# Patient Record
Sex: FEMALE | Race: WHITE | Employment: UNEMPLOYED | URBAN - METROPOLITAN AREA
[De-identification: names, ages, dates, MRNs, and addresses within clinical notes are randomized per-mention and may not be internally consistent; named-entity substitution may affect disease eponyms.]

---

## 2017-01-11 ENCOUNTER — OFFICE VISIT (OUTPATIENT)
Dept: BEHAVIORAL/MENTAL HEALTH CLINIC | Age: 62
End: 2017-01-11

## 2017-01-11 VITALS
DIASTOLIC BLOOD PRESSURE: 73 MMHG | OXYGEN SATURATION: 98 % | WEIGHT: 196 LBS | HEIGHT: 65 IN | HEART RATE: 88 BPM | SYSTOLIC BLOOD PRESSURE: 142 MMHG | BODY MASS INDEX: 32.65 KG/M2

## 2017-01-11 DIAGNOSIS — F31.9 BIPOLAR 1 DISORDER (HCC): Primary | ICD-10-CM

## 2017-01-11 RX ORDER — LORAZEPAM 0.5 MG/1
TABLET ORAL
Qty: 60 TAB | Refills: 5 | Status: SHIPPED | OUTPATIENT
Start: 2017-01-11 | End: 2017-05-26

## 2017-01-11 RX ORDER — LITHIUM CARBONATE 300 MG/1
300 TABLET, FILM COATED, EXTENDED RELEASE ORAL
Qty: 30 TAB | Refills: 5 | Status: SHIPPED | OUTPATIENT
Start: 2017-01-11 | End: 2017-05-26

## 2017-01-11 RX ORDER — SERTRALINE HYDROCHLORIDE 100 MG/1
TABLET, FILM COATED ORAL
Qty: 30 TAB | Refills: 5 | Status: ON HOLD | OUTPATIENT
Start: 2017-01-11 | End: 2017-05-26

## 2017-01-11 RX ORDER — QUETIAPINE FUMARATE 100 MG/1
TABLET, FILM COATED ORAL
Qty: 15 TAB | Refills: 5 | Status: SHIPPED | OUTPATIENT
Start: 2017-01-11 | End: 2017-05-26
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
## 2017-01-13 DIAGNOSIS — I10 ESSENTIAL HYPERTENSION WITH GOAL BLOOD PRESSURE LESS THAN 130/80: ICD-10-CM

## 2017-01-13 RX ORDER — AMLODIPINE BESYLATE 10 MG/1
TABLET ORAL
Qty: 30 TAB | Refills: 0 | Status: SHIPPED | OUTPATIENT
Start: 2017-01-13 | End: 2017-01-18 | Stop reason: SDUPTHER

## 2017-01-18 ENCOUNTER — OFFICE VISIT (OUTPATIENT)
Dept: FAMILY MEDICINE CLINIC | Age: 62
End: 2017-01-18

## 2017-01-18 VITALS
RESPIRATION RATE: 20 BRPM | WEIGHT: 198 LBS | HEART RATE: 75 BPM | OXYGEN SATURATION: 93 % | TEMPERATURE: 97.5 F | BODY MASS INDEX: 32.99 KG/M2 | HEIGHT: 65 IN | DIASTOLIC BLOOD PRESSURE: 67 MMHG | SYSTOLIC BLOOD PRESSURE: 124 MMHG

## 2017-01-18 DIAGNOSIS — F17.210 CIGARETTE NICOTINE DEPENDENCE WITHOUT COMPLICATION: ICD-10-CM

## 2017-01-18 DIAGNOSIS — I10 ESSENTIAL HYPERTENSION WITH GOAL BLOOD PRESSURE LESS THAN 130/80: Primary | ICD-10-CM

## 2017-01-18 DIAGNOSIS — E78.2 HYPERCHOLESTEROLEMIA WITH HYPERTRIGLYCERIDEMIA: ICD-10-CM

## 2017-01-18 DIAGNOSIS — J20.9 ACUTE BRONCHITIS, UNSPECIFIED ORGANISM: ICD-10-CM

## 2017-01-18 DIAGNOSIS — E78.00 HYPERCHOLESTEREMIA: ICD-10-CM

## 2017-01-18 DIAGNOSIS — R73.03 PREDIABETES: ICD-10-CM

## 2017-01-18 RX ORDER — LISINOPRIL 40 MG/1
40 TABLET ORAL DAILY
Qty: 30 TAB | Refills: 1 | Status: SHIPPED | OUTPATIENT
Start: 2017-01-18 | End: 2017-03-15 | Stop reason: SDUPTHER

## 2017-01-18 RX ORDER — AZITHROMYCIN 250 MG/1
TABLET, FILM COATED ORAL
Qty: 6 TAB | Refills: 0 | Status: SHIPPED | OUTPATIENT
Start: 2017-01-18 | End: 2017-01-23

## 2017-01-18 RX ORDER — AMLODIPINE BESYLATE 10 MG/1
10 TABLET ORAL DAILY
Qty: 30 TAB | Refills: 0 | Status: SHIPPED | OUTPATIENT
Start: 2017-01-18 | End: 2017-02-11 | Stop reason: SDUPTHER

## 2017-01-18 RX ORDER — METOPROLOL SUCCINATE 25 MG/1
25 TABLET, EXTENDED RELEASE ORAL DAILY
Qty: 30 TAB | Refills: 1 | Status: SHIPPED | OUTPATIENT
Start: 2017-01-18 | End: 2017-03-15 | Stop reason: SDUPTHER

## 2017-01-18 NOTE — PATIENT INSTRUCTIONS
High Cholesterol: Care Instructions  Your Care Instructions  Cholesterol is a type of fat in your blood. It is needed for many body functions, such as making new cells. Cholesterol is made by your body. It also comes from food you eat. High cholesterol means that you have too much of the fat in your blood. This raises your risk of a heart attack and stroke. LDL and HDL are part of your total cholesterol. LDL is the \"bad\" cholesterol. High LDL can raise your risk for heart disease, heart attack, and stroke. HDL is the \"good\" cholesterol. It helps clear bad cholesterol from the body. High HDL is linked with a lower risk of heart disease, heart attack, and stroke. Your cholesterol levels help your doctor find out your risk for having a heart attack or stroke. You and your doctor can talk about whether you need to lower your risk and what treatment is best for you. A heart-healthy lifestyle along with medicines can help lower your cholesterol and your risk. The way you choose to lower your risk will depend on how high your risk is for heart attack and stroke. It will also depend on how you feel about taking medicines. Follow-up care is a key part of your treatment and safety. Be sure to make and go to all appointments, and call your doctor if you are having problems. It's also a good idea to know your test results and keep a list of the medicines you take. How can you care for yourself at home? · Eat a variety of foods every day. Good choices include fruits, vegetables, whole grains (like oatmeal), dried beans and peas, nuts and seeds, soy products (like tofu), and fat-free or low-fat dairy products. · Replace butter, margarine, and hydrogenated or partially hydrogenated oils with olive and canola oils. (Canola oil margarine without trans fat is fine.)  · Replace red meat with fish, poultry, and soy protein (like tofu). · Limit processed and packaged foods like chips, crackers, and cookies.   · Bake, broil, or steam foods. Don't west them. · Be physically active. Get at least 30 minutes of exercise on most days of the week. Walking is a good choice. You also may want to do other activities, such as running, swimming, cycling, or playing tennis or team sports. · Stay at a healthy weight or lose weight by making the changes in eating and physical activity listed above. Losing just a small amount of weight, even 5 to 10 pounds, can reduce your risk for having a heart attack or stroke. · Do not smoke. When should you call for help? Watch closely for changes in your health, and be sure to contact your doctor if:  · You need help making lifestyle changes. · You have questions about your medicine. Where can you learn more? Go to http://maria victoriaOCP Collectiveignacio.info/. Enter Q096 in the search box to learn more about \"High Cholesterol: Care Instructions. \"  Current as of: January 27, 2016  Content Version: 11.1  © 6531-2213 [employer]. Care instructions adapted under license by [employer] (which disclaims liability or warranty for this information). If you have questions about a medical condition or this instruction, always ask your healthcare professional. [address] any warranty or liability for your use of this information. DASH Diet: Care Instructions  Your Care Instructions  The DASH diet is an eating plan that can help lower your blood pressure. DASH stands for Dietary Approaches to Stop Hypertension. Hypertension is high blood pressure. The DASH diet focuses on eating foods that are high in calcium, potassium, and magnesium. These nutrients can lower blood pressure. The foods that are highest in these nutrients are fruits, vegetables, low-fat dairy products, nuts, seeds, and legumes. But taking calcium, potassium, and magnesium supplements instead of eating foods that are high in those nutrients does not have the same effect.  The DASH diet also includes whole grains, fish, and poultry. The DASH diet is one of several lifestyle changes your doctor may recommend to lower your high blood pressure. Your doctor may also want you to decrease the amount of sodium in your diet. Lowering sodium while following the DASH diet can lower blood pressure even further than just the DASH diet alone. Follow-up care is a key part of your treatment and safety. Be sure to make and go to all appointments, and call your doctor if you are having problems. It's also a good idea to know your test results and keep a list of the medicines you take. How can you care for yourself at home? Following the DASH diet  · Eat 4 to 5 servings of fruit each day. A serving is 1 medium-sized piece of fruit, ½ cup chopped or canned fruit, 1/4 cup dried fruit, or 4 ounces (½ cup) of fruit juice. Choose fruit more often than fruit juice. · Eat 4 to 5 servings of vegetables each day. A serving is 1 cup of lettuce or raw leafy vegetables, ½ cup of chopped or cooked vegetables, or 4 ounces (½ cup) of vegetable juice. Choose vegetables more often than vegetable juice. · Get 2 to 3 servings of low-fat and fat-free dairy each day. A serving is 8 ounces of milk, 1 cup of yogurt, or 1 ½ ounces of cheese. · Eat 6 to 8 servings of grains each day. A serving is 1 slice of bread, 1 ounce of dry cereal, or ½ cup of cooked rice, pasta, or cooked cereal. Try to choose whole-grain products as much as possible. · Limit lean meat, poultry, and fish to 2 servings each day. A serving is 3 ounces, about the size of a deck of cards. · Eat 4 to 5 servings of nuts, seeds, and legumes (cooked dried beans, lentils, and split peas) each week. A serving is 1/3 cup of nuts, 2 tablespoons of seeds, or ½ cup of cooked beans or peas. · Limit fats and oils to 2 to 3 servings each day. A serving is 1 teaspoon of vegetable oil or 2 tablespoons of salad dressing. · Limit sweets and added sugars to 5 servings or less a week. A serving is 1 tablespoon jelly or jam, ½ cup sorbet, or 1 cup of lemonade. · Eat less than 2,300 milligrams (mg) of sodium a day. If you limit your sodium to 1,500 mg a day, you can lower your blood pressure even more. Tips for success  · Start small. Do not try to make dramatic changes to your diet all at once. You might feel that you are missing out on your favorite foods and then be more likely to not follow the plan. Make small changes, and stick with them. Once those changes become habit, add a few more changes. · Try some of the following:  ¨ Make it a goal to eat a fruit or vegetable at every meal and at snacks. This will make it easy to get the recommended amount of fruits and vegetables each day. ¨ Try yogurt topped with fruit and nuts for a snack or healthy dessert. ¨ Add lettuce, tomato, cucumber, and onion to sandwiches. ¨ Combine a ready-made pizza crust with low-fat mozzarella cheese and lots of vegetable toppings. Try using tomatoes, squash, spinach, broccoli, carrots, cauliflower, and onions. ¨ Have a variety of cut-up vegetables with a low-fat dip as an appetizer instead of chips and dip. ¨ Sprinkle sunflower seeds or chopped almonds over salads. Or try adding chopped walnuts or almonds to cooked vegetables. ¨ Try some vegetarian meals using beans and peas. Add garbanzo or kidney beans to salads. Make burritos and tacos with mashed marques beans or black beans. Where can you learn more? Go to http://maria victoria-ignacio.info/. Enter Z098 in the search box to learn more about \"DASH Diet: Care Instructions. \"  Current as of: March 23, 2016  Content Version: 11.1  © 4952-2840 [employer]. Care instructions adapted under license by [employer] (which disclaims liability or warranty for this information).  If you have questions about a medical condition or this instruction, always ask your healthcare professional. Vignesh Ferrer disclaims any warranty or liability for your use of this information. Stopping Smoking: Care Instructions  Your Care Instructions  Cigarette smokers crave the nicotine in cigarettes. Giving it up is much harder than simply changing a habit. Your body has to stop craving the nicotine. It is hard to quit, but you can do it. There are many tools that people use to quit smoking. You may find that combining tools works best for you. There are several steps to quitting. First you get ready to quit. Then you get support to help you. After that, you learn new skills and behaviors to become a nonsmoker. For many people, a necessary step is getting and using medicine. Your doctor will help you set up the plan that best meets your needs. You may want to attend a smoking cessation program to help you quit smoking. When you choose a program, look for one that has proven success. Ask your doctor for ideas. You will greatly increase your chances of success if you take medicine as well as get counseling or join a cessation program.  Some of the changes you feel when you first quit tobacco are uncomfortable. Your body will miss the nicotine at first, and you may feel short-tempered and grumpy. You may have trouble sleeping or concentrating. Medicine can help you deal with these symptoms. You may struggle with changing your smoking habits and rituals. The last step is the tricky one: Be prepared for the smoking urge to continue for a time. This is a lot to deal with, but keep at it. You will feel better. Follow-up care is a key part of your treatment and safety. Be sure to make and go to all appointments, and call your doctor if you are having problems. Its also a good idea to know your test results and keep a list of the medicines you take. How can you care for yourself at home? · Ask your family, friends, and coworkers for support. You have a better chance of quitting if you have help and support.   · Join a support group, such as Nicotine Anonymous, for people who are trying to quit smoking. · Consider signing up for a smoking cessation program, such as the American Lung Association's Freedom from Smoking program.  · Set a quit date. Pick your date carefully so that it is not right in the middle of a big deadline or stressful time. Once you quit, do not even take a puff. Get rid of all ashtrays and lighters after your last cigarette. Clean your house and your clothes so that they do not smell of smoke. · Learn how to be a nonsmoker. Think about ways you can avoid those things that make you reach for a cigarette. ¨ Avoid situations that put you at greatest risk for smoking. For some people, it is hard to have a drink with friends without smoking. For others, they might skip a coffee break with coworkers who smoke. ¨ Change your daily routine. Take a different route to work or eat a meal in a different place. · Cut down on stress. Calm yourself or release tension by doing an activity you enjoy, such as reading a book, taking a hot bath, or gardening. · Talk to your doctor or pharmacist about nicotine replacement therapy, which replaces the nicotine in your body. You still get nicotine but you do not use tobacco. Nicotine replacement products help you slowly reduce the amount of nicotine you need. These products come in several forms, many of them available over-the-counter:  ¨ Nicotine patches  ¨ Nicotine gum and lozenges  ¨ Nicotine inhaler  · Ask your doctor about bupropion (Wellbutrin) or varenicline (Chantix), which are prescription medicines. They do not contain nicotine. They help you by reducing withdrawal symptoms, such as stress and anxiety. · Some people find hypnosis, acupuncture, and massage helpful for ending the smoking habit. · Eat a healthy diet and get regular exercise. Having healthy habits will help your body move past its craving for nicotine. · Be prepared to keep trying.  Most people are not successful the first few times they try to quit. Do not get mad at yourself if you smoke again. Make a list of things you learned and think about when you want to try again, such as next week, next month, or next year. Where can you learn more? Go to http://maria victoria-ignacio.info/. Enter Z719 in the search box to learn more about \"Stopping Smoking: Care Instructions. \"  Current as of: May 26, 2016  Content Version: 11.1  © 1366-9497 [employer]. Care instructions adapted under license by [employer] (which disclaims liability or warranty for this information). If you have questions about a medical condition or this instruction, always ask your healthcare professional. [address] any warranty or liability for your use of this information. Bronchitis: Care Instructions  Your Care Instructions    Bronchitis is inflammation of the bronchial tubes, which carry air to the lungs. The tubes swell and produce mucus, or phlegm. The mucus and inflamed bronchial tubes make you cough. You may have trouble breathing. Most cases of bronchitis are caused by viruses like those that cause colds. Antibiotics usually do not help and they may be harmful. Bronchitis usually develops rapidly and lasts about 2 to 3 weeks in otherwise healthy people. Follow-up care is a key part of your treatment and safety. Be sure to make and go to all appointments, and call your doctor if you are having problems. It's also a good idea to know your test results and keep a list of the medicines you take. How can you care for yourself at home? · Take all medicines exactly as prescribed. Call your doctor if you think you are having a problem with your medicine. · Get some extra rest.  · Take an over-the-counter pain medicine, such as acetaminophen (Tylenol), ibuprofen (Advil, Motrin), or naproxen (Aleve) to reduce fever and relieve body aches. Read and follow all instructions on the label.   · Do not take two or more pain medicines at the same time unless the doctor told you to. Many pain medicines have acetaminophen, which is Tylenol. Too much acetaminophen (Tylenol) can be harmful. · Take an over-the-counter cough medicine that contains dextromethorphan to help quiet a dry, hacking cough so that you can sleep. Avoid cough medicines that have more than one active ingredient. Read and follow all instructions on the label. · Breathe moist air from a humidifier, hot shower, or sink filled with hot water. The heat and moisture will thin mucus so you can cough it out. · Do not smoke. Smoking can make bronchitis worse. If you need help quitting, talk to your doctor about stop-smoking programs and medicines. These can increase your chances of quitting for good. When should you call for help? Call 911 anytime you think you may need emergency care. For example, call if:  · You have severe trouble breathing. Call your doctor now or seek immediate medical care if:  · You have new or worse trouble breathing. · You cough up dark brown or bloody mucus (sputum). · You have a new or higher fever. · You have a new rash. Watch closely for changes in your health, and be sure to contact your doctor if:  · You cough more deeply or more often, especially if you notice more mucus or a change in the color of your mucus. · You are not getting better as expected. Where can you learn more? Go to http://maria victoria-ignacio.info/. Enter H333 in the search box to learn more about \"Bronchitis: Care Instructions. \"  Current as of: May 23, 2016  Content Version: 11.1  © 9862-9027 [employer]. Care instructions adapted under license by [employer] (which disclaims liability or warranty for this information).  If you have questions about a medical condition or this instruction, always ask your healthcare professional. [place] [address] any warranty or liability for your use of this information.

## 2017-01-18 NOTE — PROGRESS NOTES
Chief Complaint   Patient presents with    Hypertension     1 month follow up      1. Have you been to the ER, urgent care clinic since your last visit? Hospitalized since your last visit? Yes psychiatry     2. Have you seen or consulted any other health care providers outside of the [address] [place] [address] since your last visit? Include any pap smears or colon screening.  No

## 2017-01-18 NOTE — MR AVS SNAPSHOT
Visit Information Date & Time Provider Department Dept. Phone Encounter #  
 1/18/2017 10:00 AM Robb Meza, RO[facility] [address] \[facility]\"" Primary Care [phone] [id number] Follow-up Instructions Return in about 3 months (around 4/18/2017) for f/u htn, lipids. Your Appointments 7/12/2017 11:00 AM  
ESTABLISHED PATIENT with Araseli Carcamo NP [facility] ([address] [facility]) Appt Note: 6 month follow-up [address] [facility] [address] [facility] [address] [address]  
  
   
 [address] [facility] [address] [facility] [address] [facility] [id number] Upcoming Health Maintenance Date Due Hepatitis C Screening 1955 Pneumococcal 19-64 Medium Risk (1 of 1 - PPSV23) 11/16/1975 DTaP/Tdap/Td series (1 - Tdap) 11/16/1977 PAP AKA CERVICAL CYTOLOGY 11/16/1977 BREAST CANCER SCRN MAMMOGRAM 11/16/2006 FOBT Q 1 YEAR AGE 50-75 11/16/2006 INFLUENZA AGE 9 TO ADULT 8/1/2016 ZOSTER VACCINE AGE 60> 11/16/2016 Allergies as of 1/18/2017  Review Complete On: 1/18/2017 By: Robb Meza NP No Known Allergies Current Immunizations  Never Reviewed No immunizations on file. Not reviewed this visit You Were Diagnosed With   
  
 Codes Comments Essential hypertension with goal blood pressure less than 130/80    -  Primary ICD-10-CM: I10 
ICD-9-CM: 401.9 Hypercholesterolemia with hypertriglyceridemia     ICD-10-CM: E78.2 ICD-9-CM: 272.2 Prediabetes     ICD-10-CM: R73.03 
ICD-9-CM: 790.29 Acute bronchitis, unspecified organism     ICD-10-CM: J20.9 ICD-9-CM: 466.0 Cigarette nicotine dependence without complication     ZDT-36-FD: F17.210 ICD-9-CM: 305.1 Vitals BP Pulse Temp Resp Height(growth percentile) Weight(growth percentile) 124/67 (BP 1 Location: Right arm, BP Patient Position: Sitting) 75 97.5 °F (36.4 °C) (Oral) 20 5' 5\" (1.651 m) 198 lb (89.8 kg) SpO2 BMI OB Status Smoking Status 93% 32.95 kg/m2 Postmenopausal Current Every Day Smoker Vitals History BMI and BSA Data Body Mass Index Body Surface Area 32.95 kg/m 2 2.03 m 2 Preferred Pharmacy Pharmacy Name Phone [facility] [place] [facility] [address], [address] [phone] Your Updated Medication List  
  
   
This list is accurate as of: 1/18/17 10:20 AM.  Always use your most recent med list. amLODIPine 10 mg tablet Commonly known as:  Elspeth Bakes Take 1 Tab by mouth daily. atorvastatin 20 mg tablet Commonly known as:  LIPITOR Take 1 Tab by mouth daily. azithromycin 250 mg tablet Commonly known as:  Ernestina Sheriff Take 2 tablets today, then take 1 tablet daily GAS-X 80 mg chewable tablet Generic drug:  simethicone Take 80 mg by mouth every six (6) hours as needed. guaiFENesin-dextromethorphan 600-30 mg per tablet Commonly known as:  Benoit & Benoit DM Take 1 Tab by mouth two (2) times a day for 5 days. lisinopril 40 mg tablet Commonly known as:  Celestina Primmer Take 1 Tab by mouth daily. lithium carbonate  mg CR tablet Commonly known as:  Carles Poot Take 1 Tab by mouth nightly. LORazepam 0.5 mg tablet Commonly known as:  ATIVAN  
TAKE ONE TABLET BY MOUTH TWICE A DAY FOR ANXIETY  
  
 metoprolol succinate 25 mg XL tablet Commonly known as:  TOPROL-XL Take 1 Tab by mouth daily. mupirocin 2 % ointment Commonly known as:  [facility] Apply  to affected area daily. QUEtiapine 100 mg tablet Commonly known as:  SEROquel TAKE ONE-HALF TABLET BY MOUTH EVERY NIGHT AT BEDTIME  
  
 sertraline 100 mg tablet Commonly known as:  ZOLOFT  
TAKE ONE TABLET BY MOUTH DAILY  
  
 triamcinolone acetonide 0.025 % topical cream  
Commonly known as:  KENALOG Apply  to affected area two (2) times a day. use thin layer VITAMIN D3 1,000 unit tablet Generic drug:  cholecalciferol Take 1,000 Units by mouth daily. Prescriptions Sent to Pharmacy Refills  
 amLODIPine (NORVASC) 10 mg tablet 0 Sig: Take 1 Tab by mouth daily. Class: Normal  
 Pharmacy: [address] Ph #: [phone] Route: Oral  
 metoprolol succinate (TOPROL-XL) 25 mg XL tablet 1 Sig: Take 1 Tab by mouth daily. Class: Normal  
 Pharmacy: [address] Ph #: [phone] Route: Oral  
 lisinopril (PRINIVIL, ZESTRIL) 40 mg tablet 1 Sig: Take 1 Tab by mouth daily. Class: Normal  
 Pharmacy: [address] Ph #: [phone] Route: Oral  
 azithromycin (ZITHROMAX) 250 mg tablet 0 Sig: Take 2 tablets today, then take 1 tablet daily Class: Normal  
 Pharmacy: [address] Ph #: [phone]  
 guaiFENesin-dextromethorphan (MUCINEX DM) 600-30 mg per tablet 0 Sig: Take 1 Tab by mouth two (2) times a day for 5 days. Class: Normal  
 Pharmacy: [address] Ph #: [phone] Route: Oral  
  
We Performed the Following LIPID PANEL [43606 CPT(R)] METABOLIC PANEL, COMPREHENSIVE [17033 CPT(R)] Follow-up Instructions Return in about 3 months (around 4/18/2017) for f/u htn, lipids. Patient Instructions High Cholesterol: Care Instructions Your Care Instructions Cholesterol is a type of fat in your blood. It is needed for many body functions, such as making new cells. Cholesterol is made by your body. It also comes from food you eat. High cholesterol means that you have too much of the fat in your blood. This raises your risk of a heart attack and stroke. LDL and HDL are part of your total cholesterol. LDL is the \"bad\" cholesterol.  High LDL can raise your risk for heart disease, heart attack, and stroke. HDL is the \"good\" cholesterol. It helps clear bad cholesterol from the body. High HDL is linked with a lower risk of heart disease, heart attack, and stroke. Your cholesterol levels help your doctor find out your risk for having a heart attack or stroke. You and your doctor can talk about whether you need to lower your risk and what treatment is best for you. A heart-healthy lifestyle along with medicines can help lower your cholesterol and your risk. The way you choose to lower your risk will depend on how high your risk is for heart attack and stroke. It will also depend on how you feel about taking medicines. Follow-up care is a key part of your treatment and safety. Be sure to make and go to all appointments, and call your doctor if you are having problems. It's also a good idea to know your test results and keep a list of the medicines you take. How can you care for yourself at home? · Eat a variety of foods every day. Good choices include fruits, vegetables, whole grains (like oatmeal), dried beans and peas, nuts and seeds, soy products (like tofu), and fat-free or low-fat dairy products. · Replace butter, margarine, and hydrogenated or partially hydrogenated oils with olive and canola oils. (Canola oil margarine without trans fat is fine.) · Replace red meat with fish, poultry, and soy protein (like tofu). · Limit processed and packaged foods like chips, crackers, and cookies. · Bake, broil, or steam foods. Don't west them. · Be physically active. Get at least 30 minutes of exercise on most days of the week. Walking is a good choice. You also may want to do other activities, such as running, swimming, cycling, or playing tennis or team sports. · Stay at a healthy weight or lose weight by making the changes in eating and physical activity listed above. Losing just a small amount of weight, even 5 to 10 pounds, can reduce your risk for having a heart attack or stroke. · Do not smoke. When should you call for help? Watch closely for changes in your health, and be sure to contact your doctor if: 
· You need help making lifestyle changes. · You have questions about your medicine. Where can you learn more? Go to http://maria victoria-ignacio.info/. Enter D477 in the search box to learn more about \"High Cholesterol: Care Instructions. \" Current as of: January 27, 2016 Content Version: 11.1 © 2006-2016 [employer]. Care instructions adapted under license by [employer] (which disclaims liability or warranty for this information). If you have questions about a medical condition or this instruction, always ask your healthcare professional. [place] [address] any warranty or liability for your use of this information. DASH Diet: Care Instructions Your Care Instructions The DASH diet is an eating plan that can help lower your blood pressure. DASH stands for Dietary Approaches to Stop Hypertension. Hypertension is high blood pressure. The DASH diet focuses on eating foods that are high in calcium, potassium, and magnesium. These nutrients can lower blood pressure. The foods that are highest in these nutrients are fruits, vegetables, low-fat dairy products, nuts, seeds, and legumes. But taking calcium, potassium, and magnesium supplements instead of eating foods that are high in those nutrients does not have the same effect. The DASH diet also includes whole grains, fish, and poultry. The DASH diet is one of several lifestyle changes your doctor may recommend to lower your high blood pressure. Your doctor may also want you to decrease the amount of sodium in your diet. Lowering sodium while following the DASH diet can lower blood pressure even further than just the DASH diet alone. Follow-up care is a key part of your treatment and safety.  Be sure to make and go to all appointments, and call your doctor if you are having problems. It's also a good idea to know your test results and keep a list of the medicines you take. How can you care for yourself at home? Following the DASH diet · Eat 4 to 5 servings of fruit each day. A serving is 1 medium-sized piece of fruit, ½ cup chopped or canned fruit, 1/4 cup dried fruit, or 4 ounces (½ cup) of fruit juice. Choose fruit more often than fruit juice. · Eat 4 to 5 servings of vegetables each day. A serving is 1 cup of lettuce or raw leafy vegetables, ½ cup of chopped or cooked vegetables, or 4 ounces (½ cup) of vegetable juice. Choose vegetables more often than vegetable juice. · Get 2 to 3 servings of low-fat and fat-free dairy each day. A serving is 8 ounces of milk, 1 cup of yogurt, or 1 ½ ounces of cheese. · Eat 6 to 8 servings of grains each day. A serving is 1 slice of bread, 1 ounce of dry cereal, or ½ cup of cooked rice, pasta, or cooked cereal. Try to choose whole-grain products as much as possible. · Limit lean meat, poultry, and fish to 2 servings each day. A serving is 3 ounces, about the size of a deck of cards. · Eat 4 to 5 servings of nuts, seeds, and legumes (cooked dried beans, lentils, and split peas) each week. A serving is 1/3 cup of nuts, 2 tablespoons of seeds, or ½ cup of cooked beans or peas. · Limit fats and oils to 2 to 3 servings each day. A serving is 1 teaspoon of vegetable oil or 2 tablespoons of salad dressing. · Limit sweets and added sugars to 5 servings or less a week. A serving is 1 tablespoon jelly or jam, ½ cup sorbet, or 1 cup of lemonade. · Eat less than 2,300 milligrams (mg) of sodium a day. If you limit your sodium to 1,500 mg a day, you can lower your blood pressure even more. Tips for success · Start small. Do not try to make dramatic changes to your diet all at once. You might feel that you are missing out on your favorite foods and then be more likely to not follow the plan.  Make small changes, and stick with them. Once those changes become habit, add a few more changes. · Try some of the following: ¨ Make it a goal to eat a fruit or vegetable at every meal and at snacks. This will make it easy to get the recommended amount of fruits and vegetables each day. ¨ Try yogurt topped with fruit and nuts for a snack or healthy dessert. ¨ Add lettuce, tomato, cucumber, and onion to sandwiches. ¨ Combine a ready-made pizza crust with low-fat mozzarella cheese and lots of vegetable toppings. Try using tomatoes, squash, spinach, broccoli, carrots, cauliflower, and onions. ¨ Have a variety of cut-up vegetables with a low-fat dip as an appetizer instead of chips and dip. ¨ Sprinkle sunflower seeds or chopped almonds over salads. Or try adding chopped walnuts or almonds to cooked vegetables. ¨ Try some vegetarian meals using beans and peas. Add garbanzo or kidney beans to salads. Make burritos and tacos with mashed marques beans or black beans. Where can you learn more? Go to http://maria [email or web address].info/. Enter T511 in the search box to learn more about \"DASH Diet: Care Instructions. \" Current as of: March 23, 2016 Content Version: 11.1 © 1596-3207 [employer]. Care instructions adapted under license by "[employer] (which disclaims liability or warranty for this information). If you have questions about a medical condition or this instruction, always ask your healthcare professional. [place] [address] any warranty or liability for your use of this information. Stopping Smoking: Care Instructions Your Care Instructions Cigarette smokers crave the nicotine in cigarettes. Giving it up is much harder than simply changing a habit. Your body has to stop craving the nicotine. It is hard to quit, but you can do it. There are many tools that people use to quit smoking. You may find that combining tools works best for you. There are several steps to quitting. First you get ready to quit. Then you get support to help you. After that, you learn new skills and behaviors to become a nonsmoker. For many people, a necessary step is getting and using medicine. Your doctor will help you set up the plan that best meets your needs. You may want to attend a smoking cessation program to help you quit smoking. When you choose a program, look for one that has proven success. Ask your doctor for ideas. You will greatly increase your chances of success if you take medicine as well as get counseling or join a cessation program. 
Some of the changes you feel when you first quit tobacco are uncomfortable. Your body will miss the nicotine at first, and you may feel short-tempered and grumpy. You may have trouble sleeping or concentrating. Medicine can help you deal with these symptoms. You may struggle with changing your smoking habits and rituals. The last step is the tricky one: Be prepared for the smoking urge to continue for a time. This is a lot to deal with, but keep at it. You will feel better. Follow-up care is a key part of your treatment and safety. Be sure to make and go to all appointments, and call your doctor if you are having problems. Its also a good idea to know your test results and keep a list of the medicines you take. How can you care for yourself at home? · Ask your family, friends, and coworkers for support. You have a better chance of quitting if you have help and support. · Join a support group, such as Nicotine Anonymous, for people who are trying to quit smoking. · Consider signing up for a smoking cessation program, such as the American Lung Association's Freedom from Smoking program. 
· Set a quit date. Pick your date carefully so that it is not right in the middle of a big deadline or stressful time. Once you quit, do not even take a puff.  Get rid of all ashtrays and lighters after your last cigarette. Clean your house and your clothes so that they do not smell of smoke. · Learn how to be a nonsmoker. Think about ways you can avoid those things that make you reach for a cigarette. ¨ Avoid situations that put you at greatest risk for smoking. For some people, it is hard to have a drink with friends without smoking. For others, they might skip a coffee break with coworkers who smoke. ¨ Change your daily routine. Take a different route to work or eat a meal in a different place. · Cut down on stress. Calm yourself or release tension by doing an activity you enjoy, such as reading a book, taking a hot bath, or gardening. · Talk to your doctor or pharmacist about nicotine replacement therapy, which replaces the nicotine in your body. You still get nicotine but you do not use tobacco. Nicotine replacement products help you slowly reduce the amount of nicotine you need. These products come in several forms, many of them available over-the-counter: ¨ Nicotine patches ¨ Nicotine gum and lozenges ¨ Nicotine inhaler · Ask your doctor about bupropion (Wellbutrin) or varenicline (Chantix), which are prescription medicines. They do not contain nicotine. They help you by reducing withdrawal symptoms, such as stress and anxiety. · Some people find hypnosis, acupuncture, and massage helpful for ending the smoking habit. · Eat a healthy diet and get regular exercise. Having healthy habits will help your body move past its craving for nicotine. · Be prepared to keep trying. Most people are not successful the first few times they try to quit. Do not get mad at yourself if you smoke again. Make a list of things you learned and think about when you want to try again, such as next week, next month, or next year. Where can you learn more? Go to http://maria victoria-ignacio.info/. Enter J536 in the search box to learn more about \"Stopping Smoking: Care Instructions. \" Current as of: May 26, 2016 Content Version: 11.1 © 2840-8036 [employer]. Care instructions adapted under license by [employer] (which disclaims liability or warranty for this information). If you have questions about a medical condition or this instruction, always ask your healthcare professional. [address] any warranty or liability for your use of this information. Bronchitis: Care Instructions Your Care Instructions Bronchitis is inflammation of the bronchial tubes, which carry air to the lungs. The tubes swell and produce mucus, or phlegm. The mucus and inflamed bronchial tubes make you cough. You may have trouble breathing. Most cases of bronchitis are caused by viruses like those that cause colds. Antibiotics usually do not help and they may be harmful. Bronchitis usually develops rapidly and lasts about 2 to 3 weeks in otherwise healthy people. Follow-up care is a key part of your treatment and safety. Be sure to make and go to all appointments, and call your doctor if you are having problems. It's also a good idea to know your test results and keep a list of the medicines you take. How can you care for yourself at home? · Take all medicines exactly as prescribed. Call your doctor if you think you are having a problem with your medicine. · Get some extra rest. 
· Take an over-the-counter pain medicine, such as acetaminophen (Tylenol), ibuprofen (Advil, Motrin), or naproxen (Aleve) to reduce fever and relieve body aches. Read and follow all instructions on the label. · Do not take two or more pain medicines at the same time unless the doctor told you to. Many pain medicines have acetaminophen, which is Tylenol. Too much acetaminophen (Tylenol) can be harmful. · Take an over-the-counter cough medicine that contains dextromethorphan to help quiet a dry, hacking cough so that you can sleep.  Avoid cough medicines that have more than one active ingredient. Read and follow all instructions on the label. · Breathe moist air from a humidifier, hot shower, or sink filled with hot water. The heat and moisture will thin mucus so you can cough it out. · Do not smoke. Smoking can make bronchitis worse. If you need help quitting, talk to your doctor about stop-smoking programs and medicines. These can increase your chances of quitting for good. When should you call for help? Call 911 anytime you think you may need emergency care. For example, call if: 
· You have severe trouble breathing. Call your doctor now or seek immediate medical care if: 
· You have new or worse trouble breathing. · You cough up dark brown or bloody mucus (sputum). · You have a new or higher fever. · You have a new rash. Watch closely for changes in your health, and be sure to contact your doctor if: 
· You cough more deeply or more often, especially if you notice more mucus or a change in the color of your mucus. · You are not getting better as expected. Where can you learn more? Go to http://maria victoria-ignacio.info/. Enter H333 in the search box to learn more about \"Bronchitis: Care Instructions. \" Current as of: May 23, 2016 Content Version: 11.1 © 4859-6060 Bookmycab, Inc[employer]. Care instructions adapted under license by gumi (which disclaims liability or warranty for this information). If you have questions about a medical condition or this instruction, always ask your healthcare professional. [place] [address] any warranty or liability for your use of this information. Introducing [facility]! [facility] [facility] introduces [employer] patient portal. Now you can access parts of your medical record, email your doctor's office, and request medication refills online. 1. In your internet browser, go to [email or web address] 2. Click on the First Time User? Click Here link in the Sign In box. You will see the New Member Sign Up page. 3. Enter your Ziegler Access Code exactly as it appears below. You will not need to use this code after youve completed the sign-up process. If you do not sign up before the expiration date, you must request a new code. · Ziegler Access Code: [email or web address] Expires: 4/18/2017 10:20 AM 
 
4. Enter the last four digits of your Social Security Number (xxxx) and Date of Birth (mm/dd/yyyy) as indicated and click Submit. You will be taken to the next sign-up page. 5. Create a Ziegler ID. This will be your Ziegler login ID and cannot be changed, so think of one that is secure and easy to remember. 6. Create a Ziegler password. You can change your password at any time. 7. Enter your Password Reset Question and Answer. This can be used at a later time if you forget your password. 8. Enter your e-mail address. You will receive e-mail notification when new information is available in [address] 9. Click Sign Up. You can now view and download portions of your medical record. 10. Click the Download Summary menu link to download a portable copy of your medical information. If you have questions, please visit the Frequently Asked Questions section of the Ziegler website. Remember, Ziegler is NOT to be used for urgent needs. For medical emergencies, dial 911. Now available from your iPhone and Android! Please provide this summary of care documentation to your next provider. Your primary care clinician is listed as Chidi Sifuentes. If you have any questions after today's visit, please call [phone].

## 2017-01-18 NOTE — PROGRESS NOTES
Subjective:     Sydnee Dick is a 62 y.o. female who presents for follow up of hypertension and hyperlipidemia. History is obtained from patient and . Diet and Lifestyle: not attempting to follow a low fat, low cholesterol diet, not attempting to follow a low sodium diet, sedentary, smoker 1/2 ppd  Home BP Monitoring: is not measured at home    Cardiovascular ROS: taking medications as instructed, no medication side effects noted, no TIA's, no chest pain on exertion, no dyspnea on exertion, no swelling of ankles. New concerns: c/o cough productive for green sputum, with headache and purulent nasal discharge, possibly some wheezing, x 2 weeks. No fever or chills.  states he was instructed to throw away all of her albuterol inhalers during her last hospital admission. Psychiatric symptoms stable, doing well on current medications. Denies psychosis. Has continued regular f/u visits with her mental health provider FATOUMATA Reid NP. Back on seroquel with improved mood and sleep. Patient Active Problem List   Diagnosis Code    Gallstones K80.20    Common bile duct calculus K80.50    Hydradenitis L73.2    Abscess of buttock L02.31    Bipolar 1 disorder (HCC) F31.9    Essential hypertension with goal blood pressure less than 130/80 I10    Prediabetes R73.03     No Known Allergies  Past Medical History   Diagnosis Date    Abscess of buttock 7/23/2012    Bronchitis     Common bile duct calculus 7/23/2012    Gallstones 7/12/2012    GERD (gastroesophageal reflux disease)     High cholesterol     Hypercholesterolemia     Hypertension     Ill-defined condition      missing upper front tooth    Ill-defined condition      lower leg bilateral reddened rash.  Insomnia     Other ill-defined conditions(799.89)      Large boil under right arm-pit.     Paranoia ([facility] [address]     Psychiatric disorder      bipolar     Past Surgical History   Procedure Laterality Date    Pr abdomen surgery proc unlisted       cholecystectomy 7/23/2012     Family History   Problem Relation Age of Onset    Cancer Father      lung    Diabetes Brother     Heart Disease Brother     Malignant Hyperthermia Neg Hx     Pseudocholinesterase Deficiency Neg Hx     Delayed Awakening Neg Hx     Post-op Nausea/Vomiting Neg Hx     Emergence Delirium Neg Hx     Post-op Cognitive Dysfunction Neg Hx     Other Neg Hx      Social History   Substance Use Topics    Smoking status: Current Every Day Smoker     Packs/day: 0.50    Smokeless tobacco: Never Used    Alcohol use No        Lab Results  Component Value Date/Time   Hemoglobin A1c 5.9 11/02/2016 10:29 AM   Hemoglobin A1c 6.0 05/31/2016 02:13 PM   Glucose 98 11/02/2016 10:29 AM   Glucose (POC) 115 10/31/2011 08:08 PM   LDL, calculated 281 11/02/2016 10:29 AM   Creatinine (POC) 0.6 10/31/2011 08:08 PM   Creatinine 0.90 11/02/2016 10:29 AM      Lab Results  Component Value Date/Time   Cholesterol, total 399 11/02/2016 10:29 AM   HDL Cholesterol 44 11/02/2016 10:29 AM   LDL, calculated 281 11/02/2016 10:29 AM   Triglyceride 368 11/02/2016 10:29 AM       Lab Results  Component Value Date/Time   ALT 16 11/02/2016 10:29 AM   AST 15 11/02/2016 10:29 AM   Alk. phosphatase 98 11/02/2016 10:29 AM   Bilirubin, direct 0.1 10/31/2011 08:05 PM   Bilirubin, total 0.2 11/02/2016 10:29 AM       Lab Results  Component Value Date/Time   GFR est AA 81 11/02/2016 10:29 AM   GFR est non-AA 70 11/02/2016 10:29 AM   Creatinine (POC) 0.6 10/31/2011 08:08 PM   Creatinine 0.90 11/02/2016 10:29 AM   BUN 16 11/02/2016 10:29 AM   BUN (POC) 12 10/31/2011 08:08 PM   Sodium (POC) 136 10/31/2011 08:08 PM   Sodium 137 11/02/2016 10:29 AM   Potassium 4.6 11/02/2016 10:29 AM   Potassium (POC) 3.6 10/31/2011 08:08 PM   Chloride (POC) 102 10/31/2011 08:08 PM   Chloride 99 11/02/2016 10:29 AM   CO2 23 11/02/2016 10:29 AM         Review of Systems, additional:  Pertinent items are noted in HPI.     Objective:     Visit Vitals    /67 (BP 1 Location: Right arm, BP Patient Position: Sitting)    Pulse 75    Temp 97.5 °F (36.4 °C) (Oral)    Resp 20    Ht 5' 5\" (1.651 m)    Wt 198 lb (89.8 kg)    SpO2 93%    BMI 32.95 kg/m2     Appearance: alert, well appearing, and in no distress, oriented to person, place, and time and overweight. General exam: CVS exam BP noted to be well controlled today in office, S1, S2 normal, no gallop, no murmur, chest clear, no JVD, no HSM, no edema, peripheral vascular exam both carotids normal upstroke without bruits, neurological exam alert, oriented, normal speech, no focal findings or movement disorder noted. Lab review: labs reviewed, I note that glycosylated hemoglobin elevated in prediabetes range, lipids LDL result does not yet meet goal, HDL normal, triglycerides high, renal functions normal, liver functions normal.     Assessment/Plan:     hypertension well controlled, hyperlipidemia poorly controlled. reviewed diet, exercise and weight control  very strongly urged to quit smoking to reduce cardiovascular risk  copy of written low fat low cholesterol diet provided and reviewed  cardiovascular risk and specific lipid/LDL goals reviewed  reviewed medications and side effects in detail. [place]into was seen today for hypertension and cough. Diagnoses and all orders for this visit:    Essential hypertension with goal blood pressure less than 130/80  At goal  Continue current regimen  Weight loss  Increase exercise  Smoking cessation  DASH diet  -     METABOLIC PANEL, COMPREHENSIVE  -     amLODIPine (NORVASC) 10 mg tablet; Take 1 Tab by mouth daily. -     metoprolol succinate (TOPROL-XL) 25 mg XL tablet; Take 1 Tab by mouth daily. -     lisinopril (PRINIVIL, ZESTRIL) 40 mg tablet; Take 1 Tab by mouth daily.     Hypercholesterolemia with hypertriglyceridemia  TLC Diet:  -- Saturated fat <7% of calories, cholesterol <200 mg/day  -- Consider increased viscous (soluble) fiber (10-25 g/day) and plant stanols/sterols  (2g/day) as therapeutic options to enhance LDL lowering  Weight management  Increased physical activity  Continue statin, adjust dose pending labs  -     METABOLIC PANEL, COMPREHENSIVE  -     LIPID PANEL    Prediabetes  Weight loss  Decrease sugars and simple carbs    Acute bronchitis, unspecified organism  Add Rx  -     azithromycin (ZITHROMAX) 250 mg tablet; Take 2 tablets today, then take 1 tablet daily  -     guaiFENesin-dextromethorphan (MUCINEX DM) 600-30 mg per tablet; Take 1 Tab by mouth two (2) times a day for 5 days. Cigarette nicotine dependence without complication  Continue wean to quit  Continue nicotine patches  We have set a goal to quit by next f/u visit    Follow-up Disposition:  Return in about 3 months (around 4/18/2017) for f/u htn, lipids. I have discussed the diagnosis with the patient and the intended plan as seen in the above orders. The patient has received an after-visit summary and questions were answered concerning future plans. Patient conveyed understanding of the plan at the time of the visit.     Ej Israel NP  01/18/17

## 2017-01-19 LAB
ALBUMIN SERPL-MCNC: 4.5 G/DL (ref 3.6–4.8)
ALBUMIN/GLOB SERPL: 1.4 {RATIO} (ref 1.1–2.5)
ALP SERPL-CCNC: 98 IU/L (ref 39–117)
ALT SERPL-CCNC: 17 IU/L (ref 0–32)
AST SERPL-CCNC: 15 IU/L (ref 0–40)
BILIRUB SERPL-MCNC: 0.3 MG/DL (ref 0–1.2)
BUN SERPL-MCNC: 12 MG/DL (ref 8–27)
BUN/CREAT SERPL: 14 (ref 11–26)
CALCIUM SERPL-MCNC: 9.6 MG/DL (ref 8.7–10.3)
CHLORIDE SERPL-SCNC: 100 MMOL/L (ref 96–106)
CHOLEST SERPL-MCNC: 244 MG/DL (ref 100–199)
CO2 SERPL-SCNC: 23 MMOL/L (ref 18–29)
CREAT SERPL-MCNC: 0.85 MG/DL (ref 0.57–1)
GLOBULIN SER CALC-MCNC: 3.3 G/DL (ref 1.5–4.5)
GLUCOSE SERPL-MCNC: 107 MG/DL (ref 65–99)
HDLC SERPL-MCNC: 47 MG/DL
INTERPRETATION, 910389: NORMAL
LDLC SERPL CALC-MCNC: ABNORMAL MG/DL (ref 0–99)
POTASSIUM SERPL-SCNC: 4.7 MMOL/L (ref 3.5–5.2)
PROT SERPL-MCNC: 7.8 G/DL (ref 6–8.5)
SODIUM SERPL-SCNC: 138 MMOL/L (ref 134–144)
TRIGL SERPL-MCNC: 420 MG/DL (ref 0–149)
VLDLC SERPL CALC-MCNC: ABNORMAL MG/DL (ref 5–40)

## 2017-01-19 RX ORDER — ATORVASTATIN CALCIUM 40 MG/1
40 TABLET, FILM COATED ORAL DAILY
Qty: 30 TAB | Refills: 1 | Status: SHIPPED | OUTPATIENT
Start: 2017-01-19 | End: 2017-03-15 | Stop reason: SDUPTHER

## 2017-01-19 NOTE — PROGRESS NOTES
Total cholesterol improved by triglycerides are worse. Need to decrease sugar intake (sugars, simple carbs) to address this. Will also increase your cholesterol medication to 40 mg. Recheck in 6-8 weeks fasting.

## 2017-02-11 DIAGNOSIS — I10 ESSENTIAL HYPERTENSION WITH GOAL BLOOD PRESSURE LESS THAN 130/80: ICD-10-CM

## 2017-02-11 RX ORDER — AMLODIPINE BESYLATE 10 MG/1
TABLET ORAL
Qty: 30 TAB | Refills: 0 | Status: SHIPPED | OUTPATIENT
Start: 2017-02-11 | End: 2017-04-13 | Stop reason: SDUPTHER

## 2017-03-15 ENCOUNTER — OFFICE VISIT (OUTPATIENT)
Dept: FAMILY MEDICINE CLINIC | Age: 62
End: 2017-03-15

## 2017-03-15 VITALS
RESPIRATION RATE: 18 BRPM | HEIGHT: 65 IN | SYSTOLIC BLOOD PRESSURE: 135 MMHG | OXYGEN SATURATION: 95 % | WEIGHT: 197 LBS | DIASTOLIC BLOOD PRESSURE: 74 MMHG | HEART RATE: 67 BPM | BODY MASS INDEX: 32.82 KG/M2 | TEMPERATURE: 97.7 F

## 2017-03-15 DIAGNOSIS — F31.9 BIPOLAR 1 DISORDER (HCC): ICD-10-CM

## 2017-03-15 DIAGNOSIS — E78.00 HYPERCHOLESTEREMIA: Primary | ICD-10-CM

## 2017-03-15 DIAGNOSIS — I10 ESSENTIAL HYPERTENSION WITH GOAL BLOOD PRESSURE LESS THAN 130/80: ICD-10-CM

## 2017-03-15 DIAGNOSIS — R73.03 PREDIABETES: ICD-10-CM

## 2017-03-15 RX ORDER — METOPROLOL SUCCINATE 25 MG/1
25 TABLET, EXTENDED RELEASE ORAL DAILY
Qty: 30 TAB | Refills: 5 | Status: SHIPPED | OUTPATIENT
Start: 2017-03-15 | End: 2017-10-30 | Stop reason: SDUPTHER

## 2017-03-15 RX ORDER — ATORVASTATIN CALCIUM 40 MG/1
40 TABLET, FILM COATED ORAL DAILY
Qty: 30 TAB | Refills: 5 | Status: SHIPPED | OUTPATIENT
Start: 2017-03-15 | End: 2017-05-11 | Stop reason: SDUPTHER

## 2017-03-15 RX ORDER — LISINOPRIL 40 MG/1
40 TABLET ORAL DAILY
Qty: 30 TAB | Refills: 5 | Status: SHIPPED | OUTPATIENT
Start: 2017-03-15 | End: 2017-05-26

## 2017-03-15 NOTE — MR AVS SNAPSHOT
Visit Information Date & Time Provider Department Dept. Phone Encounter #  
 3/15/2017 10:00 AM David Hall  [facility] [phone] [id number] Follow-up Instructions Return in about 3 months (around 6/15/2017) for f/u cholesterol, BP, prediabetes. Your Appointments 7/12/2017 11:00 AM  
ESTABLISHED PATIENT with Shanda Metcalf NP [facility] (Ammon Jaeger) Appt Note: 6 month follow-up [address] [facility] [place] [address] [facility] [address]  
  
   
 [address] [facility] [id number] Upcoming Health Maintenance Date Due Hepatitis C Screening 1955 Pneumococcal 19-64 Medium Risk (1 of 1 - PPSV23) 11/16/1975 DTaP/Tdap/Td series (1 - Tdap) 11/16/1977 PAP AKA CERVICAL CYTOLOGY 11/16/1977 BREAST CANCER SCRN MAMMOGRAM 11/16/2006 FOBT Q 1 YEAR AGE 50-75 11/16/2006 INFLUENZA AGE 9 TO ADULT 8/1/2016 ZOSTER VACCINE AGE 60> 11/16/2016 Allergies as of 3/15/2017  Review Complete On: 3/15/2017 By: David Hall NP No Known Allergies Current Immunizations  Never Reviewed No immunizations on file. Not reviewed this visit You Were Diagnosed With   
  
 Codes Comments Hypercholesteremia    -  Primary ICD-10-CM: E78.00 ICD-9-CM: 272.0 Essential hypertension with goal blood pressure less than 130/80     ICD-10-CM: I10 
ICD-9-CM: 401.9 Bipolar 1 disorder (HCC)     ICD-10-CM: F31.9 ICD-9-CM: 296.7 Prediabetes     ICD-10-CM: R73.03 
ICD-9-CM: 790.29 Vitals BP Pulse Temp Resp Height(growth percentile) Weight(growth percentile) 135/74 (BP 1 Location: Left arm, BP Patient Position: Sitting) 67 97.7 °F (36.5 °C) (Oral) 18 5' 5\" (1.651 m) 197 lb (89.4 kg) SpO2 BMI OB Status Smoking Status 95% 32.78 kg/m2 Postmenopausal Current Every Day Smoker Vitals History BMI and BSA Data Body Mass Index Body Surface Area 32.78 kg/m 2 2.02 m 2 Preferred Pharmacy Pharmacy Name Phone [facility] [address] [facility], [facility] [facility] [address] [phone] Your Updated Medication List  
  
   
This list is accurate as of: 3/15/17 10:32 AM.  Always use your most recent med list. amLODIPine 10 mg tablet Commonly known as:  Rudene Post TAKE ONE TABLET BY MOUTH DAILY  
  
 atorvastatin 40 mg tablet Commonly known as:  LIPITOR Take 1 Tab by mouth daily. GAS-X 80 mg chewable tablet Generic drug:  simethicone Take 80 mg by mouth every six (6) hours as needed. lisinopril 40 mg tablet Commonly known as:  Delsie Commander Take 1 Tab by mouth daily. lithium carbonate  mg CR tablet Commonly known as:  Neo Ormond Take 1 Tab by mouth nightly. LORazepam 0.5 mg tablet Commonly known as:  ATIVAN  
TAKE ONE TABLET BY MOUTH TWICE A DAY FOR ANXIETY  
  
 metoprolol succinate 25 mg XL tablet Commonly known as:  TOPROL-XL Take 1 Tab by mouth daily. mupirocin 2 % ointment Commonly known as:  [employer] Apply  to affected area daily. QUEtiapine 100 mg tablet Commonly known as:  SEROquel TAKE ONE-HALF TABLET BY MOUTH EVERY NIGHT AT BEDTIME  
  
 sertraline 100 mg tablet Commonly known as:  ZOLOFT  
TAKE ONE TABLET BY MOUTH DAILY  
  
 triamcinolone acetonide 0.025 % topical cream  
Commonly known as:  KENALOG Apply  to affected area two (2) times a day. use thin layer VITAMIN D3 1,000 unit tablet Generic drug:  cholecalciferol Take 1,000 Units by mouth daily. Prescriptions Sent to Pharmacy Refills  
 atorvastatin (LIPITOR) 40 mg tablet 5 Sig: Take 1 Tab by mouth daily. Class: Normal  
 Pharmacy: Darian Lal, [address] [place] [address] #: [phone]  Route: Oral  
 metoprolol succinate (TOPROL-XL) 25 mg XL tablet 5  
 Sig: Take 1 Tab by mouth daily. Class: Normal  
 [facility] [address] [facility] [employer] Ph #: [phone] Route: Oral  
 lisinopril (PRINIVIL, ZESTRIL) 40 mg tablet 5 Sig: Take 1 Tab by mouth daily. Class: Normal  
 [facility], [address] [facility] [employer] Ph #: [phone] Route: Oral  
  
We Performed the Following LIPID PANEL [56925 CPT(R)] METABOLIC PANEL, COMPREHENSIVE [13380 CPT(R)] Follow-up Instructions Return in about 3 months (around 6/15/2017) for f/u cholesterol, BP, prediabetes. Patient Instructions High Blood Pressure: Care Instructions Your Care Instructions If your blood pressure is usually above 140/90, you have high blood pressure, or hypertension. That means the top number is 140 or higher or the bottom number is 90 or higher, or both. Despite what a lot of people think, high blood pressure usually doesn't cause headaches or make you feel dizzy or lightheaded. It usually has no symptoms. But it does increase your risk for heart attack, stroke, and kidney or eye damage. The higher your blood pressure, the more your risk increases. Your doctor will give you a goal for your blood pressure. Your goal will be based on your health and your age. An example of a goal is to keep your blood pressure below 140/90. Lifestyle changes, such as eating healthy and being active, are always important to help lower blood pressure. You might also take medicine to reach your blood pressure goal. 
Follow-up care is a key part of your treatment and safety. Be sure to make and go to all appointments, and call your doctor if you are having problems. It's also a good idea to know your test results and keep a list of the medicines you take. How can you care for yourself at home? Medical treatment · If you stop taking your medicine, your blood pressure will go back up. You may take one or more types of medicine to lower your blood pressure. Be safe with medicines. Take your medicine exactly as prescribed. Call your doctor if you think you are having a problem with your medicine. · Talk to your doctor before you start taking aspirin every day. Aspirin can help certain people lower their risk of a heart attack or stroke. But taking aspirin isn't right for everyone, because it can cause serious bleeding. · See your doctor regularly. You may need to see the doctor more often at first or until your blood pressure comes down. · If you are taking blood pressure medicine, talk to your doctor before you take decongestants or anti-inflammatory medicine, such as ibuprofen. Some of these medicines can raise blood pressure. · Learn how to check your blood pressure at home. Lifestyle changes · Stay at a healthy weight. This is especially important if you put on weight around the waist. Losing even 10 pounds can help you lower your blood pressure. · If your doctor recommends it, get more exercise. Walking is a good choice. Bit by bit, increase the amount you walk every day. Try for at least 30 minutes on most days of the week. You also may want to swim, bike, or do other activities. · Avoid or limit alcohol. Talk to your doctor about whether you can drink any alcohol. · Try to limit how much sodium you eat to less than 2,300 milligrams (mg) a day. Your doctor may ask you to try to eat less than 1,500 mg a day. · Eat plenty of fruits (such as bananas and oranges), vegetables, legumes, whole grains, and low-fat dairy products. · Lower the amount of saturated fat in your diet. Saturated fat is found in animal products such as milk, cheese, and meat. Limiting these foods may help you lose weight and also lower your risk for heart disease. · Do not smoke. Smoking increases your risk for heart attack and stroke.  If you need help quitting, talk to your doctor about stop-smoking programs and medicines. These can increase your chances of quitting for good. When should you call for help? Call 911 anytime you think you may need emergency care. This may mean having symptoms that suggest that your blood pressure is causing a serious heart or blood vessel problem. Your blood pressure may be over 180/110. For example, call 911 if: 
· You have symptoms of a heart attack. These may include: ¨ Chest pain or pressure, or a strange feeling in the chest. 
¨ Sweating. ¨ Shortness of breath. ¨ Nausea or vomiting. ¨ Pain, pressure, or a strange feeling in the back, neck, jaw, or upper belly or in one or both shoulders or arms. ¨ Lightheadedness or sudden weakness. ¨ A fast or irregular heartbeat. · You have symptoms of a stroke. These may include: 
¨ Sudden numbness, tingling, weakness, or loss of movement in your face, arm, or leg, especially on only one side of your body. ¨ Sudden vision changes. ¨ Sudden trouble speaking. ¨ Sudden confusion or trouble understanding simple statements. ¨ Sudden problems with walking or balance. ¨ A sudden, severe headache that is different from past headaches. · You have severe back or belly pain. Do not wait until your blood pressure comes down on its own. Get help right away. Call your doctor now or seek immediate care if: 
· Your blood pressure is much higher than normal (such as 180/110 or higher), but you don't have symptoms. · You think high blood pressure is causing symptoms, such as: ¨ Severe headache. ¨ Blurry vision. Watch closely for changes in your health, and be sure to contact your doctor if: 
· Your blood pressure measures 140/90 or higher at least 2 times. That means the top number is 140 or higher or the bottom number is 90 or higher, or both. · You think you may be having side effects from your blood pressure medicine. · Your blood pressure is usually normal, but it goes above normal at least 2 times. Where can you learn more? Go to http://maria victoria-ignacio.info/. Enter Y545 in the search box to learn more about \"High Blood Pressure: Care Instructions. \" Current as of: August 8, 2016 Content Version: 11.1 © 8183-7608 [employer]. Care instructions adapted under license by [employer] (which disclaims liability or warranty for this information). If you have questions about a medical condition or this instruction, always ask your healthcare professional. [address] any warranty or liability for your use of this information. DASH Diet: Care Instructions Your Care Instructions The DASH diet is an eating plan that can help lower your blood pressure. DASH stands for Dietary Approaches to Stop Hypertension. Hypertension is high blood pressure. The DASH diet focuses on eating foods that are high in calcium, potassium, and magnesium. These nutrients can lower blood pressure. The foods that are highest in these nutrients are fruits, vegetables, low-fat dairy products, nuts, seeds, and legumes. But taking calcium, potassium, and magnesium supplements instead of eating foods that are high in those nutrients does not have the same effect. The DASH diet also includes whole grains, fish, and poultry. The DASH diet is one of several lifestyle changes your doctor may recommend to lower your high blood pressure. Your doctor may also want you to decrease the amount of sodium in your diet. Lowering sodium while following the DASH diet can lower blood pressure even further than just the DASH diet alone. Follow-up care is a key part of your treatment and safety. Be sure to make and go to all appointments, and call your doctor if you are having problems. It's also a good idea to know your test results and keep a list of the medicines you take. How can you care for yourself at home? Following the DASH diet · Eat 4 to 5 servings of fruit each day.  A serving is 1 medium-sized piece of fruit, ½ cup chopped or canned fruit, 1/4 cup dried fruit, or 4 ounces (½ cup) of fruit juice. Choose fruit more often than fruit juice. · Eat 4 to 5 servings of vegetables each day. A serving is 1 cup of lettuce or raw leafy vegetables, ½ cup of chopped or cooked vegetables, or 4 ounces (½ cup) of vegetable juice. Choose vegetables more often than vegetable juice. · Get 2 to 3 servings of low-fat and fat-free dairy each day. A serving is 8 ounces of milk, 1 cup of yogurt, or 1 ½ ounces of cheese. · Eat 6 to 8 servings of grains each day. A serving is 1 slice of bread, 1 ounce of dry cereal, or ½ cup of cooked rice, pasta, or cooked cereal. Try to choose whole-grain products as much as possible. · Limit lean meat, poultry, and fish to 2 servings each day. A serving is 3 ounces, about the size of a deck of cards. · Eat 4 to 5 servings of nuts, seeds, and legumes (cooked dried beans, lentils, and split peas) each week. A serving is 1/3 cup of nuts, 2 tablespoons of seeds, or ½ cup of cooked beans or peas. · Limit fats and oils to 2 to 3 servings each day. A serving is 1 teaspoon of vegetable oil or 2 tablespoons of salad dressing. · Limit sweets and added sugars to 5 servings or less a week. A serving is 1 tablespoon jelly or jam, ½ cup sorbet, or 1 cup of lemonade. · Eat less than 2,300 milligrams (mg) of sodium a day. If you limit your sodium to 1,500 mg a day, you can lower your blood pressure even more. Tips for success · Start small. Do not try to make dramatic changes to your diet all at once. You might feel that you are missing out on your favorite foods and then be more likely to not follow the plan. Make small changes, and stick with them. Once those changes become habit, add a few more changes. · Try some of the following: ¨ Make it a goal to eat a fruit or vegetable at every meal and at snacks. This will make it easy to get the recommended amount of fruits and vegetables each day. ¨ Try yogurt topped with fruit and nuts for a snack or healthy dessert. ¨ Add lettuce, tomato, cucumber, and onion to sandwiches. ¨ Combine a ready-made pizza crust with low-fat mozzarella cheese and lots of vegetable toppings. Try using tomatoes, squash, spinach, broccoli, carrots, cauliflower, and onions. ¨ Have a variety of cut-up vegetables with a low-fat dip as an appetizer instead of chips and dip. ¨ Sprinkle sunflower seeds or chopped almonds over salads. Or try adding chopped walnuts or almonds to cooked vegetables. ¨ Try some vegetarian meals using beans and peas. Add garbanzo or kidney beans to salads. Make burritos and tacos with mashed marques beans or black beans. Where can you learn more? Go to http://maria victoriaWeb International Englishignacio.info/. Enter F623 in the search box to learn more about \"DASH Diet: Care Instructions. \" Current as of: March 23, 2016 Content Version: 11.1 © 9107-0111 [employer]. Care instructions adapted under license by [employer]Share (which disclaims liability or warranty for this information). If you have questions about a medical condition or this instruction, always ask your healthcare professional. [address] any warranty or liability for your use of this information. Heart-Healthy Diet: Care Instructions Your Care Instructions A heart-healthy diet has lots of vegetables, fruits, nuts, beans, and whole grains, and is low in salt. It limits foods that are high in saturated fat, such as meats, cheeses, and fried foods. It may be hard to change your diet, but even small changes can lower your risk of heart attack and heart disease. Follow-up care is a key part of your treatment and safety. Be sure to make and go to all appointments, and call your doctor if you are having problems. It's also a good idea to know your test results and keep a list of the medicines you take. How can you care for yourself at home? Watch your portions · Learn what a serving is. A \"serving\" and a \"portion\" are not always the same thing. Make sure that you are not eating larger portions than are recommended. For example, a serving of pasta is ½ cup. A serving size of meat is 2 to 3 ounces. A 3-ounce serving is about the size of a deck of cards. Measure serving sizes until you are good at [place]" them. Keep in mind that restaurants often serve portions that are 2 or 3 times the size of one serving. · To keep your energy level up and keep you from feeling hungry, eat often but in smaller portions. · Eat only the number of calories you need to stay at a healthy weight. If you need to lose weight, eat fewer calories than your body burns (through exercise and other physical activity). Eat more fruits and vegetables · Eat a variety of fruit and vegetables every day. Dark green, deep orange, red, or yellow fruits and vegetables are especially good for you. Examples include spinach, carrots, peaches, and berries. · Keep carrots, celery, and other veggies handy for snacks. Buy fruit that is in season and store it where you can see it so that you will be tempted to eat it. · Cook dishes that have a lot of veggies in them, such as stir-fries and soups. Limit saturated and trans fat · Read food labels, and try to avoid saturated and trans fats. They increase your risk of heart disease. Trans fat is found in many processed foods such as cookies and crackers. · Use olive or canola oil when you cook. Try cholesterol-lowering spreads, such as Benecol or Take Control. · Bake, broil, grill, or steam foods instead of frying them. · Choose lean meats instead of high-fat meats such as hot dogs and sausages. Cut off all visible fat when you prepare meat. · Eat fish, skinless poultry, and meat alternatives such as soy products instead of high-fat meats. Soy products, such as tofu, may be especially good for your heart. · Choose low-fat or fat-free milk and dairy products. Eat fish · Eat at least two servings of fish a week. Certain fish, such as salmon and tuna, contain omega-3 fatty acids, which may help reduce your risk of heart attack. Eat foods high in fiber · Eat a variety of grain products every day. Include whole-grain foods that have lots of fiber and nutrients. Examples of whole-grain foods include oats, whole wheat bread, and brown rice. · Buy whole-grain breads and cereals, instead of white bread or pastries. Limit salt and sodium · Limit how much salt and sodium you eat to help lower your blood pressure. · Taste food before you salt it. Add only a little salt when you think you need it. With time, your taste buds will adjust to less salt. · Eat fewer snack items, fast foods, and other high-salt, processed foods. Check food labels for the amount of sodium in packaged foods. · Choose low-sodium versions of canned goods (such as soups, vegetables, and beans). Limit sugar · Limit drinks and foods with added sugar. These include candy, desserts, and soda pop. Limit alcohol · Limit alcohol to no more than 2 drinks a day for men and 1 drink a day for women. Too much alcohol can cause health problems. When should you call for help? Watch closely for changes in your health, and be sure to contact your doctor if: 
· You would like help planning heart-healthy meals. Where can you learn more? Go to http://maria victoria-ignacio.info/. Enter V137 in the search box to learn more about \"Heart-Healthy Diet: Care Instructions. \" Current as of: January 27, 2016 Content Version: 11.1 © 9500-4277 [employer]. Care instructions adapted under license by [employer]Doc (which disclaims liability or warranty for this information).  If you have questions about a medical condition or this instruction, always ask your healthcare professional. Kayla Pal[employer] disclaims any warranty or liability for your use of this information. Introducing [facility]! Adrien Quinn introduces ScheduleThing patient portal. Now you can access parts of your medical record, email your doctor's office, and request medication refills online. 1. In your internet browser, go to [email or web address] 2. Click on the First Time User? Click Here link in the Sign In box. You will see the New Member Sign Up page. 3. Enter your ScheduleThing Access Code exactly as it appears below. You will not need to use this code after youve completed the sign-up process. If you do not sign up before the expiration date, you must request a new code. · ScheduleThing Access Code: [email or web address] Expires: 4/18/2017 11:20 AM 
 
4. Enter the last four digits of your Social Security Number (xxxx) and Date of Birth (mm/dd/yyyy) as indicated and click Submit. You will be taken to the next sign-up page. 5. Create a ScheduleThing ID. This will be your ScheduleThing login ID and cannot be changed, so think of one that is secure and easy to remember. 6. Create a ScheduleThing password. You can change your password at any time. 7. Enter your Password Reset Question and Answer. This can be used at a later time if you forget your password. 8. Enter your e-mail address. You will receive e-mail notification when new information is available in [address] 9. Click Sign Up. You can now view and download portions of your medical record. 10. Click the Download Summary menu link to download a portable copy of your medical information. If you have questions, please visit the Frequently Asked Questions section of the ScheduleThing website. Remember, ScheduleThing is NOT to be used for urgent needs. For medical emergencies, dial 911. Now available from your iPhone and Android! Please provide this summary of care documentation to your next provider. Your primary care clinician is listed as Ginger Gaytan. If you have any questions after today's visit, please call [phone].

## 2017-03-15 NOTE — PROGRESS NOTES
Subjective:   Laura Keith is a 62 y.o. female who is seen for follow up of hypertension, hyperlipidemia and obesity. Cardiovascular risk analysis - 62 y.o. female LDL goal is under 100. Compliance with treatment thus far has been fair. ROS: taking medications as instructed, no medication side effects noted, no TIA's, no chest pain on exertion, no dyspnea on exertion, no swelling of ankles. New concerns: none. Does not follow recommended diet. Does not exercise  Smoking 5 cigarettes a day, using nicotine patches    Patient Active Problem List   Diagnosis Code    Gallstones K80.20    Common bile duct calculus K80.50    Hydradenitis L73.2    Abscess of buttock L02.31    Bipolar 1 disorder ([address] F31.9    Essential hypertension with goal blood pressure less than 130/80 I10    Prediabetes R73.03    Hypercholesteremia E78.00     No Known Allergies  Past Medical History:   Diagnosis Date    Abscess of buttock 7/23/2012    Bronchitis     Common bile duct calculus 7/23/2012    Gallstones 7/12/2012    GERD (gastroesophageal reflux disease)     High cholesterol     Hypercholesterolemia     Hypertension     Ill-defined condition     missing upper front tooth    Ill-defined condition     lower leg bilateral reddened rash.  Insomnia     Other ill-defined conditions(799.89)     Large boil under right arm-pit.     Paranoia [address] [address]     Psychiatric disorder     bipolar     Past Surgical History:   Procedure Laterality Date    ABDOMEN SURGERY PROC UNLISTED      cholecystectomy 7/23/2012     Family History   Problem Relation Age of Onset    Cancer Father      lung    Diabetes Brother     Heart Disease Brother     Malignant Hyperthermia Neg Hx     Pseudocholinesterase Deficiency Neg Hx     Delayed Awakening Neg Hx     Post-op Nausea/Vomiting Neg Hx     Emergence Delirium Neg Hx     Post-op Cognitive Dysfunction Neg Hx     Other Neg Hx      Social History   Substance Use Topics    Smoking status: Current Every Day Smoker     Packs/day: 0.50    Smokeless tobacco: Never Used    Alcohol use No      Lab Results  Component Value Date/Time   Hemoglobin A1c 5.9 11/02/2016 10:29 AM   Hemoglobin A1c 6.0 05/31/2016 02:13 PM   Glucose 107 01/18/2017 10:23 AM   Glucose (POC) 115 10/31/2011 08:08 PM   LDL, calculated Comment 01/18/2017 10:23 AM   Creatinine (POC) 0.6 10/31/2011 08:08 PM   Creatinine 0.85 01/18/2017 10:23 AM      Lab Results  Component Value Date/Time   Cholesterol, total 244 01/18/2017 10:23 AM   HDL Cholesterol 47 01/18/2017 10:23 AM   LDL, calculated Comment 01/18/2017 10:23 AM   Triglyceride 420 01/18/2017 10:23 AM       Lab Results  Component Value Date/Time   ALT (SGPT) 17 01/18/2017 10:23 AM   AST (SGOT) 15 01/18/2017 10:23 AM   Alk. phosphatase 98 01/18/2017 10:23 AM   Bilirubin, direct 0.1 10/31/2011 08:05 PM   Bilirubin, total 0.3 01/18/2017 10:23 AM       Lab Results  Component Value Date/Time   GFR est AA 86 01/18/2017 10:23 AM   GFR est non-AA 75 01/18/2017 10:23 AM   Creatinine (POC) 0.6 10/31/2011 08:08 PM   Creatinine 0.85 01/18/2017 10:23 AM   BUN 12 01/18/2017 10:23 AM   BUN (POC) 12 10/31/2011 08:08 PM   Sodium (POC) 136 10/31/2011 08:08 PM   Sodium 138 01/18/2017 10:23 AM   Potassium 4.7 01/18/2017 10:23 AM   Potassium (POC) 3.6 10/31/2011 08:08 PM   Chloride (POC) 102 10/31/2011 08:08 PM   Chloride 100 01/18/2017 10:23 AM   CO2 23 01/18/2017 10:23 AM         Objective:     Visit Vitals    /74 (BP 1 Location: Left arm, BP Patient Position: Sitting)    Pulse 67    Temp 97.7 °F (36.5 °C) (Oral)    Resp 18    Ht 5' 5\" (1.651 m)    Wt 197 lb (89.4 kg)    SpO2 95%    BMI 32.78 kg/m2      Appearance: alert, well appearing, and in no distress, oriented to person, place, and time, overweight and anxious. CVS exam BP noted to be well controlled today in office, S1, S2 normal, no gallop, no murmur, chest clear, no JVD, no HSM, no edema.   Lab review: labs reviewed, I note that glycosylated hemoglobin abnormal consistent with prediabetes, lipids LDL result does not yet meet goal, HDL normal, triglycerides high, renal functions normal, liver functions normal.     Assessment/Plan:   Hyperlipidemia poorly controlled. reviewed diet, exercise and weight control  very strongly urged to quit smoking to reduce cardiovascular risk  copy of written low fat low cholesterol diet provided and reviewed  reviewed medications and side effects in detail. Gerald Simmons was seen today for cholesterol problem. Diagnoses and all orders for this visit:    Hypercholesteremia  TLC Diet:  -- Saturated fat <7% of calories, cholesterol <200 mg/day  -- Consider increased viscous (soluble) fiber (10-25 g/day) and plant stanols/sterols  (2g/day) as therapeutic options to enhance LDL lowering  Weight management  Increased physical activity. Continue atorvastatin  Check fasting lipids today, liver function  -     atorvastatin (LIPITOR) 40 mg tablet; Take 1 Tab by mouth daily.  -     LIPID PANEL  -     METABOLIC PANEL, COMPREHENSIVE    Essential hypertension with goal blood pressure less than 130/80  At goal  Continue current meds  DASH diet  Weight loss  Walking 20 minutes daily  Smoking cessation  -     metoprolol succinate (TOPROL-XL) 25 mg XL tablet; Take 1 Tab by mouth daily. -     lisinopril (PRINIVIL, ZESTRIL) 40 mg tablet; Take 1 Tab by mouth daily. Bipolar 1 disorder ([address]  Continue f/u at regular intervals with mental health provider  Currently doing well on sertraline, quetiapine, and lithium    Prediabetes  Weight loss  Lower carb diet  Daily walking    Follow-up Disposition:  Return in about 3 months (around 6/15/2017) for f/u cholesterol, BP, prediabetes. I have discussed the diagnosis with the patient and the intended plan as seen in the above orders. The patient has received an after-visit summary and questions were answered concerning future plans.  Patient conveyed understanding of the plan at the time of the visit.     Ella Hudson NP  03/15/17

## 2017-03-15 NOTE — PATIENT INSTRUCTIONS
High Blood Pressure: Care Instructions  Your Care Instructions  If your blood pressure is usually above 140/90, you have high blood pressure, or hypertension. That means the top number is 140 or higher or the bottom number is 90 or higher, or both. Despite what a lot of people think, high blood pressure usually doesn't cause headaches or make you feel dizzy or lightheaded. It usually has no symptoms. But it does increase your risk for heart attack, stroke, and kidney or eye damage. The higher your blood pressure, the more your risk increases. Your doctor will give you a goal for your blood pressure. Your goal will be based on your health and your age. An example of a goal is to keep your blood pressure below 140/90. Lifestyle changes, such as eating healthy and being active, are always important to help lower blood pressure. You might also take medicine to reach your blood pressure goal.  Follow-up care is a key part of your treatment and safety. Be sure to make and go to all appointments, and call your doctor if you are having problems. It's also a good idea to know your test results and keep a list of the medicines you take. How can you care for yourself at home? Medical treatment  · If you stop taking your medicine, your blood pressure will go back up. You may take one or more types of medicine to lower your blood pressure. Be safe with medicines. Take your medicine exactly as prescribed. Call your doctor if you think you are having a problem with your medicine. · Talk to your doctor before you start taking aspirin every day. Aspirin can help certain people lower their risk of a heart attack or stroke. But taking aspirin isn't right for everyone, because it can cause serious bleeding. · See your doctor regularly. You may need to see the doctor more often at first or until your blood pressure comes down.   · If you are taking blood pressure medicine, talk to your doctor before you take decongestants or anti-inflammatory medicine, such as ibuprofen. Some of these medicines can raise blood pressure. · Learn how to check your blood pressure at home. Lifestyle changes  · Stay at a healthy weight. This is especially important if you put on weight around the waist. Losing even 10 pounds can help you lower your blood pressure. · If your doctor recommends it, get more exercise. Walking is a good choice. Bit by bit, increase the amount you walk every day. Try for at least 30 minutes on most days of the week. You also may want to swim, bike, or do other activities. · Avoid or limit alcohol. Talk to your doctor about whether you can drink any alcohol. · Try to limit how much sodium you eat to less than 2,300 milligrams (mg) a day. Your doctor may ask you to try to eat less than 1,500 mg a day. · Eat plenty of fruits (such as bananas and oranges), vegetables, legumes, whole grains, and low-fat dairy products. · Lower the amount of saturated fat in your diet. Saturated fat is found in animal products such as milk, cheese, and meat. Limiting these foods may help you lose weight and also lower your risk for heart disease. · Do not smoke. Smoking increases your risk for heart attack and stroke. If you need help quitting, talk to your doctor about stop-smoking programs and medicines. These can increase your chances of quitting for good. When should you call for help? Call 911 anytime you think you may need emergency care. This may mean having symptoms that suggest that your blood pressure is causing a serious heart or blood vessel problem. Your blood pressure may be over 180/110. For example, call 911 if:  · You have symptoms of a heart attack. These may include:  ¨ Chest pain or pressure, or a strange feeling in the chest.  ¨ Sweating. ¨ Shortness of breath. ¨ Nausea or vomiting. ¨ Pain, pressure, or a strange feeling in the back, neck, jaw, or upper belly or in one or both shoulders or arms.   ¨ Lightheadedness or sudden weakness. ¨ A fast or irregular heartbeat. · You have symptoms of a stroke. These may include:  ¨ Sudden numbness, tingling, weakness, or loss of movement in your face, arm, or leg, especially on only one side of your body. ¨ Sudden vision changes. ¨ Sudden trouble speaking. ¨ Sudden confusion or trouble understanding simple statements. ¨ Sudden problems with walking or balance. ¨ A sudden, severe headache that is different from past headaches. · You have severe back or belly pain. Do not wait until your blood pressure comes down on its own. Get help right away. Call your doctor now or seek immediate care if:  · Your blood pressure is much higher than normal (such as 180/110 or higher), but you don't have symptoms. · You think high blood pressure is causing symptoms, such as:  ¨ Severe headache. ¨ Blurry vision. Watch closely for changes in your health, and be sure to contact your doctor if:  · Your blood pressure measures 140/90 or higher at least 2 times. That means the top number is 140 or higher or the bottom number is 90 or higher, or both. · You think you may be having side effects from your blood pressure medicine. · Your blood pressure is usually normal, but it goes above normal at least 2 times. Where can you learn more? Go to http://maria victoria-ignacio.info/. Enter I535 in the search box to learn more about \"High Blood Pressure: Care Instructions. \"  Current as of: August 8, 2016  Content Version: 11.1  © 4422-2876 [employer]. Care instructions adapted under license by [email or web address] (which disclaims liability or warranty for this information). If you have questions about a medical condition or this instruction, always ask your healthcare professional. [place] [address] any warranty or liability for your use of this information.        DASH Diet: Care Instructions  Your Care Instructions  The DASH diet is an eating plan that can help lower your blood pressure. DASH stands for Dietary Approaches to Stop Hypertension. Hypertension is high blood pressure. The DASH diet focuses on eating foods that are high in calcium, potassium, and magnesium. These nutrients can lower blood pressure. The foods that are highest in these nutrients are fruits, vegetables, low-fat dairy products, nuts, seeds, and legumes. But taking calcium, potassium, and magnesium supplements instead of eating foods that are high in those nutrients does not have the same effect. The DASH diet also includes whole grains, fish, and poultry. The DASH diet is one of several lifestyle changes your doctor may recommend to lower your high blood pressure. Your doctor may also want you to decrease the amount of sodium in your diet. Lowering sodium while following the DASH diet can lower blood pressure even further than just the DASH diet alone. Follow-up care is a key part of your treatment and safety. Be sure to make and go to all appointments, and call your doctor if you are having problems. It's also a good idea to know your test results and keep a list of the medicines you take. How can you care for yourself at home? Following the DASH diet  · Eat 4 to 5 servings of fruit each day. A serving is 1 medium-sized piece of fruit, ½ cup chopped or canned fruit, 1/4 cup dried fruit, or 4 ounces (½ cup) of fruit juice. Choose fruit more often than fruit juice. · Eat 4 to 5 servings of vegetables each day. A serving is 1 cup of lettuce or raw leafy vegetables, ½ cup of chopped or cooked vegetables, or 4 ounces (½ cup) of vegetable juice. Choose vegetables more often than vegetable juice. · Get 2 to 3 servings of low-fat and fat-free dairy each day. A serving is 8 ounces of milk, 1 cup of yogurt, or 1 ½ ounces of cheese. · Eat 6 to 8 servings of grains each day.  A serving is 1 slice of bread, 1 ounce of dry cereal, or ½ cup of cooked rice, pasta, or cooked cereal. Try to choose whole-grain products as much as possible. · Limit lean meat, poultry, and fish to 2 servings each day. A serving is 3 ounces, about the size of a deck of cards. · Eat 4 to 5 servings of nuts, seeds, and legumes (cooked dried beans, lentils, and split peas) each week. A serving is 1/3 cup of nuts, 2 tablespoons of seeds, or ½ cup of cooked beans or peas. · Limit fats and oils to 2 to 3 servings each day. A serving is 1 teaspoon of vegetable oil or 2 tablespoons of salad dressing. · Limit sweets and added sugars to 5 servings or less a week. A serving is 1 tablespoon jelly or jam, ½ cup sorbet, or 1 cup of lemonade. · Eat less than 2,300 milligrams (mg) of sodium a day. If you limit your sodium to 1,500 mg a day, you can lower your blood pressure even more. Tips for success  · Start small. Do not try to make dramatic changes to your diet all at once. You might feel that you are missing out on your favorite foods and then be more likely to not follow the plan. Make small changes, and stick with them. Once those changes become habit, add a few more changes. · Try some of the following:  ¨ Make it a goal to eat a fruit or vegetable at every meal and at snacks. This will make it easy to get the recommended amount of fruits and vegetables each day. ¨ Try yogurt topped with fruit and nuts for a snack or healthy dessert. ¨ Add lettuce, tomato, cucumber, and onion to sandwiches. ¨ Combine a ready-made pizza crust with low-fat mozzarella cheese and lots of vegetable toppings. Try using tomatoes, squash, spinach, broccoli, carrots, cauliflower, and onions. ¨ Have a variety of cut-up vegetables with a low-fat dip as an appetizer instead of chips and dip. ¨ Sprinkle sunflower seeds or chopped almonds over salads. Or try adding chopped walnuts or almonds to cooked vegetables. ¨ Try some vegetarian meals using beans and peas. Add garbanzo or kidney beans to salads.  Make burritos and tacos with mashed marques beans or black beans.  Where can you learn more? Go to http://maria victoria-ignacio.info/. Enter A131 in the search box to learn more about \"DASH Diet: Care Instructions. \"  Current as of: March 23, 2016  Content Version: 11.1  © 3576-3183 [employer]. Care instructions adapted under license by [employer] (which disclaims liability or warranty for this information). If you have questions about a medical condition or this instruction, always ask your healthcare professional. [address] any warranty or liability for your use of this information. Heart-Healthy Diet: Care Instructions  Your Care Instructions    A heart-healthy diet has lots of vegetables, fruits, nuts, beans, and whole grains, and is low in salt. It limits foods that are high in saturated fat, such as meats, cheeses, and fried foods. It may be hard to change your diet, but even small changes can lower your risk of heart attack and heart disease. Follow-up care is a key part of your treatment and safety. Be sure to make and go to all appointments, and call your doctor if you are having problems. It's also a good idea to know your test results and keep a list of the medicines you take. How can you care for yourself at home? Watch your portions  · Learn what a serving is. A \"serving\" and a \"portion\" are not always the same thing. Make sure that you are not eating larger portions than are recommended. For example, a serving of pasta is ½ cup. A serving size of meat is 2 to 3 ounces. A 3-ounce serving is about the size of a deck of cards. Measure serving sizes until you are good at [place]" them. Keep in mind that restaurants often serve portions that are 2 or 3 times the size of one serving. · To keep your energy level up and keep you from feeling hungry, eat often but in smaller portions. · Eat only the number of calories you need to stay at a healthy weight.  If you need to lose weight, eat fewer calories than your body burns (through exercise and other physical activity). Eat more fruits and vegetables  · Eat a variety of fruit and vegetables every day. Dark green, deep orange, red, or yellow fruits and vegetables are especially good for you. Examples include spinach, carrots, peaches, and berries. · Keep carrots, celery, and other veggies handy for snacks. Buy fruit that is in season and store it where you can see it so that you will be tempted to eat it. · Cook dishes that have a lot of veggies in them, such as stir-fries and soups. Limit saturated and trans fat  · Read food labels, and try to avoid saturated and trans fats. They increase your risk of heart disease. Trans fat is found in many processed foods such as cookies and crackers. · Use olive or canola oil when you cook. Try cholesterol-lowering spreads, such as Benecol or Take Control. · Bake, broil, grill, or steam foods instead of frying them. · Choose lean meats instead of high-fat meats such as hot dogs and sausages. Cut off all visible fat when you prepare meat. · Eat fish, skinless poultry, and meat alternatives such as soy products instead of high-fat meats. Soy products, such as tofu, may be especially good for your heart. · Choose low-fat or fat-free milk and dairy products. Eat fish  · Eat at least two servings of fish a week. Certain fish, such as salmon and tuna, contain omega-3 fatty acids, which may help reduce your risk of heart attack. Eat foods high in fiber  · Eat a variety of grain products every day. Include whole-grain foods that have lots of fiber and nutrients. Examples of whole-grain foods include oats, whole wheat bread, and brown rice. · Buy whole-grain breads and cereals, instead of white bread or pastries. Limit salt and sodium  · Limit how much salt and sodium you eat to help lower your blood pressure. · Taste food before you salt it. Add only a little salt when you think you need it.  With time, your taste buds will adjust to less salt. · Eat fewer snack items, fast foods, and other high-salt, processed foods. Check food labels for the amount of sodium in packaged foods. · Choose low-sodium versions of canned goods (such as soups, vegetables, and beans). Limit sugar  · Limit drinks and foods with added sugar. These include candy, desserts, and soda pop. Limit alcohol  · Limit alcohol to no more than 2 drinks a day for men and 1 drink a day for women. Too much alcohol can cause health problems. When should you call for help? Watch closely for changes in your health, and be sure to contact your doctor if:  · You would like help planning heart-healthy meals. Where can you learn more? Go to http://maria victoria-ignacio.info/. Enter V137 in the search box to learn more about \"Heart-Healthy Diet: Care Instructions. \"  Current as of: January 27, 2016  Content Version: 11.1  © 1270-7272 [employer]. Care instructions adapted under license by [employer] (which disclaims liability or warranty for this information). If you have questions about a medical condition or this instruction, always ask your healthcare professional. [address] any warranty or liability for your use of this information.

## 2017-03-16 LAB
ALBUMIN SERPL-MCNC: 4 G/DL (ref 3.6–4.8)
ALBUMIN/GLOB SERPL: 1.2 {RATIO} (ref 1.2–2.2)
ALP SERPL-CCNC: 97 IU/L (ref 39–117)
ALT SERPL-CCNC: 16 IU/L (ref 0–32)
AST SERPL-CCNC: 13 IU/L (ref 0–40)
BILIRUB SERPL-MCNC: 0.2 MG/DL (ref 0–1.2)
BUN SERPL-MCNC: 16 MG/DL (ref 8–27)
BUN/CREAT SERPL: 18 (ref 11–26)
CALCIUM SERPL-MCNC: 9.7 MG/DL (ref 8.7–10.3)
CHLORIDE SERPL-SCNC: 102 MMOL/L (ref 96–106)
CHOLEST SERPL-MCNC: 198 MG/DL (ref 100–199)
CO2 SERPL-SCNC: 20 MMOL/L (ref 18–29)
CREAT SERPL-MCNC: 0.91 MG/DL (ref 0.57–1)
GLOBULIN SER CALC-MCNC: 3.4 G/DL (ref 1.5–4.5)
GLUCOSE SERPL-MCNC: 105 MG/DL (ref 65–99)
HDLC SERPL-MCNC: 41 MG/DL
INTERPRETATION, 910389: NORMAL
LDLC SERPL CALC-MCNC: 86 MG/DL (ref 0–99)
POTASSIUM SERPL-SCNC: 4.9 MMOL/L (ref 3.5–5.2)
PROT SERPL-MCNC: 7.4 G/DL (ref 6–8.5)
SODIUM SERPL-SCNC: 138 MMOL/L (ref 134–144)
TRIGL SERPL-MCNC: 357 MG/DL (ref 0–149)
VLDLC SERPL CALC-MCNC: 71 MG/DL (ref 5–40)

## 2017-03-17 NOTE — PROGRESS NOTES
Cholesterol has improved significantly on the medication. Please continue to take at current dose. Triglycerides remain high- need to decrease sugar and simple carbs in the diet to address. Recheck in 3 months.

## 2017-03-27 ENCOUNTER — TELEPHONE (OUTPATIENT)
Dept: FAMILY MEDICINE CLINIC | Age: 62
End: 2017-03-27

## 2017-04-13 DIAGNOSIS — I10 ESSENTIAL HYPERTENSION WITH GOAL BLOOD PRESSURE LESS THAN 130/80: ICD-10-CM

## 2017-04-13 RX ORDER — AMLODIPINE BESYLATE 10 MG/1
TABLET ORAL
Qty: 30 TAB | Refills: 0 | Status: SHIPPED | OUTPATIENT
Start: 2017-04-13 | End: 2017-05-11 | Stop reason: SDUPTHER

## 2017-05-11 DIAGNOSIS — E78.00 HYPERCHOLESTEREMIA: ICD-10-CM

## 2017-05-11 DIAGNOSIS — I10 ESSENTIAL HYPERTENSION WITH GOAL BLOOD PRESSURE LESS THAN 130/80: ICD-10-CM

## 2017-05-15 RX ORDER — ATORVASTATIN CALCIUM 40 MG/1
40 TABLET, FILM COATED ORAL DAILY
Qty: 90 TAB | Refills: 1 | Status: SHIPPED | OUTPATIENT
Start: 2017-05-15 | End: 2017-05-26

## 2017-05-15 RX ORDER — AMLODIPINE BESYLATE 10 MG/1
TABLET ORAL
Qty: 30 TAB | Refills: 0 | Status: SHIPPED | OUTPATIENT
Start: 2017-05-15 | End: 2017-05-26

## 2017-05-19 ENCOUNTER — TELEPHONE (OUTPATIENT)
Dept: FAMILY MEDICINE CLINIC | Age: 62
End: 2017-05-19

## 2017-05-19 NOTE — TELEPHONE ENCOUNTER
Received a call from patients  with several concerns. He indicated patient abruptly stopped taking all of her medication 10 days ago. When asked patient stated she felt shaky. When  became aware he restarted her on all of her medication 2 days with the exception of metoprolol because it states to consult physician is doses are interrupted. Also amlodipine in which they are out of. He states patient during that time developed diarrhea, and loss of appetite. Diarrhea continued for 4 days but has since stopped. Patient has c/o dry mouth, chapped bottom lip, and and bad taste in her mouth. States everything except water taste bad and has not had much food. Patient has also experienced some fogginess by husbands account.      Advised  that the schedule was full today, however I would get further advise & instructions from NP

## 2017-05-19 NOTE — TELEPHONE ENCOUNTER
Returned call as requested. LM recommended resuming all of her medications. Recommend daily oversight by  for medication administration. Anticipate symptoms described will improve with time back on meds. Recommend  Office visit or f/u by phone early next week.

## 2017-05-21 ENCOUNTER — HOSPITAL ENCOUNTER (INPATIENT)
Age: 62
LOS: 5 days | Discharge: HOME HEALTH | DRG: 092 | End: 2017-05-26
Attending: EMERGENCY MEDICINE | Admitting: INTERNAL MEDICINE
Payer: SUBSIDIZED

## 2017-05-21 ENCOUNTER — APPOINTMENT (OUTPATIENT)
Dept: GENERAL RADIOLOGY | Age: 62
DRG: 092 | End: 2017-05-21
Attending: PHYSICIAN ASSISTANT
Payer: SUBSIDIZED

## 2017-05-21 ENCOUNTER — APPOINTMENT (OUTPATIENT)
Dept: CT IMAGING | Age: 62
DRG: 092 | End: 2017-05-21
Attending: PHYSICIAN ASSISTANT
Payer: SUBSIDIZED

## 2017-05-21 DIAGNOSIS — N17.9 AKI (ACUTE KIDNEY INJURY) (HCC): ICD-10-CM

## 2017-05-21 DIAGNOSIS — G93.40 ACUTE ENCEPHALOPATHY: Primary | ICD-10-CM

## 2017-05-21 DIAGNOSIS — T56.891A LITHIUM TOXICITY, ACCIDENTAL OR UNINTENTIONAL, INITIAL ENCOUNTER: ICD-10-CM

## 2017-05-21 LAB
ALBUMIN SERPL BCP-MCNC: 3.7 G/DL (ref 3.5–5)
ALBUMIN/GLOB SERPL: 0.8 {RATIO} (ref 1.1–2.2)
ALP SERPL-CCNC: 239 U/L (ref 45–117)
ALT SERPL-CCNC: 17 U/L (ref 12–78)
AMPHET UR QL SCN: NEGATIVE
ANION GAP BLD CALC-SCNC: 10 MMOL/L (ref 5–15)
APPEARANCE UR: ABNORMAL
AST SERPL W P-5'-P-CCNC: 15 U/L (ref 15–37)
BARBITURATES UR QL SCN: NEGATIVE
BASOPHILS # BLD AUTO: 0 K/UL (ref 0–0.1)
BASOPHILS # BLD: 0 % (ref 0–1)
BENZODIAZ UR QL: NEGATIVE
BILIRUB SERPL-MCNC: 0.3 MG/DL (ref 0.2–1)
BILIRUB UR QL CFM: NEGATIVE
BNP SERPL-MCNC: 139 PG/ML (ref 0–125)
BUN SERPL-MCNC: 44 MG/DL (ref 6–20)
BUN/CREAT SERPL: 28 (ref 12–20)
CALCIUM SERPL-MCNC: 9.7 MG/DL (ref 8.5–10.1)
CANNABINOIDS UR QL SCN: NEGATIVE
CHLORIDE SERPL-SCNC: 100 MMOL/L (ref 97–108)
CO2 SERPL-SCNC: 24 MMOL/L (ref 21–32)
COCAINE UR QL SCN: NEGATIVE
COLOR UR: ABNORMAL
CREAT SERPL-MCNC: 1.59 MG/DL (ref 0.55–1.02)
DATE LAST DOSE: ABNORMAL
DATE LAST DOSE: ABNORMAL
DRUG SCRN COMMENT,DRGCM: NORMAL
EOSINOPHIL # BLD: 0.6 K/UL (ref 0–0.4)
EOSINOPHIL NFR BLD: 5 % (ref 0–7)
ERYTHROCYTE [DISTWIDTH] IN BLOOD BY AUTOMATED COUNT: 13.9 % (ref 11.5–14.5)
GLOBULIN SER CALC-MCNC: 4.4 G/DL (ref 2–4)
GLUCOSE SERPL-MCNC: 91 MG/DL (ref 65–100)
GLUCOSE UR STRIP.AUTO-MCNC: NEGATIVE MG/DL
HCT VFR BLD AUTO: 38.3 % (ref 35–47)
HGB BLD-MCNC: 13 G/DL (ref 11.5–16)
HGB UR QL STRIP: NEGATIVE
KETONES UR QL STRIP.AUTO: 15 MG/DL
LACTATE SERPL-SCNC: 1 MMOL/L (ref 0.4–2)
LEUKOCYTE ESTERASE UR QL STRIP.AUTO: NEGATIVE
LITHIUM SERPL-SCNC: 2.43 MMOL/L (ref 0.6–1.2)
LITHIUM SERPL-SCNC: 2.66 MMOL/L (ref 0.6–1.2)
LYMPHOCYTES # BLD AUTO: 22 % (ref 12–49)
LYMPHOCYTES # BLD: 2.5 K/UL (ref 0.8–3.5)
MAGNESIUM SERPL-MCNC: 2.3 MG/DL (ref 1.6–2.4)
MCH RBC QN AUTO: 28.8 PG (ref 26–34)
MCHC RBC AUTO-ENTMCNC: 33.9 G/DL (ref 30–36.5)
MCV RBC AUTO: 84.7 FL (ref 80–99)
METHADONE UR QL: NEGATIVE
MONOCYTES # BLD: 0.7 K/UL (ref 0–1)
MONOCYTES NFR BLD AUTO: 6 % (ref 5–13)
NEUTS SEG # BLD: 7.6 K/UL (ref 1.8–8)
NEUTS SEG NFR BLD AUTO: 67 % (ref 32–75)
NITRITE UR QL STRIP.AUTO: NEGATIVE
OPIATES UR QL: NEGATIVE
PCP UR QL: NEGATIVE
PH UR STRIP: 5 [PH] (ref 5–8)
PLATELET # BLD AUTO: 299 K/UL (ref 150–400)
POTASSIUM SERPL-SCNC: 3.7 MMOL/L (ref 3.5–5.1)
PROT SERPL-MCNC: 8.1 G/DL (ref 6.4–8.2)
PROT UR STRIP-MCNC: NEGATIVE MG/DL
RBC # BLD AUTO: 4.52 M/UL (ref 3.8–5.2)
REPORTED DOSE,DOSE: ABNORMAL UNITS
REPORTED DOSE,DOSE: ABNORMAL UNITS
REPORTED DOSE/TIME,TMG: ABNORMAL
REPORTED DOSE/TIME,TMG: ABNORMAL
SODIUM SERPL-SCNC: 134 MMOL/L (ref 136–145)
SP GR UR REFRACTOMETRY: 1.02 (ref 1–1.03)
TROPONIN I SERPL-MCNC: <0.04 NG/ML
TSH SERPL DL<=0.05 MIU/L-ACNC: 6.15 UIU/ML (ref 0.36–3.74)
UROBILINOGEN UR QL STRIP.AUTO: 0.2 EU/DL (ref 0.2–1)
WBC # BLD AUTO: 11.4 K/UL (ref 3.6–11)

## 2017-05-21 PROCEDURE — 51798 US URINE CAPACITY MEASURE: CPT

## 2017-05-21 PROCEDURE — 80178 ASSAY OF LITHIUM: CPT | Performed by: PHYSICIAN ASSISTANT

## 2017-05-21 PROCEDURE — 85025 COMPLETE CBC W/AUTO DIFF WBC: CPT | Performed by: PHYSICIAN ASSISTANT

## 2017-05-21 PROCEDURE — 93005 ELECTROCARDIOGRAM TRACING: CPT

## 2017-05-21 PROCEDURE — 81003 URINALYSIS AUTO W/O SCOPE: CPT | Performed by: PHYSICIAN ASSISTANT

## 2017-05-21 PROCEDURE — 80178 ASSAY OF LITHIUM: CPT | Performed by: INTERNAL MEDICINE

## 2017-05-21 PROCEDURE — 77030011943

## 2017-05-21 PROCEDURE — 83880 ASSAY OF NATRIURETIC PEPTIDE: CPT | Performed by: PHYSICIAN ASSISTANT

## 2017-05-21 PROCEDURE — 99285 EMERGENCY DEPT VISIT HI MDM: CPT

## 2017-05-21 PROCEDURE — 84443 ASSAY THYROID STIM HORMONE: CPT | Performed by: PHYSICIAN ASSISTANT

## 2017-05-21 PROCEDURE — 70450 CT HEAD/BRAIN W/O DYE: CPT

## 2017-05-21 PROCEDURE — 51702 INSERT TEMP BLADDER CATH: CPT

## 2017-05-21 PROCEDURE — 83735 ASSAY OF MAGNESIUM: CPT | Performed by: PHYSICIAN ASSISTANT

## 2017-05-21 PROCEDURE — 71010 XR CHEST PORT: CPT

## 2017-05-21 PROCEDURE — 94762 N-INVAS EAR/PLS OXIMTRY CONT: CPT

## 2017-05-21 PROCEDURE — 80053 COMPREHEN METABOLIC PANEL: CPT | Performed by: PHYSICIAN ASSISTANT

## 2017-05-21 PROCEDURE — 74011250636 HC RX REV CODE- 250/636: Performed by: INTERNAL MEDICINE

## 2017-05-21 PROCEDURE — 65660000000 HC RM CCU STEPDOWN

## 2017-05-21 PROCEDURE — 36415 COLL VENOUS BLD VENIPUNCTURE: CPT | Performed by: PHYSICIAN ASSISTANT

## 2017-05-21 PROCEDURE — 84484 ASSAY OF TROPONIN QUANT: CPT | Performed by: PHYSICIAN ASSISTANT

## 2017-05-21 PROCEDURE — 83605 ASSAY OF LACTIC ACID: CPT | Performed by: PHYSICIAN ASSISTANT

## 2017-05-21 PROCEDURE — 80307 DRUG TEST PRSMV CHEM ANLYZR: CPT | Performed by: INTERNAL MEDICINE

## 2017-05-21 RX ORDER — SODIUM CHLORIDE 0.9 % (FLUSH) 0.9 %
5-10 SYRINGE (ML) INJECTION EVERY 8 HOURS
Status: DISCONTINUED | OUTPATIENT
Start: 2017-05-21 | End: 2017-05-26 | Stop reason: HOSPADM

## 2017-05-21 RX ORDER — LORAZEPAM 0.5 MG/1
0.5 TABLET ORAL
Status: DISCONTINUED | OUTPATIENT
Start: 2017-05-21 | End: 2017-05-26 | Stop reason: HOSPADM

## 2017-05-21 RX ORDER — ATORVASTATIN CALCIUM 20 MG/1
40 TABLET, FILM COATED ORAL DAILY
Status: DISCONTINUED | OUTPATIENT
Start: 2017-05-22 | End: 2017-05-24

## 2017-05-21 RX ORDER — SODIUM CHLORIDE 0.9 % (FLUSH) 0.9 %
5-10 SYRINGE (ML) INJECTION AS NEEDED
Status: DISCONTINUED | OUTPATIENT
Start: 2017-05-21 | End: 2017-05-26 | Stop reason: HOSPADM

## 2017-05-21 RX ORDER — SODIUM CHLORIDE 9 MG/ML
50 INJECTION, SOLUTION INTRAVENOUS CONTINUOUS
Status: DISCONTINUED | OUTPATIENT
Start: 2017-05-21 | End: 2017-05-26 | Stop reason: HOSPADM

## 2017-05-21 RX ORDER — NALOXONE HYDROCHLORIDE 0.4 MG/ML
0.4 INJECTION, SOLUTION INTRAMUSCULAR; INTRAVENOUS; SUBCUTANEOUS AS NEEDED
Status: DISCONTINUED | OUTPATIENT
Start: 2017-05-21 | End: 2017-05-26 | Stop reason: HOSPADM

## 2017-05-21 RX ORDER — HEPARIN SODIUM 5000 [USP'U]/ML
5000 INJECTION, SOLUTION INTRAVENOUS; SUBCUTANEOUS EVERY 8 HOURS
Status: DISCONTINUED | OUTPATIENT
Start: 2017-05-21 | End: 2017-05-26 | Stop reason: HOSPADM

## 2017-05-21 RX ADMIN — HEPARIN SODIUM 5000 UNITS: 5000 INJECTION, SOLUTION INTRAVENOUS; SUBCUTANEOUS at 21:29

## 2017-05-21 RX ADMIN — SODIUM CHLORIDE 150 ML/HR: 900 INJECTION, SOLUTION INTRAVENOUS at 23:49

## 2017-05-21 RX ADMIN — SODIUM CHLORIDE 150 ML/HR: 900 INJECTION, SOLUTION INTRAVENOUS at 16:00

## 2017-05-21 RX ADMIN — Medication 10 ML: at 21:29

## 2017-05-21 NOTE — H&P
[facility]  [address] [place] [address] 19 [phone]    Hospitalist Admission Note      NAME:  Omar Emerson   :   [birth year]   MRN:  [id number]     PCP:  Monique Tang NP     Date/Time:  2017 5:50 PM          Subjective:     CHIEF COMPLAINT: AMS    HISTORY OF PRESENT ILLNESS:     Ms. Gilda Sweeney is a 62 y.o. female w/ hx of bipolar d/o on Li, HTN, HLD who presents with AMS. Her symptoms started ~5 days ago.  first noted confusion, lethargy, moderate, constant, worsening, associated with tremors, n/v/d. Pt could not take her medications herself so  continued to give them to her, including Shannan Senna. No HA, dizziness, CP, SOB, f/c.     ED workup showed Li 2.66 and ALEX. Ms. Gilda Sweeney is admitted for further evaluation and management of lithium toxicity. Past Medical History:   Diagnosis Date    Abscess of buttock 2012    Bronchitis     Common bile duct calculus 2012    Gallstones 2012    GERD (gastroesophageal reflux disease)     High cholesterol     Hypercholesterolemia     Hypertension     Ill-defined condition     missing upper front tooth    Ill-defined condition     lower leg bilateral reddened rash.  Insomnia     Other ill-defined conditions(799.89)     Large boil under right arm-pit.     Paranoia ([facility] [address]     Psychiatric disorder     bipolar        Past Surgical History:   Procedure Laterality Date    ABDOMEN SURGERY PROC UNLISTED      cholecystectomy 2012       Social History   Substance Use Topics    Smoking status: Current Every Day Smoker     Packs/day: 0.50    Smokeless tobacco: Never Used    Alcohol use No        Family History   Problem Relation Age of Onset    Cancer Father      lung    Diabetes Brother     Heart Disease Brother     Malignant Hyperthermia Neg Hx     Pseudocholinesterase Deficiency Neg Hx     Delayed Awakening Neg Hx     Post-op Nausea/Vomiting Neg Hx     Emergence Delirium Neg Hx     Post-op Cognitive Dysfunction Neg Hx     Other Neg Hx         No Known Allergies     Prior to Admission medications    Medication Sig Start Date End Date Taking? Authorizing Provider   atorvastatin (LIPITOR) 40 mg tablet Take 1 Tab by mouth daily. 5/15/17  Yes Lordirk RivalROQUE   amLODIPine (NORVASC) 10 mg tablet TAKE ONE TABLET BY MOUTH DAILY 5/15/17  Yes Lorel RivalROQUE   metoprolol succinate (TOPROL-XL) 25 mg XL tablet Take 1 Tab by mouth daily. 3/15/17  Yes Amaury Mccabe NP   lisinopril (PRINIVIL, ZESTRIL) 40 mg tablet Take 1 Tab by mouth daily. 3/15/17  Yes Lorel RivaROQUE le   sertraline (ZOLOFT) 100 mg tablet TAKE ONE TABLET BY MOUTH DAILY 1/11/17  Yes Kimberly Pierce NP   QUEtiapine (SEROQUEL) 100 mg tablet TAKE ONE-HALF TABLET BY MOUTH EVERY NIGHT AT BEDTIME 1/11/17  Yes Kimberly Pierce NP   LORazepam (ATIVAN) 0.5 mg tablet TAKE ONE TABLET BY MOUTH TWICE A DAY FOR ANXIETY 1/11/17  Yes Kimberly Pierce NP   lithium carbonate SR (LITHOBID) 300 mg CR tablet Take 1 Tab by mouth nightly. 1/11/17  Yes Kimberly Pierce NP       Review of Systems:  (bold if positive, if negative)    Gen:  Eyes:  ENT:  CVS:  Pulm:  GI:  Abdominal pain, nausea, emesis, diarrhea,  :    MS:  Skin:  Psych:  Endo:    Hem:  Renal:    Neuro:  tremors          Objective:      VITALS:    Vital signs reviewed; most recent are:    Visit Vitals    /51    Pulse 78    Temp 98.6 °F (37 °C)    Resp 16    Ht 5' 5\" (1.651 m)    Wt 89.4 kg (197 lb)    SpO2 98%    BMI 32.78 kg/m2     SpO2 Readings from Last 6 Encounters:   05/21/17 98%   03/15/17 95%   01/18/17 93%   01/11/17 98%   12/14/16 97%   11/30/16 98%        No intake or output data in the 24 hours ending 05/21/17 1750         Exam:     Physical Exam:    Gen:  Well-developed, well-nourished, in no acute distress. somnolent  HEENT:  No scleral icterus, PERRL, hearing intact to voice, moist mucous membranes  Neck:  Supple, without masses.    Resp:  No accessory muscle use. CTAB without wheezing, rales, rhonchi  Card: RRR. Normal S1 and S2 without murmurs, rubs, or gallops. No peripheral lower extremity edema. No JVD. Abd:  Normoactive bowel sounds. Soft, non-tender, non-distended. No rebound, no guarding. No appreciable hepatosplenomegaly   Musc:  No cyanosis or clubbing  Skin:  No rashes or ulcers; turgor intact   Neuro:  Cranial nerves are grossly intact, no focal motor weakness, follows commands appropriately slowly  Psych:  Limited insight, normal affect. Alert, oriented to self, hospital, year, president. Answers questions appropriately but very slowly       Labs:    Recent Labs      05/21/17   1025   WBC  11.4*   HGB  13.0   HCT  38.3   PLT  299     Recent Labs      05/21/17   1025   NA  134*   K  3.7   CL  100   CO2  24   GLU  91   BUN  44*   CREA  1.59*   CA  9.7   MG  2.3   ALB  3.7   SGOT  15   ALT  17     No components found for: GLPOC  No results for input(s): PH, PCO2, PO2, HCO3, FIO2 in the last 72 hours. No results for input(s): INR in the last 72 hours. No lab exists for component: INREXT  No results found for: SDES  No results found for: CULT  All other current labs reviewed in the computer. Imaging/Studies:    CXR: Low lung volumes with mild bibasilar atelectasis. Imaging personally reviewed    EKG: NSR, Q waves inf leads  EKG personally reviewed         Assessment / Plan:       62 y.o. female with of bipolar d/o p/w AMS, n/v/d, tremors, found to have Li toxicity      Lithium toxicity: Li 2.66, with acute toxic encephalopathy, tremors, N/V/D  -- aggressive IVF with NS  -- repeat Li  -- monitor UOP. Follow clinically  -- if deteriorates then RRT.  Discussed with nephrology      ALEX (acute kidney injury)  -- follow Cr and UOP closely on IVF      Bipolar 1 disorder: has been hospitalized prior, but years ago  -- holding Li and other psychotropics  -- psych to make recommendations       Essential hypertension with goal blood pressure less than 130/80: BP controlled  -- hold Norvasc and metoprolol      Prediabetes: BG WNR  -- follow FBG      Hypercholesteremia  -- cont statin      Code Status: FULL     Surrogate decision maker:       Previous notes and lab results reviewed including ED notes      Total time spent with patient: 79 Minutes     Risk of deterioration: High                 Care Plan discussed with: ED provider, Patient, Family, Nursing Staff and Consultant/Specialist    Discussed:  Care Plan    Prophylaxis:  Hep SQ    Disposition:  Home w/Family       ___________________________________________________    Attending Physician: Denisse Jones MD

## 2017-05-21 NOTE — CONSULTS
Susie Prasad  YOB: 1955     Assessment & Plan:   1. Lithium toxicity  · Level 2.6   · Borderline for HD need  · Repeal levels post volume  · If levels better, NO hd  · IF Levels worse: HD  2. ALEX  · Appears dry  · Hold ace  · ivf  · Us IF not better  3. Bipolar  4. HTN  · Norvasc,toprol  · Hold ACE  5. Dyslipidemia            Subjective:   CHIEF COMPLAIN:AMS  HPI: 62 YRS OLD WF is here with AMS. She was found to have elevated Lithum level 2.6. Past levels 0.3  She was found to have ALEX cr: 1.5 . She is ao2. Not a good historian. ? Baseline   She has HTN and on 3 meds. She has Bipolar and on lithium. She has dyslipidemia and on statin      Review of Systems  Review of systems not obtained due to patient factors. Past Medical History:   Diagnosis Date    Abscess of buttock 7/23/2012    Bronchitis     Common bile duct calculus 7/23/2012    Gallstones 7/12/2012    GERD (gastroesophageal reflux disease)     High cholesterol     Hypercholesterolemia     Hypertension     Ill-defined condition     missing upper front tooth    Ill-defined condition     lower leg bilateral reddened rash.  Insomnia     Other ill-defined conditions(799.89)     Large boil under right arm-pit.  Paranoia ([facility] [address]     Psychiatric disorder     bipolar      Past Surgical History:   Procedure Laterality Date    ABDOMEN SURGERY PROC UNLISTED      cholecystectomy 7/23/2012       Social History     Social History    Marital status:      Spouse name: N/A    Number of children: N/A    Years of education: N/A     Occupational History    Not on file.      Social History Main Topics    Smoking status: Current Every Day Smoker     Packs/day: 0.50    Smokeless tobacco: Never Used    Alcohol use No    Drug use: No    Sexual activity: Not on file     Other Topics Concern    Not on file     Social History Narrative      Family History   Problem Relation Age of Onset    Cancer Father      lung    Diabetes Brother     Heart Disease Brother     Malignant Hyperthermia Neg Hx     Pseudocholinesterase Deficiency Neg Hx     Delayed Awakening Neg Hx     Post-op Nausea/Vomiting Neg Hx     Emergence Delirium Neg Hx     Post-op Cognitive Dysfunction Neg Hx     Other Neg Hx       Prior to Admission medications    Medication Sig Start Date End Date Taking? Authorizing Provider   atorvastatin (LIPITOR) 40 mg tablet Take 1 Tab by mouth daily. 5/15/17  Yes Tonja Loyola NP   amLODIPine (NORVASC) 10 mg tablet TAKE ONE TABLET BY MOUTH DAILY 5/15/17  Yes Tonja Loyola NP   metoprolol succinate (TOPROL-XL) 25 mg XL tablet Take 1 Tab by mouth daily. 3/15/17  Yes Amaury Mccabe NP   lisinopril (PRINIVIL, ZESTRIL) 40 mg tablet Take 1 Tab by mouth daily. 3/15/17  Yes Tonja Loyola NP   sertraline (ZOLOFT) 100 mg tablet TAKE ONE TABLET BY MOUTH DAILY 17  Yes Nate Rogers NP   QUEtiapine (SEROQUEL) 100 mg tablet TAKE ONE-HALF TABLET BY MOUTH EVERY NIGHT AT BEDTIME 17  Yes Nate Rogers NP   LORazepam (ATIVAN) 0.5 mg tablet TAKE ONE TABLET BY MOUTH TWICE A DAY FOR ANXIETY 17  Yes Nate Rogers NP   lithium carbonate SR (LITHOBID) 300 mg CR tablet Take 1 Tab by mouth nightly. 17  Yes Nate Rogers NP     No Known Allergies    Objective:     Vitals:  Blood pressure 116/51, pulse 80, temperature 98.6 °F (37 °C), resp. rate 16, height 5' 5\" (1.651 m), weight 89.4 kg (197 lb), SpO2 98 %.   Temp (24hrs), Av.1 °F (36.7 °C), Min:97.5 °F (36.4 °C), Max:98.6 °F (37 °C)      Intake and Output:          Physical Exam:                Patient is intubated:  no    Physical Examination:   GENERAL ASSESSMENT: altered  SKIN: dry skin  HEAD: normocephalic  EYES: normal eyes  NECK: normal  CHEST: normal air exchange, no rales, no rhonchi, no wheezes, respiratory effort normal with no retractions  HEART: regular rate and rhythm, normal S1/S2  ABDOMEN: soft, non-distended, no masses  :Ricci: no  EXTREMITY: no joint swelling  NEURO: gross motor exam normal by observation but AO2      ECG/rhythm[de-identified] Rev:yes  Xray/CT/US/MRI REV:yes  Data Review   Recent Results (from the past 72 hour(s))   LITHIUM    Collection Time: 05/21/17 10:15 AM   Result Value Ref Range    Lithium 2.66 (HH) 0.60 - 1.20 MMOL/L    Reported dose date: UNKNOWN      Reported dose time: UNKNOWN      Reported dose: UNKNOWN UNITS   CBC WITH AUTOMATED DIFF    Collection Time: 05/21/17 10:25 AM   Result Value Ref Range    WBC 11.4 (H) 3.6 - 11.0 K/uL    RBC 4.52 3.80 - 5.20 M/uL    HGB 13.0 11.5 - 16.0 g/dL    HCT 38.3 35.0 - 47.0 %    MCV 84.7 80.0 - 99.0 FL    MCH 28.8 26.0 - 34.0 PG    MCHC 33.9 30.0 - 36.5 g/dL    RDW 13.9 11.5 - 14.5 %    PLATELET 643 786 - 256 K/uL    NEUTROPHILS 67 32 - 75 %    LYMPHOCYTES 22 12 - 49 %    MONOCYTES 6 5 - 13 %    EOSINOPHILS 5 0 - 7 %    BASOPHILS 0 0 - 1 %    ABS. NEUTROPHILS 7.6 1.8 - 8.0 K/UL    ABS. LYMPHOCYTES 2.5 0.8 - 3.5 K/UL    ABS. MONOCYTES 0.7 0.0 - 1.0 K/UL    ABS. EOSINOPHILS 0.6 (H) 0.0 - 0.4 K/UL    ABS. BASOPHILS 0.0 0.0 - 0.1 K/UL   METABOLIC PANEL, COMPREHENSIVE    Collection Time: 05/21/17 10:25 AM   Result Value Ref Range    Sodium 134 (L) 136 - 145 mmol/L    Potassium 3.7 3.5 - 5.1 mmol/L    Chloride 100 97 - 108 mmol/L    CO2 24 21 - 32 mmol/L    Anion gap 10 5 - 15 mmol/L    Glucose 91 65 - 100 mg/dL    BUN 44 (H) 6 - 20 MG/DL    Creatinine 1.59 (H) 0.55 - 1.02 MG/DL    BUN/Creatinine ratio 28 (H) 12 - 20      GFR est AA 40 (L) >60 ml/min/1.73m2    GFR est non-AA 33 (L) >60 ml/min/1.73m2    Calcium 9.7 8.5 - 10.1 MG/DL    Bilirubin, total 0.3 0.2 - 1.0 MG/DL    ALT (SGPT) 17 12 - 78 U/L    AST (SGOT) 15 15 - 37 U/L    Alk.  phosphatase 239 (H) 45 - 117 U/L    Protein, total 8.1 6.4 - 8.2 g/dL    Albumin 3.7 3.5 - 5.0 g/dL    Globulin 4.4 (H) 2.0 - 4.0 g/dL    A-G Ratio 0.8 (L) 1.1 - 2.2     MAGNESIUM    Collection Time: 05/21/17 10:25 AM   Result Value Ref Range    Magnesium 2.3 1.6 - 2.4 mg/dL   TROPONIN I    Collection Time: 05/21/17 10:25 AM   Result Value Ref Range    Troponin-I, Qt. <0.04 <0.05 ng/mL   LACTIC ACID, PLASMA    Collection Time: 05/21/17 10:25 AM   Result Value Ref Range    Lactic acid 1.0 0.4 - 2.0 MMOL/L   PRO-BNP    Collection Time: 05/21/17 10:30 AM   Result Value Ref Range    NT pro- (H) 0 - 125 PG/ML   TSH 3RD GENERATION    Collection Time: 05/21/17 10:30 AM   Result Value Ref Range    TSH 6.15 (H) 0.36 - 3.74 uIU/mL   EKG, 12 LEAD, INITIAL    Collection Time: 05/21/17 10:33 AM   Result Value Ref Range    Ventricular Rate 77 BPM    Atrial Rate 77 BPM    P-R Interval 204 ms    QRS Duration 84 ms    Q-T Interval 402 ms    QTC Calculation (Bezet) 454 ms    Calculated P Axis 76 degrees    Calculated R Axis -7 degrees    Calculated T Axis 81 degrees    Diagnosis       Normal sinus rhythm  Low voltage QRS  Inferior infarct , age undetermined  Cannot rule out Anterior infarct , age undetermined  Abnormal ECG  When compared with ECG of 18-JUL-2012 11:34,  Inferior infarct is now present     URINALYSIS W/ RFLX MICROSCOPIC    Collection Time: 05/21/17 11:50 AM   Result Value Ref Range    Color YELLOW/STRAW      Appearance CLOUDY (A) CLEAR      Specific gravity 1.019 1.003 - 1.030      pH (UA) 5.0 5.0 - 8.0      Protein NEGATIVE  NEG mg/dL    Glucose NEGATIVE  NEG mg/dL    Ketone 15 (A) NEG mg/dL    Blood NEGATIVE  NEG      Urobilinogen 0.2 0.2 - 1.0 EU/dL    Nitrites NEGATIVE  NEG      Leukocyte Esterase NEGATIVE  NEG     BILIRUBIN, CONFIRM    Collection Time: 05/21/17 11:50 AM   Result Value Ref Range    Bilirubin UA, confirm NEGATIVE  NEG         Discussed with:    Patient and Attending/Consulting  Thank you so much to allow us to participate in this patient's care. We will follow.  : Jennifer Coombs MD  5/21/2017      [facility]:  [email or web address]  Denny Bradley office:  [address] [place] [address], [address] [place] [address],[address]  [place], [address] [facility]  Phone: [phone]  Fax :     [phone]    [place] office:  [address] [facility], [address]   Phone - [phone]  Fax - [phone]

## 2017-05-21 NOTE — IP AVS SNAPSHOT
Ghislaine Meza 
 
 
 [address] [place] [address] 
[phone] Patient: Moira Roman MRN: [id number] WPI:81/80/2822 You are allergic to the following No active allergies Recent Documentation Height Weight BMI OB Status Smoking Status 1.651 m 84.9 kg 31.14 kg/m2 Postmenopausal Current Every Day Smoker Unresulted Labs Order Current Status ARSENIC, UR, FRACTIONATED In , UR In process Emergency Contacts Name Discharge Info Relation Home Work Mobile Venu Mejía DISCHARGE CAREGIVER [3] Spouse [3] About your hospitalization You were admitted on:  May 21, 2017 You last received care in the:  [facility] 4M POST SURG ORT 2 You were discharged on:  May 26, 2017 Unit phone number:  [phone] Why you were hospitalized 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 Your diagnoses also included:  Kevon (Acute Kidney Injury) (Hcc), Bipolar 1 Disorder (Hcc), Essential Hypertension With Goal Blood Pressure Less Than 130/80, Prediabetes, Hypercholesteremia, Encephalopathy, Anemia Providers Seen During Your Hospitalizations Provider Role Specialty Primary office phone Shaun Ferris MD Attending Provider Emergency Medicine [phone] Tery Lesch, MD Attending Provider Internal Medicine [phone] Kalpana Chou MD Attending Provider Internal Medicine [phone] Adonay Flynn MD Attending Provider Internal Medicine [phone] Your Primary Care Physician (PCP) Primary Care Physician Office Phone Office Fax Mercedes Foster [phone] Follow-up Information Follow up With Details Comments Contact [address] 
[address] Floor [place] [address] 
[phone]  Audrey Matias NP Schedule an appointment as soon as possible for a visit in 1 week  [place] [address] [address] [id number] [phone] Current Discharge Medication List  
  
START taking these medications Dose & Instructions Dispensing Information Comments Morning Noon Evening Bedtime  
 risperiDONE 0.5 mg tablet Commonly known as:  RisperDAL Your last dose was: Your next dose is:    
   
   
 Dose:  0.5 mg Take 1 Tab by mouth nightly for 20 days. Quantity:  20 Tab Refills:  0 CONTINUE these medications which have CHANGED Dose & Instructions Dispensing Information Comments Morning Noon Evening Bedtime  
 sertraline 50 mg tablet Commonly known as:  ZOLOFT What changed:   
- medication strength 
- how much to take 
- how to take this - when to take this Your last dose was: Your next dose is:    
   
   
 Dose:  50 mg Take 1 Tab by mouth daily for 30 days. TAKE ONE TABLET BY MOUTH DAILY  Indications: ANXIETY WITH DEPRESSION Quantity:  30 Tab Refills:  0 CONTINUE these medications which have NOT CHANGED Dose & Instructions Dispensing Information Comments Morning Noon Evening Bedtime  
 metoprolol succinate 25 mg XL tablet Commonly known as:  TOPROL-XL Your last dose was: Your next dose is:    
   
   
 Dose:  25 mg Take 1 Tab by mouth daily. Quantity:  30 Tab Refills:  5 STOP taking these medications   
 amLODIPine 10 mg tablet Commonly known as:  NORVASC  
   
  
 atorvastatin 40 mg tablet Commonly known as:  LIPITOR  
   
  
 lisinopril 40 mg tablet Commonly known as:  PRINIVIL, ZESTRIL  
   
  
 lithium carbonate  mg CR tablet Commonly known as:  Tacos Chum LORazepam 0.5 mg tablet Commonly known as:  ATIVAN  
   
  
 QUEtiapine 100 mg tablet Commonly known as:  SEROquel Where to Get Your Medications Information on where to get these meds will be given to you by the nurse or doctor. ! Ask your nurse or doctor about these medications  
  risperiDONE 0.5 mg tablet  
 sertraline 50 mg tablet Discharge Instructions Patient Discharge Instructions Leesa Rabago / [id number] : 1955 Admitted 2017 Discharged: 2017 Primary Diagnoses Problem List as of 2017  Date Reviewed: 3/15/2017 Codes Class Noted - Resolved Anemia ALEX (acute kidney injury) ([facility] * (Principal)Lithium toxicity Encephalopathy Hypercholesteremia Prediabetes Essential hypertension with goal blood pressure less than 130/80 Bipolar 1 disorder ([facility] [address] Take Home Medications · It is important that you take the medication exactly as they are prescribed. · Keep your medication in the bottles provided by the pharmacist and keep a list of the medication names, dosages, and times to be taken in your wallet. · Do not take other medications without consulting your doctor. What to do at [facility] Recommended diet: Cardiac Diet Recommended activity: Activity as tolerated and PT/OT per Home Health If you experience worse symptoms, please follow up with your PCP or psychiatry. Follow-up with your PCP in a few days Bipolar Disorder: Care Instructions Your Care Instructions Bipolar disorder is an illness that causes extreme mood changes, from times of very high energy (manic episodes) to times of depression. But many people with bipolar disorder show only the symptoms of depression. These moods may cause problems with your work, school, family life, friendships, and how well you function. This disease is also called manic-depression. There is no cure for bipolar disorder, but it can be helped with medicines. Counseling may also help. It is important to take your medicines exactly as prescribed, even when you feel well. You may need lifelong treatment. Follow-up care is a key part of your treatment and safety. Be sure to make and go to all appointments, and call your doctor if you are having problems. It's also a good idea to know your test results and keep a list of the medicines you take. How can you care for yourself at home? · Be safe with medicines. Take your medicines exactly as prescribed. Do not stop or change a medicine without talking to your doctor first. Gordon Barba and your doctor may need to try different combinations of medicines to find what works for you. · Take your medicines on schedule to keep your moods even. When you feel good, you may think that you do not need your medicines. But it is important to keep taking them. · Go to your counseling sessions. Call and talk with your counselor if you can't go to a session or if you don't think the sessions are helping. Do not just stop going. · Get at least 30 minutes of activity on most days of the week. Walking is a good choice. You also may want to do other things, such as running, swimming, or cycling. · Get enough sleep. Keep your room dark and quiet. Try to go to bed at the same time every night. · Eat a healthy diet. This includes whole grains, dairy, fruits, vegetables, and protein. Eat foods from each of these groups. · Try to lower your stress. Manage your time, build a strong support system, and lead a healthy lifestyle. To lower your stress, try physical activity, slow deep breathing, or getting a massage. · Do not use alcohol or illegal drugs. · Learn the early signs of your mood changes. You can then take steps to help yourself feel better. · Ask for help from friends and family when you need it. You may need help with daily chores when you are depressed. When you are manic, you may need support to control your high energy levels. What should you do if someone in your family has bipolar disorder? · Learn about the disease and the signs that it is getting worse. · Remind your family member that you love him or her. · Make a plan with all family members about how to take care of your loved one when his or her symptoms are bad. · Talk about your fears and concerns and those of other family members. Seek counseling if needed. · Do not focus attention only on the person who is in treatment. · Remind yourself that it will take time for changes to occur. · Do not blame yourself for the disease. · Know your legal rights and the legal rights of your family member. Support groups or counselors can help you with this information. · Take care of yourself. Keep up with your own interests, such as your career, hobbies, and friends. Use exercise, positive self-talk, deep breathing, and other relaxing exercises to help lower your stress. · Give yourself time to grieve. You may need to deal with emotions such as anger, fear, and frustration. After you work through your feelings, you will be better able to care for yourself and your family. · If you are having a hard time with your feelings or with your relationship with your family member, talk with a counselor. When should you call for help? Call 911 anytime you think you may need emergency care. For example, call if: 
· You feel like hurting yourself or someone else. · Someone who has bipolar disorder displays dangerous behavior, and you think the person might hurt himself or herself or someone else. Call your doctor now or seek immediate medical care if: 
· You hear voices. · Someone you know has bipolar disorder and talks about suicide. Keep the numbers for these national suicide hotlines: 1-596-586-TALK (0-953-463-356-185-6798) and 0-415-XNVNHPV (6-355.345.9190). If a suicide threat seems real, with a specific plan and a way to carry it out, stay with the person, or ask someone you trust to stay with the person, until you can get help. · Someone you know has bipolar disorder and: 
¨ Starts to give away possessions. ¨ Is using illegal drugs or drinking alcohol heavily. ¨ Talks or writes about death, including writing suicide notes or talking about guns, knives, or pills. ¨ Talks or writes about hurting someone else. ¨ Starts to spend a lot of time alone. ¨ Acts very aggressively or suddenly appears calm. ¨ Talks about beliefs that are not based in reality (delusions). Watch closely for changes in your health, and be sure to contact your doctor if: 
· You cannot go to your counseling sessions. Where can you learn more? Go to http://maria victoriaBirthday Gorillaignacio.info/. Enter K052 in the search box to learn more about \"Bipolar Disorder: Care Instructions. \" Current as of: July 26, 2016 Content Version: 11.2 © 2763-9447 [employer]. Care instructions adapted under license by [employer] (which disclaims liability or warranty for this information). If you have questions about a medical condition or this instruction, always ask your healthcare professional. [place] [address] any warranty or liability for your use of this information. Information obtained by : 
I understand that if any problems occur once I am at home I am to contact my physician. I understand and acknowledge receipt of the instructions indicated above. Physician's or R.N.'s Signature                                                                  Date/Time Patient or Representative Signature                                                          Date/Time Discharge Orders None [facility] Announcement  We are excited to announce that we are making your provider's discharge notes available to you in [employer]. You will see these notes when they are completed and signed by the physician that discharged you from your recent hospital stay. If you have any questions or concerns about any information you see in [employer], please call the Health Information Department where you were seen or reach out to your Primary Care Provider for more information about your plan of care. Introducing [facility]! [employer] introduces [employer] patient portal. Now you can access parts of your medical record, email your doctor's office, and request medication refills online. 1. In your internet browser, go to [email or web address] 2. Click on the First Time User? Click Here link in the Sign In box. You will see the New Member Sign Up page. 3. Enter your [employer] Access Code exactly as it appears below. You will not need to use this code after youve completed the sign-up process. If you do not sign up before the expiration date, you must request a new code. · [employer] Access Code: [email or web address] Expires: 8/20/2017  4:35 PM 
 
4. Enter the last four digits of your Social Security Number (xxxx) and Date of Birth (mm/dd/yyyy) as indicated and click Submit. You will be taken to the next sign-up page. 5. Create a [employer] ID. This will be your [facility] login ID and cannot be changed, so think of one that is secure and easy to remember. 6. Create a [employer] password. You can change your password at any time. 7. Enter your Password Reset Question and Answer. This can be used at a later time if you forget your password. 8. Enter your e-mail address. You will receive e-mail notification when new information is available in [address] 9. Click Sign Up. You can now view and download portions of your medical record. 10. Click the Download Summary menu link to download a portable copy of your medical information. If you have questions, please visit the Frequently Asked Questions section of the MyChart website. Remember, MyChart is NOT to be used for urgent needs. For medical emergencies, dial 911. Now available from your iPhone and Android! General Information Please provide this summary of care documentation to your next provider. Patient Signature:  ____________________________________________________________ Date:  ____________________________________________________________  
  
Anthony Yolette Provider Signature:  ____________________________________________________________ Date:  ____________________________________________________________

## 2017-05-21 NOTE — PROGRESS NOTES
1925  Bedside and Verbal shift change report given to [address] (oncoming nurse) by Ella Watson RN (offgoing nurse). Report included the following information SBAR, Kardex, Intake/Output, MAR, Recent Results and Cardiac Rhythm NSR. Pt's medications are in the room with the . Pt's  advised to take medications home. Pt's  verbalized understanding. 1010  Primary Nurse Georgi Flores RN and Ambika Kevin RN performed a dual skin assessment on this patient Impairment noted- see wound doc flow sheet  Cade score is 17    1508  TRANSFER - IN REPORT:    Verbal report received from Hany Garcia (name) on Severo Longest  being received from ED (unit) for routine progression of care      Report consisted of patients Situation, Background, Assessment and   Recommendations(SBAR). Information from the following report(s) SBAR, Kardex, Intake/Output, MAR, Recent Results and Cardiac Rhythm NSR was reviewed with the receiving nurse. Opportunity for questions and clarification was provided. Assessment completed upon patients arrival to unit and care assumed.

## 2017-05-21 NOTE — CONSULTS
Full note to follow. 63 yo WF w/ hx of bipolar d/o p/w AMS, n/v/d, tremors, found to have Li toxicity. Li 2.66, checked at 10:15am.     Aggressive IVF, repeat Li. Strict I/Os, watch renal function. Already spoke to nephrologist, may need to have RRT if does not improve.

## 2017-05-21 NOTE — IP AVS SNAPSHOT
Current Discharge Medication List  
  
START taking these medications Dose & Instructions Dispensing Information Comments Morning Noon Evening Bedtime  
 risperiDONE 0.5 mg tablet Commonly known as:  RisperDAL Your last dose was: Your next dose is:    
   
   
 Dose:  0.5 mg Take 1 Tab by mouth nightly for 20 days. Quantity:  20 Tab Refills:  0 CONTINUE these medications which have CHANGED Dose & Instructions Dispensing Information Comments Morning Noon Evening Bedtime  
 sertraline 50 mg tablet Commonly known as:  ZOLOFT What changed:   
- medication strength 
- how much to take 
- how to take this - when to take this Your last dose was: Your next dose is:    
   
   
 Dose:  50 mg Take 1 Tab by mouth daily for 30 days. TAKE ONE TABLET BY MOUTH DAILY  Indications: ANXIETY WITH DEPRESSION Quantity:  30 Tab Refills:  0 CONTINUE these medications which have NOT CHANGED Dose & Instructions Dispensing Information Comments Morning Noon Evening Bedtime  
 metoprolol succinate 25 mg XL tablet Commonly known as:  TOPROL-XL Your last dose was: Your next dose is:    
   
   
 Dose:  25 mg Take 1 Tab by mouth daily. Quantity:  30 Tab Refills:  5 STOP taking these medications   
 amLODIPine 10 mg tablet Commonly known as:  NORVASC  
   
  
 atorvastatin 40 mg tablet Commonly known as:  LIPITOR  
   
  
 lisinopril 40 mg tablet Commonly known as:  PRINIVIL, ZESTRIL  
   
  
 lithium carbonate  mg CR tablet Commonly known as:  Britta Actis LORazepam 0.5 mg tablet Commonly known as:  ATIVAN  
   
  
 QUEtiapine 100 mg tablet Commonly known as:  SEROquel Where to Get Your Medications Information on where to get these meds will be given to you by the nurse or doctor. ! Ask your nurse or doctor about these medications  
  risperiDONE 0.5 mg tablet  
 sertraline 50 mg tablet

## 2017-05-21 NOTE — ED NOTES
Per lab they have dumped the urine sample and unable to run urine drug screen, will need to recollect. Receiving nurse is aware.

## 2017-05-21 NOTE — PROGRESS NOTES
[facility]: MED RECONCILIATION    Comments/Recommendations:    Patient very lethargic at time of interview. Medication bottles were available for review however, and were consistent with current [facility] med list.   When asked, patient does not know when she last took her medications, so unable to confirm time of last doses.  Lithium - Lithium can cause a metallic taste, which is apparently one of the patient's symptoms. Recommend obtaining lithium level to check for compliance/toxicity. Information obtained from: patient's medication bottles from Sakshi cabrera    Significant PMH/Disease States:   Past Medical History:   Diagnosis Date    Abscess of buttock 7/23/2012    Bronchitis     Common bile duct calculus 7/23/2012    Gallstones 7/12/2012    GERD (gastroesophageal reflux disease)     High cholesterol     Hypercholesterolemia     Hypertension     Ill-defined condition     missing upper front tooth    Ill-defined condition     lower leg bilateral reddened rash.  Insomnia     Other ill-defined conditions(799.89)     Large boil under right arm-pit.  Paranoia ([address]     Psychiatric disorder     bipolar     Chief Complaint for this Admission:   Chief Complaint   Patient presents with    Lethargy    Decreased Appetite     Allergies: Review of patient's allergies indicates no known allergies. Prior to Admission Medications:     Medication Documentation Review Audit       Reviewed by Linda Jensen PHARMD (Pharmacist) on 05/21/17 at          Medication Sig Documenting Provider Last Dose Status Taking? amLODIPine (NORVASC) 10 mg tablet TAKE ONE TABLET BY MOUTH DAILY Audrey Matias NP  Active Yes    atorvastatin (LIPITOR) 40 mg tablet Take 1 Tab by mouth daily. Audrey Matias NP  Active Yes    lisinopril (PRINIVIL, ZESTRIL) 40 mg tablet Take 1 Tab by mouth daily. Audrey Matias NP  Active Yes    lithium carbonate SR (LITHOBID) 300 mg CR tablet Take 1 Tab by mouth nightly.  Coco Maradiaga Tiburcio Elizondo, NP  Active Yes    LORazepam (ATIVAN) 0.5 mg tablet TAKE ONE TABLET BY MOUTH TWICE A DAY FOR ANXIETY Hai Smallwood NP  Active Yes    metoprolol succinate (TOPROL-XL) 25 mg XL tablet Take 1 Tab by mouth daily. Christelle Stanford, NP  Active Yes    QUEtiapine (SEROQUEL) 100 mg tablet TAKE ONE-HALF TABLET BY MOUTH EVERY NIGHT AT BEDTIME Hai Smallwood, NP  Active Yes    sertraline (ZOLOFT) 100 mg tablet TAKE ONE TABLET BY MOUTH DAILY Hai Smallwood NP  Active Yes                  Thank you for the consult,  Arian MORRISON [facility]

## 2017-05-21 NOTE — ED TRIAGE NOTES
Patient brought in by spouse for decreased appetite x 1 wk, lethargy, states everything tastes like tin, and has not been taking medication regularly, and confusion.

## 2017-05-21 NOTE — ED PROVIDER NOTES
HPI Comments: Sugey Washburn is a 62 y.o. female who presents via [facility] with  to the ED with a c/o increasing confusion and fatigue x 2 weeks. Per , pt quit smoking, quit eating and barely drinks as \"everything tastes like tin\". She has been more confused. Pt's  notes pt has not been taking her meds for the last few days, but states he is unsure what or how much she was taking previously.  denies f/c. He states pt has been coughing. She normally smokes 1/2 ppd. He denies n/v/d. He notes she has not been walking right or moving right, noting that she had an episode of urinary incontinence. Pt is not able to provide history.  is unsure of pt's formal psychiatric diagnosis     PCP: Almaz Tuttle NP  PMHx significant for: Past Medical History:  7/23/2012: Abscess of buttock  No date: Bronchitis  7/23/2012: Common bile duct calculus  7/12/2012: Gallstones  No date: GERD (gastroesophageal reflux disease)  No date: High cholesterol  No date: Hypercholesterolemia  No date: Hypertension  No date: Ill-defined condition      Comment: missing upper front tooth  No date: Ill-defined condition      Comment: lower leg bilateral reddened rash. No date: Insomnia  No date: Other ill-defined conditions(799.89)      Comment: Large boil under right arm-pit. No date: Paranoia [facility])  No date: Psychiatric disorder      Comment: bipolar  PSHx significant for: Past Surgical History:  No date: ABDOMEN SURGERY PROC UNLISTED      Comment: cholecystectomy 7/23/2012  Social Hx: Tobacco: 1/2 ppd  EtOH: denies  Illicit drug use: denies    There are no further complaints or symptoms at this time. The history is provided by the patient and the spouse.         Past Medical History:   Diagnosis Date    Abscess of buttock 7/23/2012    Bronchitis     Common bile duct calculus 7/23/2012    Gallstones 7/12/2012    GERD (gastroesophageal reflux disease)     High cholesterol     Hypercholesterolemia     Hypertension     Ill-defined condition     missing upper front tooth    Ill-defined condition     lower leg bilateral reddened rash.  Insomnia     Other ill-defined conditions(799.89)     Large boil under right arm-pit.  Paranoia ([facility] [address]     Psychiatric disorder     bipolar       Past Surgical History:   Procedure Laterality Date    ABDOMEN SURGERY PROC UNLISTED      cholecystectomy 7/23/2012         Family History:   Problem Relation Age of Onset    Cancer Father      lung    Diabetes Brother     Heart Disease Brother     Malignant Hyperthermia Neg Hx     Pseudocholinesterase Deficiency Neg Hx     Delayed Awakening Neg Hx     Post-op Nausea/Vomiting Neg Hx     Emergence Delirium Neg Hx     Post-op Cognitive Dysfunction Neg Hx     Other Neg Hx        Social History     Social History    Marital status:      Spouse name: N/A    Number of children: N/A    Years of education: N/A     Occupational History    Not on file. Social History Main Topics    Smoking status: Current Every Day Smoker     Packs/day: 0.50    Smokeless tobacco: Never Used    Alcohol use No    Drug use: No    Sexual activity: Not on file     Other Topics Concern    Not on file     Social History Narrative         ALLERGIES: Review of patient's allergies indicates no known allergies.     Review of Systems   Unable to perform ROS: Mental status change       Patient Vitals for the past 12 hrs:   Temp Pulse Resp BP SpO2   05/21/17 1620 - 78 - - -   05/21/17 1608 98.6 °F (37 °C) 80 16 116/51 98 %   05/21/17 1500 - 75 20 139/51 99 %   05/21/17 1454 - 75 18 131/53 97 %   05/21/17 1430 - 84 18 (!) 123/102 96 %   05/21/17 1400 - 78 16 143/79 100 %   05/21/17 1357 - 76 19 140/65 97 %   05/21/17 1330 - 74 19 - 98 %   05/21/17 1300 - 80 15 - 100 %   05/21/17 1215 - - - (!) 110/97 -   05/21/17 1130 - 75 21 - 98 %   05/21/17 1115 - 77 21 - 97 %   05/21/17 1100 - 80 18 - 97 %   05/21/17 1016 - 73 20 (!) 137/97 100 %   05/21/17 0959 97.5 °F (36.4 °C) 88 15 129/65 96 %              Physical Exam   Constitutional: She appears well-developed and well-nourished. No distress. Disheveled. HENT:   Head: Normocephalic and atraumatic. Right Ear: External ear normal.   Left Ear: External ear normal.   Nose: Nose normal.   Mouth/Throat: Oropharynx is clear and moist.   Poor dentition   Neck: Neck supple. Cardiovascular: Normal rate, regular rhythm, normal heart sounds and intact distal pulses. Exam reveals no gallop and no friction rub. No murmur heard. Pulmonary/Chest: Effort normal and breath sounds normal. No stridor. No respiratory distress. She has no wheezes. She has no rales. She exhibits no tenderness. Abdominal: Soft. Bowel sounds are normal. She exhibits no distension and no mass. There is no tenderness. There is no rebound and no guarding. Musculoskeletal: Normal range of motion. She exhibits no edema, tenderness or deformity. Neurological: She is alert. No cranial nerve deficit. She exhibits normal muscle tone. Coordination abnormal.   Oriented to self. Skin: No rash noted. No erythema. No pallor. Psychiatric: She has a normal mood and affect. Her behavior is normal.   Nursing note and vitals reviewed. MDM  Number of Diagnoses or Management Options     Amount and/or Complexity of Data Reviewed  Clinical lab tests: ordered and reviewed  Tests in the radiology section of CPT®: ordered and reviewed  Tests in the medicine section of CPT®: reviewed and ordered  Obtain history from someone other than the patient: yes ()  Review and summarize past medical records: yes  Independent visualization of images, tracings, or specimens: yes    Patient Progress  Patient progress: stable    ED Course       Procedures  10:21 AM  Discussed pt, sx, hx and current findings with Dr Monet Christie. He is in agreement with plan and will see pt   Trudy Edouard.  KANNAN Bates      ED EKG interpretation: 10:33 AM  Rhythm: normal sinus rhythm; and regular . Rate (approx.): 77; Axis: normal; P wave: normal; QRS interval: low voltage; ST/T wave: non-specific changes, inferior and anterior leads; Other findings: abnormal ekg. This EKG was interpreted by Filemon Ceballos MD,ED Provider. 1:42 PM  Ernesto Barfield. KANNAN Bates spoke with Dr. Riddhi Nails, Consult for Hospitalist. Discussed available diagnostic tests and clinical findings. He is in agreement with care plans as outlined. He will admit pt. BAMBI Rojas    LABS COMPLETED AND REVIEWED:  Recent Results (from the past 12 hour(s))   LITHIUM    Collection Time: 05/21/17 10:15 AM   Result Value Ref Range    Lithium 2.66 (HH) 0.60 - 1.20 MMOL/L    Reported dose date: UNKNOWN      Reported dose time: UNKNOWN      Reported dose: UNKNOWN UNITS   CBC WITH AUTOMATED DIFF    Collection Time: 05/21/17 10:25 AM   Result Value Ref Range    WBC 11.4 (H) 3.6 - 11.0 K/uL    RBC 4.52 3.80 - 5.20 M/uL    HGB 13.0 11.5 - 16.0 g/dL    HCT 38.3 35.0 - 47.0 %    MCV 84.7 80.0 - 99.0 FL    MCH 28.8 26.0 - 34.0 PG    MCHC 33.9 30.0 - 36.5 g/dL    RDW 13.9 11.5 - 14.5 %    PLATELET 027 639 - 052 K/uL    NEUTROPHILS 67 32 - 75 %    LYMPHOCYTES 22 12 - 49 %    MONOCYTES 6 5 - 13 %    EOSINOPHILS 5 0 - 7 %    BASOPHILS 0 0 - 1 %    ABS. NEUTROPHILS 7.6 1.8 - 8.0 K/UL    ABS. LYMPHOCYTES 2.5 0.8 - 3.5 K/UL    ABS. MONOCYTES 0.7 0.0 - 1.0 K/UL    ABS. EOSINOPHILS 0.6 (H) 0.0 - 0.4 K/UL    ABS.  BASOPHILS 0.0 0.0 - 0.1 K/UL   METABOLIC PANEL, COMPREHENSIVE    Collection Time: 05/21/17 10:25 AM   Result Value Ref Range    Sodium 134 (L) 136 - 145 mmol/L    Potassium 3.7 3.5 - 5.1 mmol/L    Chloride 100 97 - 108 mmol/L    CO2 24 21 - 32 mmol/L    Anion gap 10 5 - 15 mmol/L    Glucose 91 65 - 100 mg/dL    BUN 44 (H) 6 - 20 MG/DL    Creatinine 1.59 (H) 0.55 - 1.02 MG/DL    BUN/Creatinine ratio 28 (H) 12 - 20      GFR est AA 40 (L) >60 ml/min/1.73m2    GFR est non-AA 33 (L) >60 ml/min/1.73m2    Calcium 9.7 8.5 - 10.1 MG/DL    Bilirubin, total 0.3 0.2 - 1.0 MG/DL    ALT (SGPT) 17 12 - 78 U/L    AST (SGOT) 15 15 - 37 U/L    Alk. phosphatase 239 (H) 45 - 117 U/L    Protein, total 8.1 6.4 - 8.2 g/dL    Albumin 3.7 3.5 - 5.0 g/dL    Globulin 4.4 (H) 2.0 - 4.0 g/dL    A-G Ratio 0.8 (L) 1.1 - 2.2     MAGNESIUM    Collection Time: 05/21/17 10:25 AM   Result Value Ref Range    Magnesium 2.3 1.6 - 2.4 mg/dL   TROPONIN I    Collection Time: 05/21/17 10:25 AM   Result Value Ref Range    Troponin-I, Qt. <0.04 <0.05 ng/mL   LACTIC ACID, PLASMA    Collection Time: 05/21/17 10:25 AM   Result Value Ref Range    Lactic acid 1.0 0.4 - 2.0 MMOL/L   EKG, 12 LEAD, INITIAL    Collection Time: 05/21/17 10:33 AM   Result Value Ref Range    Ventricular Rate 77 BPM    Atrial Rate 77 BPM    P-R Interval 204 ms    QRS Duration 84 ms    Q-T Interval 402 ms    QTC Calculation (Bezet) 454 ms    Calculated P Axis 76 degrees    Calculated R Axis -7 degrees    Calculated T Axis 81 degrees    Diagnosis       Normal sinus rhythm  Low voltage QRS  Inferior infarct , age undetermined  Cannot rule out Anterior infarct , age undetermined  Abnormal ECG  When compared with ECG of 18-JUL-2012 11:34,  Inferior infarct is now present     URINALYSIS W/ RFLX MICROSCOPIC    Collection Time: 05/21/17 11:50 AM   Result Value Ref Range    Color YELLOW/STRAW      Appearance CLOUDY (A) CLEAR      Specific gravity 1.019 1.003 - 1.030      pH (UA) 5.0 5.0 - 8.0      Protein NEGATIVE  NEG mg/dL    Glucose NEGATIVE  NEG mg/dL    Ketone 15 (A) NEG mg/dL    Blood NEGATIVE  NEG      Urobilinogen 0.2 0.2 - 1.0 EU/dL    Nitrites NEGATIVE  NEG      Leukocyte Esterase NEGATIVE  NEG     BILIRUBIN, CONFIRM    Collection Time: 05/21/17 11:50 AM   Result Value Ref Range    Bilirubin UA, confirm NEGATIVE  NEG         IMAGING COMPLETED AND REVIEWED:  The following have been ordered and reviewed:  Study Result      EXAM: XR CHEST PORT     INDICATION: Cough.  Altered mental status.     COMPARISON: 7/18/2012     TECHNIQUE: Portable AP upright chest view at 1053 hours     FINDINGS: Cardiac monitoring wires overlie the thorax. The cardiomediastinal  contours are stable. The pulmonary vasculature is within normal limits.      There are low lung volumes with mild bibasilar opacities. There is no pleural  effusion or pneumothorax. The bones and upper abdomen are stable.     IMPRESSION  IMPRESSION:     Low lung volumes with mild bibasilar atelectasis.        Study Result      EXAM: CT HEAD WO CONT     INDICATION: confusion/ ams     COMPARISON: None.     TECHNIQUE: Unenhanced CT of the head was performed using 5 mm images. Brain and  bone windows were generated. CT dose reduction was achieved through use of a  standardized protocol tailored for this examination and automatic exposure  control for dose modulation.      FINDINGS:  There Is no extra-axial fluid collection hemorrhage shift or masses.     IMPRESSION  impression: No acute changes. MEDICATIONS GIVEN:  Medications   sodium chloride 0.9 % bolus infusion 1,000 mL (not administered)         CLINICAL IMPRESSION:  1. Acute encephalopathy    2. ALEX (acute kidney injury) ([address]    3. Lithium toxicity, accidental or unintentional, initial encounter          Plan  1. Admission per Hosptalist      1:42 PM  The patient is being admitted to the hospital.  The results of their tests and reasons for their admission have been discussed with them and/or available family. The patient/family has conveyed agreement and understanding for the need to be admitted and for their admission diagnosis. Consultation has been made with the inpatient physician specialist for hospitalization.

## 2017-05-21 NOTE — ED NOTES
TRANSFER - OUT REPORT:    Verbal report given to Marie Hook (name) on Suad Venice  being transferred to [facility](unit) for routine progression of care       Report consisted of patients Situation, Background, Assessment and   Recommendations(SBAR). Information from the following report(s) SBAR, ED Summary and Recent Results was reviewed with the receiving nurse. Lines:   Peripheral IV 17 Left Antecubital (Active)   Site Assessment Clean, dry, & intact 2017 10:38 AM   Phlebitis Assessment 0 2017 10:38 AM   Infiltration Assessment 0 2017 10:38 AM   Dressing Status Clean, dry, & intact 2017 10:38 AM   Dressing Type Transparent 2017 10:38 AM   Hub Color/Line Status Pink;Patent 2017 10:38 AM   Alcohol Cap Used Yes 2017 10:38 AM        Opportunity for questions and clarification was provided.       Patient transported with:   Monitor  Tech

## 2017-05-22 LAB
ALBUMIN SERPL BCP-MCNC: 3.1 G/DL (ref 3.5–5)
ALBUMIN/GLOB SERPL: 0.9 {RATIO} (ref 1.1–2.2)
ALP SERPL-CCNC: 216 U/L (ref 45–117)
ALT SERPL-CCNC: 15 U/L (ref 12–78)
ANION GAP BLD CALC-SCNC: 11 MMOL/L (ref 5–15)
AST SERPL W P-5'-P-CCNC: 14 U/L (ref 15–37)
ATRIAL RATE: 77 BPM
BASOPHILS # BLD AUTO: 0 K/UL (ref 0–0.1)
BASOPHILS # BLD: 0 % (ref 0–1)
BILIRUB SERPL-MCNC: 0.3 MG/DL (ref 0.2–1)
BUN SERPL-MCNC: 29 MG/DL (ref 6–20)
BUN/CREAT SERPL: 25 (ref 12–20)
CALCIUM SERPL-MCNC: 8.8 MG/DL (ref 8.5–10.1)
CALCULATED P AXIS, ECG09: 76 DEGREES
CALCULATED R AXIS, ECG10: -7 DEGREES
CALCULATED T AXIS, ECG11: 81 DEGREES
CHLORIDE SERPL-SCNC: 108 MMOL/L (ref 97–108)
CO2 SERPL-SCNC: 19 MMOL/L (ref 21–32)
CREAT SERPL-MCNC: 1.14 MG/DL (ref 0.55–1.02)
DATE LAST DOSE: ABNORMAL
DIAGNOSIS, 93000: NORMAL
EOSINOPHIL # BLD: 0.4 K/UL (ref 0–0.4)
EOSINOPHIL NFR BLD: 5 % (ref 0–7)
ERYTHROCYTE [DISTWIDTH] IN BLOOD BY AUTOMATED COUNT: 13.9 % (ref 11.5–14.5)
GLOBULIN SER CALC-MCNC: 3.6 G/DL (ref 2–4)
GLUCOSE SERPL-MCNC: 71 MG/DL (ref 65–100)
HCT VFR BLD AUTO: 34.9 % (ref 35–47)
HGB BLD-MCNC: 11.4 G/DL (ref 11.5–16)
LITHIUM SERPL-SCNC: 2.02 MMOL/L (ref 0.6–1.2)
LYMPHOCYTES # BLD AUTO: 26 % (ref 12–49)
LYMPHOCYTES # BLD: 2.2 K/UL (ref 0.8–3.5)
MAGNESIUM SERPL-MCNC: 2.1 MG/DL (ref 1.6–2.4)
MCH RBC QN AUTO: 27.8 PG (ref 26–34)
MCHC RBC AUTO-ENTMCNC: 32.7 G/DL (ref 30–36.5)
MCV RBC AUTO: 85.1 FL (ref 80–99)
MONOCYTES # BLD: 0.6 K/UL (ref 0–1)
MONOCYTES NFR BLD AUTO: 7 % (ref 5–13)
NEUTS SEG # BLD: 5.4 K/UL (ref 1.8–8)
NEUTS SEG NFR BLD AUTO: 62 % (ref 32–75)
P-R INTERVAL, ECG05: 204 MS
PLATELET # BLD AUTO: 227 K/UL (ref 150–400)
POTASSIUM SERPL-SCNC: 3.5 MMOL/L (ref 3.5–5.1)
PROT SERPL-MCNC: 6.7 G/DL (ref 6.4–8.2)
Q-T INTERVAL, ECG07: 402 MS
QRS DURATION, ECG06: 84 MS
QTC CALCULATION (BEZET), ECG08: 454 MS
RBC # BLD AUTO: 4.1 M/UL (ref 3.8–5.2)
REPORTED DOSE,DOSE: ABNORMAL UNITS
REPORTED DOSE/TIME,TMG: ABNORMAL
SODIUM SERPL-SCNC: 138 MMOL/L (ref 136–145)
VENTRICULAR RATE, ECG03: 77 BPM
WBC # BLD AUTO: 8.6 K/UL (ref 3.6–11)

## 2017-05-22 PROCEDURE — 77030034850

## 2017-05-22 PROCEDURE — 65660000000 HC RM CCU STEPDOWN

## 2017-05-22 PROCEDURE — 85025 COMPLETE CBC W/AUTO DIFF WBC: CPT | Performed by: INTERNAL MEDICINE

## 2017-05-22 PROCEDURE — 80053 COMPREHEN METABOLIC PANEL: CPT | Performed by: INTERNAL MEDICINE

## 2017-05-22 PROCEDURE — 74011250637 HC RX REV CODE- 250/637: Performed by: INTERNAL MEDICINE

## 2017-05-22 PROCEDURE — 80178 ASSAY OF LITHIUM: CPT | Performed by: INTERNAL MEDICINE

## 2017-05-22 PROCEDURE — 74011250636 HC RX REV CODE- 250/636: Performed by: INTERNAL MEDICINE

## 2017-05-22 PROCEDURE — 83735 ASSAY OF MAGNESIUM: CPT | Performed by: INTERNAL MEDICINE

## 2017-05-22 PROCEDURE — 36415 COLL VENOUS BLD VENIPUNCTURE: CPT | Performed by: INTERNAL MEDICINE

## 2017-05-22 RX ORDER — POTASSIUM CHLORIDE 1.5 G/1.77G
20 POWDER, FOR SOLUTION ORAL
Status: COMPLETED | OUTPATIENT
Start: 2017-05-22 | End: 2017-05-22

## 2017-05-22 RX ORDER — METOPROLOL SUCCINATE 25 MG/1
25 TABLET, EXTENDED RELEASE ORAL DAILY
Status: DISCONTINUED | OUTPATIENT
Start: 2017-05-22 | End: 2017-05-26 | Stop reason: HOSPADM

## 2017-05-22 RX ADMIN — SODIUM CHLORIDE 150 ML/HR: 900 INJECTION, SOLUTION INTRAVENOUS at 12:39

## 2017-05-22 RX ADMIN — SODIUM CHLORIDE 150 ML/HR: 900 INJECTION, SOLUTION INTRAVENOUS at 19:41

## 2017-05-22 RX ADMIN — Medication 10 ML: at 06:45

## 2017-05-22 RX ADMIN — SODIUM CHLORIDE 150 ML/HR: 900 INJECTION, SOLUTION INTRAVENOUS at 06:45

## 2017-05-22 RX ADMIN — METOPROLOL SUCCINATE 25 MG: 25 TABLET, FILM COATED, EXTENDED RELEASE ORAL at 12:21

## 2017-05-22 RX ADMIN — Medication 10 ML: at 21:46

## 2017-05-22 RX ADMIN — ATORVASTATIN CALCIUM 40 MG: 20 TABLET, FILM COATED ORAL at 09:16

## 2017-05-22 RX ADMIN — HEPARIN SODIUM 5000 UNITS: 5000 INJECTION, SOLUTION INTRAVENOUS; SUBCUTANEOUS at 04:36

## 2017-05-22 RX ADMIN — Medication 10 ML: at 17:01

## 2017-05-22 RX ADMIN — POTASSIUM CHLORIDE 20 MEQ: 1.5 POWDER, FOR SOLUTION ORAL at 12:22

## 2017-05-22 RX ADMIN — HEPARIN SODIUM 5000 UNITS: 5000 INJECTION, SOLUTION INTRAVENOUS; SUBCUTANEOUS at 12:21

## 2017-05-22 RX ADMIN — HEPARIN SODIUM 5000 UNITS: 5000 INJECTION, SOLUTION INTRAVENOUS; SUBCUTANEOUS at 19:44

## 2017-05-22 NOTE — PROGRESS NOTES
I met with the patient's  Merline Boudreaux he can be reached on the home phone which is [phone]. He has a cell phone but he cannot remember the number. Pt lives in a two story home, pt stays on the first level. Pt does not drive, she relies on her  for transportation. Pt does not have insurance, she gets her prescriptions filled at [facility]. Pt has never had home health before. DME - pt has a cane. No other issues or concerns at this time. Thanks ONUR Engel   Care Management Interventions  PCP Verified by CM:  Yes  MyChart Signup: No  Discharge Durable Medical Equipment: No  Physical Therapy Consult: No  Occupational Therapy Consult: No  Speech Therapy Consult: No  Current Support Network: Lives with Spouse  Confirm Follow Up Transport: Family  Plan discussed with Pt/Family/Caregiver: Yes  Discharge Location  Discharge Placement: Home

## 2017-05-22 NOTE — PROGRESS NOTES
Medical Progress Note      NAME: Duncan Jauregui   :  1955  MRM:  [id number]    Date/Time: 2017  11:01 AM       Assessment / Plan:     Lithium toxicity:  Lithium level improving with hydration and holding meds. -- continue hydration   -- monitor levels    Acute toxic encephalopathy:  From lithium toxicity likely. No focal neuro deficits noted on exam.  Head CT w/o acute process. -- neuro checks   -- further work up if not improved with falling lithium levels     ALEX (acute kidney injury): Suspect from IVVD due to lethargy and poor intake. Improved with IVF. -- continue IVF     Bipolar 1 disorder:     -- holding Li and other psych meds until more awake   -- psych consult     N/V/D:  may be from elevated lithium levels vs gastroenteritis. No BM or N/V since admission. -- IVF   -- antiemetics prn    Essential hypertension: BP controlled   -- hold Norvasc   -- restart metoprolol     Prediabetes: FBS well controlled.     Hypercholesteremia   -- cont statin    Hypokalemia:   -- replete with PO KCL    Abnormal TSH:  Elevated TSH. Possible euthyroid sick. -- check free t4 in AM    Transfer to remote tele. Subjective:     Chief Complaint:  Feels well. Patient remains confused. Unable to obtain reliable hx or ROS. Objective:       Vitals:          Last 24hrs VS reviewed since prior progress note.  Most recent are:    Visit Vitals    /69 (BP 1 Location: Right arm, BP Patient Position: At rest)    Pulse 89    Temp 98.2 °F (36.8 °C)    Resp 16    Ht 5' 5\" (1.651 m)    Wt 89.4 kg (197 lb)    SpO2 95%    BMI 32.78 kg/m2     SpO2 Readings from Last 6 Encounters:   17 95%   03/15/17 95%   17 93%   17 98%   16 97%   16 98%          Intake/Output Summary (Last 24 hours) at 17 1101  Last data filed at 17 0934   Gross per 24 hour   Intake                0 ml   Output              950 ml   Net             -950 ml          Exam: Physical Exam:    Gen:  Well-developed, well-nourished, in no acute distress  HEENT:  Pink conjunctivae, hearing intact to voice, moist mucous membranes  Neck:  Supple  Resp:  No accessory muscle use, clear breath sounds without wheezes rales or rhonchi  Card:  No murmurs, normal S1, S2 without thrills or peripheral edema  Abd:  Soft, non-tender, non-distended, normoactive bowel sounds are present  Musc:  No cyanosis  Skin:  Mild erythema under b/l breast left > right  Neuro:   Face symmetric, tongue midline,  strength is 5/5 bilaterally and hip flexion 5/5 bilaterally, follows commands appropriately  Psych:  Poor insight, somnolent but wakes to voice       Telemetry reviewed:   normal sinus rhythm    Medications Reviewed: (see below)    Lab Data Reviewed: (see below)    ______________________________________________________________________    Medications:     Current Facility-Administered Medications   Medication Dose Route Frequency    sodium chloride (NS) flush 5-10 mL  5-10 mL IntraVENous Q8H    sodium chloride (NS) flush 5-10 mL  5-10 mL IntraVENous PRN    naloxone (NARCAN) injection 0.4 mg  0.4 mg IntraVENous PRN    0.9% sodium chloride infusion  150 mL/hr IntraVENous CONTINUOUS    atorvastatin (LIPITOR) tablet 40 mg  40 mg Oral DAILY    LORazepam (ATIVAN) tablet 0.5 mg  0.5 mg Oral BID PRN    heparin (porcine) injection 5,000 Units  5,000 Units SubCUTAneous Q8H            Lab Review:     Recent Labs      05/22/17   0500  05/21/17   1025   WBC  8.6  11.4*   HGB  11.4*  13.0   HCT  34.9*  38.3   PLT  227  299     Recent Labs      05/22/17   0500  05/21/17   1025   NA  138  134*   K  3.5  3.7   CL  108  100   CO2  19*  24   GLU  71  91   BUN  29*  44*   CREA  1.14*  1.59*   CA  8.8  9.7   MG  2.1  2.3   ALB  3.1*  3.7   TBILI  0.3  0.3   SGOT  14*  15   ALT  15  17     Lab Results   Component Value Date/Time    Glucose (POC) 115 10/31/2011 08:08 PM         Total time spent with patient: 28 Minutes                  Care Plan discussed with: Patient and Nursing Staff    Discussed:  Care Plan    Prophylaxis:  Hep SQ    Disposition:  Home w/Family           ___________________________________________________    Attending Physician: Ivett Ovalle MD

## 2017-05-22 NOTE — PROGRESS NOTES
Notified Dr. Yang Aid of critical lab value of Lithium 2.02. Received call from [address] [place] [address]. No further orders received.

## 2017-05-22 NOTE — ROUTINE PROCESS
Bedside and Verbal shift change report given to Thu Taylor RN (oncoming nurse) by Antonia Escamilla RN (offgoing nurse). Report included the following information SBAR, Kardex, ED Summary, Procedure Summary, Intake/Output, MAR, Accordion, Recent Results and Med Rec Status.

## 2017-05-22 NOTE — ROUTINE PROCESS
Patient has not urinated since being admitted. Bladder scan showed 473ml being retained. Called and spoke to MD Golden who ordered to place a nguyen for retention.

## 2017-05-22 NOTE — PROGRESS NOTES
Susie Prasad  YOB: 1955          Assessment & Plan:   1. Lithium toxicity  · Level 2.6 to 2.   · No need  for HD     2. ALEX  · Appears to be Pre renal  · Hold ace  · Cr better with ivf    3. Bipolar  4. HTN  · Norvasc,toprol  · Hold ACE             5.    Dyslipidemia        Subjective:   CC:arf  HPI: Patient seen   ALEX is better  Lithium level better  HTN is under control    Current Facility-Administered Medications   Medication Dose Route Frequency    sodium chloride (NS) flush 5-10 mL  5-10 mL IntraVENous Q8H    sodium chloride (NS) flush 5-10 mL  5-10 mL IntraVENous PRN    naloxone (NARCAN) injection 0.4 mg  0.4 mg IntraVENous PRN    0.9% sodium chloride infusion  150 mL/hr IntraVENous CONTINUOUS    atorvastatin (LIPITOR) tablet 40 mg  40 mg Oral DAILY    LORazepam (ATIVAN) tablet 0.5 mg  0.5 mg Oral BID PRN    heparin (porcine) injection 5,000 Units  5,000 Units SubCUTAneous Q8H          Objective:     Vitals:  Blood pressure 142/69, pulse 89, temperature 98.2 °F (36.8 °C), resp. rate 16, height 5' 5\" (1.651 m), weight 89.4 kg (197 lb), SpO2 95 %. Temp (24hrs), Av.9 °F (36.6 °C), Min:97.4 °F (36.3 °C), Max:98.6 °F (37 °C)      Intake and Output:   0701 -  1900  In: -   Out: 950 [Urine:950]       Physical Exam:                Patient is intubated:  no    Physical Examination:   GENERAL ASSESSMENT: NAD  HEENT:Nontraumatic   CHEST: CTA  HEART: S1S2  ABDOMEN: Soft,NT,             ECG/rhythm:    Data Review      No results for input(s): TNIPOC in the last 72 hours.     No lab exists for component: ITNL   Recent Labs      17   1025   TROIQ  <0.04     Recent Labs      17   0500  17   1025   NA  138  134*   K  3.5  3.7   CL  108  100   CO2  19*  24   BUN  29*  44*   CREA  1.14*  1.59*   GLU  71  91   MG  2.1  2.3   CA  8.8  9.7   ALB  3.1*  3.7   WBC  8.6  11.4*   HGB  11.4*  13.0   HCT  34.9* 38.3   PLT  227  299      No results for input(s): INR, PTP, APTT in the last 72 hours. No lab exists for component: INREXT  Needs: urine analysis, urine sodium, protein and creatinine  No results found for: AUSTIN BAEZ           : Frieda Phoenix MD  5/22/2017        [facility]:  [email or web address]  Aj Sanchez office:  [address], [address] [place] [address],[address]  [address]  Phone: [phone]  Fax :     [phone]    [facility] office:  [address] [facility], [address]  Phone - [phone]  Fax - [phone]
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
## 2017-05-22 NOTE — PROGRESS NOTES
Bedside and Verbal shift change report given to ROYCE / Izzy Enriquez RN (oncoming nurse) by Albania Wiseman (offgoing nurse). Report included the following information SBAR, Kardex, Intake/Output, MAR, Accordion, Recent Results and Med Rec Status.

## 2017-05-22 NOTE — ROUTINE PROCESS
Received call from Lab with critical value of Lithium 2.43. Called and spoke to MD Golden who did not order any additional interventions.

## 2017-05-23 ENCOUNTER — HOME HEALTH ADMISSION (OUTPATIENT)
Dept: HOME HEALTH SERVICES | Facility: HOME HEALTH | Age: 62
End: 2017-05-23

## 2017-05-23 PROBLEM — D64.9 ANEMIA: Status: ACTIVE | Noted: 2017-05-23

## 2017-05-23 LAB
ALBUMIN SERPL BCP-MCNC: 2.8 G/DL (ref 3.5–5)
ALBUMIN/GLOB SERPL: 0.8 {RATIO} (ref 1.1–2.2)
ALP SERPL-CCNC: 224 U/L (ref 45–117)
ALT SERPL-CCNC: 16 U/L (ref 12–78)
ANION GAP BLD CALC-SCNC: 11 MMOL/L (ref 5–15)
AST SERPL W P-5'-P-CCNC: 16 U/L (ref 15–37)
BILIRUB SERPL-MCNC: 0.4 MG/DL (ref 0.2–1)
BUN SERPL-MCNC: 14 MG/DL (ref 6–20)
BUN/CREAT SERPL: 14 (ref 12–20)
CALCIUM SERPL-MCNC: 8.6 MG/DL (ref 8.5–10.1)
CHLORIDE SERPL-SCNC: 111 MMOL/L (ref 97–108)
CHOLEST SERPL-MCNC: 98 MG/DL
CO2 SERPL-SCNC: 19 MMOL/L (ref 21–32)
CREAT SERPL-MCNC: 1 MG/DL (ref 0.55–1.02)
DATE LAST DOSE: ABNORMAL
ERYTHROCYTE [DISTWIDTH] IN BLOOD BY AUTOMATED COUNT: 13.9 % (ref 11.5–14.5)
GLOBULIN SER CALC-MCNC: 3.5 G/DL (ref 2–4)
GLUCOSE SERPL-MCNC: 85 MG/DL (ref 65–100)
HCT VFR BLD AUTO: 31.3 % (ref 35–47)
HDLC SERPL-MCNC: 40 MG/DL
HDLC SERPL: 2.5 {RATIO} (ref 0–5)
HGB BLD-MCNC: 10.2 G/DL (ref 11.5–16)
LDLC SERPL CALC-MCNC: 28.4 MG/DL (ref 0–100)
LIPID PROFILE,FLP: NORMAL
LITHIUM SERPL-SCNC: 1.53 MMOL/L (ref 0.6–1.2)
MAGNESIUM SERPL-MCNC: 1.8 MG/DL (ref 1.6–2.4)
MCH RBC QN AUTO: 28.4 PG (ref 26–34)
MCHC RBC AUTO-ENTMCNC: 32.6 G/DL (ref 30–36.5)
MCV RBC AUTO: 87.2 FL (ref 80–99)
PLATELET # BLD AUTO: 192 K/UL (ref 150–400)
POTASSIUM SERPL-SCNC: 3.5 MMOL/L (ref 3.5–5.1)
PROT SERPL-MCNC: 6.3 G/DL (ref 6.4–8.2)
RBC # BLD AUTO: 3.59 M/UL (ref 3.8–5.2)
REPORTED DOSE,DOSE: ABNORMAL UNITS
REPORTED DOSE/TIME,TMG: ABNORMAL
SODIUM SERPL-SCNC: 141 MMOL/L (ref 136–145)
T4 FREE SERPL-MCNC: 0.9 NG/DL (ref 0.8–1.5)
TRIGL SERPL-MCNC: 148 MG/DL (ref ?–150)
VIT B12 SERPL-MCNC: 630 PG/ML (ref 211–911)
VLDLC SERPL CALC-MCNC: 29.6 MG/DL
WBC # BLD AUTO: 8.1 K/UL (ref 3.6–11)

## 2017-05-23 PROCEDURE — 82607 VITAMIN B-12: CPT | Performed by: INTERNAL MEDICINE

## 2017-05-23 PROCEDURE — 74011250637 HC RX REV CODE- 250/637: Performed by: INTERNAL MEDICINE

## 2017-05-23 PROCEDURE — 74011250636 HC RX REV CODE- 250/636: Performed by: INTERNAL MEDICINE

## 2017-05-23 PROCEDURE — 85027 COMPLETE CBC AUTOMATED: CPT | Performed by: INTERNAL MEDICINE

## 2017-05-23 PROCEDURE — 83735 ASSAY OF MAGNESIUM: CPT | Performed by: INTERNAL MEDICINE

## 2017-05-23 PROCEDURE — 80061 LIPID PANEL: CPT | Performed by: INTERNAL MEDICINE

## 2017-05-23 PROCEDURE — 74011250637 HC RX REV CODE- 250/637: Performed by: PSYCHIATRY & NEUROLOGY

## 2017-05-23 PROCEDURE — 36415 COLL VENOUS BLD VENIPUNCTURE: CPT | Performed by: INTERNAL MEDICINE

## 2017-05-23 PROCEDURE — 65660000000 HC RM CCU STEPDOWN

## 2017-05-23 PROCEDURE — 84439 ASSAY OF FREE THYROXINE: CPT | Performed by: INTERNAL MEDICINE

## 2017-05-23 PROCEDURE — 80178 ASSAY OF LITHIUM: CPT | Performed by: INTERNAL MEDICINE

## 2017-05-23 PROCEDURE — 80053 COMPREHEN METABOLIC PANEL: CPT | Performed by: INTERNAL MEDICINE

## 2017-05-23 RX ORDER — RISPERIDONE 0.25 MG/1
0.25 TABLET, FILM COATED ORAL
Status: DISCONTINUED | OUTPATIENT
Start: 2017-05-23 | End: 2017-05-25

## 2017-05-23 RX ADMIN — SODIUM CHLORIDE 150 ML/HR: 900 INJECTION, SOLUTION INTRAVENOUS at 10:36

## 2017-05-23 RX ADMIN — Medication 10 ML: at 21:45

## 2017-05-23 RX ADMIN — HEPARIN SODIUM 5000 UNITS: 5000 INJECTION, SOLUTION INTRAVENOUS; SUBCUTANEOUS at 19:22

## 2017-05-23 RX ADMIN — RISPERIDONE 0.25 MG: 0.25 TABLET, FILM COATED ORAL at 21:45

## 2017-05-23 RX ADMIN — HEPARIN SODIUM 5000 UNITS: 5000 INJECTION, SOLUTION INTRAVENOUS; SUBCUTANEOUS at 12:57

## 2017-05-23 RX ADMIN — HEPARIN SODIUM 5000 UNITS: 5000 INJECTION, SOLUTION INTRAVENOUS; SUBCUTANEOUS at 03:35

## 2017-05-23 RX ADMIN — Medication 10 ML: at 12:58

## 2017-05-23 RX ADMIN — SODIUM CHLORIDE 150 ML/HR: 900 INJECTION, SOLUTION INTRAVENOUS at 03:35

## 2017-05-23 RX ADMIN — ATORVASTATIN CALCIUM 40 MG: 20 TABLET, FILM COATED ORAL at 10:17

## 2017-05-23 RX ADMIN — METOPROLOL SUCCINATE 25 MG: 25 TABLET, FILM COATED, EXTENDED RELEASE ORAL at 10:17

## 2017-05-23 NOTE — PROGRESS NOTES
Physical Exam   Skin:        Primary Nurse Melani Diaz, RN performed a dual skin assessment on this patient Impairment noted- see wound doc flow sheet  Cade score is 16      Bedside and Verbal shift change report given to [address] [place] [address] (oncoming nurse) by Violetta Aiken RN (offgoing nurse). Report included the following information SBAR, Kardex, Procedure Summary, Intake/Output, MAR, Accordion and Recent Results.

## 2017-05-23 NOTE — PROGRESS NOTES
Referral for home health noted and pt and  in agreement. Referral sent to [facility]. CM to follow. Thanks.   Chin Castro LCSW

## 2017-05-23 NOTE — ROUTINE PROCESS
Bedside and Verbal shift change report given to Chris Altman (oncoming nurse) by [facility] ([facility]going [facility]). Report included the following information SBAR, Kardex and Recent Results.

## 2017-05-23 NOTE — PROGRESS NOTES
Fercho Mcfadden [facility] 79  [address] [facility], [address] [place] [address]  [phone]      Medical Progress Note      NAME: Jennifer Villarreal   :  1955  MRM:  [id number]    Date/Time: 2017  10:54 AM       Assessment and Plan:     Lithium toxicity - Lithium level improving with hydration. PCP to consider resuming lower dose.     Acute toxic encephalopathy / Bipolar 1 disorder - POA, presumed from lithium toxicity, but not improving much despite better Li level. No focal neuro deficits noted on exam. Head CT w/o acute process. Appreciate psych input. Elisa Peabody and    until more awake     Debilitated patient - Weak, general.  PT/OT eval.  Likely needs HH PT/OT    ALEX (acute kidney injury) - POA, mild, due to IVVD due to lethargy and poor intake. Improved with IVF.     Anemia - Mild and presumed due to chronic disease. Check B12. N/V/D - POA due to elevated lithium levels vs gastroenteritis. No BM or N/V since admission.     Essential hypertension - Continue metoprolol      Hypercholesteremia - Check lipid panel and continue statin     Hypokalemia - POA and resolved with KCL     Abnormal TSH - Elevated TSH, but normal T4       Subjective:     Chief Complaint:  Confused, but no specific complaints, discussed with her     ROS:  (bold if positive, if negative)      Tolerating minimal PT Tolerating minimal Diet        Objective:     Last 24hrs VS reviewed since prior progress note.  Most recent are:    Visit Vitals    /71 (BP 1 Location: Left arm, BP Patient Position: At rest)    Pulse 75    Temp 98.4 °F (36.9 °C)    Resp 16    Ht 5' 5\" (1.651 m)    Wt 89.2 kg (196 lb 10.4 oz)    SpO2 95%    BMI 32.72 kg/m2     SpO2 Readings from Last 6 Encounters:   17 95%   03/15/17 95%   17 93%   17 98%   16 97%   16 98%          Intake/Output Summary (Last 24 hours) at 17 1054  Last data filed at 17 0554   Gross per 24 hour   Intake 0 ml   Output             1300 ml   Net            -1300 ml        Physical Exam:    Gen:  Well-developed, well-nourished, in no acute distress  HEENT:  Pink conjunctivae, PERRL, hearing intact to voice, moist mucous membranes  Neck:  Supple, without masses, thyroid non-tender  Resp:  No accessory muscle use, clear breath sounds without wheezes rales or rhonchi  Card:  No murmurs, normal S1, S2 without thrills, bruits or peripheral edema  Abd:  Soft, non-tender, non-distended, normoactive bowel sounds are present, no mass  Lymph:  No cervical or inguinal adenopathy  Musc:  No cyanosis or clubbing  Skin:  No rashes or ulcers, skin turgor is good  Neuro:  Cranial nerves are grossly intact, general motor weakness, follows commands vaguely  Psych:  Poor insight, oriented to person    Telemetry reviewed:   normal sinus rhythm  __________________________________________________________________  Medications Reviewed: (see below)  Medications:     Current Facility-Administered Medications   Medication Dose Route Frequency    metoprolol succinate (TOPROL-XL) XL tablet 25 mg  25 mg Oral DAILY    sodium chloride (NS) flush 5-10 mL  5-10 mL IntraVENous Q8H    sodium chloride (NS) flush 5-10 mL  5-10 mL IntraVENous PRN    naloxone (NARCAN) injection 0.4 mg  0.4 mg IntraVENous PRN    0.9% sodium chloride infusion  50 mL/hr IntraVENous CONTINUOUS    atorvastatin (LIPITOR) tablet 40 mg  40 mg Oral DAILY    LORazepam (ATIVAN) tablet 0.5 mg  0.5 mg Oral BID PRN    heparin (porcine) injection 5,000 Units  5,000 Units SubCUTAneous Q8H        Lab Data Reviewed: (see below)  Lab Review:     Recent Labs      05/23/17   0616  05/22/17   0500  05/21/17   1025   WBC  8.1  8.6  11.4*   HGB  10.2*  11.4*  13.0   HCT  31.3*  34.9*  38.3   PLT  192  227  299     Recent Labs      05/23/17   0616  05/22/17   0500  05/21/17   1025   NA  141  138  134*   K  3.5  3.5  3.7   CL  111*  108  100   CO2  19*  19*  24   GLU  85  71  91   BUN  14 29*  44*   CREA  1.00  1.14*  1.59*   CA  8.6  8.8  9.7   MG  1.8  2.1  2.3   ALB  2.8*  3.1*  3.7   TBILI  0.4  0.3  0.3   SGOT  16  14*  15   ALT  16  15  17     Lab Results   Component Value Date/Time    Glucose (POC) 115 10/31/2011 08:08 PM     No results for input(s): PH, PCO2, PO2, HCO3, FIO2 in the last 72 hours. No results for input(s): INR in the last 72 hours. No lab exists for component: INREXT  All Micro Results     None          I have reviewed notes of prior 24hr.     Other pertinent lab: none    Total time spent with patient: [address] discussed with: Patient, Family, Care Manager, Nursing Staff and >50% of time spent in counseling and coordination of care    Discussed:  Care Plan and D/C Planning    Prophylaxis:  H2B/PPI    Disposition:   PT, OT, RN           ___________________________________________________    Attending Physician: Francisco Watkins MD

## 2017-05-24 PROCEDURE — 83655 ASSAY OF LEAD: CPT | Performed by: INTERNAL MEDICINE

## 2017-05-24 PROCEDURE — 65270000029 HC RM PRIVATE

## 2017-05-24 PROCEDURE — 74011250636 HC RX REV CODE- 250/636: Performed by: INTERNAL MEDICINE

## 2017-05-24 PROCEDURE — 74011250637 HC RX REV CODE- 250/637: Performed by: PSYCHIATRY & NEUROLOGY

## 2017-05-24 PROCEDURE — 81050 URINALYSIS VOLUME MEASURE: CPT | Performed by: INTERNAL MEDICINE

## 2017-05-24 PROCEDURE — 82175 ASSAY OF ARSENIC: CPT | Performed by: INTERNAL MEDICINE

## 2017-05-24 RX ADMIN — Medication 10 ML: at 21:21

## 2017-05-24 RX ADMIN — Medication 10 ML: at 06:00

## 2017-05-24 RX ADMIN — SODIUM CHLORIDE 50 ML/HR: 900 INJECTION, SOLUTION INTRAVENOUS at 14:22

## 2017-05-24 RX ADMIN — HEPARIN SODIUM 5000 UNITS: 5000 INJECTION, SOLUTION INTRAVENOUS; SUBCUTANEOUS at 19:41

## 2017-05-24 RX ADMIN — HEPARIN SODIUM 5000 UNITS: 5000 INJECTION, SOLUTION INTRAVENOUS; SUBCUTANEOUS at 04:00

## 2017-05-24 RX ADMIN — HEPARIN SODIUM 5000 UNITS: 5000 INJECTION, SOLUTION INTRAVENOUS; SUBCUTANEOUS at 14:22

## 2017-05-24 RX ADMIN — RISPERIDONE 0.25 MG: 0.25 TABLET, FILM COATED ORAL at 21:21

## 2017-05-24 RX ADMIN — Medication 10 ML: at 14:22

## 2017-05-24 NOTE — PROGRESS NOTES
Psychiatric Follow Up Note  Remington Kincaid MD  [phone]    Date: 5/23/2017  Account Number:  [de-identified]  Name: Adrienne Ruffin PROGRESS NOTE:  To include the following:   Coordinated treatment team rounds conducted with patient. Discussions held with nursing staff. Chart reviewed in full including consultant notes, ancillary staff notes, vitals and labs in [facility] EMR reviewed in full. SUBJECTIVE:   Patient was seen with  present. Says he did not return this writers call as he was working all night. Pt is somewhat asleep but noted flailing her hands in air. Wakes up to verbal commands but then goes back to sleep. Per  she has hx of Manic and psychotic symptoms. The patient follows up with Gabino Steele who has been prescribing the Lithium.  notes about two weeks ago she started to have some bad taste  in her mouth as well as tremors. After that she stopped taking her medications for couple of days. Also had diarrhea around that time.  notes that the patient has been on Abilify in the past which made her very sedated. She  has had two psychiatric hospitalizations between 2004 and 2007 for manic symptoms as well as hallucinations and paranoia. The patient has not had any suicide attempts in the past. The pt still hears voices from time to time  But they are not voices per husbands report. Side Effects:  None reported or admitted to. OBJECTIVE:                   Mental Status exam:   Limited exam. Restless in bed. Flailing her arms during sleep.      Pertinent data:  Patient Vitals for the past 8 hrs:   BP Temp Pulse Resp SpO2   05/23/17 1919 145/75 97.6 °F (36.4 °C) 76 17 96 %   05/23/17 1506 138/65 97.5 °F (36.4 °C) 75 18 96 %   05/23/17 1500 - - 79 - -     Recent Results (from the past 24 hour(s))   LITHIUM    Collection Time: 05/23/17  5:44 AM   Result Value Ref Range    Lithium 1.53 (H) 0.60 - 1.20 MMOL/L    Reported dose date: NOT PROVIDED Reported dose time: NOT PROVIDED      Reported dose: NOT PROVIDED UNITS   T4, FREE    Collection Time: 05/23/17  5:44 AM   Result Value Ref Range    T4, Free 0.9 0.8 - 1.5 NG/DL   CBC W/O DIFF    Collection Time: 05/23/17  6:16 AM   Result Value Ref Range    WBC 8.1 3.6 - 11.0 K/uL    RBC 3.59 (L) 3.80 - 5.20 M/uL    HGB 10.2 (L) 11.5 - 16.0 g/dL    HCT 31.3 (L) 35.0 - 47.0 %    MCV 87.2 80.0 - 99.0 FL    MCH 28.4 26.0 - 34.0 PG    MCHC 32.6 30.0 - 36.5 g/dL    RDW 13.9 11.5 - 14.5 %    PLATELET 665 827 - 539 K/uL   METABOLIC PANEL, COMPREHENSIVE    Collection Time: 05/23/17  6:16 AM   Result Value Ref Range    Sodium 141 136 - 145 mmol/L    Potassium 3.5 3.5 - 5.1 mmol/L    Chloride 111 (H) 97 - 108 mmol/L    CO2 19 (L) 21 - 32 mmol/L    Anion gap 11 5 - 15 mmol/L    Glucose 85 65 - 100 mg/dL    BUN 14 6 - 20 MG/DL    Creatinine 1.00 0.55 - 1.02 MG/DL    BUN/Creatinine ratio 14 12 - 20      GFR est AA >60 >60 ml/min/1.73m2    GFR est non-AA 57 (L) >60 ml/min/1.73m2    Calcium 8.6 8.5 - 10.1 MG/DL    Bilirubin, total 0.4 0.2 - 1.0 MG/DL    ALT (SGPT) 16 12 - 78 U/L    AST (SGOT) 16 15 - 37 U/L    Alk.  phosphatase 224 (H) 45 - 117 U/L    Protein, total 6.3 (L) 6.4 - 8.2 g/dL    Albumin 2.8 (L) 3.5 - 5.0 g/dL    Globulin 3.5 2.0 - 4.0 g/dL    A-G Ratio 0.8 (L) 1.1 - 2.2     MAGNESIUM    Collection Time: 05/23/17  6:16 AM   Result Value Ref Range    Magnesium 1.8 1.6 - 2.4 mg/dL   LIPID PANEL    Collection Time: 05/23/17  6:16 AM   Result Value Ref Range    LIPID PROFILE          Cholesterol, total 98 <200 MG/DL    Triglyceride 148 <150 MG/DL    HDL Cholesterol 40 MG/DL    LDL, calculated 28.4 0 - 100 MG/DL    VLDL, calculated 29.6 MG/DL    CHOL/HDL Ratio 2.5 0 - 5.0     VITAMIN B12    Collection Time: 05/23/17  6:16 AM   Result Value Ref Range    Vitamin B12 630 211 - 911 pg/mL       Medications:  Current Facility-Administered Medications   Medication Dose Route Frequency    risperiDONE (RisperDAL) tablet 0.25 mg 0.25 mg Oral QHS    metoprolol succinate (TOPROL-XL) XL tablet 25 mg  25 mg Oral DAILY    sodium chloride (NS) flush 5-10 mL  5-10 mL IntraVENous Q8H    sodium chloride (NS) flush 5-10 mL  5-10 mL IntraVENous PRN    naloxone (NARCAN) injection 0.4 mg  0.4 mg IntraVENous PRN    0.9% sodium chloride infusion  50 mL/hr IntraVENous CONTINUOUS    atorvastatin (LIPITOR) tablet 40 mg  40 mg Oral DAILY    LORazepam (ATIVAN) tablet 0.5 mg  0.5 mg Oral BID PRN    heparin (porcine) injection 5,000 Units  5,000 Units SubCUTAneous Q8H       Scheduled Medications:  Current Facility-Administered Medications   Medication Dose Route Frequency    risperiDONE (RisperDAL) tablet 0.25 mg  0.25 mg Oral QHS    metoprolol succinate (TOPROL-XL) XL tablet 25 mg  25 mg Oral DAILY    sodium chloride (NS) flush 5-10 mL  5-10 mL IntraVENous Q8H    0.9% sodium chloride infusion  50 mL/hr IntraVENous CONTINUOUS    atorvastatin (LIPITOR) tablet 40 mg  40 mg Oral DAILY    heparin (porcine) injection 5,000 Units  5,000 Units SubCUTAneous Q8H         ASSESSMENT/PLAN:       Diagnoses: The patient Chirag Levin is a 62 y.o. female who presents at this time for the following psychiatric diagnoses:     Delirium secondary to kidney injury and lithium toxicity  Bipolar d/o with psychotic features  by hx.        Plan:  1. Hold Lithium  2. Medical Management  3. Add low dose risperdal at night.           Will follow patient's course along with you as necessary.      Thank you for the opportunity to participate in the care of your patient.       Signed By: Jaquan Arriaga MD

## 2017-05-24 NOTE — ROUTINE PROCESS
Bedside and Verbal shift change report given to Juan David Reeves RN (oncoming nurse) by Paolo Boothe RN (offgoing nurse). Report included the following information SBAR, Procedure Summary, Intake/Output and MAR.

## 2017-05-24 NOTE — PROGRESS NOTES
Occupational Therapy    Acknowledge OT orders and completed chart review. Admitted for lithium toxicity. Patient resting in room, drowsy.  present and reports up until 5/17/17 patient was amb without AD and indep in self care tasks. He often assists with medications. She was driving. Patient drowsy through out session. Opening eyes to therapists voice however not maintaining eye contact. Attempted to sit EOB, patient initiated activity and then terminated and began flailing arms around and closing eyes. Will follow up tomorrow for OT evaluation.      Thank you,    Vignesh Haji OTR/L

## 2017-05-24 NOTE — PROGRESS NOTES
Fercho [facility] [address]  [address] [facility], [address] [place] [address] Nw  [phone]      Medical Progress Note      NAME: Erendira Herndon   :  1955  MRM:  [id number]    Date/Time: 2017  10:02 AM       Assessment and Plan:     Acute toxic encephalopathy / Bipolar 1 disorder - POA, presumed from lithium toxicity, but not improving despite better Li level and holding all other meds. No focal neuro deficits noted on exam. Head CT w/o acute process. Appreciate psych input. However, due to persistent trouble, will ask for neurological eval.     Debilitated patient - Weak, general.  PT/OT eval.  Likely needs HH PT/OT, but cannot participate yet    Anemia - Mild and presumed due to chronic disease. Normal B12. N/V/D - POA due to elevated lithium levels vs gastroenteritis. No BM or N/V since admission, but still poor PO intake.     Essential hypertension - Continue metoprolol      Hypercholesteremia - LDL 28 on lipid panel. Stop statin     Hypokalemia - POA and resolved with KCL     Abnormal TSH - Elevated TSH, but normal T4    Lithium toxicity - Lithium level improved with hydration. PCP/Psych to consider resuming lower dose, as outpatient.     ALEX (acute kidney injury) - POA, mild, due to IVVD due to lethargy and poor intake. Improved with IVF. Renal signed off          Subjective:     Chief Complaint:  Confused, but no specific complaints    ROS:  (bold if positive, if negative)      Tolerating minimal PT Tolerating minimal Diet        Objective:     Last 24hrs VS reviewed since prior progress note.  Most recent are:    Visit Vitals    /81 (BP 1 Location: Left arm, BP Patient Position: Lying left side)    Pulse 82    Temp 98.7 °F (37.1 °C)    Resp 18    Ht 5' 5\" (1.651 m)    Wt 89.4 kg (197 lb 1.5 oz)    SpO2 93%    BMI 32.8 kg/m2     SpO2 Readings from Last 6 Encounters:   17 93%   03/15/17 95%   17 93%   17 98%   16 97%   16 98% Intake/Output Summary (Last 24 hours) at 05/24/17 1002  Last data filed at 05/24/17 4059   Gross per 24 hour   Intake              300 ml   Output             1450 ml   Net            -1150 ml        Physical Exam:    Gen:  Well-developed, well-nourished, in no acute distress  HEENT:  Pink conjunctivae, PERRL, hearing intact to voice, moist mucous membranes  Neck:  Supple, without masses, thyroid non-tender  Resp:  No accessory muscle use, clear breath sounds without wheezes rales or rhonchi  Card:  No murmurs, normal S1, S2 without thrills, bruits or peripheral edema  Abd:  Soft, non-tender, non-distended, normoactive bowel sounds are present, no mass  Lymph:  No cervical or inguinal adenopathy  Musc:  No cyanosis or clubbing  Skin:  No rashes or ulcers, skin turgor is good  Neuro:  Cranial nerves are grossly intact, general motor weakness, follows commands vaguely  Psych:  Poor insight, oriented to person    Telemetry reviewed:   normal sinus rhythm  __________________________________________________________________  Medications Reviewed: (see below)  Medications:     Current Facility-Administered Medications   Medication Dose Route Frequency    risperiDONE (RisperDAL) tablet 0.25 mg  0.25 mg Oral QHS    metoprolol succinate (TOPROL-XL) XL tablet 25 mg  25 mg Oral DAILY    sodium chloride (NS) flush 5-10 mL  5-10 mL IntraVENous Q8H    sodium chloride (NS) flush 5-10 mL  5-10 mL IntraVENous PRN    naloxone (NARCAN) injection 0.4 mg  0.4 mg IntraVENous PRN    0.9% sodium chloride infusion  50 mL/hr IntraVENous CONTINUOUS    atorvastatin (LIPITOR) tablet 40 mg  40 mg Oral DAILY    LORazepam (ATIVAN) tablet 0.5 mg  0.5 mg Oral BID PRN    heparin (porcine) injection 5,000 Units  5,000 Units SubCUTAneous Q8H        Lab Data Reviewed: (see below)  Lab Review:     Recent Labs      05/23/17   0616  05/22/17   0500  05/21/17   1025   WBC  8.1  8.6  11.4*   HGB  10.2*  11.4*  13.0   HCT  31.3*  34.9*  38.3 PLT  192  227  299     Recent Labs      05/23/17   0616  05/22/17   0500  05/21/17   1025   NA  141  138  134*   K  3.5  3.5  3.7   CL  111*  108  100   CO2  19*  19*  24   GLU  85  71  91   BUN  14  29*  44*   CREA  1.00  1.14*  1.59*   CA  8.6  8.8  9.7   MG  1.8  2.1  2.3   ALB  2.8*  3.1*  3.7   TBILI  0.4  0.3  0.3   SGOT  16  14*  15   ALT  16  15  17     Lab Results   Component Value Date/Time    Glucose (POC) 115 10/31/2011 08:08 PM     No results for input(s): PH, PCO2, PO2, HCO3, FIO2 in the last 72 hours. No results for input(s): INR in the last 72 hours. No lab exists for component: INREXT, INREXT  All Micro Results     None          I have reviewed notes of prior 24hr.     Other pertinent lab: none    Total time spent with patient: [address] [place] [address] discussed with: Patient, Family, Care Manager, Nursing Staff and >50% of time spent in counseling and coordination of care    Discussed:  Care Plan and D/C Planning    Prophylaxis:  H2B/PPI    Disposition:   PT, OT, RN           ___________________________________________________    Attending Physician: Winsome Salmeron MD

## 2017-05-24 NOTE — ROUTINE PROCESS
1800: Spoke to Dr. Margarita De Leon and received order to DC tele r/t patient unable to keep on and moving so much in the bed. Turn team has also been discontinued r/t patient being so restless  in the bed and moving all around.

## 2017-05-24 NOTE — PROGRESS NOTES
Physical Therapy     Acknowledge PT orders and completed chart review. Admitted for lithium toxicity. Patient resting in room, drowsy.  present and reports up until 5/17/17 patient was amb without AD and indep in self care tasks. PT and OT present for evaluation attempt. Patient drowsy through out session. Opening eyes to therapists voice however not maintaining eye contact. Attempted to sit EOB, patient initiated activity and then terminated and began flailing arms around and closing eyes. Will follow up tomorrow as appropriate for PT evaluation.      Juli Christianson, MS, PT

## 2017-05-24 NOTE — CDMP QUERY
1.    The [address] [place] [address] has issued a statement indicating that, \"Individuals who are overweight, obese, or morbidly obese are at an increased risk for certain medical conditions when compared to persons of normal weight. Therefore, these conditions are always clinically significant and reportable when documented by the provider. \"    Review of the documentation for this patient demonstrates the clinical indicators of a BMI of 32.8  (height  of 5 5   with weight of 89.4  kg  ). Please clarify if the patient has a diagnosis associated with those findings:  _____  Obesity (BMI of 30-39. 9)  _____  Morbid Obesity  (BMI 40 or greater)  ______Overweight (BMI 25-29. 9)  _____  Other weight status (specify  status)  _____  Unable to determine    Thanks for your time.       Shashi Felix RN, BSN  [phone]

## 2017-05-24 NOTE — PROGRESS NOTES
Bedside and Verbal shift change report given to Armida Alpers RN (oncoming nurse) by Loree Hardin RN (offgoing nurse). Report included the following information SBAR, Kardex, Procedure Summary, Intake/Output, MAR, Accordion and Recent Results.

## 2017-05-25 LAB
AMMONIA PLAS-SCNC: <10 UMOL/L
DATE LAST DOSE: NORMAL
LITHIUM SERPL-SCNC: 0.84 MMOL/L (ref 0.6–1.2)
REPORTED DOSE,DOSE: NORMAL UNITS
REPORTED DOSE/TIME,TMG: NORMAL

## 2017-05-25 PROCEDURE — 97535 SELF CARE MNGMENT TRAINING: CPT | Performed by: OCCUPATIONAL THERAPIST

## 2017-05-25 PROCEDURE — 80178 ASSAY OF LITHIUM: CPT | Performed by: PSYCHIATRY & NEUROLOGY

## 2017-05-25 PROCEDURE — 82140 ASSAY OF AMMONIA: CPT | Performed by: PSYCHIATRY & NEUROLOGY

## 2017-05-25 PROCEDURE — 65270000029 HC RM PRIVATE

## 2017-05-25 PROCEDURE — 74011250636 HC RX REV CODE- 250/636: Performed by: INTERNAL MEDICINE

## 2017-05-25 PROCEDURE — 97165 OT EVAL LOW COMPLEX 30 MIN: CPT | Performed by: OCCUPATIONAL THERAPIST

## 2017-05-25 PROCEDURE — 74011250637 HC RX REV CODE- 250/637: Performed by: INTERNAL MEDICINE

## 2017-05-25 PROCEDURE — 97530 THERAPEUTIC ACTIVITIES: CPT | Performed by: OCCUPATIONAL THERAPIST

## 2017-05-25 PROCEDURE — 74011250637 HC RX REV CODE- 250/637: Performed by: PSYCHIATRY & NEUROLOGY

## 2017-05-25 PROCEDURE — 36415 COLL VENOUS BLD VENIPUNCTURE: CPT | Performed by: PSYCHIATRY & NEUROLOGY

## 2017-05-25 PROCEDURE — 97116 GAIT TRAINING THERAPY: CPT

## 2017-05-25 PROCEDURE — 97161 PT EVAL LOW COMPLEX 20 MIN: CPT

## 2017-05-25 RX ORDER — RISPERIDONE 0.25 MG/1
0.5 TABLET, FILM COATED ORAL
Status: DISCONTINUED | OUTPATIENT
Start: 2017-05-25 | End: 2017-05-26 | Stop reason: HOSPADM

## 2017-05-25 RX ADMIN — HEPARIN SODIUM 5000 UNITS: 5000 INJECTION, SOLUTION INTRAVENOUS; SUBCUTANEOUS at 13:05

## 2017-05-25 RX ADMIN — Medication 10 ML: at 06:33

## 2017-05-25 RX ADMIN — SODIUM CHLORIDE 50 ML/HR: 900 INJECTION, SOLUTION INTRAVENOUS at 10:51

## 2017-05-25 RX ADMIN — RISPERIDONE 0.5 MG: 0.25 TABLET, FILM COATED ORAL at 21:09

## 2017-05-25 RX ADMIN — HEPARIN SODIUM 5000 UNITS: 5000 INJECTION, SOLUTION INTRAVENOUS; SUBCUTANEOUS at 19:56

## 2017-05-25 RX ADMIN — HEPARIN SODIUM 5000 UNITS: 5000 INJECTION, SOLUTION INTRAVENOUS; SUBCUTANEOUS at 03:26

## 2017-05-25 RX ADMIN — Medication 10 ML: at 03:29

## 2017-05-25 RX ADMIN — METOPROLOL SUCCINATE 25 MG: 25 TABLET, FILM COATED, EXTENDED RELEASE ORAL at 10:15

## 2017-05-25 NOTE — ROUTINE PROCESS
1345: Had to move patient to room 19, closer to nurses station. Patient was found standing in the bathroom, with IV pulled tight along with nguyen. Placed patient back to bed with assist from another nurse. 1524: Notified Dr. Charo Harding patient pulled out her IV. He notified nurse that she does not need another IV access at this time. Bedside and Verbal shift change report given to Lisa Khan RN (oncoming nurse) by Joseph Whitney RN (offgoing nurse). Report included the following information SBAR, Procedure Summary, Intake/Output and MAR.

## 2017-05-25 NOTE — PROGRESS NOTES
Psychiatric Follow Up Note  Kaitlin Abraham MD  [phone]    Date: 5/25/2017  Account Number:  [de-identified]  Name: Hanna Ramos PROGRESS NOTE:  To include the following:   Coordinated treatment team rounds conducted with patient. Discussions held with nursing staff. Chart reviewed in full including consultant notes, ancillary staff notes, vitals and labs in [facility] EMR reviewed in full. SUBJECTIVE:   Patient seen. Has some confusion still but seem to be improving slowly. Lithium level is coming down and renal function is improving. On interview tells me she is feeling \"better. \" When asked where she is, she answers \"[place]___\"      Side Effects:  None reported or admitted to. OBJECTIVE:                   Mental Status exam:   Limited exam. Restless in bed. No agitation. Answers only 2 questions as noted above.      Pertinent data:  Patient Vitals for the past 8 hrs:   BP Temp Pulse Resp SpO2   05/25/17 1520 138/78 98.1 °F (36.7 °C) 75 19 96 %   05/25/17 1407 154/84 98 °F (36.7 °C) 77 19 97 %     Recent Results (from the past 24 hour(s))   LITHIUM    Collection Time: 05/25/17 12:51 AM   Result Value Ref Range    Lithium 0.84 0.60 - 1.20 MMOL/L    Reported dose date: UNKNOWN      Reported dose time: UNKNOWN      Reported dose: UNKNOWN UNITS   AMMONIA    Collection Time: 05/25/17 12:51 AM   Result Value Ref Range    Ammonia <10 <32 UMOL/L       Medications:  Current Facility-Administered Medications   Medication Dose Route Frequency    risperiDONE (RisperDAL) tablet 0.5 mg  0.5 mg Oral QHS    metoprolol succinate (TOPROL-XL) XL tablet 25 mg  25 mg Oral DAILY    sodium chloride (NS) flush 5-10 mL  5-10 mL IntraVENous Q8H    sodium chloride (NS) flush 5-10 mL  5-10 mL IntraVENous PRN    naloxone (NARCAN) injection 0.4 mg  0.4 mg IntraVENous PRN    0.9% sodium chloride infusion  50 mL/hr IntraVENous CONTINUOUS    LORazepam (ATIVAN) tablet 0.5 mg  0.5 mg Oral BID PRN    heparin (porcine) injection 5,000 Units  5,000 Units SubCUTAneous Q8H       Scheduled Medications:  Current Facility-Administered Medications   Medication Dose Route Frequency    risperiDONE (RisperDAL) tablet 0.5 mg  0.5 mg Oral QHS    metoprolol succinate (TOPROL-XL) XL tablet 25 mg  25 mg Oral DAILY    sodium chloride (NS) flush 5-10 mL  5-10 mL IntraVENous Q8H    0.9% sodium chloride infusion  50 mL/hr IntraVENous CONTINUOUS    heparin (porcine) injection 5,000 Units  5,000 Units SubCUTAneous Q8H         ASSESSMENT/PLAN:       Diagnoses: The patient Omar Emerson is a 62 y.o. female who presents at this time for the following psychiatric diagnoses:     Delirium secondary to kidney injury and lithium toxicity  Bipolar d/o with psychotic features  by hx.        Plan:  1. Hold Lithium  2. Medical Management  3. Increase risperdal to 0.5 mg qhs        Will follow patient's course along with you as necessary.      Thank you for the opportunity to participate in the care of your patient.       Signed By: Dwight Dyer MD

## 2017-05-25 NOTE — PROGRESS NOTES
Problem: Self Care Deficits Care Plan (Adult)  Goal: *Acute Goals and Plan of Care (Insert Text)  Occupational Therapy Goals  Initiated 5/25/2017  1. Patient will perform self-feeding with supervision/set-up within 7 day(s). 2. Patient will perform grooming with minimal assistance/contact guard assist within 7 day(s). 3. Patient will perform upper body dressing with supervision/set-up within 7 day(s). 4. Patient will perform toilet transfers with supervision/set-up within 7 day(s). 5. Patient will perform all aspects of toileting with supervision/set-up within 7 day(s). 6. Patient will follow 1 step commands consistently in < or = 5 seconds within 7 day(s). OCCUPATIONAL THERAPY EVALUATION  Patient: Aliya Haque (45 y.o. female)  Date: 5/25/2017  Primary Diagnosis: Lithium toxicity        Precautions:   Fall, Bed Alarm      ASSESSMENT :  Based on the objective data described below, the patient presents with increased arousal and following some commands, however significant impairment in cognition, unable to problem solve donning sock, washing face. Did don her glasses after placed in her hand. Good sitting balance edge of bed, her  arrived and she recognized him. With his assist able to encourage her to stand, ~10 minutes to sidestep to head of bed with max verbal and visual cues. Perseverated on stepping in place. Overall total assist with all basic ADL's and mobility due to need for increased time, verbal, physical and visual cues. Will continue to follow. Patient was independent and driving before this event. Disposition dependent on progress, feel once more medically stable and cognition improved should make good progress. Patient will benefit from skilled intervention to address the above impairments.   Patients rehabilitation potential is considered to be Guarded to fair  Factors which may influence rehabilitation potential include:   [ ]             None noted  [X]             Mental ability/status  [ ]             Medical condition  [ ]             Home/family situation and support systems  [ ]             Safety awareness  [ ]             Pain tolerance/management  [ ]             Other:        PLAN :  Recommendations and Planned Interventions:  [X]               Self Care Training                  [X]        Therapeutic Activities  [X]               Functional Mobility Training    [X]        Cognitive Retraining  [X]               Therapeutic Exercises           [X]        Endurance Activities  [X]               Balance Training                   [ ]        Neuromuscular Re-Education  [ ]               Visual/Perceptual Training     [X]   Home Safety Training  [X]               Patient Education                 [X]        Family Training/Education  [ ]               Other (comment):     Frequency/Duration: Patient will be followed by occupational therapy 5 times a week to address goals. Discharge Recommendations: Home Health and To Be Determined  Further Equipment Recommendations for Discharge: none at this time       SUBJECTIVE:   Patient stated I'm sorry.  stated after her foot bumping therapist leg      OBJECTIVE DATA SUMMARY:   HISTORY:   Past Medical History:   Diagnosis Date    Abscess of buttock 7/23/2012    Bronchitis      Common bile duct calculus 7/23/2012    Gallstones 7/12/2012    GERD (gastroesophageal reflux disease)      High cholesterol      Hypercholesterolemia      Hypertension      Ill-defined condition       missing upper front tooth    Ill-defined condition       lower leg bilateral reddened rash.  Insomnia      Other ill-defined conditions(799.89)       Large boil under right arm-pit.     Paranoia ([facility] [address]      Psychiatric disorder       bipolar     Past Surgical History:   Procedure Laterality Date    ABDOMEN SURGERY PROC UNLISTED         cholecystectomy 7/23/2012        Prior Level of Function/Home Situation: in bed ~4 days due to lethargy, new hand tremors and bad taste in her mouth. Was independent, driving, lives with  who works, puppy, ? If cares for 1year old granddaughter  Expanded or extensive additional review of patient history:      Home Situation  Home Environment: Private residence  One/Two Story Residence: Two story, live on 1st floor  Living Alone: No  [place]: Spouse/Significant Other/Partner  Patient Expects to be Discharged to[de-identified] Unknown  Current DME Used/Available at Home: [place] [employer]  [ ]  Right hand dominant   [ ]  Left hand dominant     EXAMINATION OF PERFORMANCE DEFICITS:  Cognitive/Behavioral Status:  Neurologic State: Alert;Confused; Restless  Orientation Level: Oriented to person;Disoriented to time;Disoriented to situation;Disoriented to place; Other (Comment) (recognized her )  Cognition: Decreased command following;Decreased attention/concentration;Memory loss;Poor safety awareness; Impaired decision making  Perception: Appears intact  Perseveration: Perseverates during ADLS;Perseverates during mobility  Safety/Judgement: Decreased awareness of environment;Decreased awareness of need for assistance;Decreased awareness of need for safety;Decreased insight into deficits     Skin: noted scabs on UE's and LE's, bruising at IV, coban tight and RN informed     Edema: right UE      Hearing: Auditory  Auditory Impairment: None     Vision/Perceptual:                 Corrective Lenses: Glasses (donned herself when handed to her)     Range of Motion:  AROM: Within functional limits  PROM: Within functional limits         Strength:  Strength: Within functional limits        Coordination:  Coordination:  (no tremors noted)  Fine Motor Skills-Upper: Left Impaired;Right Impaired    Gross Motor Skills-Upper: Left Impaired;Right Impaired     Tone & Sensation:  Tone: Normal  Sensation:  (unable to test)        Balance:  Sitting: Intact; Without support  Standing: Impaired  Standing - Static: Constant support;Good  Standing - Dynamic : Fair     Functional Mobility and Transfers for ADLs:  Bed Mobility:  Rolling: Supervision  Supine to Sit: Supervision; Additional time  Sit to Supine: Contact guard assistance; Additional time  Scooting: Minimum assistance; Additional time     Transfers:  Sit to Stand: Minimum assistance; Additional time;Assist x2  Stand to Sit: Minimum assistance; Additional time;Assist x1  Bed to Chair: Minimum assistance; Adaptive equipment; Other (comment) (significant time to sidestep due to cognitive status)  Toilet Transfer : Maximum assistance     ADL Assessment:  Feeding: Total assistance     Oral Facial Hygiene/Grooming: Total assistance     Bathing: Total assistance     Upper Body Dressing: Total assistance     Lower Body Dressing: Total assistance     Toileting: Total assistance        ADL Intervention and task modifications:   educated on role of OT, oriented to place, situation and time, use of call bell, unable to use call bell or follow direction to locate. Cognitive Retraining  Safety/Judgement: Decreased awareness of environment;Decreased awareness of need for assistance;Decreased awareness of need for safety;Decreased insight into deficits        Functional Measure:  Barthel Index:      Bathin  Bladder: 0  Bowels: 0  Groomin  Dressin  Feedin  Mobility: 0  Stairs: 0  Toilet Use: 0  Transfer (Bed to Chair and Back): 5 (due to cognitive assist)  Total: 5         Barthel and G-code impairment scale:  Percentage of impairment CH  0% CI  1-19% CJ  20-39% CK  40-59% CL  60-79% CM  80-99% CN  100%   Barthel Score 0-100 100 99-80 79-60 59-40 20-39 1-19    0   Barthel Score 0-20 20 17-19 13-16 9-12 5-8 1-4 0      The Barthel ADL Index: Guidelines  1. The index should be used as a record of what a patient does, not as a record of what a patient could do. 2. The main aim is to establish degree of independence from any help, physical or verbal, however minor and for whatever reason.   3. The need for supervision renders the patient not independent. 4. A patient's performance should be established using the best available evidence. Asking the patient, friends/relatives and nurses are the usual sources, but direct observation and common sense are also important. However direct testing is not needed. 5. Usually the patient's performance over the preceding 24-48 hours is important, but occasionally longer periods will be relevant. 6. Middle categories imply that the patient supplies over 50 per cent of the effort. 7. Use of aids to be independent is allowed. Ana Almonte., Barthel, DRickW. (8615). Functional evaluation: the Barthel Index. [facility] (14)2. Yulissa Hernandez ten PAXTON [place], Lindy Smith., Elda Tang., Ayse, [address] (1999). Measuring the change indisability after inpatient rehabilitation; comparison of the responsiveness of the Barthel Index and Functional [place] Measure. Journal of Neurology, Neurosurgery, and Psychiatry, 66(4), 791-827. Haris De La Vega, [place]IMAN[place], MARIEL Murcia, & Leah Mims M.A. (2004.) Assessment of post-stroke quality of life in cost-effectiveness studies: The usefulness of the Barthel Index and the EuroQoL-5D. Quality of Life Research, 13, 214-48            G codes: In compliance with CMSs Claims Based Outcome Reporting, the following G-code set was chosen for this patient based on their primary functional limitation being treated: The outcome measure chosen to determine the severity of the functional limitation was the Barthel Index with a score of 5/100 which was correlated with the impairment scale.       · Self Care:               - CURRENT STATUS:    CM - 80%-99% impaired, limited or restricted               - GOAL STATUS:           CL - 60%-79% impaired, limited or restricted               - D/C STATUS:                       ---------------To be determined---------------      Occupational Therapy Evaluation Charge Determination   History Examination Decision-Making   LOW Complexity : Brief history review  LOW Complexity : 1-3 performance deficits relating to physical, cognitive , or psychosocial skils that result in activity limitations and / or participation restrictions  LOW Complexity : No comorbidities that affect functional and no verbal or physical assistance needed to complete eval tasks       Based on the above components, the patient evaluation is determined to be of the following complexity level: LOW   Pain:  Pain Scale 1: Visual  Pain Intensity 1: 0              Activity Tolerance:   Poor, due to confusion, however increased arousal and alert  Please refer to the flowsheet for vital signs taken during this treatment. After treatment:   [ ] Patient left in no apparent distress sitting up in chair  [X] Patient left in no apparent distress in bed  [X] Call bell left within reach  [X] Nursing notified  [X] Caregiver present  [X] Bed alarm activated      COMMUNICATION/EDUCATION:   The patients plan of care was discussed with: Physical Therapist and Registered Nurse. [ ] Home safety education was provided and the patient/caregiver indicated understanding. [ ] Patient/family have participated as able in goal setting and plan of care. [ ] Patient/family agree to work toward stated goals and plan of care. [ ] Patient understands intent and goals of therapy, but is neutral about his/her participation. [X] Patient is unable to participate in goal setting and plan of care. This patients plan of care is appropriate for delegation to [facility].      Thank you for this referral.  Claudene Feather, OTR/L  Time Calculation: 32 mins

## 2017-05-25 NOTE — CONSULTS
NEUROLOGY IN-PATIENT NEW CONSULTATION      5/24/2017    RE: Conrado Izquierdo         1955      REFERRED BY:  Lanette Moody NP      CHIEF COMPLAINT:  This is Conrado Izquierdo is a 62 y.o. female right handed who had concerns including Lethargy and Decreased Appetite. HPI:     Patient has history of bipolar disorder with paranoia and eventually was placed on Lithium with note of control of psychiatic issues. However, for the past several weeks,  noted increase drowsiness associated with development of bilateral hand tremors and change in taste and appetite. Because of worsening of symptoms, patient was admitted to [facility] with note of Lithium level of 2.66. Review of Systems   Constitutional: Negative for chills, fever and weight loss. All other systems reviewed and are negative    Past Medical Hx  Past Medical History:   Diagnosis Date    Abscess of buttock 7/23/2012    Bronchitis     Common bile duct calculus 7/23/2012    Gallstones 7/12/2012    GERD (gastroesophageal reflux disease)     High cholesterol     Hypercholesterolemia     Hypertension     Ill-defined condition     missing upper front tooth    Ill-defined condition     lower leg bilateral reddened rash.  Insomnia     Other ill-defined conditions(799.89)     Large boil under right arm-pit.     Paranoia ([address]     Psychiatric disorder     bipolar       Social Hx  Social History     Social History    Marital status:      Spouse name: N/A    Number of children: N/A    Years of education: N/A     Social History Main Topics    Smoking status: Current Every Day Smoker     Packs/day: 0.50    Smokeless tobacco: Never Used    Alcohol use No    Drug use: No    Sexual activity: Not on file     Other Topics Concern    Not on file     Social History Narrative       Family Hx  Family History   Problem Relation Age of Onset    Cancer Father      lung    Diabetes Brother     Heart Disease Brother     Malignant Hyperthermia Neg Hx     Pseudocholinesterase Deficiency Neg Hx     Delayed Awakening Neg Hx     Post-op Nausea/Vomiting Neg Hx     Emergence Delirium Neg Hx     Post-op Cognitive Dysfunction Neg Hx     Other Neg Hx        ALLERGIES  No Known Allergies    CURRENT MEDS  Current Facility-Administered Medications   Medication Dose Route Frequency Provider Last Rate Last Dose    risperiDONE (RisperDAL) tablet 0.25 mg  0.25 mg Oral QHS Myles Beltran MD   0.25 mg at 05/23/17 2145    metoprolol succinate (TOPROL-XL) XL tablet 25 mg  25 mg Oral DAILY Jenn Muhammad MD   Stopped at 05/24/17 1788    sodium chloride (NS) flush 5-10 mL  5-10 mL IntraVENous Q8H Zhao Parson MD   10 mL at 05/24/17 1422    sodium chloride (NS) flush 5-10 mL  5-10 mL IntraVENous PRN Zhao Parson MD        naloxone [facility]) injection 0.4 mg  0.4 mg IntraVENous PRN Zhao Parson MD        0.9% sodium chloride infusion  50 mL/hr IntraVENous CONTINUOUS Yoni Cortes MD 50 mL/hr at 05/24/17 1422 50 mL/hr at 05/24/17 1422    LORazepam (ATIVAN) tablet 0.5 mg  0.5 mg Oral BID PRN Zhao Parson MD        heparin (porcine) injection 5,000 Units  5,000 Units SubCUTAneous Q8H Zhao Parson MD   5,000 Units at 05/24/17 1941           PREVIOUS WORKUP: (reviewed)  IMAGING:    CT Results (recent):    Results from Hospital Encounter encounter on 05/21/17   CT HEAD WO CONT   Narrative EXAM:  CT HEAD WO CONT    INDICATION:   confusion/ ams    COMPARISON: None. TECHNIQUE: Unenhanced CT of the head was performed using 5 mm images. Brain and  bone windows were generated. CT dose reduction was achieved through use of a  standardized protocol tailored for this examination and automatic exposure  control for dose modulation. FINDINGS:  There Is no extra-axial fluid collection hemorrhage shift or masses. Impression impression: No acute changes. MRI Results (recent):  No results found for this or any previous visit.     IR Results (recent):  No results found for this or any previous visit. VAS/US Results (recent):  No results found for this or any previous visit. LABS (reviewed)  Results for orders placed or performed during the hospital encounter of 05/21/17   CBC WITH AUTOMATED DIFF   Result Value Ref Range    WBC 11.4 (H) 3.6 - 11.0 K/uL    RBC 4.52 3.80 - 5.20 M/uL    HGB 13.0 11.5 - 16.0 g/dL    HCT 38.3 35.0 - 47.0 %    MCV 84.7 80.0 - 99.0 FL    MCH 28.8 26.0 - 34.0 PG    MCHC 33.9 30.0 - 36.5 g/dL    RDW 13.9 11.5 - 14.5 %    PLATELET 499 424 - 477 K/uL    NEUTROPHILS 67 32 - 75 %    LYMPHOCYTES 22 12 - 49 %    MONOCYTES 6 5 - 13 %    EOSINOPHILS 5 0 - 7 %    BASOPHILS 0 0 - 1 %    ABS. NEUTROPHILS 7.6 1.8 - 8.0 K/UL    ABS. LYMPHOCYTES 2.5 0.8 - 3.5 K/UL    ABS. MONOCYTES 0.7 0.0 - 1.0 K/UL    ABS. EOSINOPHILS 0.6 (H) 0.0 - 0.4 K/UL    ABS. BASOPHILS 0.0 0.0 - 0.1 K/UL   METABOLIC PANEL, COMPREHENSIVE   Result Value Ref Range    Sodium 134 (L) 136 - 145 mmol/L    Potassium 3.7 3.5 - 5.1 mmol/L    Chloride 100 97 - 108 mmol/L    CO2 24 21 - 32 mmol/L    Anion gap 10 5 - 15 mmol/L    Glucose 91 65 - 100 mg/dL    BUN 44 (H) 6 - 20 MG/DL    Creatinine 1.59 (H) 0.55 - 1.02 MG/DL    BUN/Creatinine ratio 28 (H) 12 - 20      GFR est AA 40 (L) >60 ml/min/1.73m2    GFR est non-AA 33 (L) >60 ml/min/1.73m2    Calcium 9.7 8.5 - 10.1 MG/DL    Bilirubin, total 0.3 0.2 - 1.0 MG/DL    ALT (SGPT) 17 12 - 78 U/L    AST (SGOT) 15 15 - 37 U/L    Alk.  phosphatase 239 (H) 45 - 117 U/L    Protein, total 8.1 6.4 - 8.2 g/dL    Albumin 3.7 3.5 - 5.0 g/dL    Globulin 4.4 (H) 2.0 - 4.0 g/dL    A-G Ratio 0.8 (L) 1.1 - 2.2     MAGNESIUM   Result Value Ref Range    Magnesium 2.3 1.6 - 2.4 mg/dL   URINALYSIS W/ RFLX MICROSCOPIC   Result Value Ref Range    Color YELLOW/STRAW      Appearance CLOUDY (A) CLEAR      Specific gravity 1.019 1.003 - 1.030      pH (UA) 5.0 5.0 - 8.0      Protein NEGATIVE  NEG mg/dL    Glucose NEGATIVE  NEG mg/dL    Ketone 15 (A) NEG mg/dL    Blood NEGATIVE  NEG      Urobilinogen 0.2 0.2 - 1.0 EU/dL    Nitrites NEGATIVE  NEG      Leukocyte Esterase NEGATIVE  NEG     TROPONIN I   Result Value Ref Range    Troponin-I, Qt. <0.04 <0.05 ng/mL   LITHIUM   Result Value Ref Range    Lithium 2.66 (HH) 0.60 - 1.20 MMOL/L    Reported dose date: UNKNOWN      Reported dose time: UNKNOWN      Reported dose: UNKNOWN UNITS   LACTIC ACID, PLASMA   Result Value Ref Range    Lactic acid 1.0 0.4 - 2.0 MMOL/L   BILIRUBIN, CONFIRM   Result Value Ref Range    Bilirubin UA, confirm NEGATIVE  NEG     PRO-BNP   Result Value Ref Range    NT pro- (H) 0 - 125 PG/ML   TSH 3RD GENERATION   Result Value Ref Range    TSH 6.15 (H) 0.36 - 3.74 uIU/mL   DRUG SCREEN, URINE   Result Value Ref Range    AMPHETAMINE NEGATIVE  NEG      BARBITURATES NEGATIVE  NEG      BENZODIAZEPINE NEGATIVE  NEG      COCAINE NEGATIVE  NEG      METHADONE NEGATIVE  NEG      OPIATES NEGATIVE  NEG      PCP(PHENCYCLIDINE) NEGATIVE  NEG      THC (TH-CANNABINOL) NEGATIVE  NEG      Drug screen comment (NOTE)    LITHIUM   Result Value Ref Range    Lithium 2.43 (HH) 0.60 - 1.20 MMOL/L    Reported dose date: UNKNOWN      Reported dose time: UNKNOWN      Reported dose: UNKNOWN UNITS   METABOLIC PANEL, COMPREHENSIVE   Result Value Ref Range    Sodium 138 136 - 145 mmol/L    Potassium 3.5 3.5 - 5.1 mmol/L    Chloride 108 97 - 108 mmol/L    CO2 19 (L) 21 - 32 mmol/L    Anion gap 11 5 - 15 mmol/L    Glucose 71 65 - 100 mg/dL    BUN 29 (H) 6 - 20 MG/DL    Creatinine 1.14 (H) 0.55 - 1.02 MG/DL    BUN/Creatinine ratio 25 (H) 12 - 20      GFR est AA 59 (L) >60 ml/min/1.73m2    GFR est non-AA 49 (L) >60 ml/min/1.73m2    Calcium 8.8 8.5 - 10.1 MG/DL    Bilirubin, total 0.3 0.2 - 1.0 MG/DL    ALT (SGPT) 15 12 - 78 U/L    AST (SGOT) 14 (L) 15 - 37 U/L    Alk.  phosphatase 216 (H) 45 - 117 U/L    Protein, total 6.7 6.4 - 8.2 g/dL    Albumin 3.1 (L) 3.5 - 5.0 g/dL    Globulin 3.6 2.0 - 4.0 g/dL    A-G Ratio 0.9 (L) 1.1 - 2.2     CBC WITH AUTOMATED DIFF   Result Value Ref Range    WBC 8.6 3.6 - 11.0 K/uL    RBC 4.10 3.80 - 5.20 M/uL    HGB 11.4 (L) 11.5 - 16.0 g/dL    HCT 34.9 (L) 35.0 - 47.0 %    MCV 85.1 80.0 - 99.0 FL    MCH 27.8 26.0 - 34.0 PG    MCHC 32.7 30.0 - 36.5 g/dL    RDW 13.9 11.5 - 14.5 %    PLATELET 831 091 - 173 K/uL    NEUTROPHILS 62 32 - 75 %    LYMPHOCYTES 26 12 - 49 %    MONOCYTES 7 5 - 13 %    EOSINOPHILS 5 0 - 7 %    BASOPHILS 0 0 - 1 %    ABS. NEUTROPHILS 5.4 1.8 - 8.0 K/UL    ABS. LYMPHOCYTES 2.2 0.8 - 3.5 K/UL    ABS. MONOCYTES 0.6 0.0 - 1.0 K/UL    ABS. EOSINOPHILS 0.4 0.0 - 0.4 K/UL    ABS. BASOPHILS 0.0 0.0 - 0.1 K/UL   MAGNESIUM   Result Value Ref Range    Magnesium 2.1 1.6 - 2.4 mg/dL   LITHIUM   Result Value Ref Range    Lithium 2.02 (HH) 0.60 - 1.20 MMOL/L    Reported dose date: NOT PROVIDED      Reported dose time: NOT PROVIDED      Reported dose: NOT PROVIDED UNITS   CBC W/O DIFF   Result Value Ref Range    WBC 8.1 3.6 - 11.0 K/uL    RBC 3.59 (L) 3.80 - 5.20 M/uL    HGB 10.2 (L) 11.5 - 16.0 g/dL    HCT 31.3 (L) 35.0 - 47.0 %    MCV 87.2 80.0 - 99.0 FL    MCH 28.4 26.0 - 34.0 PG    MCHC 32.6 30.0 - 36.5 g/dL    RDW 13.9 11.5 - 14.5 %    PLATELET 360 782 - 928 K/uL   METABOLIC PANEL, COMPREHENSIVE   Result Value Ref Range    Sodium 141 136 - 145 mmol/L    Potassium 3.5 3.5 - 5.1 mmol/L    Chloride 111 (H) 97 - 108 mmol/L    CO2 19 (L) 21 - 32 mmol/L    Anion gap 11 5 - 15 mmol/L    Glucose 85 65 - 100 mg/dL    BUN 14 6 - 20 MG/DL    Creatinine 1.00 0.55 - 1.02 MG/DL    BUN/Creatinine ratio 14 12 - 20      GFR est AA >60 >60 ml/min/1.73m2    GFR est non-AA 57 (L) >60 ml/min/1.73m2    Calcium 8.6 8.5 - 10.1 MG/DL    Bilirubin, total 0.4 0.2 - 1.0 MG/DL    ALT (SGPT) 16 12 - 78 U/L    AST (SGOT) 16 15 - 37 U/L    Alk.  phosphatase 224 (H) 45 - 117 U/L    Protein, total 6.3 (L) 6.4 - 8.2 g/dL    Albumin 2.8 (L) 3.5 - 5.0 g/dL    Globulin 3.5 2.0 - 4.0 g/dL    A-G Ratio 0.8 (L) 1.1 - 2.2     MAGNESIUM Result Value Ref Range    Magnesium 1.8 1.6 - 2.4 mg/dL   LITHIUM   Result Value Ref Range    Lithium 1.53 (H) 0.60 - 1.20 MMOL/L    Reported dose date: NOT PROVIDED      Reported dose time: NOT PROVIDED      Reported dose: NOT PROVIDED UNITS   T4, FREE   Result Value Ref Range    T4, Free 0.9 0.8 - 1.5 NG/DL   LIPID PANEL   Result Value Ref Range    LIPID PROFILE          Cholesterol, total 98 <200 MG/DL    Triglyceride 148 <150 MG/DL    HDL Cholesterol 40 MG/DL    LDL, calculated 28.4 0 - 100 MG/DL    VLDL, calculated 29.6 MG/DL    CHOL/HDL Ratio 2.5 0 - 5.0     VITAMIN B12   Result Value Ref Range    Vitamin B12 630 211 - 911 pg/mL   EKG, 12 LEAD, INITIAL   Result Value Ref Range    Ventricular Rate 77 BPM    Atrial Rate 77 BPM    P-R Interval 204 ms    QRS Duration 84 ms    Q-T Interval 402 ms    QTC Calculation (Bezet) 454 ms    Calculated P Axis 76 degrees    Calculated R Axis -7 degrees    Calculated T Axis 81 degrees    Diagnosis       Normal sinus rhythm  Low voltage QRS  Cannot rule out Inferior infarct , age undetermined  Poor R-wave Progression  Abnormal ECG  When compared with ECG of 18-JUL-2012 11:34,  Cannot rule out Inferior infarct is now present  Confirmed by Penelope Quach MD., [place] ([id number]) on 5/22/2017 8:31:07 AM         Physical Exam:     Visit Vitals    /78 (BP 1 Location: Left arm, BP Patient Position: Lying left side)    Pulse 76    Temp 99.7 °F (37.6 °C)    Resp 18    Ht 5' 5\" (1.651 m)    Wt 86 kg (189 lb 8 oz)    SpO2 97%    BMI 31.53 kg/m2     General:  Drowsy, but opens eyes to name calling, follows some commands but limited speech, not in distress. Head:  Normocephalic, without obvious abnormality, atraumatic. Eyes:  Conjunctivae/corneas clear. Lungs:  Heart:   Non labored breathing  Regular rate and rhythm, no carotid bruits   Abdomen:   Soft, non-distended   Extremities: Extremities normal, atraumatic, no cyanosis or edema. Pulses: 2+ and symmetric all extremities. Skin: Skin color, texture, turgor normal. No rashes or lesions. Neurologic Exam     Gen: Attention normal             Language: naming, repetition, fluency normal             Memory: intact recent and remote memory  Cranial Nerves:  I: smell Not tested   II: visual fields Unable to test   II: pupils Equal, round, reactive to light   II: optic disc No papilledema   III,VII: ptosis none   III,IV,VI: extraocular muscles  Full ROM   V: mastication normal   V: facial light touch sensation  normal   VII: facial muscle function   symmetric   VIII: hearing symmetric   IX: soft palate elevation  Unable to test   XI: trapezius strength  Unable to test   XI: sternocleidomastoid strength Unable to test   XI: neck flexion strength  Unable to test   XII: tongue  midline     Motor and sensory: move all extremities spontaneously  Reflexes: 2+ throughout; Down going toes  Coordination: Good FTN and HTS, Romberg negative  Gait: deferred           Impression:     Matilde Lovell is a 62 y.o. female who  has a past medical history of Abscess of buttock (7/23/2012); Bronchitis; Common bile duct calculus (7/23/2012); Gallstones (7/12/2012); GERD (gastroesophageal reflux disease); High cholesterol; Hypercholesterolemia; Hypertension; Ill-defined condition; Ill-defined condition; Insomnia; Other ill-defined conditions(799.89); Paranoia ([facility] [address] and Psychiatric disorder. who was placed on Lithium and for the past several weeks,  noted increase drowsiness associated with development of bilateral hand tremors and change in taste and appetite. Neurologic exam is otherwise non-focal. Consideration includes Toxic/metabolic encephalopathy due to Lithium toxicity. RECOMMENDATIONS  1. Will check Lithium level tomorrow. Of note, serum lithium concentrations often do not correlate with clinical signs of toxicity. Reports exist of severe clinical toxicity despite therapeutic lithium concentrations.  Therefore, treatment should be based upon clinical manifestations and not solely upon drug levels. 2. Will also check ammonia level  3.  Will do EEG to evaluate for subclinical seizure due to lithium toxicity    Please call for questions        Thank you for the consultation      Yamileth Norton MD  Diplomate, American Board of Psychiatry and Neurology  Diplomate, Neuromuscular Medicine  Diplomate, American Board of Electrodiagnostic Medicine    Greater than 50% of time spent counseling patient        CC: Christelle Stanford NP  Fax: [phone]

## 2017-05-25 NOTE — PROGRESS NOTES
Problem: Mobility Impaired (Adult and Pediatric)  Goal: *Acute Goals and Plan of Care (Insert Text)  Physical Therapy Goals  Initiated 5/25/2017  1. Patient will move from supine to sit and sit to supine in bed with supervision/set-up within 7 day(s). 2. Patient will transfer from bed to chair and chair to bed with supervision/set-up using the least restrictive device within 7 day(s). 3. Patient will perform sit to stand with supervision/set-up within 7 day(s). 4. Patient will ambulate with modified independence for 200 feet with the least restrictive device within 7 day(s). 5. Patient will ascend/descend 3 stairs with 1 handrail(s) with modified independence within 7 day(s). PHYSICAL THERAPY EVALUATION  Patient: Mayra Durham (16 y.o. female)  Date: 5/25/2017  Primary Diagnosis: Lithium toxicity        Precautions:   Fall, Bed Alarm      ASSESSMENT :  Based on the objective data described below, the patient presents with a flat affect, decreased command following, and impaired coordination after admission for lithium toxicity. She is a limited historian due to current cognitive presentation but reports indpendence for mobility prior to admission. Anticipate good progress towards goals as her lithium levels decrease and disposition with have to be TBD with progress. Anticipate home with HHPT vs TBD. Patient will benefit from skilled intervention to address the above impairments.   Patients rehabilitation potential is considered to be Good  Factors which may influence rehabilitation potential include:   [ ]         None noted  [ ]         Mental ability/status  [X]         Medical condition  [ ]         Home/family situation and support systems  [ ]         Safety awareness  [ ]         Pain tolerance/management  [ ]         Other:        PLAN :  Recommendations and Planned Interventions:  [X]           Bed Mobility Training             [X]    Neuromuscular Re-Education  [X]           Transfer Training [ ]    Orthotic/Prosthetic Training  [X]           Gait Training                         [ ]    Modalities  [X]           Therapeutic Exercises           [ ]    Edema Management/Control  [X]           Therapeutic Activities            [ ]    Patient and Family Training/Education  [ ]           Other (comment):     Frequency/Duration: Patient will be followed by physical therapy  4 times a week to address goals. Discharge Recommendations: Home Health and To Be Determined  Further Equipment Recommendations for Discharge: None       SUBJECTIVE:   Patient stated Jocelyn may.      OBJECTIVE DATA SUMMARY:   HISTORY:    Past Medical History:   Diagnosis Date    Abscess of buttock 7/23/2012    Bronchitis      Common bile duct calculus 7/23/2012    Gallstones 7/12/2012    GERD (gastroesophageal reflux disease)      High cholesterol      Hypercholesterolemia      Hypertension      Ill-defined condition       missing upper front tooth    Ill-defined condition       lower leg bilateral reddened rash.  Insomnia      Other ill-defined conditions(799.89)       Large boil under right arm-pit.     Paranoia ([facility] [address]      Psychiatric disorder       bipolar     Past Surgical History:   Procedure Laterality Date    ABDOMEN SURGERY PROC UNLISTED         cholecystectomy 7/23/2012     Prior Level of Function/Home Situation: independent per patient  Personal factors and/or comorbidities impacting plan of care:      Home Situation  Home Environment: Private residence  One/Two Story Residence: Two story, live on 1st floor  Living Alone: No  Support Systems: Spouse/Significant Other/Partner  Patient Expects to be Discharged to[de-identified] Unknown  Current DME Used/Available at Home: Walker     EXAMINATION/PRESENTATION/DECISION MAKING:   Critical Behavior:  Neurologic State: Alert, Confused, Restless  Orientation Level: Oriented to person, Disoriented to time, Disoriented to situation, Disoriented to place, Other (Comment) (recognized her )  Cognition: Decreased command following, Decreased attention/concentration, Memory loss, Poor safety awareness, Impaired decision making  Safety/Judgement: Decreased awareness of environment, Decreased awareness of need for assistance, Decreased awareness of need for safety, Decreased insight into deficits  Hearing: Auditory  Auditory Impairment: None  Skin:    Edema:   Range Of Motion:  AROM: Within functional limits           PROM: Within functional limits           Strength:    Strength: Within functional limits                    Tone & Sensation:   Tone: Normal              Sensation:  (unable to test)               Coordination:  Coordination:  (no tremors noted)  Vision:   Corrective Lenses: Glasses (donned herself when handed to her)  Functional Mobility:  Bed Mobility:  Rolling: Supervision  Supine to Sit: Moderate assistance; Additional time  Sit to Supine: Contact guard assistance; Additional time  Scooting: Minimum assistance; Additional time  Transfers:  Sit to Stand: Moderate assistance; Additional time  Stand to Sit: Minimum assistance; Additional time;Assist x1        Bed to Chair: Minimum assistance; Adaptive equipment; Other (comment) (significant time to sidestep due to cognitive status)              Balance:   Sitting: Intact; Without support  Standing: Impaired  Standing - Static: Constant support; Fair  Standing - Dynamic : Fair  Ambulation/Gait Training:  Distance (ft): 25 Feet (ft)  Assistive Device: Gait belt;Walker, rolling  Ambulation - Level of Assistance: Minimal assistance     Gait Description (WDL): Exceptions to WDL  Gait Abnormalities: Ataxic;Decreased step clearance;Trunk sway increased; Path deviations        Base of Support: Widened     Speed/Jackeline: Slow;Shuffled           Physical Therapy Evaluation Charge Determination   History Examination Presentation Decision-Making   MEDIUM  Complexity : 1-2 comorbidities / personal factors will impact the outcome/ POC MEDIUM Complexity : 3 Standardized tests and measures addressing body structure, function, activity limitation and / or participation in recreation  LOW Complexity : Stable, uncomplicated  LOW Complexity : FOTO score of       Based on the above components, the patient evaluation is determined to be of the following complexity level: LOW      Pain:  Pain Scale 1: Visual  Pain Intensity 1: 0        After treatment:   [ ]         Patient left in no apparent distress sitting up in chair  [X]         Patient left in no apparent distress in bed  [X]         Call bell left within reach  [X]         Nursing notified  [ ]         Caregiver present  [X]         Bed alarm activated      COMMUNICATION/EDUCATION:   The patients plan of care was discussed with: Registered Nurse.  [X]         Fall prevention education was provided and the patient/caregiver indicated understanding. [X]         Patient/family have participated as able in goal setting and plan of care. [X]         Patient/family agree to work toward stated goals and plan of care. [ ]         Patient understands intent and goals of therapy, but is neutral about his/her participation. [ ]         Patient is unable to participate in goal setting and plan of care.      Thank you for this referral.  Emma Smallwood, PT, DPT   Time Calculation: 27 mins

## 2017-05-25 NOTE — PROGRESS NOTES
Fercho Garciaelsen [facility] [place] [facility]  [address] [facility], [place], [address] [place] [address]  [phone]      Medical Progress Note      NAME: Ida Torres   :  1955  MRM:  [id number]    Date/Time: 2017  8:43 AM       Assessment and Plan:     Acute toxic encephalopathy / Bipolar 1 disorder - POA, presumed from lithium toxicity, but only barely improving despite better Li level and holding all other meds. No focal neuro deficits noted on exam. Head CT w/o acute process. Appreciate psych and neurological eval.  They both think just a prolonged Li effect. Normal Ammonia.     Debilitated patient - Weak, general.  PT/OT eval.  Likely needs HH PT/OT, but cannot participate yet. Anemia - Mild and presumed due to chronic disease. Normal B12. N/V/D - POA due to elevated lithium levels vs gastroenteritis. No BM or N/V since admission, but still poor PO intake.     Essential hypertension - Continue metoprolol      Hypercholesteremia - LDL 28 on lipid panel. Stop statin     Hypokalemia - POA and resolved with KCL     Abnormal TSH - Elevated TSH, but normal T4    Lithium toxicity - Lithium level improved with hydration. PCP/Psych to consider resuming lower dose, as outpatient.     ALEX (acute kidney injury) - POA, mild, due to IVVD due to lethargy and poor intake. Improved with IVF. Renal signed off     Obesity (BMI of 30-39.9) - Advise weight loss       Subjective:     Chief Complaint:  Confused, but no specific complaints, just slightly more awake today    ROS:  (bold if positive, if negative)      Tolerating minimal PT Tolerating minimal Diet        Objective:     Last 24hrs VS reviewed since prior progress note.  Most recent are:    Visit Vitals    /84 (BP 1 Location: Left arm, BP Patient Position: At rest)    Pulse 76    Temp 97.5 °F (36.4 °C)    Resp 19    Ht 5' 5\" (1.651 m)    Wt 86 kg (189 lb 8 oz)    SpO2 95%    BMI 31.53 kg/m2     SpO2 Readings from Last 6 Encounters:   17 95% 03/15/17 95%   01/18/17 93%   01/11/17 98%   12/14/16 97%   11/30/16 98%            Intake/Output Summary (Last 24 hours) at 05/25/17 0843  Last data filed at 05/25/17 0754   Gross per 24 hour   Intake                0 ml   Output             1475 ml   Net            -1475 ml        Physical Exam:    Gen:  Well-developed, well-nourished, in no acute distress  HEENT:  Pink conjunctivae, PERRL, hearing intact to voice, moist mucous membranes  Neck:  Supple, without masses, thyroid non-tender  Resp:  No accessory muscle use, clear breath sounds without wheezes rales or rhonchi  Card:  No murmurs, normal S1, S2 without thrills, bruits or peripheral edema  Abd:  Soft, non-tender, non-distended, normoactive bowel sounds are present, no mass  Lymph:  No cervical or inguinal adenopathy  Musc:  No cyanosis or clubbing  Skin:  No rashes or ulcers, skin turgor is good  Neuro:  Cranial nerves are grossly intact, general motor weakness, follows commands vaguely  Psych:  Poor insight, oriented to person    Telemetry reviewed:   normal sinus rhythm  __________________________________________________________________  Medications Reviewed: (see below)  Medications:     Current Facility-Administered Medications   Medication Dose Route Frequency    risperiDONE (RisperDAL) tablet 0.25 mg  0.25 mg Oral QHS    metoprolol succinate (TOPROL-XL) XL tablet 25 mg  25 mg Oral DAILY    sodium chloride (NS) flush 5-10 mL  5-10 mL IntraVENous Q8H    sodium chloride (NS) flush 5-10 mL  5-10 mL IntraVENous PRN    naloxone (NARCAN) injection 0.4 mg  0.4 mg IntraVENous PRN    0.9% sodium chloride infusion  50 mL/hr IntraVENous CONTINUOUS    LORazepam (ATIVAN) tablet 0.5 mg  0.5 mg Oral BID PRN    heparin (porcine) injection 5,000 Units  5,000 Units SubCUTAneous Q8H        Lab Data Reviewed: (see below)  Lab Review:     Recent Labs      05/23/17   0616   WBC  8.1   HGB  10.2*   HCT  31.3*   PLT  192     Recent Labs      05/23/17   0616   NA 141   K  3.5   CL  111*   CO2  19*   GLU  85   BUN  14   CREA  1.00   CA  8.6   MG  1.8   ALB  2.8*   TBILI  0.4   SGOT  16   ALT  16     Lab Results   Component Value Date/Time    Glucose (POC) 115 10/31/2011 08:08 PM     No results for input(s): PH, PCO2, PO2, HCO3, FIO2 in the last 72 hours. No results for input(s): INR in the last 72 hours. No lab exists for component: INREXT, INREXT  All Micro Results     None          I have reviewed notes of prior 24hr.     Other pertinent lab: none    Total time spent with patient: [facility] [address] discussed with: Patient, Care Manager, Nursing Staff and >50% of time spent in counseling and coordination of care    Discussed:  Care Plan and D/C Planning    Prophylaxis:  H2B/PPI    Disposition:   PT, OT, RN           ___________________________________________________    Attending Physician: Eva Bruce MD

## 2017-05-25 NOTE — ROUTINE PROCESS
Bedside and Verbal shift change report given to Trina Boyer RN (oncoming nurse) by Justine Sierra RN (offgoing nurse). Report included the following information SBAR, Intake/Output, MAR and [employer].

## 2017-05-26 VITALS
SYSTOLIC BLOOD PRESSURE: 108 MMHG | RESPIRATION RATE: 18 BRPM | HEIGHT: 65 IN | WEIGHT: 187.1 LBS | OXYGEN SATURATION: 100 % | TEMPERATURE: 98.1 F | HEART RATE: 76 BPM | BODY MASS INDEX: 31.17 KG/M2 | DIASTOLIC BLOOD PRESSURE: 85 MMHG

## 2017-05-26 LAB
ALBUMIN SERPL BCP-MCNC: 2.8 G/DL (ref 3.5–5)
ALBUMIN/GLOB SERPL: 0.6 {RATIO} (ref 1.1–2.2)
ALP SERPL-CCNC: 204 U/L (ref 45–117)
ALT SERPL-CCNC: 26 U/L (ref 12–78)
ANION GAP BLD CALC-SCNC: 12 MMOL/L (ref 5–15)
ARSENIC 24H UR-MCNC: NORMAL UG/L (ref 0–50)
AST SERPL W P-5'-P-CCNC: 21 U/L (ref 15–37)
BILIRUB SERPL-MCNC: 0.5 MG/DL (ref 0.2–1)
BUN SERPL-MCNC: 10 MG/DL (ref 6–20)
BUN/CREAT SERPL: 11 (ref 12–20)
CALCIUM SERPL-MCNC: 8.7 MG/DL (ref 8.5–10.1)
CHLORIDE SERPL-SCNC: 106 MMOL/L (ref 97–108)
CO2 SERPL-SCNC: 24 MMOL/L (ref 21–32)
COLLECT DURATION TIME UR: 0 HR
COLLECT DURATION TIME UR: 0 HR
CREAT SERPL-MCNC: 0.95 MG/DL (ref 0.55–1.02)
CREAT UR-MCNC: 0.9 G/L (ref 0.3–3)
CREAT UR-MCNC: 0.91 G/L (ref 0.3–3)
ERYTHROCYTE [DISTWIDTH] IN BLOOD BY AUTOMATED COUNT: 13.7 % (ref 11.5–14.5)
GLOBULIN SER CALC-MCNC: 4.4 G/DL (ref 2–4)
GLUCOSE SERPL-MCNC: 113 MG/DL (ref 65–100)
HCT VFR BLD AUTO: 37.1 % (ref 35–47)
HGB BLD-MCNC: 12.2 G/DL (ref 11.5–16)
INORG ARSENIC UR-MCNC: NORMAL UG/L (ref 0–19)
LEAD 24H UR-MCNC: NORMAL UG/L (ref 0–49)
MAGNESIUM SERPL-MCNC: 1.6 MG/DL (ref 1.6–2.4)
MCH RBC QN AUTO: 27.9 PG (ref 26–34)
MCHC RBC AUTO-ENTMCNC: 32.9 G/DL (ref 30–36.5)
MCV RBC AUTO: 84.7 FL (ref 80–99)
PHOSPHATE SERPL-MCNC: 3.1 MG/DL (ref 2.6–4.7)
PLATELET # BLD AUTO: 233 K/UL (ref 150–400)
POTASSIUM SERPL-SCNC: 2.9 MMOL/L (ref 3.5–5.1)
PROT SERPL-MCNC: 7.2 G/DL (ref 6.4–8.2)
RBC # BLD AUTO: 4.38 M/UL (ref 3.8–5.2)
SODIUM SERPL-SCNC: 142 MMOL/L (ref 136–145)
SPECIMEN VOL ?TM UR: 0 ML
SPECIMEN VOL ?TM UR: 0 ML
WBC # BLD AUTO: 11.3 K/UL (ref 3.6–11)

## 2017-05-26 PROCEDURE — 83735 ASSAY OF MAGNESIUM: CPT | Performed by: INTERNAL MEDICINE

## 2017-05-26 PROCEDURE — 74011250636 HC RX REV CODE- 250/636: Performed by: INTERNAL MEDICINE

## 2017-05-26 PROCEDURE — 85027 COMPLETE CBC AUTOMATED: CPT | Performed by: INTERNAL MEDICINE

## 2017-05-26 PROCEDURE — 74011250637 HC RX REV CODE- 250/637: Performed by: INTERNAL MEDICINE

## 2017-05-26 PROCEDURE — 97530 THERAPEUTIC ACTIVITIES: CPT

## 2017-05-26 PROCEDURE — 97116 GAIT TRAINING THERAPY: CPT

## 2017-05-26 PROCEDURE — 97535 SELF CARE MNGMENT TRAINING: CPT

## 2017-05-26 PROCEDURE — 80053 COMPREHEN METABOLIC PANEL: CPT | Performed by: INTERNAL MEDICINE

## 2017-05-26 PROCEDURE — 36415 COLL VENOUS BLD VENIPUNCTURE: CPT | Performed by: INTERNAL MEDICINE

## 2017-05-26 PROCEDURE — 84100 ASSAY OF PHOSPHORUS: CPT | Performed by: INTERNAL MEDICINE

## 2017-05-26 RX ORDER — SERTRALINE HYDROCHLORIDE 50 MG/1
50 TABLET, FILM COATED ORAL DAILY
Qty: 30 TAB | Refills: 0 | Status: SHIPPED | OUTPATIENT
Start: 2017-05-26 | End: 2017-06-25

## 2017-05-26 RX ORDER — RISPERIDONE 0.5 MG/1
0.5 TABLET, FILM COATED ORAL
Qty: 20 TAB | Refills: 0 | Status: SHIPPED | OUTPATIENT
Start: 2017-05-26 | End: 2017-07-05 | Stop reason: SDUPTHER

## 2017-05-26 RX ORDER — POTASSIUM CHLORIDE 750 MG/1
40 TABLET, FILM COATED, EXTENDED RELEASE ORAL
Status: COMPLETED | OUTPATIENT
Start: 2017-05-26 | End: 2017-05-26

## 2017-05-26 RX ADMIN — HEPARIN SODIUM 5000 UNITS: 5000 INJECTION, SOLUTION INTRAVENOUS; SUBCUTANEOUS at 13:13

## 2017-05-26 RX ADMIN — METOPROLOL SUCCINATE 25 MG: 25 TABLET, FILM COATED, EXTENDED RELEASE ORAL at 10:34

## 2017-05-26 RX ADMIN — POTASSIUM CHLORIDE 40 MEQ: 750 TABLET, FILM COATED, EXTENDED RELEASE ORAL at 13:12

## 2017-05-26 RX ADMIN — HEPARIN SODIUM 5000 UNITS: 5000 INJECTION, SOLUTION INTRAVENOUS; SUBCUTANEOUS at 03:45

## 2017-05-26 NOTE — DISCHARGE INSTRUCTIONS
Patient Discharge Instructions    Omar Emerson / [id number] : 1955    Admitted 2017 Discharged: 2017     Primary Diagnoses  Problem List as of 2017  Date Reviewed: 3/15/2017          Codes Class Noted - Resolved   Anemia   ALEX (acute kidney [facility]) ([facility]   * (Principal)Lithium toxicity   Encephalopathy   Hypercholesteremia   Prediabetes   Essential hypertension with goal blood pressure less than 130/80   Bipolar 1 disorder (HCC)          Take Home Medications     · It is important that you take the medication exactly as they are prescribed. · Keep your medication in the bottles provided by the pharmacist and keep a list of the medication names, dosages, and times to be taken in your wallet. · Do not take other medications without consulting your doctor. What to do at Home    Recommended diet: Cardiac Diet    Recommended activity: Activity as tolerated and PT/OT per Home Health    If you experience worse symptoms, please follow up with your PCP or psychiatry. Follow-up with your PCP in a few days    Bipolar Disorder: Care Instructions  Your Care Instructions  Bipolar disorder is an illness that causes extreme mood changes, from times of very high energy (manic episodes) to times of depression. But many people with bipolar disorder show only the symptoms of depression. These moods may cause problems with your work, school, family life, friendships, and how well you function. This disease is also called manic-depression. There is no cure for bipolar disorder, but it can be helped with medicines. Counseling may also help. It is important to take your medicines exactly as prescribed, even when you feel well. You may need lifelong treatment. Follow-up care is a key part of your treatment and safety. Be sure to make and go to all appointments, and call your doctor if you are having problems.  It's also a good idea to know your test results and keep a list of the medicines you take.  How can you care for yourself at home? · Be safe with medicines. Take your medicines exactly as prescribed. Do not stop or change a medicine without talking to your doctor first. Mayaalondra Ordaz and your doctor may need to try different combinations of medicines to find what works for you. · Take your medicines on schedule to keep your moods even. When you feel good, you may think that you do not need your medicines. But it is important to keep taking them. · Go to your counseling sessions. Call and talk with your counselor if you can't go to a session or if you don't think the sessions are helping. Do not just stop going. · Get at least 30 minutes of activity on most days of the week. Walking is a good choice. You also may want to do other things, such as running, swimming, or cycling. · Get enough sleep. Keep your room dark and quiet. Try to go to bed at the same time every night. · Eat a healthy diet. This includes whole grains, dairy, fruits, vegetables, and protein. Eat foods from each of these groups. · Try to lower your stress. Manage your time, build a strong support system, and lead a healthy lifestyle. To lower your stress, try physical activity, slow deep breathing, or getting a massage. · Do not use alcohol or illegal drugs. · Learn the early signs of your mood changes. You can then take steps to help yourself feel better. · Ask for help from friends and family when you need it. You may need help with daily chores when you are depressed. When you are manic, you may need support to control your high energy levels. What should you do if someone in your family has bipolar disorder? · Learn about the disease and the signs that it is getting worse. · Remind your family member that you love him or her. · Make a plan with all family members about how to take care of your loved one when his or her symptoms are bad. · Talk about your fears and concerns and those of other family members.  Seek counseling if needed. · Do not focus attention only on the person who is in treatment. · Remind yourself that it will take time for changes to occur. · Do not blame yourself for the disease. · Know your legal rights and the legal rights of your family member. Support groups or counselors can help you with this information. · Take care of yourself. Keep up with your own interests, such as your career, hobbies, and friends. Use exercise, positive self-talk, deep breathing, and other relaxing exercises to help lower your stress. · Give yourself time to grieve. You may need to deal with emotions such as anger, fear, and frustration. After you work through your feelings, you will be better able to care for yourself and your family. · If you are having a hard time with your feelings or with your relationship with your family member, talk with a counselor. When should you call for help? Call 911 anytime you think you may need emergency care. For example, call if:  · You feel like hurting yourself or someone else. · Someone who has bipolar disorder displays dangerous behavior, and you think the person might hurt himself or herself or someone else. Call your doctor now or seek immediate medical care if:  · You hear voices. · Someone you know has bipolar disorder and talks about suicide. Keep the numbers for these national suicide hotlines: 8-646-150-TALK (8-297.682.4874) and 7-525-WUDYDAG (1-670.758.6201). If a suicide threat seems real, with a specific plan and a way to carry it out, stay with the person, or ask someone you trust to stay with the person, until you can get help. · Someone you know has bipolar disorder and:  ¨ Starts to give away possessions. ¨ Is using illegal drugs or drinking alcohol heavily. ¨ Talks or writes about death, including writing suicide notes or talking about guns, knives, or pills. ¨ Talks or writes about hurting someone else. ¨ Starts to spend a lot of time alone.   ¨ Acts very aggressively or suddenly appears calm. ¨ Talks about beliefs that are not based in reality (delusions). Watch closely for changes in your health, and be sure to contact your doctor if:  · You cannot go to your counseling sessions. Where can you learn more? Go to http://maria victoria-ignacio.info/. Enter K052 in the search box to learn more about \"Bipolar Disorder: Care Instructions. \"  Current as of: July 26, 2016  Content Version: 11.2  © 7729-0259 [employer]. Care instructions adapted under license by [employer] (which disclaims liability or warranty for this information). If you have questions about a medical condition or this instruction, always ask your healthcare professional. [address] any warranty or liability for your use of this information. Information obtained by :  I understand that if any problems occur once I am at home I am to contact my physician. I understand and acknowledge receipt of the instructions indicated above.                                                                                                                                            Physician's or R.N.'s Signature                                                                  Date/Time                                                                                                                                              Patient or Representative Signature                                                          Date/Time

## 2017-05-26 NOTE — PROGRESS NOTES
Fercho Mcfadden joanie [place] [address]  [address] [facility], [address] [place] [address]  [phone]      Medical Progress Note      NAME: Dena Bowens   :  1955  MRM:  [id number]    Date/Time: 2017  8:54 AM       Assessment and Plan:     Acute toxic encephalopathy / Bipolar 1 disorder - Encephalopathy POA, presumed from lithium toxicity, and very slowly improving with improved Li level and holding all other meds. No focal neuro deficits noted on exam. Head CT w/o acute process. Appreciate psych and neurological eval.  They both think just a prolonged Li effect. Normal Ammonia. Risperdal 0.5 qhs.     Debilitated patient - Weak, general.  PT/OT eval.  Likely needs HH PT/OT, and can go home when PT deems safe for ADLs with family help. Anemia - Mild and presumed due to chronic disease. Normal B12. N/V/D - POA due to elevated lithium levels vs gastroenteritis. No BM or N/V since admission, but still limited PO intake. Re-check lytes.     Essential hypertension - Continue metoprolol      Hypercholesteremia - LDL 28 on lipid panel. Stop statin     Hypokalemia - POA and resolved with KCL     Abnormal TSH - Elevated TSH, but normal T4    Lithium toxicity - Lithium level improved with hydration. PCP/Psych to consider resuming lower dose, as outpatient.     ALEX (acute kidney injury) - POA, mild, due to IVVD due to lethargy and poor intake. Improved with IVF. Renal signed off     Obesity (BMI of 30-39.9) - Advise weight loss       Subjective:     Chief Complaint:  Sleepy, but no specific complaints, did better yesterday    ROS:  (bold if positive, if negative)      Tolerating minimal PT Tolerating minimal Diet        Objective:     Last 24hrs VS reviewed since prior progress note.  Most recent are:    Visit Vitals    /67 (BP 1 Location: Left arm, BP Patient Position: At rest)    Pulse 64    Temp 98.1 °F (36.7 °C)    Resp 18    Ht 5' 5\" (1.651 m)    Wt 84.9 kg (187 lb 1.6 oz)    SpO2 98%    BMI 31.14 kg/m2     SpO2 Readings from Last 6 Encounters:   05/26/17 98%   03/15/17 95%   01/18/17 93%   01/11/17 98%   12/14/16 97%   11/30/16 98%            Intake/Output Summary (Last 24 hours) at 05/26/17 0854  Last data filed at 05/26/17 0604   Gross per 24 hour   Intake                0 ml   Output             1050 ml   Net            -1050 ml        Physical Exam:    Gen:  Well-developed, well-nourished, in no acute distress  HEENT:  Pink conjunctivae, PERRL, hearing intact to voice, moist mucous membranes  Neck:  Supple, without masses, thyroid non-tender  Resp:  No accessory muscle use, clear breath sounds without wheezes rales or rhonchi  Card:  No murmurs, normal S1, S2 without thrills, bruits or peripheral edema  Abd:  Soft, non-tender, non-distended, normoactive bowel sounds are present, no mass  Lymph:  No cervical or inguinal adenopathy  Musc:  No cyanosis or clubbing  Skin:  No rashes or ulcers, skin turgor is good  Neuro:  Cranial nerves are grossly intact, general motor weakness, follows commands vaguely  Psych:  Poor insight, oriented to person, sleepy    Telemetry reviewed:   normal sinus rhythm  __________________________________________________________________  Medications Reviewed: (see below)  Medications:     Current Facility-Administered Medications   Medication Dose Route Frequency    risperiDONE (RisperDAL) tablet 0.5 mg  0.5 mg Oral QHS    metoprolol succinate (TOPROL-XL) XL tablet 25 mg  25 mg Oral DAILY    sodium chloride (NS) flush 5-10 mL  5-10 mL IntraVENous Q8H    sodium chloride (NS) flush 5-10 mL  5-10 mL IntraVENous PRN    naloxone (NARCAN) injection 0.4 mg  0.4 mg IntraVENous PRN    0.9% sodium chloride infusion  50 mL/hr IntraVENous CONTINUOUS    LORazepam (ATIVAN) tablet 0.5 mg  0.5 mg Oral BID PRN    heparin (porcine) injection 5,000 Units  5,000 Units SubCUTAneous Q8H        Lab Data Reviewed: (see below)  Lab Review:     No results for input(s): WBC, HGB, HCT, PLT, HGBEXT, HCTEXT, PLTEXT, HGBEXT, HCTEXT, PLTEXT in the last 72 hours. No results for input(s): NA, K, CL, CO2, GLU, BUN, CREA, CA, MG, PHOS, ALB, TBIL, TBILI, SGOT, ALT, INR in the last 72 hours. No lab exists for component: INREXT, INREXT  Lab Results   Component Value Date/Time    Glucose (POC) 115 10/31/2011 08:08 PM     No results for input(s): PH, PCO2, PO2, HCO3, FIO2 in the last 72 hours. No results for input(s): INR in the last 72 hours. No lab exists for component: INREXT, INREXT  All Micro Results     None          I have reviewed notes of prior 24hr.     Other pertinent lab: none    Total time spent with patient: [address] [place] [address] discussed with: Patient, Care Manager, Nursing Staff and >50% of time spent in counseling and coordination of care    Discussed:  Care Plan and D/C Planning    Prophylaxis:  H2B/PPI    Disposition:   PT, OT, RN           ___________________________________________________    Attending Physician: Larry Verde MD

## 2017-05-26 NOTE — DISCHARGE SUMMARY
Physician Discharge Summary     Patient ID:  Dena Bowens  [id number]  39 y.o.  1955    Admit date: 5/21/2017    Discharge date and time: 5/26/2017    Admission Diagnoses: Lithium toxicity    Discharge Diagnoses:    Principal Diagnosis   Lithium toxicity                                             Other Diagnoses    B[facility] disorder ([facility] (6/1/2016)    Essential hypertension with goal blood pressure less than 130/80 (7/13/2016)    Prediabetes (9/20/2016)    Hypercholesteremia (3/15/2017)    ALEX (acute kidney injury) ([facility] (5/21/2017)    Encephalopathy (5/21/2017)    Anemia (5/23/2017)    Debilitated patient    Anemia     Hospital Course:  Acute toxic encephalopathy / Bipolar 1 disorder - Encephalopathy POA, presumed from lithium toxicity, and very slowly improving with improved Li level and holding all other meds. No focal neuro deficits noted on exam. Head CT w/o acute process. Appreciate psych and neurological eval. They both think just a prolonged Li effect. Normal Ammonia. Risperdal 0.5 qhs. Resume Zoloft 50 at discharge.      Debilitated patient - Weak, general. PT/OT eval. Likely needs HH PT/OT, and can go home when PT deems safe for ADLs with family help.     Anemia - Mild and presumed due to chronic disease. Normal B12.     N/V/D - POA due to elevated lithium levels vs gastroenteritis. No BM or N/V since admission, but still limited PO intake. Re-check lytes.      Essential hypertension - Continue metoprolol      Hypercholesteremia - LDL 28 on lipid panel. Stop statin      Hypokalemia - POA and resolved with KCL      Abnormal TSH - Elevated TSH, but normal T4     Lithium toxicity - Lithium level improved with hydration. PCP/Psych to consider resuming lower dose, as outpatient.      ALEX (acute kidney injury) - POA, mild, due to IVVD due to lethargy and poor intake. Improved with IVF.  Renal signed off      Obesity (BMI of 30-39.9) - Advise weight loss     PCP: Collette Thrasher NP    Consults: Psychiatry    Significant Diagnostic Studies: See Hospital Course    Discharged home in improved condition. Discharge Exam:   /67 (BP 1 Location: Left arm, BP Patient Position: At rest)    Pulse 64    Temp 98.1 °F (36.7 °C)    Resp 18    Ht 5' 5\" (1.651 m)    Wt 84.9 kg (187 lb 1.6 oz)    SpO2 98%    BMI 31.14 kg/m2      Gen: Well-developed, well-nourished, in no acute distress  HEENT: Pink conjunctivae, PERRL, hearing intact to voice, moist mucous membranes  Neck: Supple, without masses, thyroid non-tender  Resp: No accessory muscle use, clear breath sounds without wheezes rales or rhonchi  Card: No murmurs, normal S1, S2 without thrills, bruits or peripheral edema  Abd: Soft, non-tender, non-distended, normoactive bowel sounds are present, no mass  Lymph: No cervical or inguinal adenopathy  Musc: No cyanosis or clubbing  Skin: No rashes or ulcers, skin turgor is good  Neuro: Cranial nerves are grossly intact, general motor weakness, follows commands vaguely  Psych: Poor insight, oriented to person, sleepy    Patient Instructions:   Current Discharge Medication List      START taking these medications    Details   risperiDONE (RISPERDAL) 0.5 mg tablet Take 1 Tab by mouth nightly for 20 days. Qty: 20 Tab, Refills: 0         CONTINUE these medications which have CHANGED    Details   sertraline (ZOLOFT) 50 mg tablet Take 1 Tab by mouth daily for 30 days. TAKE ONE TABLET BY MOUTH DAILY  Indications: ANXIETY WITH DEPRESSION  Qty: 30 Tab, Refills: 0         CONTINUE these medications which have NOT CHANGED    Details   metoprolol succinate (TOPROL-XL) 25 mg XL tablet Take 1 Tab by mouth daily.   Qty: 30 Tab, Refills: 5    Associated Diagnoses: Essential hypertension with goal blood pressure less than 130/80         STOP taking these medications       atorvastatin (LIPITOR) 40 mg tablet Comments:   Reason for Stopping:         amLODIPine (NORVASC) 10 mg tablet Comments:   Reason for Stopping: lisinopril (PRINIVIL, ZESTRIL) 40 mg tablet Comments:   Reason for Stopping:         QUEtiapine (SEROQUEL) 100 mg tablet Comments:   Reason for Stopping:         LORazepam (ATIVAN) 0.5 mg tablet Comments:   Reason for Stopping:         lithium carbonate SR (LITHOBID) 300 mg CR tablet Comments:   Reason for Stopping:             Activity: Activity as tolerated  Diet: Cardiac Diet  Wound Care: None needed    Follow-up with your PCP and psychiatry in a few days.   Follow-up tests/labs - none    Signed:  Kaden Coronel MD  5/26/2017  9:02 AM

## 2017-05-26 NOTE — PROGRESS NOTES
Home Care Face to Face Encounter    Patients Name: Ghislaine Ridley    YOB: 1955    Primary Diagnosis: Lithium toxicity    Date of Face to Face:   May 26 2017                                Face to Face Encounter findings are related to primary reason for home care:   yes. 1. I certify that the patient needs intermittent care as follows: physical therapy: transfer training, gait/stair training, balance training and pt/caregiver education  occupational therapy:  ADL safety (ie. cooking, bathing, dressing) and pt/caregiver education    2. I certify that this patient is homebound, that is: 1) patient requires the use of a cane device, special transportation, or assistance of another to leave the home; or 2) patient's condition makes leaving the home medically contraindicated; and 3) patient has a normal inability to leave the home and leaving the home requires considerable and taxing effort. Patient may leave the home for infrequent and short duration for medical reasons, and occasional absences for non-medical reasons. Homebound status is due to the following functional limitations: Patient with strength deficits limiting the performance of all ADL's without caregiver assistance or the use of an assistive device. Patient with poor safety awareness and is at risk for falls without assistance of another person and the use of an assistive device. Patient with poor ambulation endurance limiting their safe ability to ascend/descend the required number of steps to leave the home. 3. I certify that this patient is under my care and that I, or a nurse practitioner or  [id number], or clinical nurse specialist, or certified nurse midwife, working with me, had a Face-to-Face Encounter that meets the physician Face-to-Face Encounter requirements.   The following are the clinical findings from the [address] [place] [address] encounter that support the need for skilled services and is a summary of the encounter: per OT note \"pt is \" a little more alert this morning. \" Pt needed max assist to brush her teeth, decreased attention to task, decreased coordination to holding toothbrush, cup of water. Pt dependent to bring comb to head. She needed moderate assist for sit to stand and impaired standing balance to tasks. Pts  plans to take pt home with home health.  Chair alarm placed as pt is impulsive with decreased safety awareness\"    See discharge summary      Luanne Jeffers MD  5/26/2017

## 2017-05-26 NOTE — ROUTINE PROCESS
Bedside and Verbal shift change report given to Nay Ospina RN (oncoming nurse) by Temi Mancuso RN (offgoing nurse). Report included the following information SBAR, Intake/Output, MAR and Accordion.

## 2017-05-26 NOTE — PROGRESS NOTES
Problem: Self Care Deficits Care Plan (Adult)  Goal: *Acute Goals and Plan of Care (Insert Text)  Occupational Therapy Goals  Initiated 5/25/2017  1. Patient will perform self-feeding with supervision/set-up within 7 day(s). 2. Patient will perform grooming with minimal assistance/contact guard assist within 7 day(s). 3. Patient will perform upper body dressing with supervision/set-up within 7 day(s). 4. Patient will perform toilet transfers with supervision/set-up within 7 day(s). 5. Patient will perform all aspects of toileting with supervision/set-up within 7 day(s). 6. Patient will follow 1 step commands consistently in < or = 5 seconds within 7 day(s). OCCUPATIONAL THERAPY TREATMENT  Patient: Chirag Levin (56 y.o. female)  Date: 5/26/2017  Diagnosis: Lithium toxicity Lithium toxicity       Precautions: Fall, Bed Alarm  Chart, occupational therapy assessment, plan of care, and goals were reviewed. ASSESSMENT:  Pt seated in chair. RN stating pt is \" a little more alert this morning. \" Pt needed max assist to brush her teeth, decreased attention to task, decreased coordination to holding toothbrush, cup of water. Pt dependent to bring comb to head. She needed moderate assist for sit to stand and impaired standing balance to tasks. Pts  plans to take pt home with home health. Chair alarm placed as pt is impulsive with decreased safety awareness. RN aware. Progression toward goals:  [ ]          Improving appropriately and progressing toward goals  [X]          Improving slowly and progressing toward goals  [ ]          Not making progress toward goals and plan of care will be adjusted       PLAN:  Patient continues to benefit from skilled intervention to address the above impairments. Continue treatment per established plan of care.   Discharge Recommendations:  Home with  and home health  Further Equipment Recommendations for Discharge:  Bedside commode       SUBJECTIVE:   Patient elena Foster.       OBJECTIVE DATA SUMMARY:   Cognitive/Behavioral Status:  Neurologic State: Confused  Orientation Level: Oriented to person  Cognition: Decreased command following; Impaired decision making; Impulsive           Functional Mobility and Transfers for ADLs:              Bed Mobility:      Not tested as pt already up in the chair. Transfers: Moderate assist for sit to stand with impaired standing balance to tasks. Balance:  Standing: Impaired  ADL Intervention:        Grooming  Brushing Teeth: Maximum assistance (pt with decreased coordination to hold toothbrush)  Brushing/Combing Hair: Total assistance (dependent)                 Upper Body Dressing Assistance  Dressing Assistance: Maximum assistance  Hospital Gown: Maximum assistance              Pain:  Pain Scale 1: Visual  Pain Intensity 1: 0                 Activity Tolerance:    Poor  Please refer to the flowsheet for vital signs taken during this treatment.   After treatment:   [X]  Patient left in no apparent distress sitting up in chair  [ ]  Patient left in no apparent distress in bed  [X]  Call bell left within reach  [X]  Nursing notified  [X]  Caregiver present  [X]  Chair  alarm activated      COMMUNICATION/COLLABORATION:   The patients plan of care was discussed with: Physical Therapy Assistant, Occupational Therapist and Registered Nurse     KALI Penny  Time Calculation: 15 mins

## 2017-05-26 NOTE — PROGRESS NOTES
Problem: Mobility Impaired (Adult and Pediatric)  Goal: *Acute Goals and Plan of Care (Insert Text)  Physical Therapy Goals  Initiated 5/25/2017  1. Patient will move from supine to sit and sit to supine in bed with supervision/set-up within 7 day(s). 2. Patient will transfer from bed to chair and chair to bed with supervision/set-up using the least restrictive device within 7 day(s). 3. Patient will perform sit to stand with supervision/set-up within 7 day(s). 4. Patient will ambulate with modified independence for 200 feet with the least restrictive device within 7 day(s). 5. Patient will ascend/descend 3 stairs with 1 handrail(s) with modified independence within 7 day(s). PHYSICAL THERAPY TREATMENT  Patient: Chris Hand (18 y.o. female)  Date: 5/26/2017  Diagnosis: Lithium toxicity Lithium toxicity       Precautions: Fall, Bed Alarm  Chart, physical therapy assessment, plan of care and goals were reviewed. ASSESSMENT:  Pt comes to sit with mod assist.Pt to stand with min assist.Pt ambulated 30ft with RW mod assist of 1. Pt is unsteady and needs cues to direct RW. Pt left sitting with nurse standing bay. Pt progressing slowly continue goal.  Progression toward goals:  [ ]      Improving appropriately and progressing toward goals  [ ]      Improving slowly and progressing toward goals  [ ]      Not making progress toward goals and plan of care will be adjusted       PLAN:  Patient continues to benefit from skilled intervention to address the above impairments. Continue treatment per established plan of care.   Discharge Recommendations:  Home Health vs SNF  Further Equipment Recommendations for Discharge:  rolling walker       SUBJECTIVE:          OBJECTIVE DATA SUMMARY:   Critical Behavior:  Neurologic State: Confused  Orientation Level: Oriented to person  Cognition: Decreased command following, Impaired decision making, Impulsive  Safety/Judgement: Decreased awareness of environment, Decreased awareness of need for assistance, Decreased awareness of need for safety, Decreased insight into deficits  Functional Mobility Training:  Bed Mobility:     Supine to Sit: Moderate assistance                          Transfers:  Sit to Stand: Minimum assistance  Stand to Sit: Minimum assistance                             Balance:  Sitting: Intact  Standing: With support  Ambulation/Gait Training:  Distance (ft): 30 Feet (ft)  Assistive Device: Gait belt;Walker, rolling  Ambulation - Level of Assistance: Moderate assistance        Gait Abnormalities: Decreased step clearance; Path deviations        Base of Support: Narrowed     Speed/Jackeline: Pace decreased (<100 feet/min)  Step Length: Left shortened;Right shortened                 Pain:  Pain Scale 1: Visual  Pain Intensity 1: 0              Activity Tolerance:   Pt tolerated treatment well. Please refer to the flowsheet for vital signs taken during this treatment.   After treatment:   [X] Patient left in no apparent distress sitting up in chair  [ ] Patient left in no apparent distress in bed  [ ] Call bell left within reach  [ ] Nursing notified  [ ] Caregiver present  [ ] Bed alarm activated      COMMUNICATION/COLLABORATION:   The patients plan of care was discussed with: Physical Therapist     Jose D Petersen PTA   Time Calculation: 23 mins

## 2017-05-26 NOTE — PROGRESS NOTES
1130:    Ricci pulled, blood drawn  1300:   Potassium 2.9. Dr. Cayetano Laboy ordered oral potassium, order entered.

## 2017-05-28 ENCOUNTER — HOME CARE VISIT (OUTPATIENT)
Dept: SCHEDULING | Facility: HOME HEALTH | Age: 62
End: 2017-05-28

## 2017-05-31 ENCOUNTER — HOME CARE VISIT (OUTPATIENT)
Dept: SCHEDULING | Facility: HOME HEALTH | Age: 62
End: 2017-05-31

## 2017-05-31 ENCOUNTER — OFFICE VISIT (OUTPATIENT)
Dept: FAMILY MEDICINE CLINIC | Age: 62
End: 2017-05-31

## 2017-05-31 VITALS
OXYGEN SATURATION: 95 % | RESPIRATION RATE: 18 BRPM | SYSTOLIC BLOOD PRESSURE: 145 MMHG | HEART RATE: 76 BPM | TEMPERATURE: 97.4 F | HEIGHT: 65 IN | BODY MASS INDEX: 29.16 KG/M2 | WEIGHT: 175 LBS | DIASTOLIC BLOOD PRESSURE: 82 MMHG

## 2017-05-31 DIAGNOSIS — H00.014 HORDEOLUM EXTERNUM LEFT UPPER EYELID: ICD-10-CM

## 2017-05-31 DIAGNOSIS — I10 ESSENTIAL HYPERTENSION WITH GOAL BLOOD PRESSURE LESS THAN 130/80: ICD-10-CM

## 2017-05-31 DIAGNOSIS — T56.891A LITHIUM TOXICITY, ACCIDENTAL OR UNINTENTIONAL, INITIAL ENCOUNTER: Primary | ICD-10-CM

## 2017-05-31 DIAGNOSIS — T14.8XXA ABRASION: ICD-10-CM

## 2017-05-31 DIAGNOSIS — F31.9 BIPOLAR 1 DISORDER (HCC): ICD-10-CM

## 2017-05-31 DIAGNOSIS — N17.9 AKI (ACUTE KIDNEY INJURY) (HCC): ICD-10-CM

## 2017-05-31 PROBLEM — G93.40 ENCEPHALOPATHY: Status: RESOLVED | Noted: 2017-05-21 | Resolved: 2017-05-31

## 2017-05-31 RX ORDER — AMLODIPINE BESYLATE 10 MG/1
5 TABLET ORAL DAILY
Qty: 30 TAB | Refills: 1 | Status: SHIPPED | OUTPATIENT
Start: 2017-05-31 | End: 2017-11-14 | Stop reason: SDUPTHER

## 2017-05-31 RX ORDER — ERYTHROMYCIN 5 MG/G
OINTMENT OPHTHALMIC
Qty: 1 G | Refills: 0 | Status: SHIPPED | OUTPATIENT
Start: 2017-05-31 | End: 2018-08-01 | Stop reason: ALTCHOICE

## 2017-05-31 NOTE — MR AVS SNAPSHOT
Visit Information Date & Time Provider Department Dept. Phone Encounter #  
 5/31/2017  3:30 PM [facility], [address] [place] [address] [id number] Follow-up Instructions Return in about 4 weeks (around 6/28/2017) for f/u HTN. Your Appointments 7/12/2017 11:00 AM  
ESTABLISHED PATIENT with Tracy Calix NP [facility] ([address] [facility]) Appt Note: 6 month follow-up [address] [facility] [address] [facility] [address]  
  
   
 [address] [facility] [address] [facility] [address] [place] [address] Upcoming Health Maintenance Date Due Hepatitis C Screening 1955 Pneumococcal 19-64 Medium Risk (1 of 1 - PPSV23) 11/16/1975 DTaP/Tdap/Td series (1 - Tdap) 11/16/1977 PAP AKA CERVICAL CYTOLOGY 11/16/1977 BREAST CANCER SCRN MAMMOGRAM 11/16/2006 FOBT Q 1 YEAR AGE 50-75 11/16/2006 ZOSTER VACCINE AGE 60> 11/16/2016 INFLUENZA AGE 9 TO ADULT 8/1/2017 Allergies as of 5/31/2017  Review Complete On: 5/31/2017 By: Gerald Don No Known Allergies Current Immunizations  Never Reviewed No immunizations on file. Not reviewed this visit You Were Diagnosed With   
  
 Codes Comments Lithium toxicity, accidental or unintentional, initial encounter    -  Primary ICD-10-CM: J32.224H ICD-9-CM: 985.8, [id number] Bipolar 1 disorder (HCC)     ICD-10-CM: F31.9 ICD-9-CM: 296.7 ALEX (acute kidney injury) ([facility]     ICD-10-CM: N17.9 ICD-9-CM: 584.9 Essential hypertension with goal blood pressure less than 130/80     ICD-10-CM: I10 
ICD-9-CM: 401.9 Hordeolum externum left upper eyelid     ICD-10-CM: H00.014 
ICD-9-CM: 373.11 Vitals BP Pulse Temp Resp Height(growth percentile) Weight(growth percentile) 145/82 (BP 1 Location: Left arm, BP Patient Position: Sitting) 76 97.4 °F (36.3 °C) (Oral) 18 5' 5\" (1.651 m) 175 lb (79.4 kg) SpO2 BMI OB Status Smoking Status 95% 29.12 kg/m2 Postmenopausal Current Every Day Smoker Vitals History BMI and BSA Data Body Mass Index Body Surface Area  
 29.12 kg/m 2 1.91 m 2 Preferred Pharmacy Pharmacy Name Phone [facility]ana [facility] [address] [phone] Your Updated Medication List  
  
   
This list is accurate as of: 5/31/17  4:12 PM.  Always use your most recent med list. amLODIPine 10 mg tablet Commonly known as:  Opal Heymann Take 0.5 Tabs by mouth daily. erythromycin ophthalmic ointment Commonly known as:  ILOTYCIN Apply thin ribbon to inner lids every 6 hours for 7 days  
  
 metoprolol succinate 25 mg XL tablet Commonly known as:  TOPROL-XL Take 1 Tab by mouth daily. risperiDONE 0.5 mg tablet Commonly known as:  RisperDAL Take 1 Tab by mouth nightly for 20 days. sertraline 50 mg tablet Commonly known as:  ZOLOFT Take 1 Tab by mouth daily for 30 days. TAKE ONE TABLET BY MOUTH DAILY  Indications: ANXIETY WITH DEPRESSION Prescriptions Sent to Pharmacy Refills  
 amLODIPine (NORVASC) 10 mg tablet 1 Sig: Take 0.5 Tabs by mouth daily. Class: Normal  
 Pharmacy: Yana Lal, [id number] [address] Ph #: [phone] Route: Oral  
 erythromycin (ILOTYCIN) ophthalmic ointment 0 Sig: Apply thin ribbon to inner lids every 6 hours for 7 days Class: Normal  
 Pharmacy: Yana Voramimi Aniyahpati, [facility]. [address] Ph #: [phone] We Performed the Following LITHIUM [id number] CPT(R)] METABOLIC PANEL, COMPREHENSIVE [02484 CPT(R)] Follow-up Instructions Return in about 4 weeks (around 6/28/2017) for f/u HTN. Patient Instructions DASH Diet: Care Instructions Your Care Instructions The DASH diet is an eating plan that can help lower your blood pressure. DASH stands for Dietary Approaches to Stop Hypertension. Hypertension is high blood pressure. The DASH diet focuses on eating foods that are high in calcium, potassium, and magnesium. These nutrients can lower blood pressure. The foods that are highest in these nutrients are fruits, vegetables, low-fat dairy products, nuts, seeds, and legumes. But taking calcium, potassium, and magnesium supplements instead of eating foods that are high in those nutrients does not have the same effect. The DASH diet also includes whole grains, fish, and poultry. The DASH diet is one of several lifestyle changes your doctor may recommend to lower your high blood pressure. Your doctor may also want you to decrease the amount of sodium in your diet. Lowering sodium while following the DASH diet can lower blood pressure even further than just the DASH diet alone. Follow-up care is a key part of your treatment and safety. Be sure to make and go to all appointments, and call your doctor if you are having problems. It's also a good idea to know your test results and keep a list of the medicines you take. How can you care for yourself at home? Following the DASH diet · Eat 4 to 5 servings of fruit each day. A serving is 1 medium-sized piece of fruit, ½ cup chopped or canned fruit, 1/4 cup dried fruit, or 4 ounces (½ cup) of fruit juice. Choose fruit more often than fruit juice. · Eat 4 to 5 servings of vegetables each day. A serving is 1 cup of lettuce or raw leafy vegetables, ½ cup of chopped or cooked vegetables, or 4 ounces (½ cup) of vegetable juice. Choose vegetables more often than vegetable juice. · Get 2 to 3 servings of low-fat and fat-free dairy each day. A serving is 8 ounces of milk, 1 cup of yogurt, or 1 ½ ounces of cheese. · Eat 6 to 8 servings of grains each day.  A serving is 1 slice of bread, 1 ounce of dry cereal, or ½ cup of cooked rice, pasta, or cooked cereal. Try to choose whole-grain products as much as possible. · Limit lean meat, poultry, and fish to 2 servings each day. A serving is 3 ounces, about the size of a deck of cards. · Eat 4 to 5 servings of nuts, seeds, and legumes (cooked dried beans, lentils, and split peas) each week. A serving is 1/3 cup of nuts, 2 tablespoons of seeds, or ½ cup of cooked beans or peas. · Limit fats and oils to 2 to 3 servings each day. A serving is 1 teaspoon of vegetable oil or 2 tablespoons of salad dressing. · Limit sweets and added sugars to 5 servings or less a week. A serving is 1 tablespoon jelly or jam, ½ cup sorbet, or 1 cup of lemonade. · Eat less than 2,300 milligrams (mg) of sodium a day. If you limit your sodium to 1,500 mg a day, you can lower your blood pressure even more. Tips for success · Start small. Do not try to make dramatic changes to your diet all at once. You might feel that you are missing out on your favorite foods and then be more likely to not follow the plan. Make small changes, and stick with them. Once those changes become habit, add a few more changes. · Try some of the following: ¨ Make it a goal to eat a fruit or vegetable at every meal and at snacks. This will make it easy to get the recommended amount of fruits and vegetables each day. ¨ Try yogurt topped with fruit and nuts for a snack or healthy dessert. ¨ Add lettuce, tomato, cucumber, and onion to sandwiches. ¨ Combine a ready-made pizza crust with low-fat mozzarella cheese and lots of vegetable toppings. Try using tomatoes, squash, spinach, broccoli, carrots, cauliflower, and onions. ¨ Have a variety of cut-up vegetables with a low-fat dip as an appetizer instead of chips and dip. ¨ Sprinkle sunflower seeds or chopped almonds over salads. Or try adding chopped walnuts or almonds to cooked vegetables. ¨ Try some vegetarian meals using beans and peas.  Add garbanzo or kidney beans to salads. Make burritos and tacos with mashed marques beans or black beans. Where can you learn more? Go to http://maria victoria-ignacio.info/. Enter D451 in the search box to learn more about \"DASH Diet: Care Instructions. \" Current as of: March 23, 2016 Content Version: 11.2 © 4576-6647 [employer]. Care instructions adapted under license by [employer] (which disclaims liability or warranty for this information). If you have questions about a medical condition or this instruction, always ask your healthcare professional. [place] [address] any warranty or liability for your use of this information. Bipolar Disorder: Care Instructions Your Care Instructions Bipolar disorder is an illness that causes extreme mood changes, from times of very high energy (manic episodes) to times of depression. But many people with bipolar disorder show only the symptoms of depression. These moods may cause problems with your work, school, family life, friendships, and how well you function. This disease is also called manic-depression. There is no cure for bipolar disorder, but it can be helped with medicines. Counseling may also help. It is important to take your medicines exactly as prescribed, even when you feel well. You may need lifelong treatment. Follow-up care is a key part of your treatment and safety. Be sure to make and go to all appointments, and call your doctor if you are having problems. It's also a good idea to know your test results and keep a list of the medicines you take. How can you care for yourself at home? · Be safe with medicines. Take your medicines exactly as prescribed. Do not stop or change a medicine without talking to your doctor first. Zach Carreon and your doctor may need to try different combinations of medicines to find what works for you. · Take your medicines on schedule to keep your moods even.  When you feel good, you may think that you do not need your medicines. But it is important to keep taking them. · Go to your counseling sessions. Call and talk with your counselor if you can't go to a session or if you don't think the sessions are helping. Do not just stop going. · Get at least 30 minutes of activity on most days of the week. Walking is a good choice. You also may want to do other things, such as running, swimming, or cycling. · Get enough sleep. Keep your room dark and quiet. Try to go to bed at the same time every night. · Eat a healthy diet. This includes whole grains, dairy, fruits, vegetables, and protein. Eat foods from each of these groups. · Try to lower your stress. Manage your time, build a strong support system, and lead a healthy lifestyle. To lower your stress, try physical activity, slow deep breathing, or getting a massage. · Do not use alcohol or illegal drugs. · Learn the early signs of your mood changes. You can then take steps to help yourself feel better. · Ask for help from friends and family when you need it. You may need help with daily chores when you are depressed. When you are manic, you may need support to control your high energy levels. What should you do if someone in your family has bipolar disorder? · Learn about the disease and the signs that it is getting worse. · Remind your family member that you love him or her. · Make a plan with all family members about how to take care of your loved one when his or her symptoms are bad. · Talk about your fears and concerns and those of other family members. Seek counseling if needed. · Do not focus attention only on the person who is in treatment. · Remind yourself that it will take time for changes to occur. · Do not blame yourself for the disease. · Know your legal rights and the legal rights of your family member. Support groups or counselors can help you with this information. · Take care of yourself. Keep up with your own interests, such as your career, hobbies, and friends. Use exercise, positive self-talk, deep breathing, and other relaxing exercises to help lower your stress. · Give yourself time to grieve. You may need to deal with emotions such as anger, fear, and frustration. After you work through your feelings, you will be better able to care for yourself and your family. · If you are having a hard time with your feelings or with your relationship with your family member, talk with a counselor. When should you call for help? Call 911 anytime you think you may need emergency care. For example, call if: 
· You feel like hurting yourself or someone else. · Someone who has bipolar disorder displays dangerous behavior, and you think the person might hurt himself or herself or someone else. Call your doctor now or seek immediate medical care if: 
· You hear voices. · Someone you know has bipolar disorder and talks about suicide. Keep the numbers for these national suicide hotlines: 1-701-947-TALK (4-327.937.4687) and 5-529-NVJAQRJ (6-638.907.8033). If a suicide threat seems real, with a specific plan and a way to carry it out, stay with the person, or ask someone you trust to stay with the person, until you can get help. · Someone you know has bipolar disorder and: 
¨ Starts to give away possessions. ¨ Is using illegal drugs or drinking alcohol heavily. ¨ Talks or writes about death, including writing suicide notes or talking about guns, knives, or pills. ¨ Talks or writes about hurting someone else. ¨ Starts to spend a lot of time alone. ¨ Acts very aggressively or suddenly appears calm. ¨ Talks about beliefs that are not based in reality (delusions). Watch closely for changes in your health, and be sure to contact your doctor if: 
· You cannot go to your counseling sessions. Where can you learn more? Go to http://maria victoria-ignacio.info/. Enter K052 in the search box to learn more about \"Bipolar Disorder: Care Instructions. \" Current as of: July 26, 2016 Content Version: 11.2 © 0972-3072 [employer]. Care instructions adapted under license by [employer] (which disclaims liability or warranty for this information). If you have questions about a medical condition or this instruction, always ask your healthcare professional. [address] any warranty or liability for your use of this information. Styes and Chalazia: Care Instructions Your Care Instructions Styes and chalazia (say \"wmj-QBE-amh-uh\") are both conditions that can cause swelling of the eyelid. A stye is an infection in the root of an eyelash. The infection causes a tender red lump on the edge of the eyelid. The infection can spread until the whole eyelid becomes red and inflamed. Styes usually break open, and a tiny amount of pus drains. They usually clear up on their own in about a week, but they sometimes need treatment with antibiotics. A chalazion is a lump or cyst in the eyelid (chalazion is singular; chalazia is plural). It is caused by swelling and inflammation of deep oil glands inside the eyelid. Chalazia are usually not infected. They can take a few months to heal. 
If a chalazion becomes more swollen and painful or does not go away, you may need to have it drained by your doctor. Follow-up care is a key part of your treatment and safety. Be sure to make and go to all appointments, and call your doctor if you are having problems. It's also a good idea to know your test results and keep a list of the medicines you take. How can you care for yourself at home? · Do not rub your eyes. Do not squeeze or try to open a stye or chalazion. · To help a stye or chalazion heal faster: ¨ Put a warm, moist compress on your eye for 5 to 10 minutes, 3 to 6 times a day. Heat often brings a stye to a point where it drains on its own. Keep in mind that warm compresses will often increase swelling a little at first. 
¨ Do not use hot water or heat a wet cloth in a microwave oven. The compress may get too hot and can burn the eyelid. · Always wash your hands before and after you use a compress or touch your eyes. · If the doctor gave you antibiotic drops or ointment, use the medicine exactly as directed. Use the medicine for as long as instructed, even if your eye starts to feel better. · To put in eyedrops or ointment: ¨ Tilt your head back, and pull your lower eyelid down with one finger. ¨ Drop or squirt the medicine inside the lower lid. ¨ Close your eye for 30 to 60 seconds to let the drops or ointment move around. ¨ Do not touch the ointment or dropper tip to your eyelashes or any other surface. · Do not wear eye makeup or contact lenses until the stye or chalazion heals. · Do not share towels, pillows, or washcloths while you have a stye. When should you call for help? Call your doctor now or seek immediate medical care if: 
· You have pain in your eye. · You have a change in vision or loss of vision. · Redness and swelling get much worse. Watch closely for changes in your health, and be sure to contact your doctor if: 
· Your stye does not get better in 1 week. · Your chalazion does not start to get better after several weeks. Where can you learn more? Go to http://maria victoria-ignacio.info/. Enter N843 in the search box to learn more about \"Styes and Chalazia: Care Instructions. \" Current as of: May 23, 2016 Content Version: 11.2 © 0494-0499 [employer]. Care instructions adapted under license by [employer] (which disclaims liability or warranty for this information). If you have questions about a medical condition or this instruction, always ask your healthcare professional. [address] any warranty or liability for your use of this information. Introducing [facility]! [facility] introduces [employer]ormo patient portal. Now you can access parts of your medical record, email your doctor's office, and request medication refills online. 1. In your internet browser, go to [email or web address] 2. Click on the First Time User? Click Here link in the Sign In box. You will see the New Member Sign Up page. 3. Enter your Adormo Access Code exactly as it appears below. You will not need to use this code after youve completed the sign-up process. If you do not sign up before the expiration date, you must request a new code. · Adormo Access Code: [email or web address] Expires: 8/20/2017  4:35 PM 
 
4. Enter the last four digits of your Social Security Number (xxxx) and Date of Birth (mm/dd/yyyy) as indicated and click Submit. You will be taken to the next sign-up page. 5. Create a Adormo ID. This will be your Adormo login ID and cannot be changed, so think of one that is secure and easy to remember. 6. Create a Adormo password. You can change your password at any time. 7. Enter your Password Reset Question and Answer. This can be used at a later time if you forget your password. 8. Enter your e-mail address. You will receive e-mail notification when new information is available in [address] 9. Click Sign Up. You can now view and download portions of your medical record. 10. Click the Download Summary menu link to download a portable copy of your medical information. If you have questions, please visit the Frequently Asked Questions section of the Adormo website. Remember, Adormo is NOT to be used for urgent needs. For medical emergencies, dial 911. Now available from your iPhone and Android! Please provide this summary of care documentation to your next provider. Your primary care clinician is listed as Rosario Genao. If you have any questions after today's visit, please call [phone].

## 2017-05-31 NOTE — PROGRESS NOTES
Chief Complaint   Patient presents with   [facility] 5/21-5/26 Dx: Lithium toxicity      1. Have you been to the ER, urgent care clinic since your last visit? Hospitalized since your last visit? Yes [facility] admission 5/21-5/26     2. Have you seen or consulted any other health care providers outside of the [address] [place] [address] since your last visit? Include any pap smears or colon screening.  No

## 2017-05-31 NOTE — PATIENT INSTRUCTIONS
DASH Diet: Care Instructions  Your Care Instructions  The DASH diet is an eating plan that can help lower your blood pressure. DASH stands for Dietary Approaches to Stop Hypertension. Hypertension is high blood pressure. The DASH diet focuses on eating foods that are high in calcium, potassium, and magnesium. These nutrients can lower blood pressure. The foods that are highest in these nutrients are fruits, vegetables, low-fat dairy products, nuts, seeds, and legumes. But taking calcium, potassium, and magnesium supplements instead of eating foods that are high in those nutrients does not have the same effect. The DASH diet also includes whole grains, fish, and poultry. The DASH diet is one of several lifestyle changes your doctor may recommend to lower your high blood pressure. Your doctor may also want you to decrease the amount of sodium in your diet. Lowering sodium while following the DASH diet can lower blood pressure even further than just the DASH diet alone. Follow-up care is a key part of your treatment and safety. Be sure to make and go to all appointments, and call your doctor if you are having problems. It's also a good idea to know your test results and keep a list of the medicines you take. How can you care for yourself at home? Following the DASH diet  · Eat 4 to 5 servings of fruit each day. A serving is 1 medium-sized piece of fruit, ½ cup chopped or canned fruit, 1/4 cup dried fruit, or 4 ounces (½ cup) of fruit juice. Choose fruit more often than fruit juice. · Eat 4 to 5 servings of vegetables each day. A serving is 1 cup of lettuce or raw leafy vegetables, ½ cup of chopped or cooked vegetables, or 4 ounces (½ cup) of vegetable juice. Choose vegetables more often than vegetable juice. · Get 2 to 3 servings of low-fat and fat-free dairy each day. A serving is 8 ounces of milk, 1 cup of yogurt, or 1 ½ ounces of cheese. · Eat 6 to 8 servings of grains each day.  A serving is 1 slice of bread, 1 ounce of dry cereal, or ½ cup of cooked rice, pasta, or cooked cereal. Try to choose whole-grain products as much as possible. · Limit lean meat, poultry, and fish to 2 servings each day. A serving is 3 ounces, about the size of a deck of cards. · Eat 4 to 5 servings of nuts, seeds, and legumes (cooked dried beans, lentils, and split peas) each week. A serving is 1/3 cup of nuts, 2 tablespoons of seeds, or ½ cup of cooked beans or peas. · Limit fats and oils to 2 to 3 servings each day. A serving is 1 teaspoon of vegetable oil or 2 tablespoons of salad dressing. · Limit sweets and added sugars to 5 servings or less a week. A serving is 1 tablespoon jelly or jam, ½ cup sorbet, or 1 cup of lemonade. · Eat less than 2,300 milligrams (mg) of sodium a day. If you limit your sodium to 1,500 mg a day, you can lower your blood pressure even more. Tips for success  · Start small. Do not try to make dramatic changes to your diet all at once. You might feel that you are missing out on your favorite foods and then be more likely to not follow the plan. Make small changes, and stick with them. Once those changes become habit, add a few more changes. · Try some of the following:  ¨ Make it a goal to eat a fruit or vegetable at every meal and at snacks. This will make it easy to get the recommended amount of fruits and vegetables each day. ¨ Try yogurt topped with fruit and nuts for a snack or healthy dessert. ¨ Add lettuce, tomato, cucumber, and onion to sandwiches. ¨ Combine a ready-made pizza crust with low-fat mozzarella cheese and lots of vegetable toppings. Try using tomatoes, squash, spinach, broccoli, carrots, cauliflower, and onions. ¨ Have a variety of cut-up vegetables with a low-fat dip as an appetizer instead of chips and dip. ¨ Sprinkle sunflower seeds or chopped almonds over salads. Or try adding chopped walnuts or almonds to cooked vegetables. ¨ Try some vegetarian meals using beans and peas. Add garbanzo or kidney beans to salads. Make burritos and tacos with mashed marques beans or black beans. Where can you learn more? Go to http://maria victoria-ignacio.info/. Enter N426 in the search box to learn more about \"DASH Diet: Care Instructions. \"  Current as of: March 23, 2016  Content Version: 11.2  © 8339-8635 [employer]. Care instructions adapted under license by [employer] (which disclaims liability or warranty for this information). If you have questions about a medical condition or this instruction, always ask your healthcare professional. [place] [address] any warranty or liability for your use of this information. Bipolar Disorder: Care Instructions  Your Care Instructions  Bipolar disorder is an illness that causes extreme mood changes, from times of very high energy (manic episodes) to times of depression. But many people with bipolar disorder show only the symptoms of depression. These moods may cause problems with your work, school, family life, friendships, and how well you function. This disease is also called manic-depression. There is no cure for bipolar disorder, but it can be helped with medicines. Counseling may also help. It is important to take your medicines exactly as prescribed, even when you feel well. You may need lifelong treatment. Follow-up care is a key part of your treatment and safety. Be sure to make and go to all appointments, and call your doctor if you are having problems. It's also a good idea to know your test results and keep a list of the medicines you take. How can you care for yourself at home? · Be safe with medicines. Take your medicines exactly as prescribed. Do not stop or change a medicine without talking to your doctor first. Clarke Lopez and your doctor may need to try different combinations of medicines to find what works for you. · Take your medicines on schedule to keep your moods even.  When you feel good, you may think that you do not need your medicines. But it is important to keep taking them. · Go to your counseling sessions. Call and talk with your counselor if you can't go to a session or if you don't think the sessions are helping. Do not just stop going. · Get at least 30 minutes of activity on most days of the week. Walking is a good choice. You also may want to do other things, such as running, swimming, or cycling. · Get enough sleep. Keep your room dark and quiet. Try to go to bed at the same time every night. · Eat a healthy diet. This includes whole grains, dairy, fruits, vegetables, and protein. Eat foods from each of these groups. · Try to lower your stress. Manage your time, build a strong support system, and lead a healthy lifestyle. To lower your stress, try physical activity, slow deep breathing, or getting a massage. · Do not use alcohol or illegal drugs. · Learn the early signs of your mood changes. You can then take steps to help yourself feel better. · Ask for help from friends and family when you need it. You may need help with daily chores when you are depressed. When you are manic, you may need support to control your high energy levels. What should you do if someone in your family has bipolar disorder? · Learn about the disease and the signs that it is getting worse. · Remind your family member that you love him or her. · Make a plan with all family members about how to take care of your loved one when his or her symptoms are bad. · Talk about your fears and concerns and those of other family members. Seek counseling if needed. · Do not focus attention only on the person who is in treatment. · Remind yourself that it will take time for changes to occur. · Do not blame yourself for the disease. · Know your legal rights and the legal rights of your family member. Support groups or counselors can help you with this information. · Take care of yourself.  Keep up with your own interests, such as your career, hobbies, and friends. Use exercise, positive self-talk, deep breathing, and other relaxing exercises to help lower your stress. · Give yourself time to grieve. You may need to deal with emotions such as anger, fear, and frustration. After you work through your feelings, you will be better able to care for yourself and your family. · If you are having a hard time with your feelings or with your relationship with your family member, talk with a counselor. When should you call for help? Call 911 anytime you think you may need emergency care. For example, call if:  · You feel like hurting yourself or someone else. · Someone who has bipolar disorder displays dangerous behavior, and you think the person might hurt himself or herself or someone else. Call your doctor now or seek immediate medical care if:  · You hear voices. · Someone you know has bipolar disorder and talks about suicide. Keep the numbers for these national suicide hotlines: 4-557-701-TALK (6-456.166.7360) and 5-024-QFTYRFB (0-698.330.9074). If a suicide threat seems real, with a specific plan and a way to carry it out, stay with the person, or ask someone you trust to stay with the person, until you can get help. · Someone you know has bipolar disorder and:  ¨ Starts to give away possessions. ¨ Is using illegal drugs or drinking alcohol heavily. ¨ Talks or writes about death, including writing suicide notes or talking about guns, knives, or pills. ¨ Talks or writes about hurting someone else. ¨ Starts to spend a lot of time alone. ¨ Acts very aggressively or suddenly appears calm. ¨ Talks about beliefs that are not based in reality (delusions). Watch closely for changes in your health, and be sure to contact your doctor if:  · You cannot go to your counseling sessions. Where can you learn more? Go to http://maria victoria-ignacio.info/.   Enter K052 in the search box to learn more about \"Bipolar Disorder: Care Instructions. \"  Current as of: July 26, 2016  Content Version: 11.2  © 5206-5422 [employer]. Care instructions adapted under license by [employer] (which disclaims liability or warranty for this information). If you have questions about a medical condition or this instruction, always ask your healthcare professional. [address] any warranty or liability for your use of this information. Styes and Chalazia: Care Instructions  Your Care Instructions    Styes and chalazia (say \"jza-YCS-zne-uh\") are both conditions that can cause swelling of the eyelid. A stye is an infection in the root of an eyelash. The infection causes a tender red lump on the edge of the eyelid. The infection can spread until the whole eyelid becomes red and inflamed. Styes usually break open, and a tiny amount of pus drains. They usually clear up on their own in about a week, but they sometimes need treatment with antibiotics. A chalazion is a lump or cyst in the eyelid (chalazion is singular; chalazia is plural). It is caused by swelling and inflammation of deep oil glands inside the eyelid. Chalazia are usually not infected. They can take a few months to heal.  If a chalazion becomes more swollen and painful or does not go away, you may need to have it drained by your doctor. Follow-up care is a key part of your treatment and safety. Be sure to make and go to all appointments, and call your doctor if you are having problems. It's also a good idea to know your test results and keep a list of the medicines you take. How can you care for yourself at home? · Do not rub your eyes. Do not squeeze or try to open a stye or chalazion. · To help a stye or chalazion heal faster:  ¨ Put a warm, moist compress on your eye for 5 to 10 minutes, 3 to 6 times a day. Heat often brings a stye to a point where it drains on its own.  Keep in mind that warm compresses will often increase swelling a little at first.  ¨ Do not use hot water or heat a wet cloth in a microwave oven. The compress may get too hot and can burn the eyelid. · Always wash your hands before and after you use a compress or touch your eyes. · If the doctor gave you antibiotic drops or ointment, use the medicine exactly as directed. Use the medicine for as long as instructed, even if your eye starts to feel better. · To put in eyedrops or ointment:  ¨ Tilt your head back, and pull your lower eyelid down with one finger. ¨ Drop or squirt the medicine inside the lower lid. ¨ Close your eye for 30 to 60 seconds to let the drops or ointment move around. ¨ Do not touch the ointment or dropper tip to your eyelashes or any other surface. · Do not wear eye makeup or contact lenses until the stye or chalazion heals. · Do not share towels, pillows, or washcloths while you have a stye. When should you call for help? Call your doctor now or seek immediate medical care if:  · You have pain in your eye. · You have a change in vision or loss of vision. · Redness and swelling get much worse. Watch closely for changes in your health, and be sure to contact your doctor if:  · Your stye does not get better in 1 week. · Your chalazion does not start to get better after several weeks. Where can you learn more? Go to http://maria victoria-ignacio.info/. Enter Z969 in the search box to learn more about \"Styes and Chalazia: Care Instructions. \"  Current as of: May 23, 2016  Content Version: 11.2  © 0971-6037 [employer]. Care instructions adapted under license by [email or web address] (which disclaims liability or warranty for this information). If you have questions about a medical condition or this instruction, always ask your healthcare professional. [address] any warranty or liability for your use of this information.

## 2017-06-01 LAB
ALBUMIN SERPL-MCNC: 3.3 G/DL (ref 3.6–4.8)
ALBUMIN/GLOB SERPL: 0.9 {RATIO} (ref 1.2–2.2)
ALP SERPL-CCNC: 153 IU/L (ref 39–117)
ALT SERPL-CCNC: 22 IU/L (ref 0–32)
AST SERPL-CCNC: 23 IU/L (ref 0–40)
BILIRUB SERPL-MCNC: 0.3 MG/DL (ref 0–1.2)
BUN SERPL-MCNC: 8 MG/DL (ref 8–27)
BUN/CREAT SERPL: 11 (ref 12–28)
CALCIUM SERPL-MCNC: 8.9 MG/DL (ref 8.7–10.3)
CHLORIDE SERPL-SCNC: 96 MMOL/L (ref 96–106)
CO2 SERPL-SCNC: 20 MMOL/L (ref 18–29)
CREAT SERPL-MCNC: 0.72 MG/DL (ref 0.57–1)
GLOBULIN SER CALC-MCNC: 3.5 G/DL (ref 1.5–4.5)
GLUCOSE SERPL-MCNC: 96 MG/DL (ref 65–99)
LITHIUM SERPL-SCNC: 0.1 MMOL/L (ref 0.6–1.2)
POTASSIUM SERPL-SCNC: 3.3 MMOL/L (ref 3.5–5.2)
PROT SERPL-MCNC: 6.8 G/DL (ref 6–8.5)
SODIUM SERPL-SCNC: 139 MMOL/L (ref 134–144)

## 2017-06-01 RX ORDER — POTASSIUM CHLORIDE 20 MEQ/1
20 TABLET, EXTENDED RELEASE ORAL DAILY
Qty: 30 TAB | Refills: 1 | Status: SHIPPED | OUTPATIENT
Start: 2017-06-01 | End: 2017-07-31 | Stop reason: SDUPTHER

## 2017-06-01 NOTE — PROGRESS NOTES
Kimberly Garcia is a 62 y.o. female who presents with the following complaints:  Chief Complaint   Patient presents with   [address] [place] [address] 5/21-5/26 Dx: Lithium toxicity        Subjective:    HPI:   Accompanied by . Presents for hospital f/u following admission for lithium toxicity, ALEX. Has been home x 4 days. Doing well, no tremor or balance disturbance Metallic taste has nearly resolved. Medication administration now being supervised by  and adult son. Meds are locked away during the day/night.  feels any excess lithium was taken without intent to harm herself. Has been nicotine free now for 10 days. Has not made appt to f/u with mental health provider. Pertinent PMH/FH/SH:  Past Medical History:   Diagnosis Date    Abscess of buttock 7/23/2012    Bronchitis     Common bile duct calculus 7/23/2012    Gallstones 7/12/2012    GERD (gastroesophageal reflux disease)     High cholesterol     Hypercholesterolemia     Hypertension     Ill-defined condition     missing upper front tooth    Ill-defined condition     lower leg bilateral reddened rash.  Insomnia     Other ill-defined conditions     Large boil under right arm-pit.     Paranoia ([address]     Psychiatric disorder     bipolar     Past Surgical History:   Procedure Laterality Date    ABDOMEN SURGERY PROC UNLISTED      cholecystectomy 7/23/2012     Family History   Problem Relation Age of Onset    Cancer Father      lung    Diabetes Brother     Heart Disease Brother     Malignant Hyperthermia Neg Hx     Pseudocholinesterase Deficiency Neg Hx     Delayed Awakening Neg Hx     Post-op Nausea/Vomiting Neg Hx     Emergence Delirium Neg Hx     Post-op Cognitive Dysfunction Neg Hx     Other Neg Hx      Social History     Social History    Marital status:      Spouse name: N/A    Number of children: N/A    Years of education: N/A     Social History Main Topics    Smoking status: Current Every Day Smoker     Packs/day: 0.50    Smokeless tobacco: Never Used    Alcohol use No    Drug use: No    Sexual activity: Not Asked     Other Topics Concern    None     Social History Narrative     Advanced Directives: N      Patient Active Problem List    Diagnosis    Anemia    [facility]    Lithium toxicity    Encephalopathy    Hypercholesteremia    Prediabetes    Essential hypertension with goal blood pressure less than 130/80    Bipolar 1 disorder ([facility]       Nurse notes were reviewed and are correct  Review of Systems - negative except as listed above in the HPI    Objective:     Vitals:    05/31/17 1537   BP: 145/82   Pulse: 76   Resp: 18   Temp: 97.4 °F (36.3 °C)   TempSrc: Oral   SpO2: 95%   Weight: 175 lb (79.4 kg)   Height: 5' 5\" (1.651 m)     Physical Examination: General appearance - alert, well appearing, and in no distress, oriented to person, place, and time and overweight  Mental status - affect appropriate to mood  Neck - supple, no significant adenopathy  Chest - clear to auscultation, no wheezes, rales or rhonchi, symmetric air entry  Heart - normal rate, regular rhythm, normal S1, S2, no murmurs, rubs, clicks or gallops  Abdomen - soft, nontender, nondistended, no masses or organomegaly  Neurological - alert, oriented, normal speech, no focal findings or movement disorder noted, normal muscle tone, no tremors, strength 5/5  Skin - normal coloration and turgor, no rashes  Eyes- stye left upper lid; small ulceration along the lash line right lower lid    Assessment/ Plan:   Jyotsna Espinoza was seen today for hospital follow up.     Diagnoses and all orders for this visit:    Lithium toxicity, accidental or unintentional, initial encounter  Check level  Lithium has been discontinued- decision on need to restart deferred to mental health provider  -     LITHIUM    Bipolar 1 disorder [facility])  Make f/u with mental health provider to be seen as soon as possible  Currently doing well on resperidone and sertraline    ALEX (acute kidney injury) ([facility]  Check kidney function  -     METABOLIC PANEL, COMPREHENSIVE    Essential hypertension with goal blood pressure less than 130/80  Above goal  Continue toprol  Add amlodipine  -     amLODIPine (NORVASC) 10 mg tablet; Take 0.5 Tabs by mouth daily. Hordeolum externum left upper eyelid  Abrasion right lower lid  Add Rx  Warm compresses TID  -     erythromycin (ILOTYCIN) ophthalmic ointment; Apply thin ribbon to inner lids every 6 hours for 7 days       Follow-up Disposition:  Return in about 4 weeks (around 6/28/2017) for f/u HTN. I have discussed the diagnosis with the patient and the intended plan as seen in the above orders. The patient has received an after-visit summary and questions were answered concerning future plans. The patient verbalizes understanding. Medication Side Effects and Warnings were discussed with patient: yes  Patient Labs were reviewed and or requested: yes  Patient Past Records were reviewed and or requested: yes    Patient Instructions        DASH Diet: Care Instructions  Your Care Instructions  The DASH diet is an eating plan that can help lower your blood pressure. DASH stands for Dietary Approaches to Stop Hypertension. Hypertension is high blood pressure. The DASH diet focuses on eating foods that are high in calcium, potassium, and magnesium. These nutrients can lower blood pressure. The foods that are highest in these nutrients are fruits, vegetables, low-fat dairy products, nuts, seeds, and legumes. But taking calcium, potassium, and magnesium supplements instead of eating foods that are high in those nutrients does not have the same effect. The DASH diet also includes whole grains, fish, and poultry. The DASH diet is one of several lifestyle changes your doctor may recommend to lower your high blood pressure.  Your doctor may also want you to decrease the amount of sodium in your diet. Lowering sodium while following the DASH diet can lower blood pressure even further than just the DASH diet alone. Follow-up care is a key part of your treatment and safety. Be sure to make and go to all appointments, and call your doctor if you are having problems. It's also a good idea to know your test results and keep a list of the medicines you take. How can you care for yourself at home? Following the DASH diet  · Eat 4 to 5 servings of fruit each day. A serving is 1 medium-sized piece of fruit, ½ cup chopped or canned fruit, 1/4 cup dried fruit, or 4 ounces (½ cup) of fruit juice. Choose fruit more often than fruit juice. · Eat 4 to 5 servings of vegetables each day. A serving is 1 cup of lettuce or raw leafy vegetables, ½ cup of chopped or cooked vegetables, or 4 ounces (½ cup) of vegetable juice. Choose vegetables more often than vegetable juice. · Get 2 to 3 servings of low-fat and fat-free dairy each day. A serving is 8 ounces of milk, 1 cup of yogurt, or 1 ½ ounces of cheese. · Eat 6 to 8 servings of grains each day. A serving is 1 slice of bread, 1 ounce of dry cereal, or ½ cup of cooked rice, pasta, or cooked cereal. Try to choose whole-grain products as much as possible. · Limit lean meat, poultry, and fish to 2 servings each day. A serving is 3 ounces, about the size of a deck of cards. · Eat 4 to 5 servings of nuts, seeds, and legumes (cooked dried beans, lentils, and split peas) each week. A serving is 1/3 cup of nuts, 2 tablespoons of seeds, or ½ cup of cooked beans or peas. · Limit fats and oils to 2 to 3 servings each day. A serving is 1 teaspoon of vegetable oil or 2 tablespoons of salad dressing. · Limit sweets and added sugars to 5 servings or less a week. A serving is 1 tablespoon jelly or jam, ½ cup sorbet, or 1 cup of lemonade. · Eat less than 2,300 milligrams (mg) of sodium a day.  If you limit your sodium to 1,500 mg a day, you can lower your blood pressure even more.  Tips for success  · Start small. Do not try to make dramatic changes to your diet all at once. You might feel that you are missing out on your favorite foods and then be more likely to not follow the plan. Make small changes, and stick with them. Once those changes become habit, add a few more changes. · Try some of the following:  ¨ Make it a goal to eat a fruit or vegetable at every meal and at snacks. This will make it easy to get the recommended amount of fruits and vegetables each day. ¨ Try yogurt topped with fruit and nuts for a snack or healthy dessert. ¨ Add lettuce, tomato, cucumber, and onion to sandwiches. ¨ Combine a ready-made pizza crust with low-fat mozzarella cheese and lots of vegetable toppings. Try using tomatoes, squash, spinach, broccoli, carrots, cauliflower, and onions. ¨ Have a variety of cut-up vegetables with a low-fat dip as an appetizer instead of chips and dip. ¨ Sprinkle sunflower seeds or chopped almonds over salads. Or try adding chopped walnuts or almonds to cooked vegetables. ¨ Try some vegetarian meals using beans and peas. Add garbanzo or kidney beans to salads. Make burritos and tacos with mashed marques beans or black beans. Where can you learn more? Go to http://maria victoria-ignacio.info/. Enter T467 in the search box to learn more about \"DASH Diet: Care Instructions. \"  Current as of: March 23, 2016  Content Version: 11.2  © 7663-9005 [employer]. Care instructions adapted under license by [employer] (which disclaims liability or warranty for this information). If you have questions about a medical condition or this instruction, always ask your healthcare professional. [place] [address] any warranty or liability for your use of this information.        Bipolar Disorder: Care Instructions  Your Care Instructions  Bipolar disorder is an illness that causes extreme mood changes, from times of very high energy (manic episodes) to times of depression. But many people with bipolar disorder show only the symptoms of depression. These moods may cause problems with your work, school, family life, friendships, and how well you function. This disease is also called manic-depression. There is no cure for bipolar disorder, but it can be helped with medicines. Counseling may also help. It is important to take your medicines exactly as prescribed, even when you feel well. You may need lifelong treatment. Follow-up care is a key part of your treatment and safety. Be sure to make and go to all appointments, and call your doctor if you are having problems. It's also a good idea to know your test results and keep a list of the medicines you take. How can you care for yourself at home? · Be safe with medicines. Take your medicines exactly as prescribed. Do not stop or change a medicine without talking to your doctor first. Marcos Sessions and your doctor may need to try different combinations of medicines to find what works for you. · Take your medicines on schedule to keep your moods even. When you feel good, you may think that you do not need your medicines. But it is important to keep taking them. · Go to your counseling sessions. Call and talk with your counselor if you can't go to a session or if you don't think the sessions are helping. Do not just stop going. · Get at least 30 minutes of activity on most days of the week. Walking is a good choice. You also may want to do other things, such as running, swimming, or cycling. · Get enough sleep. Keep your room dark and quiet. Try to go to bed at the same time every night. · Eat a healthy diet. This includes whole grains, dairy, fruits, vegetables, and protein. Eat foods from each of these groups. · Try to lower your stress. Manage your time, build a strong support system, and lead a healthy lifestyle.  To lower your stress, try physical activity, slow deep breathing, or getting a massage. · Do not use alcohol or illegal drugs. · Learn the early signs of your mood changes. You can then take steps to help yourself feel better. · Ask for help from friends and family when you need it. You may need help with daily chores when you are depressed. When you are manic, you may need support to control your high energy levels. What should you do if someone in your family has bipolar disorder? · Learn about the disease and the signs that it is getting worse. · Remind your family member that you love him or her. · Make a plan with all family members about how to take care of your loved one when his or her symptoms are bad. · Talk about your fears and concerns and those of other family members. Seek counseling if needed. · Do not focus attention only on the person who is in treatment. · Remind yourself that it will take time for changes to occur. · Do not blame yourself for the disease. · Know your legal rights and the legal rights of your family member. Support groups or counselors can help you with this information. · Take care of yourself. Keep up with your own interests, such as your career, hobbies, and friends. Use exercise, positive self-talk, deep breathing, and other relaxing exercises to help lower your stress. · Give yourself time to grieve. You may need to deal with emotions such as anger, fear, and frustration. After you work through your feelings, you will be better able to care for yourself and your family. · If you are having a hard time with your feelings or with your relationship with your family member, talk with a counselor. When should you call for help? Call 911 anytime you think you may need emergency care. For example, call if:  · You feel like hurting yourself or someone else. · Someone who has bipolar disorder displays dangerous behavior, and you think the person might hurt himself or herself or someone else.   Call your doctor now or seek immediate medical care if:  · You hear voices. · Someone you know has bipolar disorder and talks about suicide. Keep the numbers for these national suicide hotlines: 4-414-260-TALK (6-241.856.1545) and 7-780-EEGJOES (9-284.112.7384). If a suicide threat seems real, with a specific plan and a way to carry it out, stay with the person, or ask someone you trust to stay with the person, until you can get help. · Someone you know has bipolar disorder and:  ¨ Starts to give away possessions. ¨ Is using illegal drugs or drinking alcohol heavily. ¨ Talks or writes about death, including writing suicide notes or talking about guns, knives, or pills. ¨ Talks or writes about hurting someone else. ¨ Starts to spend a lot of time alone. ¨ Acts very aggressively or suddenly appears calm. ¨ Talks about beliefs that are not based in reality (delusions). Watch closely for changes in your health, and be sure to contact your doctor if:  · You cannot go to your counseling sessions. Where can you learn more? Go to http://maria victoriaFarm At Handignacio.info/. Enter K052 in the search box to learn more about \"Bipolar Disorder: Care Instructions. \"  Current as of: July 26, 2016  Content Version: 11.2  © 3469-2352 [employer]. Care instructions adapted under license by [employer] (which disclaims liability or warranty for this information). If you have questions about a medical condition or this instruction, always ask your healthcare professional. [place] [address] any warranty or liability for your use of this information. Styes and Chalazia: Care Instructions  Your Care Instructions    Styes and chalazia (say \"yls-[id number]\") are both conditions that can cause swelling of the eyelid. A stye is an infection in the root of an eyelash. The infection causes a tender red lump on the edge of the eyelid. The infection can spread until the whole eyelid becomes red and inflamed.  Styes usually break open, and a tiny amount of pus drains. They usually clear up on their own in about a week, but they sometimes need treatment with antibiotics. A chalazion is a lump or cyst in the eyelid (chalazion is singular; chalazia is plural). It is caused by swelling and inflammation of deep oil glands inside the eyelid. Chalazia are usually not infected. They can take a few months to heal.  If a chalazion becomes more swollen and painful or does not go away, you may need to have it drained by your doctor. Follow-up care is a key part of your treatment and safety. Be sure to make and go to all appointments, and call your doctor if you are having problems. It's also a good idea to know your test results and keep a list of the medicines you take. How can you care for yourself at home? · Do not rub your eyes. Do not squeeze or try to open a stye or chalazion. · To help a stye or chalazion heal faster:  ¨ Put a warm, moist compress on your eye for 5 to 10 minutes, 3 to 6 times a day. Heat often brings a stye to a point where it drains on its own. Keep in mind that warm compresses will often increase swelling a little at first.  ¨ Do not use hot water or heat a wet cloth in a microwave oven. The compress may get too hot and can burn the eyelid. · Always wash your hands before and after you use a compress or touch your eyes. · If the doctor gave you antibiotic drops or ointment, use the medicine exactly as directed. Use the medicine for as long as instructed, even if your eye starts to feel better. · To put in eyedrops or ointment:  ¨ Tilt your head back, and pull your lower eyelid down with one finger. ¨ Drop or squirt the medicine inside the lower lid. ¨ Close your eye for 30 to 60 seconds to let the drops or ointment move around. ¨ Do not touch the ointment or dropper tip to your eyelashes or any other surface. · Do not wear eye makeup or contact lenses until the stye or chalazion heals.   · Do not share towels, pillows, or washcloths while you have a stye. When should you call for help? Call your doctor now or seek immediate medical care if:  · You have pain in your eye. · You have a change in vision or loss of vision. · Redness and swelling get much worse. Watch closely for changes in your health, and be sure to contact your doctor if:  · Your stye does not get better in 1 week. · Your chalazion does not start to get better after several weeks. Where can you learn more? Go to http://maria victoria-ignacio.info/. Enter O001 in the search box to learn more about \"Styes and Chalazia: Care Instructions. \"  Current as of: May 23, 2016  Content Version: 11.2  © 0051-9352 [employer]. Care instructions adapted under license by [employer] (which disclaims liability or warranty for this information). If you have questions about a medical condition or this instruction, always ask your healthcare professional. [place] [address] any warranty or liability for your use of this information.           Jaren PEREZ

## 2017-06-02 NOTE — PROGRESS NOTES
Lithium level now down- this is good. Do not restart it. Potassium is a bit low- will send a replacement rx to pharmacy; please return for recheck labs and BP in 1 month.

## 2017-07-05 ENCOUNTER — OFFICE VISIT (OUTPATIENT)
Dept: FAMILY MEDICINE CLINIC | Age: 62
End: 2017-07-05

## 2017-07-05 VITALS
RESPIRATION RATE: 20 BRPM | HEIGHT: 65 IN | BODY MASS INDEX: 30.82 KG/M2 | DIASTOLIC BLOOD PRESSURE: 81 MMHG | OXYGEN SATURATION: 97 % | WEIGHT: 185 LBS | HEART RATE: 85 BPM | SYSTOLIC BLOOD PRESSURE: 130 MMHG | TEMPERATURE: 97.6 F

## 2017-07-05 DIAGNOSIS — E78.00 HYPERCHOLESTEREMIA: ICD-10-CM

## 2017-07-05 DIAGNOSIS — R73.03 PREDIABETES: ICD-10-CM

## 2017-07-05 DIAGNOSIS — L30.9 DERMATITIS: ICD-10-CM

## 2017-07-05 DIAGNOSIS — F31.9 BIPOLAR 1 DISORDER (HCC): Primary | ICD-10-CM

## 2017-07-05 DIAGNOSIS — I10 ESSENTIAL HYPERTENSION WITH GOAL BLOOD PRESSURE LESS THAN 130/80: ICD-10-CM

## 2017-07-05 RX ORDER — RISPERIDONE 0.5 MG/1
0.5 TABLET, FILM COATED ORAL
Qty: 30 TAB | Refills: 1 | Status: SHIPPED | OUTPATIENT
Start: 2017-07-05 | End: 2017-07-12 | Stop reason: SDUPTHER

## 2017-07-05 RX ORDER — SERTRALINE HYDROCHLORIDE 50 MG/1
TABLET, FILM COATED ORAL DAILY
COMMUNITY
End: 2017-07-12 | Stop reason: SDUPTHER

## 2017-07-05 RX ORDER — ATORVASTATIN CALCIUM 40 MG/1
40 TABLET, FILM COATED ORAL DAILY
Qty: 30 TAB | Refills: 1
Start: 2017-07-05 | End: 2017-12-06 | Stop reason: SDUPTHER

## 2017-07-05 RX ORDER — LISINOPRIL 40 MG/1
20 TABLET ORAL DAILY
Qty: 30 TAB | Refills: 0
Start: 2017-07-05 | End: 2017-12-06 | Stop reason: SDUPTHER

## 2017-07-05 NOTE — PATIENT INSTRUCTIONS
Bipolar Disorder: Care Instructions  Your Care Instructions  Bipolar disorder is an illness that causes extreme mood changes, from times of very high energy (manic episodes) to times of depression. But many people with bipolar disorder show only the symptoms of depression. These moods may cause problems with your work, school, family life, friendships, and how well you function. This disease is also called manic-depression. There is no cure for bipolar disorder, but it can be helped with medicines. Counseling may also help. It is important to take your medicines exactly as prescribed, even when you feel well. You may need lifelong treatment. Follow-up care is a key part of your treatment and safety. Be sure to make and go to all appointments, and call your doctor if you are having problems. It's also a good idea to know your test results and keep a list of the medicines you take. How can you care for yourself at home? · Be safe with medicines. Take your medicines exactly as prescribed. Do not stop or change a medicine without talking to your doctor first. Evi Davey and your doctor may need to try different combinations of medicines to find what works for you. · Take your medicines on schedule to keep your moods even. When you feel good, you may think that you do not need your medicines. But it is important to keep taking them. · Go to your counseling sessions. Call and talk with your counselor if you can't go to a session or if you don't think the sessions are helping. Do not just stop going. · Get at least 30 minutes of activity on most days of the week. Walking is a good choice. You also may want to do other things, such as running, swimming, or cycling. · Get enough sleep. Keep your room dark and quiet. Try to go to bed at the same time every night. · Eat a healthy diet. This includes whole grains, dairy, fruits, vegetables, and protein. Eat foods from each of these groups. · Try to lower your stress. Manage your time, build a strong support system, and lead a healthy lifestyle. To lower your stress, try physical activity, slow deep breathing, or getting a massage. · Do not use alcohol or illegal drugs. · Learn the early signs of your mood changes. You can then take steps to help yourself feel better. · Ask for help from friends and family when you need it. You may need help with daily chores when you are depressed. When you are manic, you may need support to control your high energy levels. What should you do if someone in your family has bipolar disorder? · Learn about the disease and the signs that it is getting worse. · Remind your family member that you love him or her. · Make a plan with all family members about how to take care of your loved one when his or her symptoms are bad. · Talk about your fears and concerns and those of other family members. Seek counseling if needed. · Do not focus attention only on the person who is in treatment. · Remind yourself that it will take time for changes to occur. · Do not blame yourself for the disease. · Know your legal rights and the legal rights of your family member. Support groups or counselors can help you with this information. · Take care of yourself. Keep up with your own interests, such as your career, hobbies, and friends. Use exercise, positive self-talk, deep breathing, and other relaxing exercises to help lower your stress. · Give yourself time to grieve. You may need to deal with emotions such as anger, fear, and frustration. After you work through your feelings, you will be better able to care for yourself and your family. · If you are having a hard time with your feelings or with your relationship with your family member, talk with a counselor. When should you call for help? Call 911 anytime you think you may need emergency care. For example, call if:  · You feel like hurting yourself or someone else.   · Someone who has bipolar disorder displays dangerous behavior, and you think the person might hurt himself or herself or someone else. Call your doctor now or seek immediate medical care if:  · You hear voices. · Someone you know has bipolar disorder and talks about suicide. Keep the numbers for these national suicide hotlines: 6-341-904-TALK (8-918-261-633-848-1383) and 9-855-RTKWVKK (3-608.102.4040). If a suicide threat seems real, with a specific plan and a way to carry it out, stay with the person, or ask someone you trust to stay with the person, until you can get help. · Someone you know has bipolar disorder and:  ¨ Starts to give away possessions. ¨ Is using illegal drugs or drinking alcohol heavily. ¨ Talks or writes about death, including writing suicide notes or talking about guns, knives, or pills. ¨ Talks or writes about hurting someone else. ¨ Starts to spend a lot of time alone. ¨ Acts very aggressively or suddenly appears calm. ¨ Talks about beliefs that are not based in reality (delusions). Watch closely for changes in your health, and be sure to contact your doctor if:  · You cannot go to your counseling sessions. Where can you learn more? Go to http://maria victoria-ignacio.info/. Enter K052 in the search box to learn more about \"Bipolar Disorder: Care Instructions. \"  Current as of: July 26, 2016  Content Version: 11.3  © 6556-7409 [employer]. Care instructions adapted under license by [employer] (which disclaims liability or warranty for this information). If you have questions about a medical condition or this instruction, always ask your healthcare professional. [address] any warranty or liability for your use of this information. DASH Diet: Care Instructions  Your Care Instructions  The DASH diet is an eating plan that can help lower your blood pressure. DASH stands for Dietary Approaches to Stop Hypertension.  Hypertension is high blood pressure. The DASH diet focuses on eating foods that are high in calcium, potassium, and magnesium. These nutrients can lower blood pressure. The foods that are highest in these nutrients are fruits, vegetables, low-fat dairy products, nuts, seeds, and legumes. But taking calcium, potassium, and magnesium supplements instead of eating foods that are high in those nutrients does not have the same effect. The DASH diet also includes whole grains, fish, and poultry. The DASH diet is one of several lifestyle changes your doctor may recommend to lower your high blood pressure. Your doctor may also want you to decrease the amount of sodium in your diet. Lowering sodium while following the DASH diet can lower blood pressure even further than just the DASH diet alone. Follow-up care is a key part of your treatment and safety. Be sure to make and go to all appointments, and call your doctor if you are having problems. It's also a good idea to know your test results and keep a list of the medicines you take. How can you care for yourself at home? Following the DASH diet  · Eat 4 to 5 servings of fruit each day. A serving is 1 medium-sized piece of fruit, ½ cup chopped or canned fruit, 1/4 cup dried fruit, or 4 ounces (½ cup) of fruit juice. Choose fruit more often than fruit juice. · Eat 4 to 5 servings of vegetables each day. A serving is 1 cup of lettuce or raw leafy vegetables, ½ cup of chopped or cooked vegetables, or 4 ounces (½ cup) of vegetable juice. Choose vegetables more often than vegetable juice. · Get 2 to 3 servings of low-fat and fat-free dairy each day. A serving is 8 ounces of milk, 1 cup of yogurt, or 1 ½ ounces of cheese. · Eat 6 to 8 servings of grains each day. A serving is 1 slice of bread, 1 ounce of dry cereal, or ½ cup of cooked rice, pasta, or cooked cereal. Try to choose whole-grain products as much as possible. · Limit lean meat, poultry, and fish to 2 servings each day.  A serving is 3 ounces, about the size of a deck of cards. · Eat 4 to 5 servings of nuts, seeds, and legumes (cooked dried beans, lentils, and split peas) each week. A serving is 1/3 cup of nuts, 2 tablespoons of seeds, or ½ cup of cooked beans or peas. · Limit fats and oils to 2 to 3 servings each day. A serving is 1 teaspoon of vegetable oil or 2 tablespoons of salad dressing. · Limit sweets and added sugars to 5 servings or less a week. A serving is 1 tablespoon jelly or jam, ½ cup sorbet, or 1 cup of lemonade. · Eat less than 2,300 milligrams (mg) of sodium a day. If you limit your sodium to 1,500 mg a day, you can lower your blood pressure even more. Tips for success  · Start small. Do not try to make dramatic changes to your diet all at once. You might feel that you are missing out on your favorite foods and then be more likely to not follow the plan. Make small changes, and stick with them. Once those changes become habit, add a few more changes. · Try some of the following:  ¨ Make it a goal to eat a fruit or vegetable at every meal and at snacks. This will make it easy to get the recommended amount of fruits and vegetables each day. ¨ Try yogurt topped with fruit and nuts for a snack or healthy dessert. ¨ Add lettuce, tomato, cucumber, and onion to sandwiches. ¨ Combine a ready-made pizza crust with low-fat mozzarella cheese and lots of vegetable toppings. Try using tomatoes, squash, spinach, broccoli, carrots, cauliflower, and onions. ¨ Have a variety of cut-up vegetables with a low-fat dip as an appetizer instead of chips and dip. ¨ Sprinkle sunflower seeds or chopped almonds over salads. Or try adding chopped walnuts or almonds to cooked vegetables. ¨ Try some vegetarian meals using beans and peas. Add garbanzo or kidney beans to salads. Make burritos and tacos with mashed marques beans or black beans. Where can you learn more? Go to http://maria victoria-ignacio.info/.   Enter L354 in the search box to learn more about \"DASH Diet: Care Instructions. \"  Current as of: April 3, 2017  Content Version: 11.3  © 6814-2599 [employer]. Care instructions adapted under license by [employer] (which disclaims liability or warranty for this information). If you have questions about a medical condition or this instruction, always ask your healthcare professional. [address] any warranty or liability for your use of this information.

## 2017-07-05 NOTE — PROGRESS NOTES
Subjective:     Chaparro Ferrera is a 62 y.o. female who presents for follow up of hypertension, hyperlipidemia, prediabetes, bipolar disorder, and obesity. Accompanied by . Diet and Lifestyle: not attempting to follow a low fat, low cholesterol diet, not attempting to follow a low sodium diet, sedentary. Has started smoking again, a few cigarettes a day. Wants to try to quit again. Home BP Monitoring: is not measured at home    Cardiovascular ROS: taking medications as instructed, no medication side effects noted, no TIA's, no chest pain on exertion, no dyspnea on exertion, no swelling of ankles. New concerns: ran out of risperidone last week,  contacted the hospitalist who prescribed it for refill but did not hear back.  reports she became \"twitchy\" and anxious about 4 days after stopping it. He began giving her lorazepam once a day to help with the symptoms. Patient describes as racing thoughts, unable to slow her thinking, becomes overwhelmed. Denies hallucinations or paranoid behaviors. No suicidal or homicidal thoughts. They have an appt with Edy Paula NP at [facility] scheduled in about 2 weeks. Patient Active Problem List   Diagnosis Code    Bipolar 1 disorder ([facility] [address] F31.9    Essential hypertension with goal blood pressure less than 130/80 I10    Prediabetes R73.03    Hypercholesteremia E78.00    ALEX (acute kidney injury) ([facility] [address] N17.9    Lithium toxicity T56.891A    Anemia D64.9     No Known Allergies  Past Medical History:   Diagnosis Date    Abscess of buttock 7/23/2012    Bronchitis     Common bile duct calculus 7/23/2012    Gallstones 7/12/2012    GERD (gastroesophageal reflux disease)     High cholesterol     Hypercholesterolemia     Hypertension     Ill-defined condition     missing upper front tooth    Ill-defined condition     lower leg bilateral reddened rash.  Insomnia     Other ill-defined conditions     Large boil under right arm-pit.     Paranoia [facility])     Psychiatric disorder     bipolar     Past Surgical History:   Procedure Laterality Date    ABDOMEN SURGERY PROC UNLISTED      cholecystectomy 7/23/2012     Family History   Problem Relation Age of Onset    Cancer Father      lung    Diabetes Brother     Heart Disease Brother     Malignant Hyperthermia Neg Hx     Pseudocholinesterase Deficiency Neg Hx     Delayed Awakening Neg Hx     Post-op Nausea/Vomiting Neg Hx     Emergence Delirium Neg Hx     Post-op Cognitive Dysfunction Neg Hx     Other Neg Hx      Social History   Substance Use Topics    Smoking status: Current Every Day Smoker     Packs/day: 0.50    Smokeless tobacco: Never Used    Alcohol use No        Lab Results  Component Value Date/Time   Cholesterol, total 98 05/23/2017 06:16 AM   HDL Cholesterol 40 05/23/2017 06:16 AM   LDL, calculated 28.4 05/23/2017 06:16 AM   Triglyceride 148 05/23/2017 06:16 AM   CHOL/HDL Ratio 2.5 05/23/2017 06:16 AM     Lab Results  Component Value Date/Time   ALT (SGPT) 22 05/31/2017 04:17 PM   AST (SGOT) 23 05/31/2017 04:17 PM   Alk.  phosphatase 153 05/31/2017 04:17 PM   Bilirubin, direct 0.1 10/31/2011 08:05 PM   Bilirubin, total 0.3 05/31/2017 04:17 PM   Albumin 3.3 05/31/2017 04:17 PM   Protein, total 6.8 05/31/2017 04:17 PM   Ammonia <10 05/25/2017 12:51 AM   INR 1.0 10/31/2011 08:05 PM   Prothrombin time 10.2 10/31/2011 08:05 PM   PLATELET 624 79/18/4969 11:25 AM       Lab Results  Component Value Date/Time   GFR est non-AA 91 05/31/2017 04:17 PM   GFRNA, POC >60 10/31/2011 08:08 PM   GFR est  05/31/2017 04:17 PM   GFRAA, POC >60 10/31/2011 08:08 PM   Creatinine 0.72 05/31/2017 04:17 PM   Creatinine (POC) 0.6 10/31/2011 08:08 PM   BUN 8 05/31/2017 04:17 PM   BUN (POC) 12 10/31/2011 08:08 PM   Sodium 139 05/31/2017 04:17 PM   Sodium (POC) 136 10/31/2011 08:08 PM   Potassium 3.3 05/31/2017 04:17 PM   Potassium (POC) 3.6 10/31/2011 08:08 PM   Chloride 96 05/31/2017 04:17 PM   Chloride (POC) 102 10/31/2011 08:08 PM   CO2 20 05/31/2017 04:17 PM   Magnesium 1.6 05/26/2017 11:25 AM   Phosphorus 3.1 05/26/2017 11:25 AM     Lab Results   Component Value Date/Time    Hemoglobin A1c 5.9 11/02/2016 10:29 AM       Review of Systems, additional:  A comprehensive review of systems was negative except for that written in the HPI. Objective:   Visit Vitals    /81 (BP 1 Location: Right arm, BP Patient Position: Sitting)    Pulse 85    Temp 97.6 °F (36.4 °C) (Oral)    Resp 20    Ht 5' 5\" (1.651 m)    Wt 185 lb (83.9 kg)    SpO2 97%    BMI 30.79 kg/m2     Appearance: alert, well appearing, and in no distress, oriented to person, place, and time, overweight and well hydrated. General exam: CVS exam S1, S2 normal, no gallop, no murmur, chest clear, no JVD, no HSM, no edema, CVS exam  - normal rate, regular rhythm, normal S1, S2, no murmurs, rubs, clicks or gallops, normal bilateral carotid upstroke without bruits, no JVD, peripheral vascular exam both carotids normal upstroke without bruits, neurological exam alert, oriented, normal speech, no focal findings or movement disorder noted. Assessment/Plan:     hypertension borderline controlled, hyperlipidemia control uncertain. very strongly urged to quit smoking to reduce cardiovascular risk  copy of written low fat low cholesterol diet provided and reviewed  reviewed medications and side effects in detail. Ilya Pimentel was seen today for medication evaluation. Diagnoses and all orders for this visit:    Bipolar 1 disorder ([facility] [place] [address]  Restart risperidone  Discontinue lorazepam  F/u with mental health provider as scheduled  -     risperiDONE (RISPERDAL) 0.5 mg tablet; Take 1 Tab by mouth nightly for 20 days. Essential hypertension with goal blood pressure less than 130/80  Restart lisinopril at 20 mg daily  Continue amlodipine, metoprolol  DASH diet  Weight loss  Daily walking  Smoking cessation  -     lisinopril (PRINIVIL, ZESTRIL) 40 mg tablet;  Take 0.5 Tabs by mouth daily. Prediabetes  eliminate sweetened beverages (soda, tea, juice), minimize sweets and desserts, and limit simple carbs (bread, rice, pasta, potatoes). Walking 20 minutes daily will help decrease your body's resistance to insulin. Weight loss will also help slow progression to diabetes. Recommend initial goal of 5% (9 lbs). Hypercholesteremia  TLC Diet:  -- Saturated fat <7% of calories, cholesterol <200 mg/day  -- Consider increased viscous (soluble) fiber (10-25 g/day) and plant stanols/sterols  (2g/day) as therapeutic options to enhance LDL lowering  Weight management  Increased physical activity. Has not been taking atorvastatin since discharge from     Dermatitis  Rash continues to recur on legs, plaques on elbows- refer for further eval.  -     REFERRAL TO DERMATOLOGY      Follow-up Disposition:  Return in about 4 weeks (around 8/2/2017) for f/u  HTN. I have discussed the diagnosis with the patient and the intended plan as seen in the above orders. The patient has received an after-visit summary and questions were answered concerning future plans. Patient conveyed understanding of the plan at the time of the visit.     Fidelia Edgar NP  07/05/17

## 2017-07-05 NOTE — MR AVS SNAPSHOT
Visit Information Date & Time Provider Department Dept. Phone Encounter #  
 7/5/2017 11:00 AM Melissa Bansal  [facility] [phone] [id number] Follow-up Instructions Return in about 4 weeks (around 8/2/2017) for f/u  HTN. Your Appointments 7/12/2017 11:00 AM  
ESTABLISHED PATIENT with Alfonso Allan NP [facility] ([address] [facility]) Appt Note: 6 month follow-up [address] [facility] [address] [place] [address] [facility] [address]  
  
   
 [address] [facility] [address] [facility] [address] [id number] Upcoming Health Maintenance Date Due Hepatitis C Screening 1955 Pneumococcal 19-64 Medium Risk (1 of 1 - PPSV23) 11/16/1975 DTaP/Tdap/Td series (1 - Tdap) 11/16/1977 PAP AKA CERVICAL CYTOLOGY 11/16/1977 BREAST CANCER SCRN MAMMOGRAM 11/16/2006 FOBT Q 1 YEAR AGE 50-75 11/16/2006 ZOSTER VACCINE AGE 60> 11/16/2016 INFLUENZA AGE 9 TO ADULT 8/1/2017 Allergies as of 7/5/2017  Review Complete On: 5/31/2017 By: Melissa Bansal NP No Known Allergies Current Immunizations  Never Reviewed No immunizations on file. Not reviewed this visit You Were Diagnosed With   
  
 Codes Comments Bipolar 1 disorder ([facility] [address]    -  Primary ICD-10-CM: F31.9 ICD-9-CM: 296.7 Essential hypertension with goal blood pressure less than 130/80     ICD-10-CM: I10 
ICD-9-CM: 401.9 Prediabetes     ICD-10-CM: R73.03 
ICD-9-CM: 790.29 Hypercholesteremia     ICD-10-CM: E78.00 ICD-9-CM: 272.0 Vitals BP Pulse Temp Resp Height(growth percentile) Weight(growth percentile) 130/81 (BP 1 Location: Right arm, BP Patient Position: Sitting) 85 97.6 °F (36.4 °C) (Oral) 20 5' 5\" (1.651 m) 185 lb (83.9 kg) SpO2 BMI OB Status Smoking Status 97% 30.79 kg/m2 Postmenopausal Current Every Day Smoker BMI and BSA Data Body Mass Index Body Surface Area  30.79 kg/m 2 1.96 m 2  
  
  
 Preferred Pharmacy Pharmacy Name Phone Celeste Patel [facility] [phone] Your Updated Medication List  
  
   
This list is accurate as of: 7/5/17 11:55 AM.  Always use your most recent med list. amLODIPine 10 mg tablet Commonly known as:  Nelson Inks Take 0.5 Tabs by mouth daily. erythromycin ophthalmic ointment Commonly known as:  ILOTYCIN Apply thin ribbon to inner lids every 6 hours for 7 days  
  
 lisinopril 40 mg tablet Commonly known as:  Smita Drought Take 0.5 Tabs by mouth daily. metoprolol succinate 25 mg XL tablet Commonly known as:  TOPROL-XL Take 1 Tab by mouth daily. potassium chloride 20 mEq tablet Commonly known as:  K-DUR, KLOR-CON Take 1 Tab by mouth daily. risperiDONE 0.5 mg tablet Commonly known as:  RisperDAL Take 1 Tab by mouth nightly for 20 days. sertraline 50 mg tablet Commonly known as:  ZOLOFT Take  by mouth daily. Prescriptions Sent to Pharmacy Refills  
 risperiDONE (RISPERDAL) 0.5 mg tablet 1 Sig: Take 1 Tab by mouth nightly for 20 days. Class: Normal  
 Pharmacy: Luana Lal, [address] [place] [address] #: [phone] Route: Oral  
  
Follow-up Instructions Return in about 4 weeks (around 8/2/2017) for f/u  HTN. Patient Instructions Bipolar Disorder: Care Instructions Your Care Instructions Bipolar disorder is an illness that causes extreme mood changes, from times of very high energy (manic episodes) to times of depression. But many people with bipolar disorder show only the symptoms of depression. These moods may cause problems with your work, school, family life, friendships, and how well you function. This disease is also called manic-depression. There is no cure for bipolar disorder, but it can be helped with medicines. Counseling may also help.  It is important to take your medicines exactly as prescribed, even when you feel well. You may need lifelong treatment. Follow-up care is a key part of your treatment and safety. Be sure to make and go to all appointments, and call your doctor if you are having problems. It's also a good idea to know your test results and keep a list of the medicines you take. How can you care for yourself at home? · Be safe with medicines. Take your medicines exactly as prescribed. Do not stop or change a medicine without talking to your doctor first. Ira Dunaway and your doctor may need to try different combinations of medicines to find what works for you. · Take your medicines on schedule to keep your moods even. When you feel good, you may think that you do not need your medicines. But it is important to keep taking them. · Go to your counseling sessions. Call and talk with your counselor if you can't go to a session or if you don't think the sessions are helping. Do not just stop going. · Get at least 30 minutes of activity on most days of the week. Walking is a good choice. You also may want to do other things, such as running, swimming, or cycling. · Get enough sleep. Keep your room dark and quiet. Try to go to bed at the same time every night. · Eat a healthy diet. This includes whole grains, dairy, fruits, vegetables, and protein. Eat foods from each of these groups. · Try to lower your stress. Manage your time, build a strong support system, and lead a healthy lifestyle. To lower your stress, try physical activity, slow deep breathing, or getting a massage. · Do not use alcohol or illegal drugs. · Learn the early signs of your mood changes. You can then take steps to help yourself feel better. · Ask for help from friends and family when you need it. You may need help with daily chores when you are depressed. When you are manic, you may need support to control your high energy levels. What should you do if someone in your family has bipolar disorder? · Learn about the disease and the signs that it is getting worse. · Remind your family member that you love him or her. · Make a plan with all family members about how to take care of your loved one when his or her symptoms are bad. · Talk about your fears and concerns and those of other family members. Seek counseling if needed. · Do not focus attention only on the person who is in treatment. · Remind yourself that it will take time for changes to occur. · Do not blame yourself for the disease. · Know your legal rights and the legal rights of your family member. Support groups or counselors can help you with this information. · Take care of yourself. Keep up with your own interests, such as your career, hobbies, and friends. Use exercise, positive self-talk, deep breathing, and other relaxing exercises to help lower your stress. · Give yourself time to grieve. You may need to deal with emotions such as anger, fear, and frustration. After you work through your feelings, you will be better able to care for yourself and your family. · If you are having a hard time with your feelings or with your relationship with your family member, talk with a counselor. When should you call for help? Call 911 anytime you think you may need emergency care. For example, call if: 
· You feel like hurting yourself or someone else. · Someone who has bipolar disorder displays dangerous behavior, and you think the person might hurt himself or herself or someone else. Call your doctor now or seek immediate medical care if: 
· You hear voices. · Someone you know has bipolar disorder and talks about suicide. Keep the numbers for these national suicide hotlines: 9-331-142-TALK (9-980.247.9543) and 5-853-JUOAWJO (2-561.569.8363).  If a suicide threat seems real, with a specific plan and a way to carry it out, stay with the person, or ask someone you trust to stay with the person, until you can get help. · Someone you know has bipolar disorder and: 
¨ Starts to give away possessions. ¨ Is using illegal drugs or drinking alcohol heavily. ¨ Talks or writes about death, including writing suicide notes or talking about guns, knives, or pills. ¨ Talks or writes about hurting someone else. ¨ Starts to spend a lot of time alone. ¨ Acts very aggressively or suddenly appears calm. ¨ Talks about beliefs that are not based in reality (delusions). Watch closely for changes in your health, and be sure to contact your doctor if: 
· You cannot go to your counseling sessions. Where can you learn more? Go to http://maria victoriaOnTheGo Platformsignacio.info/. Enter K052 in the search box to learn more about \"Bipolar Disorder: Care Instructions. \" Current as of: July 26, 2016 Content Version: 11.3 © 3080-4001 [employer]). Care instructions adapted under license by "[employer] (which disclaims liability or warranty for this information). If you have questions about a medical condition or this instruction, always ask your healthcare professional. [place] [address] any warranty or liability for your use of this information. DASH Diet: Care Instructions Your Care Instructions The DASH diet is an eating plan that can help lower your blood pressure. DASH stands for Dietary Approaches to Stop Hypertension. Hypertension is high blood pressure. The DASH diet focuses on eating foods that are high in calcium, potassium, and magnesium. These nutrients can lower blood pressure. The foods that are highest in these nutrients are fruits, vegetables, low-fat dairy products, nuts, seeds, and legumes. But taking calcium, potassium, and magnesium supplements instead of eating foods that are high in those nutrients does not have the same effect. The DASH diet also includes whole grains, fish, and poultry. The DASH diet is one of several lifestyle changes your doctor may recommend to lower your high blood pressure. Your doctor may also want you to decrease the amount of sodium in your diet. Lowering sodium while following the DASH diet can lower blood pressure even further than just the DASH diet alone. Follow-up care is a key part of your treatment and safety. Be sure to make and go to all appointments, and call your doctor if you are having problems. It's also a good idea to know your test results and keep a list of the medicines you take. How can you care for yourself at home? Following the DASH diet · Eat 4 to 5 servings of fruit each day. A serving is 1 medium-sized piece of fruit, ½ cup chopped or canned fruit, 1/4 cup dried fruit, or 4 ounces (½ cup) of fruit juice. Choose fruit more often than fruit juice. · Eat 4 to 5 servings of vegetables each day. A serving is 1 cup of lettuce or raw leafy vegetables, ½ cup of chopped or cooked vegetables, or 4 ounces (½ cup) of vegetable juice. Choose vegetables more often than vegetable juice. · Get 2 to 3 servings of low-fat and fat-free dairy each day. A serving is 8 ounces of milk, 1 cup of yogurt, or 1 ½ ounces of cheese. · Eat 6 to 8 servings of grains each day. A serving is 1 slice of bread, 1 ounce of dry cereal, or ½ cup of cooked rice, pasta, or cooked cereal. Try to choose whole-grain products as much as possible. · Limit lean meat, poultry, and fish to 2 servings each day. A serving is 3 ounces, about the size of a deck of cards. · Eat 4 to 5 servings of nuts, seeds, and legumes (cooked dried beans, lentils, and split peas) each week. A serving is 1/3 cup of nuts, 2 tablespoons of seeds, or ½ cup of cooked beans or peas. · Limit fats and oils to 2 to 3 servings each day. A serving is 1 teaspoon of vegetable oil or 2 tablespoons of salad dressing. · Limit sweets and added sugars to 5 servings or less a week.  A serving is 1 tablespoon jelly or jam, ½ cup sorbet, or 1 cup of lemonade. · Eat less than 2,300 milligrams (mg) of sodium a day. If you limit your sodium to 1,500 mg a day, you can lower your blood pressure even more. Tips for success · Start small. Do not try to make dramatic changes to your diet all at once. You might feel that you are missing out on your favorite foods and then be more likely to not follow the plan. Make small changes, and stick with them. Once those changes become habit, add a few more changes. · Try some of the following: ¨ Make it a goal to eat a fruit or vegetable at every meal and at snacks. This will make it easy to get the recommended amount of fruits and vegetables each day. ¨ Try yogurt topped with fruit and nuts for a snack or healthy dessert. ¨ Add lettuce, tomato, cucumber, and onion to sandwiches. ¨ Combine a ready-made pizza crust with low-fat mozzarella cheese and lots of vegetable toppings. Try using tomatoes, squash, spinach, broccoli, carrots, cauliflower, and onions. ¨ Have a variety of cut-up vegetables with a low-fat dip as an appetizer instead of chips and dip. ¨ Sprinkle sunflower seeds or chopped almonds over salads. Or try adding chopped walnuts or almonds to cooked vegetables. ¨ Try some vegetarian meals using beans and peas. Add garbanzo or kidney beans to salads. Make burritos and tacos with mashed marques beans or black beans. Where can you learn more? Go to http://maria victoria-ignacio.info/. Enter D401 in the search box to learn more about \"DASH Diet: Care Instructions. \" Current as of: April 3, 2017 Content Version: 11.3 © 6384-3019 [employer]. Care instructions adapted under license by [employer] (which disclaims liability or warranty for this information).  If you have questions about a medical condition or this instruction, always ask your healthcare professional. [employer] disclaims any warranty or liability for your use of this information. Introducing [facility]! Riri Maxine introduces [employer] patient portal. Now you can access parts of your medical record, email your doctor's office, and request medication refills online. 1. In your internet browser, go to [email or web address] 2. Click on the First Time User? Click Here link in the Sign In box. You will see the New Member Sign Up page. 3. Enter your [employer] Access Code exactly as it appears below. You will not need to use this code after youve completed the sign-up process. If you do not sign up before the expiration date, you must request a new code. · [employer] Access Code: [email or web address] Expires: 8/20/2017  4:35 PM 
 
4. Enter the last four digits of your Social Security Number (xxxx) and Date of Birth (mm/dd/yyyy) as indicated and click Submit. You will be taken to the next sign-up page. 5. Create a [employer] ID. This will be your [employer] login ID and cannot be changed, so think of one that is secure and easy to remember. 6. Create a [employer] password. You can change your password at any time. 7. Enter your Password Reset Question and Answer. This can be used at a later time if you forget your password. 8. Enter your e-mail address. You will receive e-mail notification when new information is available in [address] 9. Click Sign Up. You can now view and download portions of your medical record. 10. Click the Download Summary menu link to download a portable copy of your medical information. If you have questions, please visit the Frequently Asked Questions section of the [employer] website. Remember, [employer] is NOT to be used for urgent needs. For medical emergencies, dial 911. Now available from your iPhone and Android! Please provide this summary of care documentation to your next provider. Your primary care clinician is listed as Arlis Risk. If you have any questions after today's visit, please call [phone].

## 2017-07-12 ENCOUNTER — OFFICE VISIT (OUTPATIENT)
Dept: BEHAVIORAL/MENTAL HEALTH CLINIC | Age: 62
End: 2017-07-12

## 2017-07-12 VITALS
WEIGHT: 186 LBS | BODY MASS INDEX: 30.99 KG/M2 | DIASTOLIC BLOOD PRESSURE: 81 MMHG | HEIGHT: 65 IN | OXYGEN SATURATION: 95 % | SYSTOLIC BLOOD PRESSURE: 147 MMHG | HEART RATE: 111 BPM

## 2017-07-12 DIAGNOSIS — F31.9 BIPOLAR 1 DISORDER (HCC): ICD-10-CM

## 2017-07-12 RX ORDER — RISPERIDONE 0.5 MG/1
0.5 TABLET, FILM COATED ORAL
Qty: 30 TAB | Refills: 0 | Status: SHIPPED | OUTPATIENT
Start: 2017-07-12 | End: 2017-08-30 | Stop reason: SDUPTHER

## 2017-07-12 RX ORDER — SERTRALINE HYDROCHLORIDE 50 MG/1
50 TABLET, FILM COATED ORAL DAILY
Qty: 30 TAB | Refills: 1 | Status: SHIPPED | OUTPATIENT
Start: 2017-07-12 | End: 2017-09-12 | Stop reason: SDUPTHER
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
## 2017-07-28 ENCOUNTER — TELEPHONE (OUTPATIENT)
Dept: BEHAVIORAL/MENTAL HEALTH CLINIC | Age: 62
End: 2017-07-28

## 2017-07-28 NOTE — TELEPHONE ENCOUNTER
Pt called on Friday after you had left and wanted to ask you a medication question. She states that her  has been giving her half a tab of the Zoloft and she wants to know if its okay to go back to the full tab. Please call when you have a moment.

## 2017-07-31 RX ORDER — POTASSIUM CHLORIDE 1500 MG/1
TABLET, EXTENDED RELEASE ORAL
Qty: 30 TAB | Refills: 0 | Status: SHIPPED | OUTPATIENT
Start: 2017-07-31 | End: 2017-09-01 | Stop reason: SDUPTHER

## 2017-07-31 NOTE — TELEPHONE ENCOUNTER
Spoke with patient who reports moods swings- \"Crying one minute and feeling like I drank a whole pot of coffee the next. .. Rushing. \" Symptoms are vague, she does deny paranoia. She alludes to new stressor of custody issue with her granddaughter. Instructed her to increase her Risperdal to 1mg qhs. She affirms understanding. She agrees to call provider back later this week to report how she is doing.

## 2017-08-02 ENCOUNTER — HOSPITAL ENCOUNTER (OUTPATIENT)
Dept: GENERAL RADIOLOGY | Age: 62
Discharge: HOME | End: 2017-08-02
Payer: SUBSIDIZED

## 2017-08-02 ENCOUNTER — OFFICE VISIT (OUTPATIENT)
Dept: FAMILY MEDICINE CLINIC | Age: 62
End: 2017-08-02

## 2017-08-02 VITALS
DIASTOLIC BLOOD PRESSURE: 65 MMHG | WEIGHT: 190 LBS | SYSTOLIC BLOOD PRESSURE: 120 MMHG | HEIGHT: 65 IN | OXYGEN SATURATION: 95 % | RESPIRATION RATE: 18 BRPM | BODY MASS INDEX: 31.65 KG/M2 | HEART RATE: 82 BPM | TEMPERATURE: 97.6 F

## 2017-08-02 DIAGNOSIS — R73.03 PREDIABETES: ICD-10-CM

## 2017-08-02 DIAGNOSIS — I10 ESSENTIAL HYPERTENSION WITH GOAL BLOOD PRESSURE LESS THAN 130/80: Primary | ICD-10-CM

## 2017-08-02 DIAGNOSIS — F17.210 CIGARETTE NICOTINE DEPENDENCE WITHOUT COMPLICATION: ICD-10-CM

## 2017-08-02 DIAGNOSIS — M79.622 LEFT UPPER ARM PAIN: ICD-10-CM

## 2017-08-02 DIAGNOSIS — E78.00 HYPERCHOLESTEREMIA: ICD-10-CM

## 2017-08-02 PROCEDURE — 73060 X-RAY EXAM OF HUMERUS: CPT

## 2017-08-02 NOTE — LETTER
8/3/2017 8:53 AM 
 
Ms. Erickson Juanaia [address] [place] [phone] Dear Dre Cannon: 
 
Please find your most recent results below. Resulted Orders XR HUMERUS LT Narrative  
 history: Fall with acute left arm pain COMPARISON: None FINDINGS: 
 
2 views of the left humerus submitted for review. No evidence for acute fracture or dislocation. IMPRESSION: 
 
No evidence for acute fracture or dislocation. RECOMMENDATIONS: 
 
Normal x-ray Please call me if you have any questions: [phone] Sincerely, [employer] [facility]

## 2017-08-02 NOTE — MR AVS SNAPSHOT
Visit Information Date & Time Provider Department Dept. Phone Encounter #  
 8/2/2017 11:15 AM Ranjith Doshi  [facility] [phone] [id number] Follow-up Instructions Return in about 4 weeks (around 8/30/2017) for f/u arm pain. Your Appointments 9/12/2017 11:30 AM  
ESTABLISHED PATIENT with [facility]ogg [facility] ([facility]) Appt Note: 2 month f/u  
 [address] 
[facility] [address] Critical access hospital [address] [place] [address]  
  
   
 [address] [facility] [address] [facility] [address] Upcoming Health Maintenance Date Due Hepatitis C Screening 1955 Pneumococcal 19-64 Medium Risk (1 of 1 - PPSV23) 11/16/1975 DTaP/Tdap/Td series (1 - Tdap) 11/16/1977 PAP AKA CERVICAL CYTOLOGY 11/16/1977 BREAST CANCER SCRN MAMMOGRAM 11/16/2006 FOBT Q 1 YEAR AGE 50-75 11/16/2006 ZOSTER VACCINE AGE 60> 9/16/2016 INFLUENZA AGE 9 TO ADULT 8/1/2017 Allergies as of 8/2/2017  Review Complete On: 8/2/2017 By: Tim Hernandez No Known Allergies Current Immunizations  Never Reviewed No immunizations on file. Not reviewed this visit You Were Diagnosed With   
  
 Codes Comments Essential hypertension with goal blood pressure less than 130/80    -  Primary ICD-10-CM: I10 
ICD-9-CM: 401.9 Prediabetes     ICD-10-CM: R73.03 
ICD-9-CM: 790.29 Hypercholesteremia     ICD-10-CM: E78.00 ICD-9-CM: 272.0 Left upper arm pain     ICD-10-CM: Z82.972 ICD-9-CM: 729.5 Cigarette nicotine dependence without complication     HJN-62-SJ: F17.210 ICD-9-CM: 305.1 Vitals BP Pulse Temp Resp Height(growth percentile) Weight(growth percentile) 120/65 (BP 1 Location: Right arm, BP Patient Position: Sitting) 82 97.6 °F (36.4 °C) (Oral) 18 5' 5\" (1.651 m) 190 lb (86.2 kg) SpO2 BMI OB Status Smoking Status 95% 31.62 kg/m2 Postmenopausal Current Every Day Smoker BMI and BSA Data Body Mass Index Body Surface Area  
 31.62 kg/m 2 1.99 m 2 Preferred Pharmacy Pharmacy Name Phone [facility] [address] [place] [address] [phone] Your Updated Medication List  
  
   
This list is accurate as of: 8/2/17 11:37 AM.  Always use your most recent med list. amLODIPine 10 mg tablet Commonly known as:  Joy Grebe Take 0.5 Tabs by mouth daily. atorvastatin 40 mg tablet Commonly known as:  LIPITOR Take 1 Tab by mouth daily. erythromycin ophthalmic ointment Commonly known as:  ILOTYCIN Apply thin ribbon to inner lids every 6 hours for 7 days KLOR-CON M20 20 mEq tablet Generic drug:  potassium chloride TAKE ONE TABLET BY MOUTH DAILY  
  
 lisinopril 40 mg tablet Commonly known as:  Valene Pencil Take 0.5 Tabs by mouth daily. metoprolol succinate 25 mg XL tablet Commonly known as:  TOPROL-XL Take 1 Tab by mouth daily. risperiDONE 0.5 mg tablet Commonly known as:  RisperDAL Take 1 Tab by mouth nightly. sertraline 50 mg tablet Commonly known as:  ZOLOFT Take 1 Tab by mouth daily. Follow-up Instructions Return in about 4 weeks (around 8/30/2017) for f/u arm pain. To-Do List   
 08/02/2017 Imaging:  XR HUMERUS LT Patient Instructions DASH Diet: Care Instructions Your Care Instructions The DASH diet is an eating plan that can help lower your blood pressure. DASH stands for Dietary Approaches to Stop Hypertension. Hypertension is high blood pressure. The DASH diet focuses on eating foods that are high in calcium, potassium, and magnesium. These nutrients can lower blood pressure. The foods that are highest in these nutrients are fruits, vegetables, low-fat dairy products, nuts, seeds, and legumes.  But taking calcium, potassium, and magnesium supplements instead of eating foods that are high in those nutrients does not have the same effect. The DASH diet also includes whole grains, fish, and poultry. The DASH diet is one of several lifestyle changes your doctor may recommend to lower your high blood pressure. Your doctor may also want you to decrease the amount of sodium in your diet. Lowering sodium while following the DASH diet can lower blood pressure even further than just the DASH diet alone. Follow-up care is a key part of your treatment and safety. Be sure to make and go to all appointments, and call your doctor if you are having problems. It's also a good idea to know your test results and keep a list of the medicines you take. How can you care for yourself at home? Following the DASH diet · Eat 4 to 5 servings of fruit each day. A serving is 1 medium-sized piece of fruit, ½ cup chopped or canned fruit, 1/4 cup dried fruit, or 4 ounces (½ cup) of fruit juice. Choose fruit more often than fruit juice. · Eat 4 to 5 servings of vegetables each day. A serving is 1 cup of lettuce or raw leafy vegetables, ½ cup of chopped or cooked vegetables, or 4 ounces (½ cup) of vegetable juice. Choose vegetables more often than vegetable juice. · Get 2 to 3 servings of low-fat and fat-free dairy each day. A serving is 8 ounces of milk, 1 cup of yogurt, or 1 ½ ounces of cheese. · Eat 6 to 8 servings of grains each day. A serving is 1 slice of bread, 1 ounce of dry cereal, or ½ cup of cooked rice, pasta, or cooked cereal. Try to choose whole-grain products as much as possible. · Limit lean meat, poultry, and fish to 2 servings each day. A serving is 3 ounces, about the size of a deck of cards. · Eat 4 to 5 servings of nuts, seeds, and legumes (cooked dried beans, lentils, and split peas) each week. A serving is 1/3 cup of nuts, 2 tablespoons of seeds, or ½ cup of cooked beans or peas. · Limit fats and oils to 2 to 3 servings each day. A serving is 1 teaspoon of vegetable oil or 2 tablespoons of salad dressing. · Limit sweets and added sugars to 5 servings or less a week. A serving is 1 tablespoon jelly or jam, ½ cup sorbet, or 1 cup of lemonade. · Eat less than 2,300 milligrams (mg) of sodium a day. If you limit your sodium to 1,500 mg a day, you can lower your blood pressure even more. Tips for success · Start small. Do not try to make dramatic changes to your diet all at once. You might feel that you are missing out on your favorite foods and then be more likely to not follow the plan. Make small changes, and stick with them. Once those changes become habit, add a few more changes. · Try some of the following: ¨ Make it a goal to eat a fruit or vegetable at every meal and at snacks. This will make it easy to get the recommended amount of fruits and vegetables each day. ¨ Try yogurt topped with fruit and nuts for a snack or healthy dessert. ¨ Add lettuce, tomato, cucumber, and onion to sandwiches. ¨ Combine a ready-made pizza crust with low-fat mozzarella cheese and lots of vegetable toppings. Try using tomatoes, squash, spinach, broccoli, carrots, cauliflower, and onions. ¨ Have a variety of cut-up vegetables with a low-fat dip as an appetizer instead of chips and dip. ¨ Sprinkle sunflower seeds or chopped almonds over salads. Or try adding chopped walnuts or almonds to cooked vegetables. ¨ Try some vegetarian meals using beans and peas. Add garbanzo or kidney beans to salads. Make burritos and tacos with mashed marques beans or black beans. Where can you learn more? Go to http://maria victoria-ignacio.info/. Enter E351 in the search box to learn more about \"DASH Diet: Care Instructions. \" Current as of: April 3, 2017 Content Version: 11.3 © 1677-5656 [employer]. Care instructions adapted under license by [employer] (which disclaims liability or warranty for this information).  If you have questions about a medical condition or this instruction, always ask your healthcare professional. [address] any warranty or liability for your use of this information. Stopping Smoking: Care Instructions Your Care Instructions Cigarette smokers crave the nicotine in cigarettes. Giving it up is much harder than simply changing a habit. Your body has to stop craving the nicotine. It is hard to quit, but you can do it. There are many tools that people use to quit smoking. You may find that combining tools works best for you. There are several steps to quitting. First you get ready to quit. Then you get support to help you. After that, you learn new skills and behaviors to become a nonsmoker. For many people, a necessary step is getting and using medicine. Your doctor will help you set up the plan that best meets your needs. You may want to attend a smoking cessation program to help you quit smoking. When you choose a program, look for one that has proven success. Ask your doctor for ideas. You will greatly increase your chances of success if you take medicine as well as get counseling or join a cessation program. 
Some of the changes you feel when you first quit tobacco are uncomfortable. Your body will miss the nicotine at first, and you may feel short-tempered and grumpy. You may have trouble sleeping or concentrating. Medicine can help you deal with these symptoms. You may struggle with changing your smoking habits and rituals. The last step is the tricky one: Be prepared for the smoking urge to continue for a time. This is a lot to deal with, but keep at it. You will feel better. Follow-up care is a key part of your treatment and safety. Be sure to make and go to all appointments, and call your doctor if you are having problems. Its also a good idea to know your test results and keep a list of the medicines you take. How can you care for yourself at home? · Ask your family, friends, and coworkers for support. You have a better chance of quitting if you have help and support. · Join a support group, such as Nicotine Anonymous, for people who are trying to quit smoking. · Consider signing up for a smoking cessation program, such as the American Lung Association's Freedom from Smoking program. 
· Set a quit date. Pick your date carefully so that it is not right in the middle of a big deadline or stressful time. Once you quit, do not even take a puff. Get rid of all ashtrays and lighters after your last cigarette. Clean your house and your clothes so that they do not smell of smoke. · Learn how to be a nonsmoker. Think about ways you can avoid those things that make you reach for a cigarette. ¨ Avoid situations that put you at greatest risk for smoking. For some people, it is hard to have a drink with friends without smoking. For others, they might skip a coffee break with coworkers who smoke. ¨ Change your daily routine. Take a different route to work or eat a meal in a different place. · Cut down on stress. Calm yourself or release tension by doing an activity you enjoy, such as reading a book, taking a hot bath, or gardening. · Talk to your doctor or pharmacist about nicotine replacement therapy, which replaces the nicotine in your body. You still get nicotine but you do not use tobacco. Nicotine replacement products help you slowly reduce the amount of nicotine you need. These products come in several forms, many of them available over-the-counter: ¨ Nicotine patches ¨ Nicotine gum and lozenges ¨ Nicotine inhaler · Ask your doctor about bupropion (Wellbutrin) or varenicline (Chantix), which are prescription medicines. They do not contain nicotine. They help you by reducing withdrawal symptoms, such as stress and anxiety. · Some people find hypnosis, acupuncture, and massage helpful for ending the smoking habit. · Eat a healthy diet and get regular exercise. Having healthy habits will help your body move past its craving for nicotine. · Be prepared to keep trying. Most people are not successful the first few times they try to quit. Do not get mad at yourself if you smoke again. Make a list of things you learned and think about when you want to try again, such as next week, next month, or next year. Where can you learn more? Go to http://maria victoria-ignacio.info/. Enter U061 in the search box to learn more about \"Stopping Smoking: Care Instructions. \" Current as of: March 20, 2017 Content Version: 11.3 © 6776-3165 [employer]. Care instructions adapted under license by [employer] (which disclaims liability or warranty for this information). If you have questions about a medical condition or this instruction, always ask your healthcare professional. [place] [address] any warranty or liability for your use of this information. Arm Pain: Care Instructions Your Care Instructions You can hurt your arm by using it too much or by injuring it. Biking, wrestling, and home repair projects are examples of activities that can lead to arm pain. Everyday wear and tear, especially as you get older, can cause arm pain. Your forearms, wrists, hands, and fingers are the parts of your arm that are most likely to become painful. A minor arm injury usually will heal on its own with home treatment to relieve swelling and pain. If you have a more serious injury, you may need tests and treatment. Follow-up care is a key part of your treatment and safety. Be sure to make and go to all appointments, and call your doctor if you are having problems. Its also a good idea to know your test results and keep a list of the medicines you take. How can you care for yourself at home? · Take pain medicines exactly as directed. ¨ If the doctor gave you a prescription medicine for pain, take it as prescribed. ¨ If you are not taking a prescription pain medicine, ask your doctor if you can take an over-the-counter medicine. · Rest and protect your arm. Take a break from any activity that may cause pain. · Put ice or a cold pack on your arm for 10 to 20 minutes at a time. Put a thin cloth between the ice and your skin. · Prop up the sore arm on a pillow when you ice it or anytime you sit or lie down during the next 3 days. Try to keep it above the level of your heart. This will help reduce swelling. · If your doctor recommends a sling to support your arm, wear it as directed. When should you call for help? Call 911 anytime you think you may need emergency care. For example, call if: 
· Your arm or hand is cool or pale or changes color. Call your doctor now or seek immediate medical care if: 
· You cannot use your arm. · You have signs of infection, such as: 
¨ Increased pain, swelling, warmth, or redness. ¨ Red streaks running up or down your arm. ¨ Pus draining from an area of your arm. ¨ A fever. · You have tingling, weakness, or numbness in your arm. Watch closely for changes in your health, and be sure to contact your doctor if: 
· You do not get better as expected. Where can you learn more? Go to http://maria victoria-ignacio.info/. Enter B641 in the search box to learn more about \"Arm Pain: Care Instructions. \" Current as of: March 20, 2017 Content Version: 11.3 © 1964-5402 [employer]. Care instructions adapted under license by [employer] (which disclaims liability or warranty for this information). If you have questions about a medical condition or this instruction, always ask your healthcare professional. [place] [address] any warranty or liability for your use of this information. Introducing \[facility] [place] [facility]\"" & HEALTH SERVICES! Lorenzo Saucedo introduces [employer] patient portal. Now you can access parts of your medical record, email your doctor's office, and request medication refills online. 1. In your internet browser, go to [email or web address] 2. Click on the First Time User? Click Here link in the Sign In box. You will see the New Member Sign Up page. 3. Enter your [employer] Access Code exactly as it appears below. You will not need to use this code after youve completed the sign-up process. If you do not sign up before the expiration date, you must request a new code. · [employer] Access Code: [email or web address] Expires: 8/20/2017  4:35 PM 
 
4. Enter the last four digits of your Social Security Number (xxxx) and Date of Birth (mm/dd/yyyy) as indicated and click Submit. You will be taken to the next sign-up page. 5. Create a [employer] ID. This will be your [employer] login ID and cannot be changed, so think of one that is secure and easy to remember. 6. Create a [employer] password. You can change your password at any time. 7. Enter your Password Reset Question and Answer. This can be used at a later time if you forget your password. 8. Enter your e-mail address. You will receive e-mail notification when new information is available in [address] 9. Click Sign Up. You can now view and download portions of your medical record. 10. Click the Download Summary menu link to download a portable copy of your medical information. If you have questions, please visit the Frequently Asked Questions section of the [employer] website. Remember, [employer] is NOT to be used for urgent needs. For medical emergencies, dial 911. Now available from your iPhone and Android! Please provide this summary of care documentation to your next provider. Your primary care clinician is listed as Randy Lagunas. If you have any questions after today's visit, please call [phone].

## 2017-08-02 NOTE — PATIENT INSTRUCTIONS
DASH Diet: Care Instructions  Your Care Instructions  The DASH diet is an eating plan that can help lower your blood pressure. DASH stands for Dietary Approaches to Stop Hypertension. Hypertension is high blood pressure. The DASH diet focuses on eating foods that are high in calcium, potassium, and magnesium. These nutrients can lower blood pressure. The foods that are highest in these nutrients are fruits, vegetables, low-fat dairy products, nuts, seeds, and legumes. But taking calcium, potassium, and magnesium supplements instead of eating foods that are high in those nutrients does not have the same effect. The DASH diet also includes whole grains, fish, and poultry. The DASH diet is one of several lifestyle changes your doctor may recommend to lower your high blood pressure. Your doctor may also want you to decrease the amount of sodium in your diet. Lowering sodium while following the DASH diet can lower blood pressure even further than just the DASH diet alone. Follow-up care is a key part of your treatment and safety. Be sure to make and go to all appointments, and call your doctor if you are having problems. It's also a good idea to know your test results and keep a list of the medicines you take. How can you care for yourself at home? Following the DASH diet  · Eat 4 to 5 servings of fruit each day. A serving is 1 medium-sized piece of fruit, ½ cup chopped or canned fruit, 1/4 cup dried fruit, or 4 ounces (½ cup) of fruit juice. Choose fruit more often than fruit juice. · Eat 4 to 5 servings of vegetables each day. A serving is 1 cup of lettuce or raw leafy vegetables, ½ cup of chopped or cooked vegetables, or 4 ounces (½ cup) of vegetable juice. Choose vegetables more often than vegetable juice. · Get 2 to 3 servings of low-fat and fat-free dairy each day. A serving is 8 ounces of milk, 1 cup of yogurt, or 1 ½ ounces of cheese. · Eat 6 to 8 servings of grains each day.  A serving is 1 slice of bread, 1 ounce of dry cereal, or ½ cup of cooked rice, pasta, or cooked cereal. Try to choose whole-grain products as much as possible. · Limit lean meat, poultry, and fish to 2 servings each day. A serving is 3 ounces, about the size of a deck of cards. · Eat 4 to 5 servings of nuts, seeds, and legumes (cooked dried beans, lentils, and split peas) each week. A serving is 1/3 cup of nuts, 2 tablespoons of seeds, or ½ cup of cooked beans or peas. · Limit fats and oils to 2 to 3 servings each day. A serving is 1 teaspoon of vegetable oil or 2 tablespoons of salad dressing. · Limit sweets and added sugars to 5 servings or less a week. A serving is 1 tablespoon jelly or jam, ½ cup sorbet, or 1 cup of lemonade. · Eat less than 2,300 milligrams (mg) of sodium a day. If you limit your sodium to 1,500 mg a day, you can lower your blood pressure even more. Tips for success  · Start small. Do not try to make dramatic changes to your diet all at once. You might feel that you are missing out on your favorite foods and then be more likely to not follow the plan. Make small changes, and stick with them. Once those changes become habit, add a few more changes. · Try some of the following:  ¨ Make it a goal to eat a fruit or vegetable at every meal and at snacks. This will make it easy to get the recommended amount of fruits and vegetables each day. ¨ Try yogurt topped with fruit and nuts for a snack or healthy dessert. ¨ Add lettuce, tomato, cucumber, and onion to sandwiches. ¨ Combine a ready-made pizza crust with low-fat mozzarella cheese and lots of vegetable toppings. Try using tomatoes, squash, spinach, broccoli, carrots, cauliflower, and onions. ¨ Have a variety of cut-up vegetables with a low-fat dip as an appetizer instead of chips and dip. ¨ Sprinkle sunflower seeds or chopped almonds over salads. Or try adding chopped walnuts or almonds to cooked vegetables. ¨ Try some vegetarian meals using beans and peas. Add garbanzo or kidney beans to salads. Make burritos and tacos with mashed marques beans or black beans. Where can you learn more? Go to http://maria victoria-ignacio.info/. Enter Q973 in the search box to learn more about \"DASH Diet: Care Instructions. \"  Current as of: April 3, 2017  Content Version: 11.3  © 3639-0768 [employer]. Care instructions adapted under license by [employer] (which disclaims liability or warranty for this information). If you have questions about a medical condition or this instruction, always ask your healthcare professional. [place] [address] any warranty or liability for your use of this information. Stopping Smoking: Care Instructions  Your Care Instructions  Cigarette smokers crave the nicotine in cigarettes. Giving it up is much harder than simply changing a habit. Your body has to stop craving the nicotine. It is hard to quit, but you can do it. There are many tools that people use to quit smoking. You may find that combining tools works best for you. There are several steps to quitting. First you get ready to quit. Then you get support to help you. After that, you learn new skills and behaviors to become a nonsmoker. For many people, a necessary step is getting and using medicine. Your doctor will help you set up the plan that best meets your needs. You may want to attend a smoking cessation program to help you quit smoking. When you choose a program, look for one that has proven success. Ask your doctor for ideas. You will greatly increase your chances of success if you take medicine as well as get counseling or join a cessation program.  Some of the changes you feel when you first quit tobacco are uncomfortable. Your body will miss the nicotine at first, and you may feel short-tempered and grumpy. You may have trouble sleeping or concentrating. Medicine can help you deal with these symptoms.  You may struggle with changing your smoking habits and rituals. The last step is the tricky one: Be prepared for the smoking urge to continue for a time. This is a lot to deal with, but keep at it. You will feel better. Follow-up care is a key part of your treatment and safety. Be sure to make and go to all appointments, and call your doctor if you are having problems. Its also a good idea to know your test results and keep a list of the medicines you take. How can you care for yourself at home? · Ask your family, friends, and coworkers for support. You have a better chance of quitting if you have help and support. · Join a support group, such as Nicotine Anonymous, for people who are trying to quit smoking. · Consider signing up for a smoking cessation program, such as the American Lung Association's Freedom from Smoking program.  · Set a quit date. Pick your date carefully so that it is not right in the middle of a big deadline or stressful time. Once you quit, do not even take a puff. Get rid of all ashtrays and lighters after your last cigarette. Clean your house and your clothes so that they do not smell of smoke. · Learn how to be a nonsmoker. Think about ways you can avoid those things that make you reach for a cigarette. ¨ Avoid situations that put you at greatest risk for smoking. For some people, it is hard to have a drink with friends without smoking. For others, they might skip a coffee break with coworkers who smoke. ¨ Change your daily routine. Take a different route to work or eat a meal in a different place. · Cut down on stress. Calm yourself or release tension by doing an activity you enjoy, such as reading a book, taking a hot bath, or gardening. · Talk to your doctor or pharmacist about nicotine replacement therapy, which replaces the nicotine in your body. You still get nicotine but you do not use tobacco. Nicotine replacement products help you slowly reduce the amount of nicotine you need.  These products come in several forms, many of them available over-the-counter:  ¨ Nicotine patches  ¨ Nicotine gum and lozenges  ¨ Nicotine inhaler  · Ask your doctor about bupropion (Wellbutrin) or varenicline (Chantix), which are prescription medicines. They do not contain nicotine. They help you by reducing withdrawal symptoms, such as stress and anxiety. · Some people find hypnosis, acupuncture, and massage helpful for ending the smoking habit. · Eat a healthy diet and get regular exercise. Having healthy habits will help your body move past its craving for nicotine. · Be prepared to keep trying. Most people are not successful the first few times they try to quit. Do not get mad at yourself if you smoke again. Make a list of things you learned and think about when you want to try again, such as next week, next month, or next year. Where can you learn more? Go to http://maria victoriaTrueVaultignacio.info/. Enter V760 in the search box to learn more about \"Stopping Smoking: Care Instructions. \"  Current as of: March 20, 2017  Content Version: 11.3  © 0706-3810 [employer]. Care instructions adapted under license by [employer] (which disclaims liability or warranty for this information). If you have questions about a medical condition or this instruction, always ask your healthcare professional. [address] any warranty or liability for your use of this information. Arm Pain: Care Instructions  Your Care Instructions  You can hurt your arm by using it too much or by injuring it. Biking, wrestling, and home repair projects are examples of activities that can lead to arm pain. Everyday wear and tear, especially as you get older, can cause arm pain. Your forearms, wrists, hands, and fingers are the parts of your arm that are most likely to become painful. A minor arm injury usually will heal on its own with home treatment to relieve swelling and pain.  If you have a more serious injury, you may need tests and treatment. Follow-up care is a key part of your treatment and safety. Be sure to make and go to all appointments, and call your doctor if you are having problems. Its also a good idea to know your test results and keep a list of the medicines you take. How can you care for yourself at home? · Take pain medicines exactly as directed. ¨ If the doctor gave you a prescription medicine for pain, take it as prescribed. ¨ If you are not taking a prescription pain medicine, ask your doctor if you can take an over-the-counter medicine. · Rest and protect your arm. Take a break from any activity that may cause pain. · Put ice or a cold pack on your arm for 10 to 20 minutes at a time. Put a thin cloth between the ice and your skin. · Prop up the sore arm on a pillow when you ice it or anytime you sit or lie down during the next 3 days. Try to keep it above the level of your heart. This will help reduce swelling. · If your doctor recommends a sling to support your arm, wear it as directed. When should you call for help? Call 911 anytime you think you may need emergency care. For example, call if:  · Your arm or hand is cool or pale or changes color. Call your doctor now or seek immediate medical care if:  · You cannot use your arm. · You have signs of infection, such as:  ¨ Increased pain, swelling, warmth, or redness. ¨ Red streaks running up or down your arm. ¨ Pus draining from an area of your arm. ¨ A fever. · You have tingling, weakness, or numbness in your arm. Watch closely for changes in your health, and be sure to contact your doctor if:  · You do not get better as expected. Where can you learn more? Go to http://maria victoria-ignacio.info/. Enter B641 in the search box to learn more about \"Arm Pain: Care Instructions. \"  Current as of: March 20, 2017  Content Version: 11.3  © 9902-2624 [employer].  Care instructions adapted under license by [address] (which disclaims liability or warranty for this information). If you have questions about a medical condition or this instruction, always ask your healthcare professional. [address] any warranty or liability for your use of this information.

## 2017-08-02 NOTE — PROGRESS NOTES
Chief Complaint   Patient presents with    Cholesterol Problem     f/u    Hypertension     f/u     1. Have you been to the ER, urgent care clinic since your last visit? Hospitalized since your last visit? No    2. Have you seen or consulted any other health care providers outside of the [address] [place] [address] since your last visit? Include any pap smears or colon screening.  Yes NP Jose Valle

## 2017-08-03 NOTE — PROGRESS NOTES
Subjective:     Lanre Garrett is a 62 y.o. female who presents for follow up of hypertension, hyperlipidemia and obesity. Diet and Lifestyle: not attempting to follow a low fat, low cholesterol diet, not attempting to follow a low sodium diet, sedentary, smoker  Home BP Monitoring: is not measured at home    Cardiovascular ROS: taking medications as instructed, no medication side effects noted, no TIA's, no chest pain on exertion, no dyspnea on exertion, no swelling of ankles, no orthostatic dizziness or lightheadedness. New concerns: c/o left upper arm pain, intermittent, for the past 2 months. She believes she may have fallen and injured it when she was confused just prior to her admission for lithium toxicity. The pain has been present since her discharge. She has noted no bruising or redness. Above to use the arm normally for the most part. No weakness or paresthesias. Psychiatric NP increased risperidone to 1 mg recently and she is feeling much better, less anxious. Patient Active Problem List   Diagnosis Code    Bipolar 1 disorder ([facility] F31.9    Essential hypertension with goal blood pressure less than 130/80 I10    Prediabetes R73.03    Hypercholesteremia E78.00    ALEX (acute kidney injury) ([facility] [facility] N17.9    Lithium toxicity T56.891A    Anemia D64.9    Cigarette nicotine dependence without complication D44.677     No Known Allergies  Past Medical History:   Diagnosis Date    Abscess of buttock 7/23/2012    Bronchitis     Common bile duct calculus 7/23/2012    Gallstones 7/12/2012    GERD (gastroesophageal reflux disease)     High cholesterol     Hypercholesterolemia     Hypertension     Ill-defined condition     missing upper front tooth    Ill-defined condition     lower leg bilateral reddened rash.  Insomnia     Other ill-defined conditions     Large boil under right arm-pit.     Paranoia ([facility] [facility]     Psychiatric disorder     bipolar     Past Surgical History:   Procedure Laterality Date    ABDOMEN SURGERY PROC UNLISTED      cholecystectomy 7/23/2012     Family History   Problem Relation Age of Onset    Cancer Father      lung    Diabetes Brother     Heart Disease Brother     Malignant Hyperthermia Neg Hx     Pseudocholinesterase Deficiency Neg Hx     Delayed Awakening Neg Hx     Post-op Nausea/Vomiting Neg Hx     Emergence Delirium Neg Hx     Post-op Cognitive Dysfunction Neg Hx     Other Neg Hx      Social History   Substance Use Topics    Smoking status: Current Every Day Smoker     Packs/day: 0.50    Smokeless tobacco: Never Used    Alcohol use No        Lab Results  Component Value Date/Time   Hemoglobin A1c 5.9 11/02/2016 10:29 AM   Hemoglobin A1c 6.0 05/31/2016 02:13 PM   Glucose 96 05/31/2017 04:17 PM   Glucose (POC) 115 10/31/2011 08:08 PM   LDL, calculated 28.4 05/23/2017 06:16 AM   Creatinine (POC) 0.6 10/31/2011 08:08 PM   Creatinine 0.72 05/31/2017 04:17 PM      Lab Results  Component Value Date/Time   Cholesterol, total 98 05/23/2017 06:16 AM   HDL Cholesterol 40 05/23/2017 06:16 AM   LDL, calculated 28.4 05/23/2017 06:16 AM   Triglyceride 148 05/23/2017 06:16 AM   CHOL/HDL Ratio 2.5 05/23/2017 06:16 AM   Lab Results  Component Value Date/Time   ALT (SGPT) 22 05/31/2017 04:17 PM   AST (SGOT) 23 05/31/2017 04:17 PM   Alk.  phosphatase 153 05/31/2017 04:17 PM   Bilirubin, direct 0.1 10/31/2011 08:05 PM   Bilirubin, total 0.3 05/31/2017 04:17 PM   Albumin 3.3 05/31/2017 04:17 PM   Protein, total 6.8 05/31/2017 04:17 PM   Ammonia <10 05/25/2017 12:51 AM   INR 1.0 10/31/2011 08:05 PM   Prothrombin time 10.2 10/31/2011 08:05 PM   PLATELET 501 95/92/7948 11:25 AM       Lab Results  Component Value Date/Time   GFR est non-AA 91 05/31/2017 04:17 PM   GFRNA, POC >60 10/31/2011 08:08 PM   GFR est  05/31/2017 04:17 PM   GFRAA, POC >60 10/31/2011 08:08 PM   Creatinine 0.72 05/31/2017 04:17 PM   Creatinine (POC) 0.6 10/31/2011 08:08 PM   BUN 8 05/31/2017 04:17 PM BUN (POC) 12 10/31/2011 08:08 PM   Sodium 139 05/31/2017 04:17 PM   Sodium (POC) 136 10/31/2011 08:08 PM   Potassium 3.3 05/31/2017 04:17 PM   Potassium (POC) 3.6 10/31/2011 08:08 PM   Chloride 96 05/31/2017 04:17 PM   Chloride (POC) 102 10/31/2011 08:08 PM   CO2 20 05/31/2017 04:17 PM   Magnesium 1.6 05/26/2017 11:25 AM   Phosphorus 3.1 05/26/2017 11:25 AM                  Review of Systems, additional:  A comprehensive review of systems was negative except for that written in the HPI. Objective:   Visit Vitals    /65 (BP 1 Location: Right arm, BP Patient Position: Sitting)    Pulse 82    Temp 97.6 °F (36.4 °C) (Oral)    Resp 18    Ht 5' 5\" (1.651 m)    Wt 190 lb (86.2 kg)    SpO2 95%    BMI 31.62 kg/m2     Appearance: alert, well appearing, and in no distress, oriented to person, place, and time, overweight and well hydrated. General exam: CVS exam BP noted to be well controlled today in office, S1, S2 normal, no gallop, no murmur, chest clear, no JVD, no HSM, no edema, peripheral vascular exam both carotids normal upstroke without bruits, neurological exam alert, oriented, normal speech, no focal findings or movement disorder noted. Left arm exam reveals mild tenderness over the lateral aspect of the upper arm. No erythema, ecchymosis, or soft tissue edema. Normal ROM shoulder. Normal  strength left hand. 2+ radial pulse on the left. .     Assessment/Plan:     hypertension well controlled, hyperlipidemia needs improvement. reviewed diet, exercise and weight control  very strongly urged to quit smoking to reduce cardiovascular risk  copy of written low fat low cholesterol diet provided and reviewed  reviewed medications and side effects in detail. Diagnoses and all orders for this visit:    1. Essential hypertension with goal blood pressure less than 130/80  At goal  Continue lisinopril, toprol XL, amlodipine  DASH diet  Weight loss  Smoking cessation    2.  Prediabetes  Lifestyle modification to include improved diet, regular exercise, and weight loss  Check A1C at next visit. 3. Hypercholesteremia  TLC Diet:  -- Saturated fat <7% of calories, cholesterol <200 mg/day  -- Consider increased viscous (soluble) fiber (10-25 g/day) and plant stanols/sterols  (2g/day) as therapeutic options to enhance LDL lowering  Weight management  Increased physical activity. Restarted atorvastatin about 1 month ago  Check fasting lipids at next appt    4. Left upper arm pain  -     XR HUMERUS LT; Future    5. Cigarette nicotine dependence without complication  She reports she is ready to make a quit attempt- plans to use reduce to quit approach  She chews on straws to help over the urge to smoke      Follow-up Disposition:  Return in about 4 weeks (around 8/30/2017) for f/u arm pain. Fasting labs. I have discussed the diagnosis with the patient and the intended plan as seen in the above orders. The patient has received an after-visit summary and questions were answered concerning future plans. Patient conveyed understanding of the plan at the time of the visit.     Renata Dang NP  08/02/17

## 2017-08-04 ENCOUNTER — TELEPHONE (OUTPATIENT)
Dept: BEHAVIORAL/MENTAL HEALTH CLINIC | Age: 62
End: 2017-08-04

## 2017-08-04 NOTE — TELEPHONE ENCOUNTER
Pt called to let you know that she is doing fine on the other resperdone that you started her on earlier this week. She said if you need to talk to her then you can give her a call back.

## 2017-08-15 ENCOUNTER — TELEPHONE (OUTPATIENT)
Dept: BEHAVIORAL/MENTAL HEALTH CLINIC | Age: 62
End: 2017-08-15

## 2017-08-15 RX ORDER — LORAZEPAM 0.5 MG/1
0.5 TABLET ORAL
Qty: 60 TAB | Refills: 0 | Status: SHIPPED | OUTPATIENT
Start: 2017-08-15 | End: 2017-09-12 | Stop reason: SDUPTHER

## 2017-08-29 DIAGNOSIS — F31.9 BIPOLAR 1 DISORDER (HCC): ICD-10-CM

## 2017-08-29 RX ORDER — RISPERIDONE 0.5 MG/1
0.5 TABLET, FILM COATED ORAL
Qty: 30 TAB | Refills: 0 | OUTPATIENT
Start: 2017-08-29

## 2017-08-30 RX ORDER — RISPERIDONE 1 MG/1
1 TABLET, FILM COATED ORAL
Qty: 30 TAB | Refills: 0 | Status: SHIPPED | OUTPATIENT
Start: 2017-08-30 | End: 2017-09-12 | Stop reason: SDUPTHER

## 2017-09-01 RX ORDER — POTASSIUM CHLORIDE 1500 MG/1
TABLET, EXTENDED RELEASE ORAL
Qty: 30 TAB | Refills: 0 | Status: SHIPPED | OUTPATIENT
Start: 2017-09-01 | End: 2017-10-02 | Stop reason: SDUPTHER

## 2017-09-06 ENCOUNTER — OFFICE VISIT (OUTPATIENT)
Dept: FAMILY MEDICINE CLINIC | Age: 62
End: 2017-09-06

## 2017-09-06 VITALS
DIASTOLIC BLOOD PRESSURE: 66 MMHG | TEMPERATURE: 97.7 F | WEIGHT: 197 LBS | BODY MASS INDEX: 32.82 KG/M2 | OXYGEN SATURATION: 93 % | SYSTOLIC BLOOD PRESSURE: 104 MMHG | RESPIRATION RATE: 20 BRPM | HEART RATE: 75 BPM | HEIGHT: 65 IN

## 2017-09-06 DIAGNOSIS — I10 ESSENTIAL HYPERTENSION WITH GOAL BLOOD PRESSURE LESS THAN 130/80: Primary | ICD-10-CM

## 2017-09-06 DIAGNOSIS — E87.6 HYPOKALEMIA: ICD-10-CM

## 2017-09-06 DIAGNOSIS — E78.00 HYPERCHOLESTEREMIA: ICD-10-CM

## 2017-09-06 DIAGNOSIS — F17.210 CIGARETTE NICOTINE DEPENDENCE WITHOUT COMPLICATION: ICD-10-CM

## 2017-09-06 DIAGNOSIS — R73.03 PREDIABETES: ICD-10-CM

## 2017-09-06 NOTE — MR AVS SNAPSHOT
Visit Information Date & Time Provider Department Dept. Phone Encounter #  
 9/6/2017 11:00 AM Renata Dang  [facility] [phone] [id number] Follow-up Instructions Return in about 3 months (around 12/6/2017). Your Appointments 9/12/2017 11:30 AM  
ESTABLISHED PATIENT with Keenan Lynn NP [facility] ([address]) Appt Note: 2 month f/u  
 [address] [facility] [address] [facility] Ruricardo Hylton [address]  
  
   
 [address] [facility] [address] [facility] [address] [address] Upcoming Health Maintenance Date Due Hepatitis C Screening 1955 Pneumococcal 19-64 Medium Risk (1 of 1 - PPSV23) 11/16/1975 DTaP/Tdap/Td series (1 - Tdap) 11/16/1977 PAP AKA CERVICAL CYTOLOGY 11/16/1977 BREAST CANCER SCRN MAMMOGRAM 11/16/2006 FOBT Q 1 YEAR AGE 50-75 11/16/2006 ZOSTER VACCINE AGE 60> 9/16/2016 INFLUENZA AGE 9 TO ADULT 8/1/2017 Allergies as of 9/6/2017  Review Complete On: 9/6/2017 By: Kemar Perales No Known Allergies Current Immunizations  Never Reviewed No immunizations on file. Not reviewed this visit You Were Diagnosed With   
  
 Codes Comments Essential hypertension with goal blood pressure less than 130/80    -  Primary ICD-10-CM: I10 
ICD-9-CM: 401.9 Prediabetes     ICD-10-CM: R73.03 
ICD-9-CM: 790.29 Hypercholesteremia     ICD-10-CM: E78.00 ICD-9-CM: 272.0 Hypokalemia     ICD-10-CM: E87.6 ICD-9-CM: 276.8 Cigarette nicotine dependence without complication     [facility]: F17.210 ICD-9-CM: 305.1 Vitals BP Pulse Temp Resp Height(growth percentile) Weight(growth percentile) 104/66 (BP 1 Location: Right arm, BP Patient Position: Sitting) 75 97.7 °F (36.5 °C) (Oral) 20 5' 5\" (1.651 m) 197 lb (89.4 kg) SpO2 BMI OB Status Smoking Status 93% 32.78 kg/m2 Postmenopausal Current Every Day Smoker Vitals History BMI and BSA Data Body Mass Index Body Surface Area 32.78 kg/m 2 2.02 m 2 Preferred Pharmacy Pharmacy Name Phone Madi [facility] ([place], [place]) [address] [phone] Your Updated Medication List  
  
   
This list is accurate as of: 9/6/17 11:34 AM.  Always use your most recent med list. amLODIPine 10 mg tablet Commonly known as:  Clarita Funez Take 0.5 Tabs by mouth daily. atorvastatin 40 mg tablet Commonly known as:  LIPITOR Take 1 Tab by mouth daily. erythromycin ophthalmic ointment Commonly known as:  ILOTYCIN Apply thin ribbon to inner lids every 6 hours for 7 days KLOR-CON M20 20 mEq tablet Generic drug:  potassium chloride TAKE ONE TABLET BY MOUTH DAILY  
  
 lisinopril 40 mg tablet Commonly known as:  Levin [place] Take 0.5 Tabs by mouth daily. LORazepam 0.5 mg tablet Commonly known as:  ATIVAN Take 1 Tab by mouth two (2) times daily as needed for Anxiety. Max Daily Amount: 1 mg.  
  
 metoprolol succinate 25 mg XL tablet Commonly known as:  TOPROL-XL Take 1 Tab by mouth daily. risperiDONE 1 mg tablet Commonly known as:  RisperDAL Take 1 Tab by mouth nightly. sertraline 50 mg tablet Commonly known as:  ZOLOFT Take 1 Tab by mouth daily. We Performed the Following LIPID PANEL [20294 CPT(R)] METABOLIC PANEL, COMPREHENSIVE [04858 CPT(R)] Follow-up Instructions Return in about 3 months (around 12/6/2017). Patient Instructions Stopping Smoking: Care Instructions Your Care Instructions Cigarette smokers crave the nicotine in cigarettes. Giving it up is much harder than simply changing a habit. Your body has to stop craving the nicotine. It is hard to quit, but you can do it. There are many tools that people use to quit smoking. You may find that combining tools works best for you. There are several steps to quitting. First you get ready to quit. Then you get support to help you. After that, you learn new skills and behaviors to become a nonsmoker. For many people, a necessary step is getting and using medicine. Your doctor will help you set up the plan that best meets your needs. You may want to attend a smoking cessation program to help you quit smoking. When you choose a program, look for one that has proven success. Ask your doctor for ideas. You will greatly increase your chances of success if you take medicine as well as get counseling or join a cessation program. 
Some of the changes you feel when you first quit tobacco are uncomfortable. Your body will miss the nicotine at first, and you may feel short-tempered and grumpy. You may have trouble sleeping or concentrating. Medicine can help you deal with these symptoms. You may struggle with changing your smoking habits and rituals. The last step is the tricky one: Be prepared for the smoking urge to continue for a time. This is a lot to deal with, but keep at it. You will feel better. Follow-up care is a key part of your treatment and safety. Be sure to make and go to all appointments, and call your doctor if you are having problems. Its also a good idea to know your test results and keep a list of the medicines you take. How can you care for yourself at home? · Ask your family, friends, and coworkers for support. You have a better chance of quitting if you have help and support. · Join a support group, such as Nicotine Anonymous, for people who are trying to quit smoking. · Consider signing up for a smoking cessation program, such as the American Lung Association's Freedom from Smoking program. 
· Set a quit date. Pick your date carefully so that it is not right in the middle of a big deadline or stressful time. Once you quit, do not even take a puff.  Get rid of all ashtrays and lighters after your last cigarette. Clean your house and your clothes so that they do not smell of smoke. · Learn how to be a nonsmoker. Think about ways you can avoid those things that make you reach for a cigarette. ¨ Avoid situations that put you at greatest risk for smoking. For some people, it is hard to have a drink with friends without smoking. For others, they might skip a coffee break with coworkers who smoke. ¨ Change your daily routine. Take a different route to work or eat a meal in a different place. · Cut down on stress. Calm yourself or release tension by doing an activity you enjoy, such as reading a book, taking a hot bath, or gardening. · Talk to your doctor or pharmacist about nicotine replacement therapy, which replaces the nicotine in your body. You still get nicotine but you do not use tobacco. Nicotine replacement products help you slowly reduce the amount of nicotine you need. These products come in several forms, many of them available over-the-counter: ¨ Nicotine patches ¨ Nicotine gum and lozenges ¨ Nicotine inhaler · Ask your doctor about bupropion (Wellbutrin) or varenicline (Chantix), which are prescription medicines. They do not contain nicotine. They help you by reducing withdrawal symptoms, such as stress and anxiety. · Some people find hypnosis, acupuncture, and massage helpful for ending the smoking habit. · Eat a healthy diet and get regular exercise. Having healthy habits will help your body move past its craving for nicotine. · Be prepared to keep trying. Most people are not successful the first few times they try to quit. Do not get mad at yourself if you smoke again. Make a list of things you learned and think about when you want to try again, such as next week, next month, or next year. Where can you learn more? Go to http://maria victoria-ignacio.info/. Enter L745 in the search box to learn more about \"Stopping Smoking: Care Instructions. \" Current as of: March 20, 2017 Content Version: 11.3 © 6225-3252 [employer]. Care instructions adapted under license by [employer] (which disclaims liability or warranty for this information). If you have questions about a medical condition or this instruction, always ask your healthcare professional. [address] any warranty or liability for your use of this information. Introducing [facility]! Dear Pam Jett: Thank you for requesting a [employer] account. Our records indicate that you already have an active [employer] account. You can access your account anytime at [email or web address]. [email or web address] Did you know that you can access your hospital and ER discharge instructions at any time in [employer]? You can also review all of your test results from your hospital stay or ER visit. Additional Information If you have questions, please visit the Frequently Asked Questions section of the [employer] website at [email or web address] Remember, [employer] is NOT to be used for urgent needs. For medical emergencies, dial 911. Now available from your iPhone and Android! Please provide this summary of care documentation to your next provider. Your primary care clinician is listed as Antonina North. If you have any questions after today's visit, please call [phone].

## 2017-09-06 NOTE — PATIENT INSTRUCTIONS

## 2017-09-06 NOTE — PROGRESS NOTES
Chief Complaint   Patient presents with    Hypertension     f/u    Cholesterol Problem     f/u       1. Have you been to the ER, urgent care clinic since your last visit? Hospitalized since your last visit? No    2. Have you seen or consulted any other health care providers outside of the [address] [place] [address] since your last visit? Include any pap smears or colon screening.  no

## 2017-09-06 NOTE — PROGRESS NOTES
Subjective:     Lc Allison is a 62 y.o. female who presents for follow up of hypertension, hyperlipidemia and obesity. Accompanied by her , who contributes to history. She has regular visits for bipolar disorder with her psych NP Cuca Pradhan. Recently her risperidone was increased. She is doing well, will go outside of her house now, but still avoids crowds/large groups of people. Her  continues to supervised her medication administration. She is working on cutting back on the smoking. She is not fasting today. Diet and Lifestyle: not attempting to follow a low fat, low cholesterol diet, not attempting to follow a low sodium diet, does not rigorously follow a diabetic diet, sedentary, smoker  Home BP Monitoring: is not measured at home    Cardiovascular ROS: taking medications as instructed, no medication side effects noted, no TIA's, no chest pain on exertion, no dyspnea on exertion, no swelling of ankles, no orthostatic dizziness or lightheadedness. New concerns: none. Patient Active Problem List   Diagnosis Code    [facility] F31.9    Essential hypertension with goal blood pressure less than 130/80 I10    Prediabetes R73.03    Hypercholesteremia E78.00    ALEX (acute kidney injury) ([facility] N17.9    Lithium toxicity T56.891A    Anemia D64.9    Cigarette nicotine dependence without complication L94.859     No Known Allergies  Past Medical History:   Diagnosis Date    Abscess of buttock 7/23/2012    Bronchitis     Common bile duct calculus 7/23/2012    Gallstones 7/12/2012    GERD (gastroesophageal reflux disease)     High cholesterol     Hypercholesterolemia     Hypertension     Ill-defined condition     missing upper front tooth    Ill-defined condition     lower leg bilateral reddened rash.  Insomnia     Other ill-defined conditions     Large boil under right arm-pit.     Paranoia ([facility]     Psychiatric disorder     bipolar     Past Surgical History:   Procedure Laterality Date    ABDOMEN SURGERY PROC UNLISTED      cholecystectomy 7/23/2012     Family History   Problem Relation Age of Onset    Cancer Father      lung    Diabetes Brother     Heart Disease Brother     Malignant Hyperthermia Neg Hx     Pseudocholinesterase Deficiency Neg Hx     Delayed Awakening Neg Hx     Post-op Nausea/Vomiting Neg Hx     Emergence Delirium Neg Hx     Post-op Cognitive Dysfunction Neg Hx     Other Neg Hx      Social History   Substance Use Topics    Smoking status: Current Every Day Smoker     Packs/day: 0.50    Smokeless tobacco: Never Used    Alcohol use No        Lab Results  Component Value Date/Time   Hemoglobin A1c 5.9 11/02/2016 10:29 AM   Hemoglobin A1c 6.0 05/31/2016 02:13 PM   Glucose 96 05/31/2017 04:17 PM   Glucose (POC) 115 10/31/2011 08:08 PM   LDL, calculated 28.4 05/23/2017 06:16 AM   Creatinine (POC) 0.6 10/31/2011 08:08 PM   Creatinine 0.72 05/31/2017 04:17 PM      Lab Results  Component Value Date/Time   Cholesterol, total 98 05/23/2017 06:16 AM   HDL Cholesterol 40 05/23/2017 06:16 AM   LDL, calculated 28.4 05/23/2017 06:16 AM   Triglyceride 148 05/23/2017 06:16 AM   CHOL/HDL Ratio 2.5 05/23/2017 06:16 AM   Lab Results  Component Value Date/Time   ALT (SGPT) 22 05/31/2017 04:17 PM   AST (SGOT) 23 05/31/2017 04:17 PM   Alk.  phosphatase 153 05/31/2017 04:17 PM   Bilirubin, direct 0.1 10/31/2011 08:05 PM   Bilirubin, total 0.3 05/31/2017 04:17 PM   Albumin 3.3 05/31/2017 04:17 PM   Protein, total 6.8 05/31/2017 04:17 PM   Ammonia <10 05/25/2017 12:51 AM   INR 1.0 10/31/2011 08:05 PM   Prothrombin time 10.2 10/31/2011 08:05 PM   PLATELET 555 89/90/4813 11:25 AM       Lab Results  Component Value Date/Time   GFR est non-AA 91 05/31/2017 04:17 PM   GFRNA, POC >60 10/31/2011 08:08 PM   GFR est  05/31/2017 04:17 PM   GFRAA, POC >60 10/31/2011 08:08 PM   Creatinine 0.72 05/31/2017 04:17 PM   Creatinine (POC) 0.6 10/31/2011 08:08 PM BUN 8 05/31/2017 04:17 PM   BUN (POC) 12 10/31/2011 08:08 PM   Sodium 139 05/31/2017 04:17 PM   Sodium (POC) 136 10/31/2011 08:08 PM   Potassium 3.3 05/31/2017 04:17 PM   Potassium (POC) 3.6 10/31/2011 08:08 PM   Chloride 96 05/31/2017 04:17 PM   Chloride (POC) 102 10/31/2011 08:08 PM   CO2 20 05/31/2017 04:17 PM   Magnesium 1.6 05/26/2017 11:25 AM   Phosphorus 3.1 05/26/2017 11:25 AM       Review of Systems, additional:  A comprehensive review of systems was negative except for that written in the HPI. Objective:   Visit Vitals    /66 (BP 1 Location: Right arm, BP Patient Position: Sitting)    Pulse 75    Temp 97.7 °F (36.5 °C) (Oral)    Resp 20    Ht 5' 5\" (1.651 m)    Wt 197 lb (89.4 kg)    SpO2 93%    BMI 32.78 kg/m2     Appearance: alert, well appearing, and in no distress, oriented to person, place, and time, overweight and well hydrated. General exam: CVS exam BP noted to be well controlled today in office, S1, S2 normal, no gallop, no murmur, chest clear, no JVD, no HSM, no edema, peripheral vascular exam both carotids normal upstroke without bruits, neurological exam alert, oriented, normal speech, no focal findings or movement disorder noted. Assessment/Plan:     hypertension well controlled, hyperlipidemia control uncertain. orders and follow up as documented in patient record  reviewed diet, exercise and weight control  very strongly urged to quit smoking to reduce cardiovascular risk  copy of written low fat low cholesterol diet provided and reviewed  cardiovascular risk and specific lipid/LDL goals reviewed  reviewed medications and side effects in detail. Diagnoses and all orders for this visit:    1. Essential hypertension with goal blood pressure less than 130/80  At goal  continue lisinopril, amlodipine, toprol  DASH diet  Smoking cessation  Daily walking  Weight loss    2. Prediabetes   Lifestyle modification    3.  Hypercholesteremia  TLC Diet:  -- Saturated fat <7% of calories, cholesterol <200 mg/day  -- Consider increased viscous (soluble) fiber (10-25 g/day) and plant stanols/sterols  (2g/day) as therapeutic options to enhance LDL lowering  Weight management  Increased physical activity. continue atorvastatin  -     LIPID PANEL    4. Hypokalemia  -     METABOLIC PANEL, COMPREHENSIVE    5. Cigarette nicotine dependence without complication  The patient was counseled on the dangers of tobacco use, and was advised to quit. Reviewed strategies to maximize success, including removing cigarettes and smoking materials from environment, stress management, substitution of other forms of reinforcement, support of family/friends and written materials. Follow-up Disposition:  Return in about 3 months (around 12/6/2017). I have discussed the diagnosis with the patient and the intended plan as seen in the above orders. The patient has received an after-visit summary and questions were answered concerning future plans. Patient conveyed understanding of the plan at the time of the visit.     Daniel Zhao NP  09/06/17

## 2017-09-12 ENCOUNTER — OFFICE VISIT (OUTPATIENT)
Dept: BEHAVIORAL/MENTAL HEALTH CLINIC | Age: 62
End: 2017-09-12

## 2017-09-12 VITALS
WEIGHT: 197 LBS | HEIGHT: 65 IN | OXYGEN SATURATION: 98 % | HEART RATE: 88 BPM | BODY MASS INDEX: 32.82 KG/M2 | SYSTOLIC BLOOD PRESSURE: 135 MMHG | DIASTOLIC BLOOD PRESSURE: 69 MMHG

## 2017-09-12 DIAGNOSIS — F31.9 BIPOLAR 1 DISORDER (HCC): ICD-10-CM

## 2017-09-12 RX ORDER — LORAZEPAM 0.5 MG/1
0.5 TABLET ORAL
Qty: 60 TAB | Refills: 2 | Status: SHIPPED | OUTPATIENT
Start: 2017-09-12 | End: 2017-12-13 | Stop reason: SDUPTHER

## 2017-09-12 RX ORDER — RISPERIDONE 1 MG/1
1 TABLET, FILM COATED ORAL
Qty: 30 TAB | Refills: 3 | Status: SHIPPED | OUTPATIENT
Start: 2017-09-12 | End: 2017-12-13 | Stop reason: SDUPTHER

## 2017-09-12 RX ORDER — SERTRALINE HYDROCHLORIDE 50 MG/1
50 TABLET, FILM COATED ORAL DAILY
Qty: 30 TAB | Refills: 3 | Status: SHIPPED | OUTPATIENT
Start: 2017-09-12 | End: 2017-12-13 | Stop reason: SDUPTHER
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
## 2017-09-14 LAB
ALBUMIN SERPL-MCNC: 4.3 G/DL (ref 3.6–4.8)
ALBUMIN/GLOB SERPL: 1.4 {RATIO} (ref 1.2–2.2)
ALP SERPL-CCNC: 99 IU/L (ref 39–117)
ALT SERPL-CCNC: 19 IU/L (ref 0–32)
AST SERPL-CCNC: 16 IU/L (ref 0–40)
BILIRUB SERPL-MCNC: 0.3 MG/DL (ref 0–1.2)
BUN SERPL-MCNC: 10 MG/DL (ref 8–27)
BUN/CREAT SERPL: 13 (ref 12–28)
CALCIUM SERPL-MCNC: 10 MG/DL (ref 8.7–10.3)
CHLORIDE SERPL-SCNC: 101 MMOL/L (ref 96–106)
CHOLEST SERPL-MCNC: 184 MG/DL (ref 100–199)
CO2 SERPL-SCNC: 25 MMOL/L (ref 18–29)
CREAT SERPL-MCNC: 0.78 MG/DL (ref 0.57–1)
GLOBULIN SER CALC-MCNC: 3.1 G/DL (ref 1.5–4.5)
GLUCOSE SERPL-MCNC: 102 MG/DL (ref 65–99)
HDLC SERPL-MCNC: 46 MG/DL
INTERPRETATION, 910389: NORMAL
LDLC SERPL CALC-MCNC: 96 MG/DL (ref 0–99)
POTASSIUM SERPL-SCNC: 4.7 MMOL/L (ref 3.5–5.2)
PROT SERPL-MCNC: 7.4 G/DL (ref 6–8.5)
SODIUM SERPL-SCNC: 140 MMOL/L (ref 134–144)
TRIGL SERPL-MCNC: 209 MG/DL (ref 0–149)
VLDLC SERPL CALC-MCNC: 42 MG/DL (ref 5–40)

## 2017-09-15 NOTE — PROGRESS NOTES
Kidney function is normal.  Triglycerides are elevated again- cut back on sugar and simple carbs, junk food. The LDL is still good but has gone up a bit as well. Continue atorvastatin at current dose.

## 2017-10-02 RX ORDER — POTASSIUM CHLORIDE 20 MEQ/1
20 TABLET, EXTENDED RELEASE ORAL DAILY
Qty: 30 TAB | Refills: 3 | Status: SHIPPED | OUTPATIENT
Start: 2017-10-02 | End: 2017-12-06 | Stop reason: SDUPTHER

## 2017-10-02 NOTE — TELEPHONE ENCOUNTER
Jennifer Jordan        Phone Number: [phone]                       Previous Messages       ----- Message -----   Esteban Murdock From: Alen Schwab Smith-Randall      Sent: 9/30/2017  11:01 AM        To:  [address] [place] [address] Office   Subject: An Weston NP / Refill                       Pt is out of refills for Potassium Chloride needs refill sent to [facility] [address]                Last OV: 9/6/17  Last fill: 9/1/17

## 2017-10-30 DIAGNOSIS — I10 ESSENTIAL HYPERTENSION WITH GOAL BLOOD PRESSURE LESS THAN 130/80: ICD-10-CM

## 2017-10-31 RX ORDER — METOPROLOL SUCCINATE 25 MG/1
TABLET, EXTENDED RELEASE ORAL
Qty: 30 TAB | Refills: 4 | Status: SHIPPED | OUTPATIENT
Start: 2017-10-31 | End: 2017-12-06 | Stop reason: SDUPTHER

## 2017-11-14 DIAGNOSIS — I10 ESSENTIAL HYPERTENSION WITH GOAL BLOOD PRESSURE LESS THAN 130/80: ICD-10-CM

## 2017-11-14 RX ORDER — AMLODIPINE BESYLATE 10 MG/1
TABLET ORAL
Qty: 30 TAB | Refills: 0 | Status: SHIPPED | OUTPATIENT
Start: 2017-11-14 | End: 2017-12-06 | Stop reason: SDUPTHER

## 2017-12-06 ENCOUNTER — OFFICE VISIT (OUTPATIENT)
Dept: FAMILY MEDICINE CLINIC | Age: 62
End: 2017-12-06

## 2017-12-06 VITALS
TEMPERATURE: 97.6 F | SYSTOLIC BLOOD PRESSURE: 124 MMHG | DIASTOLIC BLOOD PRESSURE: 67 MMHG | HEIGHT: 65 IN | HEART RATE: 85 BPM | OXYGEN SATURATION: 94 % | BODY MASS INDEX: 33.15 KG/M2 | WEIGHT: 199 LBS | RESPIRATION RATE: 18 BRPM

## 2017-12-06 DIAGNOSIS — E78.00 HYPERCHOLESTEREMIA: ICD-10-CM

## 2017-12-06 DIAGNOSIS — I10 ESSENTIAL HYPERTENSION WITH GOAL BLOOD PRESSURE LESS THAN 130/80: Primary | ICD-10-CM

## 2017-12-06 DIAGNOSIS — F31.9 BIPOLAR 1 DISORDER (HCC): ICD-10-CM

## 2017-12-06 DIAGNOSIS — R73.03 PREDIABETES: ICD-10-CM

## 2017-12-06 DIAGNOSIS — N17.9 AKI (ACUTE KIDNEY INJURY) (HCC): ICD-10-CM

## 2017-12-06 RX ORDER — LISINOPRIL 40 MG/1
20 TABLET ORAL DAILY
Qty: 90 TAB | Refills: 1 | Status: SHIPPED | OUTPATIENT
Start: 2017-12-06 | End: 2019-02-06 | Stop reason: SDUPTHER

## 2017-12-06 RX ORDER — ATORVASTATIN CALCIUM 40 MG/1
40 TABLET, FILM COATED ORAL DAILY
Qty: 90 TAB | Refills: 1 | Status: SHIPPED | OUTPATIENT
Start: 2017-12-06 | End: 2018-08-11 | Stop reason: SDUPTHER

## 2017-12-06 RX ORDER — POTASSIUM CHLORIDE 20 MEQ/1
20 TABLET, EXTENDED RELEASE ORAL DAILY
Qty: 30 TAB | Refills: 3 | Status: SHIPPED | OUTPATIENT
Start: 2017-12-06 | End: 2018-05-01 | Stop reason: SDUPTHER

## 2017-12-06 RX ORDER — METOPROLOL SUCCINATE 25 MG/1
25 TABLET, EXTENDED RELEASE ORAL DAILY
Qty: 90 TAB | Refills: 1 | Status: SHIPPED | OUTPATIENT
Start: 2017-12-06 | End: 2019-02-06 | Stop reason: SDUPTHER

## 2017-12-06 RX ORDER — AMLODIPINE BESYLATE 10 MG/1
5 TABLET ORAL DAILY
Qty: 90 TAB | Refills: 1 | Status: SHIPPED | OUTPATIENT
Start: 2017-12-06 | End: 2019-02-06 | Stop reason: SDUPTHER

## 2017-12-06 NOTE — PROGRESS NOTES
Chief Complaint   Patient presents with    Hypertension     3 month follow up     Cholesterol Problem     1. Have you been to the ER, urgent care clinic since your last visit? Hospitalized since your last visit? no    2. Have you seen or consulted any other health care providers outside of the [address] [place] [address] since your last visit? Include any pap smears or colon screening.  no

## 2017-12-06 NOTE — PATIENT INSTRUCTIONS
DASH Diet: Care Instructions  Your Care Instructions    The DASH diet is an eating plan that can help lower your blood pressure. DASH stands for Dietary Approaches to Stop Hypertension. Hypertension is high blood pressure. The DASH diet focuses on eating foods that are high in calcium, potassium, and magnesium. These nutrients can lower blood pressure. The foods that are highest in these nutrients are fruits, vegetables, low-fat dairy products, nuts, seeds, and legumes. But taking calcium, potassium, and magnesium supplements instead of eating foods that are high in those nutrients does not have the same effect. The DASH diet also includes whole grains, fish, and poultry. The DASH diet is one of several lifestyle changes your doctor may recommend to lower your high blood pressure. Your doctor may also want you to decrease the amount of sodium in your diet. Lowering sodium while following the DASH diet can lower blood pressure even further than just the DASH diet alone. Follow-up care is a key part of your treatment and safety. Be sure to make and go to all appointments, and call your doctor if you are having problems. It's also a good idea to know your test results and keep a list of the medicines you take. How can you care for yourself at home? Following the DASH diet  · Eat 4 to 5 servings of fruit each day. A serving is 1 medium-sized piece of fruit, ½ cup chopped or canned fruit, 1/4 cup dried fruit, or 4 ounces (½ cup) of fruit juice. Choose fruit more often than fruit juice. · Eat 4 to 5 servings of vegetables each day. A serving is 1 cup of lettuce or raw leafy vegetables, ½ cup of chopped or cooked vegetables, or 4 ounces (½ cup) of vegetable juice. Choose vegetables more often than vegetable juice. · Get 2 to 3 servings of low-fat and fat-free dairy each day. A serving is 8 ounces of milk, 1 cup of yogurt, or 1 ½ ounces of cheese. · Eat 6 to 8 servings of grains each day.  A serving is 1 slice of bread, 1 ounce of dry cereal, or ½ cup of cooked rice, pasta, or cooked cereal. Try to choose whole-grain products as much as possible. · Limit lean meat, poultry, and fish to 2 servings each day. A serving is 3 ounces, about the size of a deck of cards. · Eat 4 to 5 servings of nuts, seeds, and legumes (cooked dried beans, lentils, and split peas) each week. A serving is 1/3 cup of nuts, 2 tablespoons of seeds, or ½ cup of cooked beans or peas. · Limit fats and oils to 2 to 3 servings each day. A serving is 1 teaspoon of vegetable oil or 2 tablespoons of salad dressing. · Limit sweets and added sugars to 5 servings or less a week. A serving is 1 tablespoon jelly or jam, ½ cup sorbet, or 1 cup of lemonade. · Eat less than 2,300 milligrams (mg) of sodium a day. If you limit your sodium to 1,500 mg a day, you can lower your blood pressure even more. Tips for success  · Start small. Do not try to make dramatic changes to your diet all at once. You might feel that you are missing out on your favorite foods and then be more likely to not follow the plan. Make small changes, and stick with them. Once those changes become habit, add a few more changes. · Try some of the following:  ¨ Make it a goal to eat a fruit or vegetable at every meal and at snacks. This will make it easy to get the recommended amount of fruits and vegetables each day. ¨ Try yogurt topped with fruit and nuts for a snack or healthy dessert. ¨ Add lettuce, tomato, cucumber, and onion to sandwiches. ¨ Combine a ready-made pizza crust with low-fat mozzarella cheese and lots of vegetable toppings. Try using tomatoes, squash, spinach, broccoli, carrots, cauliflower, and onions. ¨ Have a variety of cut-up vegetables with a low-fat dip as an appetizer instead of chips and dip. ¨ Sprinkle sunflower seeds or chopped almonds over salads. Or try adding chopped walnuts or almonds to cooked vegetables.   ¨ Try some vegetarian meals using beans and peas. Add garbanzo or kidney beans to salads. Make burritos and tacos with mashed marques beans or black beans. Where can you learn more? Go to http://maria victoria-ignacio.info/. Enter I254 in the search box to learn more about \"DASH Diet: Care Instructions. \"  Current as of: September 21, 2016  Content Version: 11.4  © 1452-6246 [facility]. Care instructions adapted under license by [employer] (which disclaims liability or warranty for this information). If you have questions about a medical condition or this instruction, always ask your healthcare professional. [address] any warranty or liability for your use of this information. High Cholesterol: Care Instructions  Your Care Instructions    Cholesterol is a type of fat in your blood. It is needed for many body functions, such as making new cells. Cholesterol is made by your body. It also comes from food you eat. High cholesterol means that you have too much of the fat in your blood. This raises your risk of a heart attack and stroke. LDL and HDL are part of your total cholesterol. LDL is the \"bad\" cholesterol. High LDL can raise your risk for heart disease, heart attack, and stroke. HDL is the \"good\" cholesterol. It helps clear bad cholesterol from the body. High HDL is linked with a lower risk of heart disease, heart attack, and stroke. Your cholesterol levels help your doctor find out your risk for having a heart attack or stroke. You and your doctor can talk about whether you need to lower your risk and what treatment is best for you. A heart-healthy lifestyle along with medicines can help lower your cholesterol and your risk. The way you choose to lower your risk will depend on how high your risk is for heart attack and stroke. It will also depend on how you feel about taking medicines. Follow-up care is a key part of your treatment and safety.  Be sure to make and go to all appointments, and call your doctor if you are having problems. It's also a good idea to know your test results and keep a list of the medicines you take. How can you care for yourself at home? · Eat a variety of foods every day. Good choices include fruits, vegetables, whole grains (like oatmeal), dried beans and peas, nuts and seeds, soy products (like tofu), and fat-free or low-fat dairy products. · Replace butter, margarine, and hydrogenated or partially hydrogenated oils with olive and canola oils. (Canola oil margarine without trans fat is fine.)  · Replace red meat with fish, poultry, and soy protein (like tofu). · Limit processed and packaged foods like chips, crackers, and cookies. · Bake, broil, or steam foods. Don't west them. · Be physically active. Get at least 30 minutes of exercise on most days of the week. Walking is a good choice. You also may want to do other activities, such as running, swimming, cycling, or playing tennis or team sports. · Stay at a healthy weight or lose weight by making the changes in eating and physical activity listed above. Losing just a small amount of weight, even 5 to 10 pounds, can reduce your risk for having a heart attack or stroke. · Do not smoke. When should you call for help? Watch closely for changes in your health, and be sure to contact your doctor if:  ? · You need help making lifestyle changes. ? · You have questions about your medicine. Where can you learn more? Go to http://maria victoria-ignacio.info/. Enter I789 in the search box to learn more about \"High Cholesterol: Care Instructions. \"  Current as of: September 21, 2016  Content Version: 11.4  © 4371-6375 [employer]. Care instructions adapted under license by [email or web address] (which disclaims liability or warranty for this information).  If you have questions about a medical condition or this instruction, always ask your healthcare professional. [employer] disclaims any warranty or liability for your use of this information. Bipolar Disorder: Care Instructions  Your Care Instructions    Bipolar disorder is an illness that causes extreme mood changes, from times of very high energy (manic episodes) to times of depression. But many people with bipolar disorder show only the symptoms of depression. These moods may cause problems with your work, school, family life, friendships, and how well you function. This disease is also called manic-depression. There is no cure for bipolar disorder, but it can be helped with medicines. Counseling may also help. It is important to take your medicines exactly as prescribed, even when you feel well. You may need lifelong treatment. Follow-up care is a key part of your treatment and safety. Be sure to make and go to all appointments, and call your doctor if you are having problems. It's also a good idea to know your test results and keep a list of the medicines you take. How can you care for yourself at home? · Be safe with medicines. Take your medicines exactly as prescribed. Do not stop or change a medicine without talking to your doctor first. Jihan Clifford and your doctor may need to try different combinations of medicines to find what works for you. · Take your medicines on schedule to keep your moods even. When you feel good, you may think that you do not need your medicines. But it is important to keep taking them. · Go to your counseling sessions. Call and talk with your counselor if you can't go to a session or if you don't think the sessions are helping. Do not just stop going. · Get at least 30 minutes of activity on most days of the week. Walking is a good choice. You also may want to do other things, such as running, swimming, or cycling. · Get enough sleep. Keep your room dark and quiet. Try to go to bed at the same time every night. · Eat a healthy diet.  This includes whole grains, dairy, fruits, vegetables, and protein. Eat foods from each of these groups. · Try to lower your stress. Manage your time, build a strong support system, and lead a healthy lifestyle. To lower your stress, try physical activity, slow deep breathing, or getting a massage. · Do not use alcohol or illegal drugs. · Learn the early signs of your mood changes. You can then take steps to help yourself feel better. · Ask for help from friends and family when you need it. You may need help with daily chores when you are depressed. When you are manic, you may need support to control your high energy levels. What should you do if someone in your family has bipolar disorder? · Learn about the disease and the signs that it is getting worse. · Remind your family member that you love him or her. · Make a plan with all family members about how to take care of your loved one when his or her symptoms are bad. · Talk about your fears and concerns and those of other family members. Seek counseling if needed. · Do not focus attention only on the person who is in treatment. · Remind yourself that it will take time for changes to occur. · Do not blame yourself for the disease. · Know your legal rights and the legal rights of your family member. Support groups or counselors can help you with this information. · Take care of yourself. Keep up with your own interests, such as your career, hobbies, and friends. Use exercise, positive self-talk, deep breathing, and other relaxing exercises to help lower your stress. · Give yourself time to grieve. You may need to deal with emotions such as anger, fear, and frustration. After you work through your feelings, you will be better able to care for yourself and your family. · If you are having a hard time with your feelings or with your relationship with your family member, talk with a counselor. When should you call for help? Call 911 anytime you think you may need emergency care.  For example, call if:  ? · You feel like hurting yourself or someone else. ? · Someone who has bipolar disorder displays dangerous behavior, and you think the person might hurt himself or herself or someone else. ?Call your doctor now or seek immediate medical care if:  ? · You hear voices. ? · Someone you know has bipolar disorder and talks about suicide. Keep the numbers for these national suicide hotlines: 9-753-438-TALK (8-490.192.5717) and 4-149-EKYAFEO (0-461.764.8264). If a suicide threat seems real, with a specific plan and a way to carry it out, stay with the person, or ask someone you trust to stay with the person, until you can get help. ? · Someone you know has bipolar disorder and:  ¨ Starts to give away possessions. ¨ Is using illegal drugs or drinking alcohol heavily. ¨ Talks or writes about death, including writing suicide notes or talking about guns, knives, or pills. ¨ Talks or writes about hurting someone else. ¨ Starts to spend a lot of time alone. ¨ Acts very aggressively or suddenly appears calm. ¨ Talks about beliefs that are not based in reality (delusions). ? Watch closely for changes in your health, and be sure to contact your doctor if:  ? · You cannot go to your counseling sessions. Where can you learn more? Go to http://maria victoria-ignacio.info/. Enter K052 in the search box to learn more about \"Bipolar Disorder: Care Instructions. \"  Current as of: May 12, 2017  Content Version: 11.4  © 8580-0636 [employer]. Care instructions adapted under license by [employer] (which disclaims liability or warranty for this information). If you have questions about a medical condition or this instruction, always ask your healthcare professional. [address] any warranty or liability for your use of this information.

## 2017-12-06 NOTE — MR AVS SNAPSHOT
Visit Information Date & Time Provider Department Dept. Phone Encounter #  
 12/6/2017 11:15 AM Gentry Ceja NP [address] [place] [address] [id number] Follow-up Instructions Return in about 3 months (around 3/6/2018). Your Appointments 12/13/2017 11:30 AM  
ESTABLISHED PATIENT with Chin Newby NP [facility] [facility] Appt Note: 3 month follow-up [address] [facility] [address] [facility] [address] [address]  
  
   
 [address] [place] [id number] Upcoming Health Maintenance Date Due Hepatitis C Screening 1955 PAP AKA CERVICAL CYTOLOGY 11/16/1977 BREAST CANCER SCRN MAMMOGRAM 11/16/2006 FOBT Q 1 YEAR AGE 50-75 11/16/2006 DTaP/Tdap/Td series (2 - Td) 12/6/2027 Allergies as of 12/6/2017  Review Complete On: 12/6/2017 By: Gentry Ceja NP No Known Allergies Current Immunizations  Never Reviewed No immunizations on file. Not reviewed this visit You Were Diagnosed With   
  
 Codes Comments Essential hypertension with goal blood pressure less than 130/80    -  Primary ICD-10-CM: I10 
ICD-9-CM: 401.9 Hypercholesteremia     ICD-10-CM: E78.00 ICD-9-CM: 272.0 Prediabetes     ICD-10-CM: R73.03 
ICD-9-CM: 790.29 ALEX (acute kidney injury) ([facility] [address]     ICD-10-CM: N17.9 ICD-9-CM: 531. 9 Bipolar 1 disorder (HCC)     ICD-10-CM: F31.9 ICD-9-CM: 296.7 Vitals BP Pulse Temp Resp Height(growth percentile) Weight(growth percentile) 124/67 (BP 1 Location: Right arm, BP Patient Position: Sitting) 85 97.6 °F (36.4 °C) (Oral) 18 5' 5\" (1.651 m) 199 lb (90.3 kg) SpO2 BMI OB Status Smoking Status 94% 33.12 kg/m2 Postmenopausal Current Every Day Smoker BMI and BSA Data Body Mass Index Body Surface Area  
 33.12 kg/m 2 2.04 m 2 [facility] [address] [phone] Your Updated Medication List  
  
   
This list is accurate as of: 12/6/17 11:30 AM.  Always use your most recent med list. amLODIPine 10 mg tablet Commonly known as:  [place] Take 0.5 Tabs by mouth daily. atorvastatin 40 mg tablet Commonly known as:  LIPITOR Take 1 Tab by mouth daily. erythromycin ophthalmic ointment Commonly known as:  ILOTYCIN Apply thin ribbon to inner lids every 6 hours for 7 days  
  
 lisinopril 40 mg tablet Commonly known as:  Mollie Ripa Take 0.5 Tabs by mouth daily. LORazepam 0.5 mg tablet Commonly known as:  ATIVAN Take 1 Tab by mouth two (2) times daily as needed for Anxiety. Max Daily Amount: 1 mg.  
  
 metoprolol succinate 25 mg XL tablet Commonly known as:  TOPROL-XL Take 1 Tab by mouth daily. potassium chloride 20 mEq tablet Commonly known as:  KLOR-CON M20 Take 1 Tab by mouth daily. risperiDONE 1 mg tablet Commonly known as:  RisperDAL Take 1 Tab by mouth nightly. sertraline 50 mg tablet Commonly known as:  ZOLOFT Take 1 Tab by mouth daily. Prescriptions Sent to Pharmacy Refills  
 amLODIPine (NORVASC) 10 mg tablet 1 Sig: Take 0.5 Tabs by mouth daily. Class: Normal  
 Pharmacy: Yves Lal, [address] [place] [address]. S.W Ph #: [phone] Route: Oral  
 metoprolol succinate (TOPROL-XL) 25 mg XL tablet 1 Sig: Take 1 Tab by mouth daily. Class: Normal  
 Pharmacy: Yves Lal [address] [place] [address]. S.W Ph #: [phone] Route: Oral  
 potassium chloride (KLOR-CON M20) 20 mEq tablet 3 Sig: Take 1 Tab by mouth daily. Class: Normal  
 Pharmacy: Yves Lal [address] [place] [address]. S.W Ph #: [phone] Route: Oral  
 lisinopril (PRINIVIL, ZESTRIL) 40 mg tablet 1 Sig: Take 0.5 Tabs by mouth daily. Class: Normal  
 Pharmacy: Shanthi Lal, [address] S[address] Ph #: [phone] Route: Oral  
 atorvastatin (LIPITOR) 40 mg tablet 1 Sig: Take 1 Tab by mouth daily. Class: Normal  
 Pharmacy: [facility]hi Lal, [address] S[address] Ph #: [phone] Route: Oral  
  
We Performed the Following HEMOGLOBIN A1C WITH EAG [77070 CPT(R)] RENAL FUNCTION PANEL [31957 CPT(R)] Follow-up Instructions Return in about 3 months (around 3/6/2018). Patient Instructions DASH Diet: Care Instructions Your Care Instructions The DASH diet is an eating plan that can help lower your blood pressure. DASH stands for Dietary Approaches to Stop Hypertension. Hypertension is high blood pressure. The DASH diet focuses on eating foods that are high in calcium, potassium, and magnesium. These nutrients can lower blood pressure. The foods that are highest in these nutrients are fruits, vegetables, low-fat dairy products, nuts, seeds, and legumes. But taking calcium, potassium, and magnesium supplements instead of eating foods that are high in those nutrients does not have the same effect. The DASH diet also includes whole grains, fish, and poultry. The DASH diet is one of several lifestyle changes your doctor may recommend to lower your high blood pressure. Your doctor may also want you to decrease the amount of sodium in your diet. Lowering sodium while following the DASH diet can lower blood pressure even further than just the DASH diet alone. Follow-up care is a key part of your treatment and safety. Be sure to make and go to all appointments, and call your doctor if you are having problems. It's also a good idea to know your test results and keep a list of the medicines you take. How can you care for yourself at home? Following the DASH diet · Eat 4 to 5 servings of fruit each day.  A serving is 1 medium-sized piece of fruit, ½ cup chopped or canned fruit, 1/4 cup dried fruit, or 4 ounces (½ cup) of fruit juice. Choose fruit more often than fruit juice. · Eat 4 to 5 servings of vegetables each day. A serving is 1 cup of lettuce or raw leafy vegetables, ½ cup of chopped or cooked vegetables, or 4 ounces (½ cup) of vegetable juice. Choose vegetables more often than vegetable juice. · Get 2 to 3 servings of low-fat and fat-free dairy each day. A serving is 8 ounces of milk, 1 cup of yogurt, or 1 ½ ounces of cheese. · Eat 6 to 8 servings of grains each day. A serving is 1 slice of bread, 1 ounce of dry cereal, or ½ cup of cooked rice, pasta, or cooked cereal. Try to choose whole-grain products as much as possible. · Limit lean meat, poultry, and fish to 2 servings each day. A serving is 3 ounces, about the size of a deck of cards. · Eat 4 to 5 servings of nuts, seeds, and legumes (cooked dried beans, lentils, and split peas) each week. A serving is 1/3 cup of nuts, 2 tablespoons of seeds, or ½ cup of cooked beans or peas. · Limit fats and oils to 2 to 3 servings each day. A serving is 1 teaspoon of vegetable oil or 2 tablespoons of salad dressing. · Limit sweets and added sugars to 5 servings or less a week. A serving is 1 tablespoon jelly or jam, ½ cup sorbet, or 1 cup of lemonade. · Eat less than 2,300 milligrams (mg) of sodium a day. If you limit your sodium to 1,500 mg a day, you can lower your blood pressure even more. Tips for success · Start small. Do not try to make dramatic changes to your diet all at once. You might feel that you are missing out on your favorite foods and then be more likely to not follow the plan. Make small changes, and stick with them. Once those changes become habit, add a few more changes. · Try some of the following: ¨ Make it a goal to eat a fruit or vegetable at every meal and at snacks. This will make it easy to get the recommended amount of fruits and vegetables each day. ¨ Try yogurt topped with fruit and nuts for a snack or healthy dessert. ¨ Add lettuce, tomato, cucumber, and onion to sandwiches. ¨ Combine a ready-made pizza crust with low-fat mozzarella cheese and lots of vegetable toppings. Try using tomatoes, squash, spinach, broccoli, carrots, cauliflower, and onions. ¨ Have a variety of cut-up vegetables with a low-fat dip as an appetizer instead of chips and dip. ¨ Sprinkle sunflower seeds or chopped almonds over salads. Or try adding chopped walnuts or almonds to cooked vegetables. ¨ Try some vegetarian meals using beans and peas. Add garbanzo or kidney beans to salads. Make burritos and tacos with mashed marques beans or black beans. Where can you learn more? Go to http://maria victoriaMonarch Teaching Technologiesignacio.info/. Enter L321 in the search box to learn more about \"DASH Diet: Care Instructions. \" Current as of: September 21, 2016 Content Version: 11.4 © 7538-1246 Mayvenn. Care instructions adapted under license by Mama (which disclaims liability or warranty for this information). If you have questions about a medical condition or this instruction, always ask your healthcare professional. [address] any warranty or liability for your use of this information. High Cholesterol: Care Instructions Your Care Instructions Cholesterol is a type of fat in your blood. It is needed for many body functions, such as making new cells. Cholesterol is made by your body. It also comes from food you eat. High cholesterol means that you have too much of the fat in your blood. This raises your risk of a heart attack and stroke. LDL and HDL are part of your total cholesterol. LDL is the \"bad\" cholesterol. High LDL can raise your risk for heart disease, heart attack, and stroke. HDL is the \"good\" cholesterol. It helps clear bad cholesterol from the body.  High HDL is linked with a lower risk of heart disease, heart attack, and stroke. Your cholesterol levels help your doctor find out your risk for having a heart attack or stroke. You and your doctor can talk about whether you need to lower your risk and what treatment is best for you. A heart-healthy lifestyle along with medicines can help lower your cholesterol and your risk. The way you choose to lower your risk will depend on how high your risk is for heart attack and stroke. It will also depend on how you feel about taking medicines. Follow-up care is a key part of your treatment and safety. Be sure to make and go to all appointments, and call your doctor if you are having problems. It's also a good idea to know your test results and keep a list of the medicines you take. How can you care for yourself at home? · Eat a variety of foods every day. Good choices include fruits, vegetables, whole grains (like oatmeal), dried beans and peas, nuts and seeds, soy products (like tofu), and fat-free or low-fat dairy products. · Replace butter, margarine, and hydrogenated or partially hydrogenated oils with olive and canola oils. (Canola oil margarine without trans fat is fine.) · Replace red meat with fish, poultry, and soy protein (like tofu). · Limit processed and packaged foods like chips, crackers, and cookies. · Bake, broil, or steam foods. Don't west them. · Be physically active. Get at least 30 minutes of exercise on most days of the week. Walking is a good choice. You also may want to do other activities, such as running, swimming, cycling, or playing tennis or team sports. · Stay at a healthy weight or lose weight by making the changes in eating and physical activity listed above. Losing just a small amount of weight, even 5 to 10 pounds, can reduce your risk for having a heart attack or stroke. · Do not smoke. When should you call for help? Watch closely for changes in your health, and be sure to contact your doctor if: ? · You need help making lifestyle changes. ? · You have questions about your medicine. Where can you learn more? Go to http://maria victoria-ignacio.info/. Enter Y910 in the search box to learn more about \"High Cholesterol: Care Instructions. \" Current as of: September 21, 2016 Content Version: 11.4 © 0402-6296 Qteros. Care instructions adapted under license by Sola[employer]x (which disclaims liability or warranty for this information). If you have questions about a medical condition or this instruction, always ask your healthcare professional. [address] any warranty or liability for your use of this information. Bipolar Disorder: Care Instructions Your Care Instructions Bipolar disorder is an illness that causes extreme mood changes, from times of very high energy (manic episodes) to times of depression. But many people with bipolar disorder show only the symptoms of depression. These moods may cause problems with your work, school, family life, friendships, and how well you function. This disease is also called manic-depression. There is no cure for bipolar disorder, but it can be helped with medicines. Counseling may also help. It is important to take your medicines exactly as prescribed, even when you feel well. You may need lifelong treatment. Follow-up care is a key part of your treatment and safety. Be sure to make and go to all appointments, and call your doctor if you are having problems. It's also a good idea to know your test results and keep a list of the medicines you take. How can you care for yourself at home? · Be safe with medicines. Take your medicines exactly as prescribed. Do not stop or change a medicine without talking to your doctor first. Caesar Orourke and your doctor may need to try different combinations of medicines to find what works for you. · Take your medicines on schedule to keep your moods even.  When you feel good, you may think that you do not need your medicines. But it is important to keep taking them. · Go to your counseling sessions. Call and talk with your counselor if you can't go to a session or if you don't think the sessions are helping. Do not just stop going. · Get at least 30 minutes of activity on most days of the week. Walking is a good choice. You also may want to do other things, such as running, swimming, or cycling. · Get enough sleep. Keep your room dark and quiet. Try to go to bed at the same time every night. · Eat a healthy diet. This includes whole grains, dairy, fruits, vegetables, and protein. Eat foods from each of these groups. · Try to lower your stress. Manage your time, build a strong support system, and lead a healthy lifestyle. To lower your stress, try physical activity, slow deep breathing, or getting a massage. · Do not use alcohol or illegal drugs. · Learn the early signs of your mood changes. You can then take steps to help yourself feel better. · Ask for help from friends and family when you need it. You may need help with daily chores when you are depressed. When you are manic, you may need support to control your high energy levels. What should you do if someone in your family has bipolar disorder? · Learn about the disease and the signs that it is getting worse. · Remind your family member that you love him or her. · Make a plan with all family members about how to take care of your loved one when his or her symptoms are bad. · Talk about your fears and concerns and those of other family members. Seek counseling if needed. · Do not focus attention only on the person who is in treatment. · Remind yourself that it will take time for changes to occur. · Do not blame yourself for the disease. · Know your legal rights and the legal rights of your family member. Support groups or counselors can help you with this information. · Take care of yourself. Keep up with your own interests, such as your career, hobbies, and friends. Use exercise, positive self-talk, deep breathing, and other relaxing exercises to help lower your stress. · Give yourself time to grieve. You may need to deal with emotions such as anger, fear, and frustration. After you work through your feelings, you will be better able to care for yourself and your family. · If you are having a hard time with your feelings or with your relationship with your family member, talk with a counselor. When should you call for help? Call 911 anytime you think you may need emergency care. For example, call if: 
? · You feel like hurting yourself or someone else. ? · Someone who has bipolar disorder displays dangerous behavior, and you think the person might hurt himself or herself or someone else. ?Call your doctor now or seek immediate medical care if: 
? · You hear voices. ? · Someone you know has bipolar disorder and talks about suicide. Keep the numbers for these national suicide hotlines: 4-421-114-TALK (5-217.451.9879) and 0-740-JPZRGMQ (0-553.888.8488). If a suicide threat seems real, with a specific plan and a way to carry it out, stay with the person, or ask someone you trust to stay with the person, until you can get help. ? · Someone you know has bipolar disorder and: 
¨ Starts to give away possessions. ¨ Is using illegal drugs or drinking alcohol heavily. ¨ Talks or writes about death, including writing suicide notes or talking about guns, knives, or pills. ¨ Talks or writes about hurting someone else. ¨ Starts to spend a lot of time alone. ¨ Acts very aggressively or suddenly appears calm. ¨ Talks about beliefs that are not based in reality (delusions). ? Watch closely for changes in your health, and be sure to contact your doctor if: 
? · You cannot go to your counseling sessions. Where can you learn more? Go to http://maria victoria-ignacio.info/. Enter K052 in the search box to learn more about \"Bipolar Disorder: Care Instructions. \" Current as of: May 12, 2017 Content Version: 11.4 © 1810-1917 Solar Power Limited. Care instructions adapted under license by TrademarkFly (which disclaims liability or warranty for this information). If you have questions about a medical condition or this instruction, always ask your healthcare professional. [address] any warranty or liability for your use of this information. Introducing [facility]! Dear Claire Villafana: Thank you for requesting a Nazar account. Our records indicate that you already have an active Nazar account. You can access your account anytime at [email or web address] Did you know that you can access your hospital and ER discharge instructions at any time in Nazar? You can also review all of your test results from your hospital stay or ER visit. Additional Information If you have questions, please visit the Frequently Asked Questions section of the Nazar website at [email or web address] Remember, Nazar is NOT to be used for urgent needs. For medical emergencies, dial 911. Now available from your iPhone and Android! Please provide this summary of care documentation to your next provider. Your primary care clinician is listed as Alondra Mcarthur. If you have any questions after today's visit, please call [phone].

## 2017-12-06 NOTE — LETTER
1/18/2018 1:12 PM 
 
Ms. Ziyad Rodriguez [address] [place] [address] Dear Ziyad Rodriguez: 
 
Please find your most recent results below. Resulted Orders RENAL FUNCTION PANEL Result Value Ref Range Glucose 102 (H) 65 - 99 mg/dL BUN 13 8 - 27 mg/dL Creatinine 0.83 0.57 - 1.00 mg/dL GFR est non-AA 76 >59 mL/min/1.73 GFR est AA 88 >59 mL/min/1.73  
 BUN/Creatinine ratio 16 12 - 28 Sodium 141 134 - 144 mmol/L Potassium 4.5 3.5 - 5.2 mmol/L Chloride 103 96 - 106 mmol/L  
 CO2 21 18 - 29 mmol/L Calcium 9.7 8.7 - 10.3 mg/dL Phosphorus 3.6 2.5 - 4.5 mg/dL Albumin 4.1 3.6 - 4.8 g/dL Narrative Performed at:  [address]  [id number] : Kaleigh Mcneal MD, Phone:  [phone] HEMOGLOBIN A1C WITH EAG Result Value Ref Range Hemoglobin A1c 6.2 (H) 4.8 - 5.6 % Comment:  
            Pre-diabetes: 5.7 - 6.4 Diabetes: >6.4 Glycemic control for adults with diabetes: <7.0 Estimated average glucose 131 mg/dL Narrative Performed at:  [address]  [id number] : Kaleigh Mcneal MD, Phone:  [phone] RECOMMENDATIONS: 
 
 
a1c has gotten closer to diabetes. Recommend  Please eliminate sweetened beverages (soda, tea, juice), minimize sweets and desserts, and limit simple carbs (bread, rice, pasta, potatoes). Walking 20 minutes daily will help decrease your body's resistance to insulin. Weight loss will also help slow progression to diabetes. Recommend initial goal of 5% (10 lbs). Recheck in 6 months. All other labs look good Please call me if you have any questions: [phone] Sincerely, 
 
 
David Hall NP

## 2017-12-07 LAB
ALBUMIN SERPL-MCNC: 4.1 G/DL (ref 3.6–4.8)
BUN SERPL-MCNC: 13 MG/DL (ref 8–27)
BUN/CREAT SERPL: 16 (ref 12–28)
CALCIUM SERPL-MCNC: 9.7 MG/DL (ref 8.7–10.3)
CHLORIDE SERPL-SCNC: 103 MMOL/L (ref 96–106)
CO2 SERPL-SCNC: 21 MMOL/L (ref 18–29)
CREAT SERPL-MCNC: 0.83 MG/DL (ref 0.57–1)
EST. AVERAGE GLUCOSE BLD GHB EST-MCNC: 131 MG/DL
GFR SERPLBLD CREATININE-BSD FMLA CKD-EPI: 76 ML/MIN/1.73
GFR SERPLBLD CREATININE-BSD FMLA CKD-EPI: 88 ML/MIN/1.73
GLUCOSE SERPL-MCNC: 102 MG/DL (ref 65–99)
HBA1C MFR BLD: 6.2 % (ref 4.8–5.6)
PHOSPHATE SERPL-MCNC: 3.6 MG/DL (ref 2.5–4.5)
POTASSIUM SERPL-SCNC: 4.5 MMOL/L (ref 3.5–5.2)
SODIUM SERPL-SCNC: 141 MMOL/L (ref 134–144)

## 2017-12-07 NOTE — PROGRESS NOTES
a1c has gotten closer to diabetes. Recommend  Please eliminate sweetened beverages (soda, tea, juice), minimize sweets and desserts, and limit simple carbs (bread, rice, pasta, potatoes). Walking 20 minutes daily will help decrease your body's resistance to insulin. Weight loss will also help slow progression to diabetes. Recommend initial goal of 5% (10 lbs). Recheck in 6 months. All other labs look good.

## 2017-12-09 NOTE — PROGRESS NOTES
Subjective:     Bella Pate is a 65 y.o. female who presents for follow up of prediabetes, hypertension, hyperlipidemia and obesity. Accompanied by . Diet and Lifestyle: not attempting to follow a low fat, low cholesterol diet, not attempting to follow a low sodium diet, does not rigorously follow a diabetic diet, sedentary, smoker 1/3 ppd  Home BP Monitoring: is not measured at home    Cardiovascular ROS: taking medications as instructed, no medication side effects noted, no TIA's, no chest pain on exertion, no dyspnea on exertion, no swelling of ankles, no orthostatic dizziness or lightheadedness. New concerns: none. Patient Active Problem List   Diagnosis Code    [facility] ([facility] F31.9    Essential hypertension with goal blood pressure less than 130/80 I10    Prediabetes R73.03    Hypercholesteremia E78.00    ALEX (acute kidney injury) ([facility] N17.9    Lithium toxicity T56.891A    Anemia D64.9    Cigarette nicotine dependence without complication R38.534     No Known Allergies  Past Medical History:   Diagnosis Date    Abscess of buttock 7/23/2012    Bronchitis     Common bile duct calculus 7/23/2012    Gallstones 7/12/2012    GERD (gastroesophageal reflux disease)     High cholesterol     Hypercholesterolemia     Hypertension     Ill-defined condition     missing upper front tooth    Ill-defined condition     lower leg bilateral reddened rash.  Insomnia     Other ill-defined conditions(799.89)     Large boil under right arm-pit.     Paranoia ([facility]     Psychiatric disorder     bipolar     Past Surgical History:   Procedure Laterality Date    ABDOMEN SURGERY PROC UNLISTED      cholecystectomy 7/23/2012     Family History   Problem Relation Age of Onset    Cancer Father      lung    Diabetes Brother     Heart Disease Brother     Malignant Hyperthermia Neg Hx     Pseudocholinesterase Deficiency Neg Hx     Delayed Awakening Neg Hx     Post-op Nausea/Vomiting Neg Hx     Emergence Delirium Neg Hx     Post-op Cognitive Dysfunction Neg Hx     Other Neg Hx      Social History   Substance Use Topics    Smoking status: Current Every Day Smoker     Packs/day: 0.50    Smokeless tobacco: Never Used    Alcohol use No        Review of Systems, additional:  A comprehensive review of systems was negative except for that written in the HPI. Objective:     Visit Vitals    /67 (BP 1 Location: Right arm, BP Patient Position: Sitting)    Pulse 85    Temp 97.6 °F (36.4 °C) (Oral)    Resp 18    Ht 5' 5\" (1.651 m)    Wt 199 lb (90.3 kg)    SpO2 94%    BMI 33.12 kg/m2     Appearance: alert, well appearing, and in no distress, oriented to person, place, and time, overweight and well hydrated. General exam: CVS exam BP noted to be well controlled today in office, S1, S2 normal, no gallop, no murmur, chest clear, no JVD, no HSM, no edema, peripheral vascular exam both carotids normal upstroke without bruits, neurological exam alert, oriented, normal speech, no focal findings or movement disorder noted. Assessment/Plan:     hypertension well controlled, hyperlipidemia reasonably well controlled. reviewed diet, exercise and weight control  very strongly urged to quit smoking to reduce cardiovascular risk  cardiovascular risk and specific lipid/LDL goals reviewed  reviewed medications and side effects in detail  use of aspirin to prevent MI and TIA's discussed. Diagnoses and all orders for this visit:    1. Essential hypertension with goal blood pressure less than 130/80  At goal  Continue Rx  DASH diet  Smoking cessation  -     amLODIPine (NORVASC) 10 mg tablet; Take 0.5 Tabs by mouth daily. -     metoprolol succinate (TOPROL-XL) 25 mg XL tablet; Take 1 Tab by mouth daily. -     lisinopril (PRINIVIL, ZESTRIL) 40 mg tablet; Take 0.5 Tabs by mouth daily.     2. Hypercholesteremia  TLC Diet:  -- Saturated fat <7% of calories, cholesterol <200 mg/day  -- Consider increased viscous (soluble) fiber (10-25 g/day) and plant stanols/sterols  (2g/day) as therapeutic options to enhance LDL lowering  Weight management  Increased physical activity. Continue lipitor  -     atorvastatin (LIPITOR) 40 mg tablet; Take 1 Tab by mouth daily. 3. Prediabetes  Improve diet  Weight loss  Daily walking  -     HEMOGLOBIN A1C WITH EAG    4. ALEX (acute kidney injury) ([facility]  Recheck kidney function  -     RENAL FUNCTION PANEL    5. Bipolar 1 disorder ([facility]  Symptoms well controlled at present time  Continue f/u with mental health provider as scheduled later this month    Other orders  -     potassium chloride (KLOR-CON M20) 20 mEq tablet; Take 1 Tab by mouth daily. Follow-up Disposition:  Return in about 3 months (around 3/6/2018). I have discussed the diagnosis with the patient and the intended plan as seen in the above orders. The patient has received an after-visit summary and questions were answered concerning future plans. Patient conveyed understanding of the plan at the time of the visit.     Gosia Hughes NP

## 2017-12-13 ENCOUNTER — OFFICE VISIT (OUTPATIENT)
Dept: BEHAVIORAL/MENTAL HEALTH CLINIC | Age: 62
End: 2017-12-13

## 2017-12-13 VITALS
HEART RATE: 89 BPM | WEIGHT: 200 LBS | DIASTOLIC BLOOD PRESSURE: 76 MMHG | OXYGEN SATURATION: 94 % | SYSTOLIC BLOOD PRESSURE: 135 MMHG | HEIGHT: 65 IN | BODY MASS INDEX: 33.32 KG/M2

## 2017-12-13 DIAGNOSIS — F31.9 BIPOLAR 1 DISORDER (HCC): Primary | ICD-10-CM

## 2017-12-13 RX ORDER — SERTRALINE HYDROCHLORIDE 50 MG/1
50 TABLET, FILM COATED ORAL DAILY
Qty: 30 TAB | Refills: 5 | Status: SHIPPED | OUTPATIENT
Start: 2017-12-13 | End: 2018-06-13 | Stop reason: SDUPTHER

## 2017-12-13 RX ORDER — LORAZEPAM 0.5 MG/1
TABLET ORAL
Qty: 30 TAB | Refills: 2 | Status: SHIPPED | OUTPATIENT
Start: 2017-12-13 | End: 2018-05-01 | Stop reason: SDUPTHER

## 2017-12-13 RX ORDER — RISPERIDONE 1 MG/1
1 TABLET, FILM COATED ORAL
Qty: 30 TAB | Refills: 5 | Status: SHIPPED | OUTPATIENT
Start: 2017-12-13 | End: 2018-05-03 | Stop reason: SDUPTHER
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
## 2017-12-13 NOTE — MR AVS SNAPSHOT
Visit Information Date & Time Provider Department Dept. Phone Encounter #  
 12/13/2017 11:30 AM Arline Lindo NP [facility] [phone] [id number] Upcoming Health Maintenance Date Due Hepatitis C Screening 1955 PAP AKA CERVICAL CYTOLOGY 11/16/1977 FOBT Q 1 YEAR AGE 50-75 11/16/2006 DTaP/Tdap/Td series (2 - Td) 12/6/2027 Allergies as of 12/13/2017  Review Complete On: 12/13/2017 By: Yariel Avendaño CNA No Known Allergies Current Immunizations  Never Reviewed No immunizations on file. Not reviewed this visit You Were Diagnosed With   
  
 Codes Comments Bipolar 1 disorder ([facility]    -  Primary ICD-10-CM: F31.9 ICD-9-CM: 296.7 Vitals BP Pulse Height(growth percentile) Weight(growth percentile) SpO2 BMI  
 135/76 (BP 1 Location: Left arm, BP Patient Position: Sitting) 89 5' 5\" (1.651 m) 200 lb (90.7 kg) 94% 33.28 kg/m2 OB Status Smoking Status Postmenopausal Current Every Day Smoker Vitals History BMI and BSA Data Body Mass Index Body Surface Area  
 33.28 kg/m 2 2.04 m 2 Preferred Pharmacy Pharmacy Name Phone [facility], [address] [phone] Your Updated Medication List  
  
   
This list is accurate as of: 12/13/17 11:54 AM.  Always use your most recent med list. amLODIPine 10 mg tablet Commonly known as:  Tad Francesca Take 0.5 Tabs by mouth daily. atorvastatin 40 mg tablet Commonly known as:  LIPITOR Take 1 Tab by mouth daily. erythromycin ophthalmic ointment Commonly known as:  ILOTYCIN Apply thin ribbon to inner lids every 6 hours for 7 days  
  
 lisinopril 40 mg tablet Commonly known as:  Mikey Henny Take 0.5 Tabs by mouth daily. LORazepam 0.5 mg tablet Commonly known as:  ATIVAN Take 1 tablet by mouth daily PRN anxiety. metoprolol succinate 25 mg XL tablet Commonly known as:  TOPROL-XL Take 1 Tab by mouth daily. potassium chloride 20 mEq tablet Commonly known as:  KLOR-CON M20 Take 1 Tab by mouth daily. risperiDONE 1 mg tablet Commonly known as:  RisperDAL Take 1 Tab by mouth nightly. sertraline 50 mg tablet Commonly known as:  ZOLOFT Take 1 Tab by mouth daily. Prescriptions Printed Refills LORazepam (ATIVAN) 0.5 mg tablet 2 Sig: Take 1 tablet by mouth daily PRN anxiety. Class: Print Prescriptions Sent to Pharmacy Refills  
 risperiDONE (RISPERDAL) 1 mg tablet 5 Sig: Take 1 Tab by mouth nightly. Class: Normal  
 [facility] [address] Ph #: [phone] Route: Oral  
 sertraline (ZOLOFT) 50 mg tablet 5 Sig: Take 1 Tab by mouth daily. Class: Normal  
 [facility] [address] Ph #: [phone] Route: Oral  
  
Introducing [facility]! Dear Claire Villafana: Thank you for requesting a [employer] account. Our records indicate that you already have an active [employer] account. You can access your account anytime at [email or web address] Did you know that you can access your hospital and ER discharge instructions at any time in [employer]? You can also review all of your test results from your hospital stay or ER visit. Additional Information If you have questions, please visit the Frequently Asked Questions section of the [employer] website at [email or web address] Remember, [employer] is NOT to be used for urgent needs. For medical emergencies, dial 911. Now available from your iPhone and Android! Please provide this summary of care documentation to your next provider. Your primary care clinician is listed as Alondra Mcarthur. If you have any questions after today's visit, please call [phone].

## 2018-01-18 NOTE — PROGRESS NOTES
Call placed to patient, no answer. Left message to return call to practice   I have been unable to reach this patient by phone. A letter is being sent to the last known home address.

## 2018-03-28 ENCOUNTER — OFFICE VISIT (OUTPATIENT)
Dept: FAMILY MEDICINE CLINIC | Age: 63
End: 2018-03-28

## 2018-03-28 VITALS
DIASTOLIC BLOOD PRESSURE: 71 MMHG | RESPIRATION RATE: 16 BRPM | OXYGEN SATURATION: 93 % | TEMPERATURE: 98.4 F | SYSTOLIC BLOOD PRESSURE: 107 MMHG | HEIGHT: 65 IN | BODY MASS INDEX: 32.32 KG/M2 | HEART RATE: 101 BPM | WEIGHT: 194 LBS

## 2018-03-28 DIAGNOSIS — Z12.11 SCREEN FOR COLON CANCER: ICD-10-CM

## 2018-03-28 DIAGNOSIS — R73.03 PREDIABETES: ICD-10-CM

## 2018-03-28 DIAGNOSIS — F31.9 BIPOLAR 1 DISORDER (HCC): ICD-10-CM

## 2018-03-28 DIAGNOSIS — I10 ESSENTIAL HYPERTENSION WITH GOAL BLOOD PRESSURE LESS THAN 130/80: Primary | ICD-10-CM

## 2018-03-28 DIAGNOSIS — Z12.39 SCREENING FOR MALIGNANT NEOPLASM OF BREAST: ICD-10-CM

## 2018-03-28 DIAGNOSIS — F17.210 CIGARETTE NICOTINE DEPENDENCE WITHOUT COMPLICATION: ICD-10-CM

## 2018-03-28 DIAGNOSIS — E78.00 HYPERCHOLESTEREMIA: ICD-10-CM

## 2018-03-28 NOTE — PROGRESS NOTES
Subjective:   Michael Mg is a 65 y.o. female who is seen for follow up of prediabetes, hypertension, hyperlipidemia and obesity. Accompanied by . Cardiovascular risk analysis - 65 y.o. female LDL goal is under 100. Compliance with treatment thus far has been good. ROS: taking medications as instructed, no medication side effects noted, no TIA's, no chest pain on exertion, no dyspnea on exertion. Hypertension:    Diet and Lifestyle: not attempting to follow a low fat, low cholesterol diet, not attempting to follow a low sodium diet, sedentary, smoker 1 pdd Not making any progress with smoking cessation. Home BP Monitoring: is not measured at home    Cardiovascular ROS: taking medications as instructed, no medication side effects noted, no TIA's, no chest pain on exertion, no dyspnea on exertion, no swelling of ankles, no orthostatic dizziness or lightheadedness. Prediabetes:  Not following recommended diet. Not exercising. Has lost 6 lbs since last visit. Bipolar Disorder with Active Depression:  Doing well on her current meds  Receiving regular care from her mental health provider   monitors med administration      New concerns: none.      Patient Active Problem List   Diagnosis Code    Bipolar 1 disorder ([facility] F31.9    Essential hypertension with goal blood pressure less than 130/80 I10    Prediabetes R73.03    Hypercholesteremia E78.00    ALEX (acute kidney injury) ([facility] N17.9    Lithium toxicity T56.891A    Anemia D64.9    Cigarette nicotine dependence without complication X23.530     No Known Allergies  Past Medical History:   Diagnosis Date    Abscess of buttock 7/23/2012    Bronchitis     Common bile duct calculus 7/23/2012    Gallstones 7/12/2012    GERD (gastroesophageal reflux disease)     High cholesterol     Hypercholesterolemia     Hypertension     Ill-defined condition     missing upper front tooth    Ill-defined condition     lower leg bilateral reddened rash.  Insomnia     Other ill-defined conditions(799.89)     Large boil under right arm-pit.  Paranoia ([address]     Psychiatric disorder     bipolar     Past Surgical History:   Procedure Laterality Date    ABDOMEN SURGERY PROC UNLISTED      cholecystectomy 7/23/2012     Family History   Problem Relation Age of Onset    Cancer Father      lung    Diabetes Brother     Heart Disease Brother     Malignant Hyperthermia Neg Hx     Pseudocholinesterase Deficiency Neg Hx     Delayed Awakening Neg Hx     Post-op Nausea/Vomiting Neg Hx     Emergence Delirium Neg Hx     Post-op Cognitive Dysfunction Neg Hx     Other Neg Hx      Social History   Substance Use Topics    Smoking status: Current Every Day Smoker     Packs/day: 0.50    Smokeless tobacco: Never Used    Alcohol use No      Lab Results  Component Value Date/Time   Hemoglobin A1c 6.2 (H) 12/06/2017 11:34 AM   Hemoglobin A1c 5.9 (H) 11/02/2016 10:29 AM   Hemoglobin A1c 6.0 (H) 05/31/2016 02:13 PM   Glucose 102 (H) 12/06/2017 11:34 AM   Glucose (POC) 115 (H) 10/31/2011 08:08 PM   LDL, calculated 96 09/07/2017 11:01 AM   Creatinine (POC) 0.6 10/31/2011 08:08 PM   Creatinine 0.83 12/06/2017 11:34 AM      Lab Results  Component Value Date/Time   Cholesterol, total 184 09/07/2017 11:01 AM   HDL Cholesterol 46 09/07/2017 11:01 AM   LDL, calculated 96 09/07/2017 11:01 AM   Triglyceride 209 (H) 09/07/2017 11:01 AM   CHOL/HDL Ratio 2.5 05/23/2017 06:16 AM     Lab Results  Component Value Date/Time   ALT (SGPT) 19 09/07/2017 11:01 AM   AST (SGOT) 16 09/07/2017 11:01 AM   Alk.  phosphatase 99 09/07/2017 11:01 AM   Bilirubin, direct 0.1 10/31/2011 08:05 PM   Bilirubin, total 0.3 09/07/2017 11:01 AM   Albumin 4.1 12/06/2017 11:34 AM   Protein, total 7.4 09/07/2017 11:01 AM   Ammonia <10 05/25/2017 12:51 AM   INR 1.0 10/31/2011 08:05 PM   Prothrombin time 10.2 10/31/2011 08:05 PM   PLATELET 797 42/15/8424 11:25 AM       Lab Results  Component Value Date/Time   GFR est non-AA 76 12/06/2017 11:34 AM   GFRNA, POC >60 10/31/2011 08:08 PM   GFR est AA 88 12/06/2017 11:34 AM   GFRAA, POC >60 10/31/2011 08:08 PM   Creatinine 0.83 12/06/2017 11:34 AM   Creatinine (POC) 0.6 10/31/2011 08:08 PM   BUN 13 12/06/2017 11:34 AM   BUN (POC) 12 10/31/2011 08:08 PM   Sodium 141 12/06/2017 11:34 AM   Sodium (POC) 136 (L) 10/31/2011 08:08 PM   Potassium 4.5 12/06/2017 11:34 AM   Potassium (POC) 3.6 10/31/2011 08:08 PM   Chloride 103 12/06/2017 11:34 AM   Chloride (POC) 102 10/31/2011 08:08 PM   CO2 21 12/06/2017 11:34 AM   Magnesium 1.6 05/26/2017 11:25 AM   Phosphorus 3.6 12/06/2017 11:34 AM        Objective:     Visit Vitals    /71    Pulse (!) 101    Temp 98.4 °F (36.9 °C) (Oral)    Resp 16    Ht 5' 5\" (1.651 m)    Wt 194 lb (88 kg)    SpO2 93%    BMI 32.28 kg/m2      Wt Readings from Last 3 Encounters:   03/28/18 194 lb (88 kg)   12/13/17 200 lb (90.7 kg)   12/06/17 199 lb (90.3 kg)       Appearance: alert, well appearing, and in no distress, oriented to person, place, and time, overweight and well hydrated. CVS exam BP noted to be well controlled today in office, S1, S2 normal, no gallop, no murmur, chest clear, no JVD, no HSM, no edema. Assessment/Plan:   Hyperlipidemia needs further observation. reviewed diet, exercise and weight control  very strongly urged to quit smoking to reduce cardiovascular risk  copy of written low fat low cholesterol diet provided and reviewed  cardiovascular risk and specific lipid/LDL goals reviewed  reviewed medications and side effects in detail  reviewed potential future medication changes and side effects  use of aspirin to prevent MI and TIA's discussed. Diagnoses and all orders for this visit:    1. Essential hypertension with goal blood pressure less than 130/80  At goal  Continue norvasc, lisinopril, toprol  DASH diet  Smoking cessation  Daily walking  Weight loss  -     METABOLIC PANEL, COMPREHENSIVE    2. Bipolar 1 disorder ([facility]  Stable symptoms  Management deferred to mental health provider    3. Cigarette nicotine dependence without complication  The patient was counseled on the dangers of tobacco use, and was advised to quit. Reviewed strategies to maximize success, including removing cigarettes and smoking materials from environment, stress management, substitution of other forms of reinforcement, support of family/friends and written materials. 4. Hypercholesteremia  TLC Diet:  -- Saturated fat <7% of calories, cholesterol <200 mg/day  -- Consider increased viscous (soluble) fiber (10-25 g/day) and plant stanols/sterols  (2g/day) as therapeutic options to enhance LDL lowering  Weight management  Increased physical activity. Continue atorvastatin  -     LIPID PANEL    5. Prediabetes  Lifestyle modifications to include improving diet, regular exercise, and weight loss  -     HEMOGLOBIN A1C WITH EAG    6. Screen for colon cancer  -     OCCULT BLOOD, IMMUNOASSAY (FIT)    7. Screening for malignant neoplasm of breast  -     LUIS ARMANDO MAMMO BI SCREENING INCL CAD; Future      Follow-up Disposition:  Return in about 3 months (around 6/28/2018). .    I have discussed the diagnosis with the patient and the intended plan as seen in the above orders. The patient has received an after-visit summary and questions were answered concerning future plans. Patient conveyed understanding of the plan at the time of the visit.     Julian Meza NP  03/28/18

## 2018-03-28 NOTE — LETTER
4/6/2018 11:58 AM 
 
Ms. Aguila Kelly [address] [place] [address] [place] [id number] Dear Aguila Kelly: 
 
Please find your most recent results below. Resulted Orders HEMOGLOBIN A1C WITH EAG Result Value Ref Range Hemoglobin A1c 6.2 (H) 4.8 - 5.6 % Comment:  
            Pre-diabetes: 5.7 - 6.4 Diabetes: >6.4 Glycemic control for adults with diabetes: <7.0 Estimated average glucose 131 mg/dL Narrative Performed at:  [address]  [id number] : Dennys Montano MD, Phone:  [phone] LIPID PANEL Result Value Ref Range Cholesterol, total 195 100 - 199 mg/dL Triglyceride 301 (H) 0 - 149 mg/dL HDL Cholesterol 44 >39 mg/dL VLDL, calculated 60 (H) 5 - 40 mg/dL LDL, calculated 91 0 - 99 mg/dL Narrative Performed at:  [address]  [id number] : Dennys Montano MD, Phone:  [phone] METABOLIC PANEL, COMPREHENSIVE Result Value Ref Range Glucose 98 65 - 99 mg/dL BUN 14 8 - 27 mg/dL Creatinine 0.89 0.57 - 1.00 mg/dL GFR est non-AA 70 >59 mL/min/1.73 GFR est AA 81 >59 mL/min/1.73  
 BUN/Creatinine ratio 16 12 - 28 Sodium 140 134 - 144 mmol/L Potassium 4.6 3.5 - 5.2 mmol/L Chloride 102 96 - 106 mmol/L  
 CO2 22 18 - 29 mmol/L Calcium 9.8 8.7 - 10.3 mg/dL Protein, total 7.2 6.0 - 8.5 g/dL Albumin 4.2 3.6 - 4.8 g/dL GLOBULIN, TOTAL 3.0 1.5 - 4.5 g/dL A-G Ratio 1.4 1.2 - 2.2 Bilirubin, total 0.3 0.0 - 1.2 mg/dL Alk. phosphatase 111 39 - 117 IU/L  
 AST (SGOT) 10 0 - 40 IU/L  
 ALT (SGPT) 19 0 - 32 IU/L Narrative Performed at:  [address]  [id number] : Dennys Montano MD, Phone:  [phone] CVD REPORT Result Value Ref Range INTERPRETATION Note Comment:  
   Supplemental report is available. Narrative Performed at:  [address] [place] [address]  [id number] : Caterina Montana PhD, Phone:  [phone] RECOMMENDATIONS: 
Work on improving diet, cutting sugar and carbs, walk a minimum of 20 min daily to start some weight loss. We would like to re check in three months, if no improvement we should reconsider starting metformin. Please call me if you have any questions: [phone] Sincerely, 
 
 
Dari Lang NP

## 2018-03-28 NOTE — PROGRESS NOTES
1. Have you been to the ER, urgent care clinic since your last visit? Hospitalized since your last visit? No    2. Have you seen or consulted any other health care providers outside of the [address] [place] [address] since your last visit? Include any pap smears or colon screening.  No     Chief Complaint   Patient presents with    Hypertension    Diabetes

## 2018-03-28 NOTE — MR AVS SNAPSHOT
[address] [facility] [address] 
[phone] Patient: Shayy Dumont MRN: [id number] OLN:[id number] Visit Information Date & Time Provider Department Dept. Phone Encounter #  
 3/28/2018 11:15 AM Suyapa Concepcion NP [address] [place] [address] [id number] Follow-up Instructions Return in about 3 months (around 6/28/2018). Your Appointments 6/13/2018 11:30 AM  
ESTABLISHED PATIENT with Maki Figueroa NP [facility] ([facility]) Appt Note: 6 month follow-up; 6 month follow-up [address] [facility] [address] [place] [address] [facility] 178  
  
   
 [address] [facility] [address] [facility] [address] Upcoming Health Maintenance Date Due Hepatitis C Screening 1955 PAP AKA CERVICAL CYTOLOGY 11/16/1977 BREAST CANCER SCRN MAMMOGRAM 11/16/2006 FOBT Q 1 YEAR AGE 50-75 11/16/2006 DTaP/Tdap/Td series (2 - Td) 12/6/2027 Allergies as of 3/28/2018  Review Complete On: 3/28/2018 By: Bryson Morales LPN No Known Allergies Current Immunizations  Never Reviewed No immunizations on file. Not reviewed this visit You Were Diagnosed With   
  
 Codes Comments Essential hypertension with goal blood pressure less than 130/80    -  Primary ICD-10-CM: I10 
ICD-9-CM: 401.9 Bipolar 1 disorder (HCC)     ICD-10-CM: F31.9 ICD-9-CM: 296.7 Cigarette nicotine dependence without complication     USK-67-OZ: F17.210 ICD-9-CM: 305.1 Hypercholesteremia     ICD-10-CM: E78.00 ICD-9-CM: 272.0 Prediabetes     ICD-10-CM: R73.03 
ICD-9-CM: 790.29 Screen for colon cancer     ICD-10-CM: Z12.11 ICD-9-CM: V76.51 Screening for malignant neoplasm of breast     ICD-10-CM: Z12.31 
ICD-9-CM: V76.10 Vitals BP Pulse Temp Resp Height(growth percentile) Weight(growth percentile) 107/71 (!) 101 98.4 °F (36.9 °C) (Oral) 16 5' 5\" (1.651 m) 194 lb (88 kg) SpO2 BMI OB Status Smoking Status 93% 32.28 kg/m2 Postmenopausal Current Every Day Smoker Vitals History BMI and BSA Data Body Mass Index Body Surface Area  
 32.28 kg/m 2 2.01 m 2 Preferred Pharmacy Pharmacy Name Phone [facility] [phone] Your Updated Medication List  
  
   
This list is accurate as of 3/28/18 11:37 AM.  Always use your most recent med list. amLODIPine 10 mg tablet Commonly known as:  Aracelis Hernandez Take 0.5 Tabs by mouth daily. atorvastatin 40 mg tablet Commonly known as:  LIPITOR Take 1 Tab by mouth daily. erythromycin ophthalmic ointment Commonly known as:  ILOTYCIN Apply thin ribbon to inner lids every 6 hours for 7 days  
  
 lisinopril 40 mg tablet Commonly known as:  Azul Champ Take 0.5 Tabs by mouth daily. LORazepam 0.5 mg tablet Commonly known as:  ATIVAN Take 1 tablet by mouth daily PRN anxiety. metoprolol succinate 25 mg XL tablet Commonly known as:  TOPROL-XL Take 1 Tab by mouth daily. potassium chloride 20 mEq tablet Commonly known as:  KLOR-CON M20 Take 1 Tab by mouth daily. risperiDONE 1 mg tablet Commonly known as:  RisperDAL Take 1 Tab by mouth nightly. sertraline 50 mg tablet Commonly known as:  ZOLOFT Take 1 Tab by mouth daily. We Performed the Following HEMOGLOBIN A1C WITH EAG [26635 CPT(R)] LIPID PANEL [91838 CPT(R)] METABOLIC PANEL, COMPREHENSIVE [41954 CPT(R)] OCCULT BLOOD, IMMUNOASSAY (FIT) [id number] CPT(R)] Follow-up Instructions Return in about 3 months (around 6/28/2018). To-Do List   
 03/28/2018 Imaging:  LUIS ARMANDO MAMMO BI SCREENING INCL CAD Patient Instructions Bipolar Disorder: Care Instructions Your Care Instructions Bipolar disorder is an illness that causes extreme mood changes, from times of very high energy (manic episodes) to times of depression. But many people with bipolar disorder show only the symptoms of depression. These moods may cause problems with your work, school, family life, friendships, and how well you function. This disease is also called manic-depression. There is no cure for bipolar disorder, but it can be helped with medicines. Counseling may also help. It is important to take your medicines exactly as prescribed, even when you feel well. You may need lifelong treatment. Follow-up care is a key part of your treatment and safety. Be sure to make and go to all appointments, and call your doctor if you are having problems. It's also a good idea to know your test results and keep a list of the medicines you take. How can you care for yourself at home? · Be safe with medicines. Take your medicines exactly as prescribed. Do not stop or change a medicine without talking to your doctor first. Jerry Net and your doctor may need to try different combinations of medicines to find what works for you. · Take your medicines on schedule to keep your moods even. When you feel good, you may think that you do not need your medicines. But it is important to keep taking them. · Go to your counseling sessions. Call and talk with your counselor if you can't go to a session or if you don't think the sessions are helping. Do not just stop going. · Get at least 30 minutes of activity on most days of the week. Walking is a good choice. You also may want to do other things, such as running, swimming, or cycling. · Get enough sleep. Keep your room dark and quiet. Try to go to bed at the same time every night. · Eat a healthy diet. This includes whole grains, dairy, fruits, vegetables, and protein. Eat foods from each of these groups. · Try to lower your stress.  Manage your time, build a strong support system, and lead a healthy lifestyle. To lower your stress, try physical activity, slow deep breathing, or getting a massage. · Do not use alcohol or illegal drugs. · Learn the early signs of your mood changes. You can then take steps to help yourself feel better. · Ask for help from friends and family when you need it. You may need help with daily chores when you are depressed. When you are manic, you may need support to control your high energy levels. What should you do if someone in your family has bipolar disorder? · Learn about the disease and the signs that it is getting worse. · Remind your family member that you love him or her. · Make a plan with all family members about how to take care of your loved one when his or her symptoms are bad. · Talk about your fears and concerns and those of other family members. Seek counseling if needed. · Do not focus attention only on the person who is in treatment. · Remind yourself that it will take time for changes to occur. · Do not blame yourself for the disease. · Know your legal rights and the legal rights of your family member. Support groups or counselors can help you with this information. · Take care of yourself. Keep up with your own interests, such as your career, hobbies, and friends. Use exercise, positive self-talk, deep breathing, and other relaxing exercises to help lower your stress. · Give yourself time to grieve. You may need to deal with emotions such as anger, fear, and frustration. After you work through your feelings, you will be better able to care for yourself and your family. · If you are having a hard time with your feelings or with your relationship with your family member, talk with a counselor. When should you call for help? Call 911 anytime you think you may need emergency care. For example, call if: 
? · You feel like hurting yourself or someone else. ? · Someone who has bipolar disorder displays dangerous behavior, and you think the person might hurt himself or herself or someone else. ?Call your doctor now or seek immediate medical care if: 
? · You hear voices. ? · Someone you know has bipolar disorder and talks about suicide. Keep the numbers for these national suicide hotlines: 7-863-692-TALK (9-524.207.8906) and 6-528-PPPOKRM (5-342.956.4642). If a suicide threat seems real, with a specific plan and a way to carry it out, stay with the person, or ask someone you trust to stay with the person, until you can get help. ? · Someone you know has bipolar disorder and: 
¨ Starts to give away possessions. ¨ Is using illegal drugs or drinking alcohol heavily. ¨ Talks or writes about death, including writing suicide notes or talking about guns, knives, or pills. ¨ Talks or writes about hurting someone else. ¨ Starts to spend a lot of time alone. ¨ Acts very aggressively or suddenly appears calm. ¨ Talks about beliefs that are not based in reality (delusions). ? Watch closely for changes in your health, and be sure to contact your doctor if: 
? · You cannot go to your counseling sessions. Where can you learn more? Go to http://maria victoria-ignacio.info/. Enter K052 in the search box to learn more about \"Bipolar Disorder: Care Instructions. \" Current as of: May 12, 2017 Content Version: 11.4 © 1737-8946 Healthwise, Breaker. Care instructions adapted under license by [employer] (which disclaims liability or warranty for this information). If you have questions about a medical condition or this instruction, always ask your healthcare professional. [address] any warranty or liability for your use of this information. DASH Diet: Care Instructions Your Care Instructions The DASH diet is an eating plan that can help lower your blood pressure. DASH stands for Dietary Approaches to Stop Hypertension. Hypertension is high blood pressure. The DASH diet focuses on eating foods that are high in calcium, potassium, and magnesium. These nutrients can lower blood pressure. The foods that are highest in these nutrients are fruits, vegetables, low-fat dairy products, nuts, seeds, and legumes. But taking calcium, potassium, and magnesium supplements instead of eating foods that are high in those nutrients does not have the same effect. The DASH diet also includes whole grains, fish, and poultry. The DASH diet is one of several lifestyle changes your doctor may recommend to lower your high blood pressure. Your doctor may also want you to decrease the amount of sodium in your diet. Lowering sodium while following the DASH diet can lower blood pressure even further than just the DASH diet alone. Follow-up care is a key part of your treatment and safety. Be sure to make and go to all appointments, and call your doctor if you are having problems. It's also a good idea to know your test results and keep a list of the medicines you take. How can you care for yourself at home? Following the DASH diet · Eat 4 to 5 servings of fruit each day. A serving is 1 medium-sized piece of fruit, ½ cup chopped or canned fruit, 1/4 cup dried fruit, or 4 ounces (½ cup) of fruit juice. Choose fruit more often than fruit juice. · Eat 4 to 5 servings of vegetables each day. A serving is 1 cup of lettuce or raw leafy vegetables, ½ cup of chopped or cooked vegetables, or 4 ounces (½ cup) of vegetable juice. Choose vegetables more often than vegetable juice. · Get 2 to 3 servings of low-fat and fat-free dairy each day. A serving is 8 ounces of milk, 1 cup of yogurt, or 1 ½ ounces of cheese. · Eat 6 to 8 servings of grains each day.  A serving is 1 slice of bread, 1 ounce of dry cereal, or ½ cup of cooked rice, pasta, or cooked cereal. Try to choose whole-grain products as much as possible. · Limit lean meat, poultry, and fish to 2 servings each day. A serving is 3 ounces, about the size of a deck of cards. · Eat 4 to 5 servings of nuts, seeds, and legumes (cooked dried beans, lentils, and split peas) each week. A serving is 1/3 cup of nuts, 2 tablespoons of seeds, or ½ cup of cooked beans or peas. · Limit fats and oils to 2 to 3 servings each day. A serving is 1 teaspoon of vegetable oil or 2 tablespoons of salad dressing. · Limit sweets and added sugars to 5 servings or less a week. A serving is 1 tablespoon jelly or jam, ½ cup sorbet, or 1 cup of lemonade. · Eat less than 2,300 milligrams (mg) of sodium a day. If you limit your sodium to 1,500 mg a day, you can lower your blood pressure even more. Tips for success · Start small. Do not try to make dramatic changes to your diet all at once. You might feel that you are missing out on your favorite foods and then be more likely to not follow the plan. Make small changes, and stick with them. Once those changes become habit, add a few more changes. · Try some of the following: ¨ Make it a goal to eat a fruit or vegetable at every meal and at snacks. This will make it easy to get the recommended amount of fruits and vegetables each day. ¨ Try yogurt topped with fruit and nuts for a snack or healthy dessert. ¨ Add lettuce, tomato, cucumber, and onion to sandwiches. ¨ Combine a ready-made pizza crust with low-fat mozzarella cheese and lots of vegetable toppings. Try using tomatoes, squash, spinach, broccoli, carrots, cauliflower, and onions. ¨ Have a variety of cut-up vegetables with a low-fat dip as an appetizer instead of chips and dip. ¨ Sprinkle sunflower seeds or chopped almonds over salads. Or try adding chopped walnuts or almonds to cooked vegetables. ¨ Try some vegetarian meals using beans and peas.  Add garbanzo or kidney beans to salads. Make burritos and tacos with mashed marques beans or black beans. Where can you learn more? Go to http://maria victoria-ignacio.info/. Enter B084 in the search box to learn more about \"DASH Diet: Care Instructions. \" Current as of: September 21, 2016 Content Version: 11.4 © 1126-0286 [employer]. Care instructions adapted under license by [employer] (which disclaims liability or warranty for this information). If you have questions about a medical condition or this instruction, always ask your healthcare professional. [place] [address] any warranty or liability for your use of this information. Stopping Smoking: Care Instructions Your Care Instructions Cigarette smokers crave the nicotine in cigarettes. Giving it up is much harder than simply changing a habit. Your body has to stop craving the nicotine. It is hard to quit, but you can do it. There are many tools that people use to quit smoking. You may find that combining tools works best for you. There are several steps to quitting. First you get ready to quit. Then you get support to help you. After that, you learn new skills and behaviors to become a nonsmoker. For many people, a necessary step is getting and using medicine. Your doctor will help you set up the plan that best meets your needs. You may want to attend a smoking cessation program to help you quit smoking. When you choose a program, look for one that has proven success. Ask your doctor for ideas. You will greatly increase your chances of success if you take medicine as well as get counseling or join a cessation program. 
Some of the changes you feel when you first quit tobacco are uncomfortable. Your body will miss the nicotine at first, and you may feel short-tempered and grumpy. You may have trouble sleeping or concentrating. Medicine can help you deal with these symptoms.  You may struggle with changing your smoking habits and rituals. The last step is the tricky one: Be prepared for the smoking urge to continue for a time. This is a lot to deal with, but keep at it. You will feel better. Follow-up care is a key part of your treatment and safety. Be sure to make and go to all appointments, and call your doctor if you are having problems. It's also a good idea to know your test results and keep a list of the medicines you take. How can you care for yourself at home? · Ask your family, friends, and coworkers for support. You have a better chance of quitting if you have help and support. · Join a support group, such as Nicotine Anonymous, for people who are trying to quit smoking. · Consider signing up for a smoking cessation program, such as the American Lung Association's Freedom from Smoking program. 
· Set a quit date. Pick your date carefully so that it is not right in the middle of a big deadline or stressful time. Once you quit, do not even take a puff. Get rid of all ashtrays and lighters after your last cigarette. Clean your house and your clothes so that they do not smell of smoke. · Learn how to be a nonsmoker. Think about ways you can avoid those things that make you reach for a cigarette. ¨ Avoid situations that put you at greatest risk for smoking. For some people, it is hard to have a drink with friends without smoking. For others, they might skip a coffee break with coworkers who smoke. ¨ Change your daily routine. Take a different route to work or eat a meal in a different place. · Cut down on stress. Calm yourself or release tension by doing an activity you enjoy, such as reading a book, taking a hot bath, or gardening. · Talk to your doctor or pharmacist about nicotine replacement therapy, which replaces the nicotine in your body.  You still get nicotine but you do not use tobacco. Nicotine replacement products help you slowly reduce the amount of nicotine you need. These products come in several forms, many of them available over-the-counter: ¨ Nicotine patches ¨ Nicotine gum and lozenges ¨ Nicotine inhaler · Ask your doctor about bupropion (Wellbutrin) or varenicline (Chantix), which are prescription medicines. They do not contain nicotine. They help you by reducing withdrawal symptoms, such as stress and anxiety. · Some people find hypnosis, acupuncture, and massage helpful for ending the smoking habit. · Eat a healthy diet and get regular exercise. Having healthy habits will help your body move past its craving for nicotine. · Be prepared to keep trying. Most people are not successful the first few times they try to quit. Do not get mad at yourself if you smoke again. Make a list of things you learned and think about when you want to try again, such as next week, next month, or next year. Where can you learn more? Go to http://maria victoriaVeltiignacio.info/. Enter D955 in the search box to learn more about \"Stopping Smoking: Care Instructions. \" Current as of: March 20, 2017 Content Version: 11.4 © 6113-9491 [employer]. Care instructions adapted under license by [employer] (which disclaims liability or warranty for this information). If you have questions about a medical condition or this instruction, always ask your healthcare professional. [address] any warranty or liability for your use of this information. Introducing [facility]! Dear Sena Ruffin: Thank you for requesting a [place] account. Our records indicate that you already have an active [place] account. You can access your account anytime at [email or web address]. [email or web address] Did you know that you can access your hospital and ER discharge instructions at any time in [place]? You can also review all of your test results from your hospital stay or ER visit. Additional Information If you have questions, please visit the Frequently Asked Questions section of the [employer] website at [email or web address] Remember, [employer] is NOT to be used for urgent needs. For medical emergencies, dial 911. Now available from your iPhone and Android! Please provide this summary of care documentation to your next provider. Your primary care clinician is listed as Ricardo Dancer. If you have any questions after today's visit, please call [phone].

## 2018-03-28 NOTE — PATIENT INSTRUCTIONS
Bipolar Disorder: Care Instructions  Your Care Instructions    Bipolar disorder is an illness that causes extreme mood changes, from times of very high energy (manic episodes) to times of depression. But many people with bipolar disorder show only the symptoms of depression. These moods may cause problems with your work, school, family life, friendships, and how well you function. This disease is also called manic-depression. There is no cure for bipolar disorder, but it can be helped with medicines. Counseling may also help. It is important to take your medicines exactly as prescribed, even when you feel well. You may need lifelong treatment. Follow-up care is a key part of your treatment and safety. Be sure to make and go to all appointments, and call your doctor if you are having problems. It's also a good idea to know your test results and keep a list of the medicines you take. How can you care for yourself at home? · Be safe with medicines. Take your medicines exactly as prescribed. Do not stop or change a medicine without talking to your doctor first. Deborah Yen and your doctor may need to try different combinations of medicines to find what works for you. · Take your medicines on schedule to keep your moods even. When you feel good, you may think that you do not need your medicines. But it is important to keep taking them. · Go to your counseling sessions. Call and talk with your counselor if you can't go to a session or if you don't think the sessions are helping. Do not just stop going. · Get at least 30 minutes of activity on most days of the week. Walking is a good choice. You also may want to do other things, such as running, swimming, or cycling. · Get enough sleep. Keep your room dark and quiet. Try to go to bed at the same time every night. · Eat a healthy diet. This includes whole grains, dairy, fruits, vegetables, and protein. Eat foods from each of these groups. · Try to lower your stress. Manage your time, build a strong support system, and lead a healthy lifestyle. To lower your stress, try physical activity, slow deep breathing, or getting a massage. · Do not use alcohol or illegal drugs. · Learn the early signs of your mood changes. You can then take steps to help yourself feel better. · Ask for help from friends and family when you need it. You may need help with daily chores when you are depressed. When you are manic, you may need support to control your high energy levels. What should you do if someone in your family has bipolar disorder? · Learn about the disease and the signs that it is getting worse. · Remind your family member that you love him or her. · Make a plan with all family members about how to take care of your loved one when his or her symptoms are bad. · Talk about your fears and concerns and those of other family members. Seek counseling if needed. · Do not focus attention only on the person who is in treatment. · Remind yourself that it will take time for changes to occur. · Do not blame yourself for the disease. · Know your legal rights and the legal rights of your family member. Support groups or counselors can help you with this information. · Take care of yourself. Keep up with your own interests, such as your career, hobbies, and friends. Use exercise, positive self-talk, deep breathing, and other relaxing exercises to help lower your stress. · Give yourself time to grieve. You may need to deal with emotions such as anger, fear, and frustration. After you work through your feelings, you will be better able to care for yourself and your family. · If you are having a hard time with your feelings or with your relationship with your family member, talk with a counselor. When should you call for help? Call 911 anytime you think you may need emergency care. For example, call if:  ? · You feel like hurting yourself or someone else.    ? · Someone who has bipolar disorder displays dangerous behavior, and you think the person might hurt himself or herself or someone else. ?Call your doctor now or seek immediate medical care if:  ? · You hear voices. ? · Someone you know has bipolar disorder and talks about suicide. Keep the numbers for these national suicide hotlines: 8-539-352-TALK (6-264.445.7031) and 0-945-USOWMES (4-634.646.7269). If a suicide threat seems real, with a specific plan and a way to carry it out, stay with the person, or ask someone you trust to stay with the person, until you can get help. ? · Someone you know has bipolar disorder and:  ¨ Starts to give away possessions. ¨ Is using illegal drugs or drinking alcohol heavily. ¨ Talks or writes about death, including writing suicide notes or talking about guns, knives, or pills. ¨ Talks or writes about hurting someone else. ¨ Starts to spend a lot of time alone. ¨ Acts very aggressively or suddenly appears calm. ¨ Talks about beliefs that are not based in reality (delusions). ? Watch closely for changes in your health, and be sure to contact your doctor if:  ? · You cannot go to your counseling sessions. Where can you learn more? Go to http://maria victoria-ignacio.info/. Enter K052 in the search box to learn more about \"Bipolar Disorder: Care Instructions. \"  Current as of: May 12, 2017  Content Version: 11.4  © 4128-1844 [employer]. Care instructions adapted under license by [employer] (which disclaims liability or warranty for this information). If you have questions about a medical condition or this instruction, always ask your healthcare professional. [place] [address] any warranty or liability for your use of this information. DASH Diet: Care Instructions  Your Care Instructions    The DASH diet is an eating plan that can help lower your blood pressure. DASH stands for Dietary Approaches to Stop Hypertension.  Hypertension is high blood pressure. The DASH diet focuses on eating foods that are high in calcium, potassium, and magnesium. These nutrients can lower blood pressure. The foods that are highest in these nutrients are fruits, vegetables, low-fat dairy products, nuts, seeds, and legumes. But taking calcium, potassium, and magnesium supplements instead of eating foods that are high in those nutrients does not have the same effect. The DASH diet also includes whole grains, fish, and poultry. The DASH diet is one of several lifestyle changes your doctor may recommend to lower your high blood pressure. Your doctor may also want you to decrease the amount of sodium in your diet. Lowering sodium while following the DASH diet can lower blood pressure even further than just the DASH diet alone. Follow-up care is a key part of your treatment and safety. Be sure to make and go to all appointments, and call your doctor if you are having problems. It's also a good idea to know your test results and keep a list of the medicines you take. How can you care for yourself at home? Following the DASH diet  · Eat 4 to 5 servings of fruit each day. A serving is 1 medium-sized piece of fruit, ½ cup chopped or canned fruit, 1/4 cup dried fruit, or 4 ounces (½ cup) of fruit juice. Choose fruit more often than fruit juice. · Eat 4 to 5 servings of vegetables each day. A serving is 1 cup of lettuce or raw leafy vegetables, ½ cup of chopped or cooked vegetables, or 4 ounces (½ cup) of vegetable juice. Choose vegetables more often than vegetable juice. · Get 2 to 3 servings of low-fat and fat-free dairy each day. A serving is 8 ounces of milk, 1 cup of yogurt, or 1 ½ ounces of cheese. · Eat 6 to 8 servings of grains each day. A serving is 1 slice of bread, 1 ounce of dry cereal, or ½ cup of cooked rice, pasta, or cooked cereal. Try to choose whole-grain products as much as possible. · Limit lean meat, poultry, and fish to 2 servings each day.  A serving is 3 ounces, about the size of a deck of cards. · Eat 4 to 5 servings of nuts, seeds, and legumes (cooked dried beans, lentils, and split peas) each week. A serving is 1/3 cup of nuts, 2 tablespoons of seeds, or ½ cup of cooked beans or peas. · Limit fats and oils to 2 to 3 servings each day. A serving is 1 teaspoon of vegetable oil or 2 tablespoons of salad dressing. · Limit sweets and added sugars to 5 servings or less a week. A serving is 1 tablespoon jelly or jam, ½ cup sorbet, or 1 cup of lemonade. · Eat less than 2,300 milligrams (mg) of sodium a day. If you limit your sodium to 1,500 mg a day, you can lower your blood pressure even more. Tips for success  · Start small. Do not try to make dramatic changes to your diet all at once. You might feel that you are missing out on your favorite foods and then be more likely to not follow the plan. Make small changes, and stick with them. Once those changes become habit, add a few more changes. · Try some of the following:  ¨ Make it a goal to eat a fruit or vegetable at every meal and at snacks. This will make it easy to get the recommended amount of fruits and vegetables each day. ¨ Try yogurt topped with fruit and nuts for a snack or healthy dessert. ¨ Add lettuce, tomato, cucumber, and onion to sandwiches. ¨ Combine a ready-made pizza crust with low-fat mozzarella cheese and lots of vegetable toppings. Try using tomatoes, squash, spinach, broccoli, carrots, cauliflower, and onions. ¨ Have a variety of cut-up vegetables with a low-fat dip as an appetizer instead of chips and dip. ¨ Sprinkle sunflower seeds or chopped almonds over salads. Or try adding chopped walnuts or almonds to cooked vegetables. ¨ Try some vegetarian meals using beans and peas. Add garbanzo or kidney beans to salads. Make burritos and tacos with mashed marques beans or black beans. Where can you learn more? Go to http://irene-ignacio.info/.   Enter N428 in the search box to learn more about \"DASH Diet: Care Instructions. \"  Current as of: September 21, 2016  Content Version: 11.4  © 7187-8557 [employer] Care instructions adapted under license by [employer] (which disclaims liability or warranty for this information). If you have questions about a medical condition or this instruction, always ask your healthcare professional. [address] any warranty or liability for your use of this information. Stopping Smoking: Care Instructions  Your Care Instructions    Cigarette smokers crave the nicotine in cigarettes. Giving it up is much harder than simply changing a habit. Your body has to stop craving the nicotine. It is hard to quit, but you can do it. There are many tools that people use to quit smoking. You may find that combining tools works best for you. There are several steps to quitting. First you get ready to quit. Then you get support to help you. After that, you learn new skills and behaviors to become a nonsmoker. For many people, a necessary step is getting and using medicine. Your doctor will help you set up the plan that best meets your needs. You may want to attend a smoking cessation program to help you quit smoking. When you choose a program, look for one that has proven success. Ask your doctor for ideas. You will greatly increase your chances of success if you take medicine as well as get counseling or join a cessation program.  Some of the changes you feel when you first quit tobacco are uncomfortable. Your body will miss the nicotine at first, and you may feel short-tempered and grumpy. You may have trouble sleeping or concentrating. Medicine can help you deal with these symptoms. You may struggle with changing your smoking habits and rituals. The last step is the tricky one: Be prepared for the smoking urge to continue for a time. This is a lot to deal with, but keep at it. You will feel better.   Follow-up care is a key part of your treatment and safety. Be sure to make and go to all appointments, and call your doctor if you are having problems. It's also a good idea to know your test results and keep a list of the medicines you take. How can you care for yourself at home? · Ask your family, friends, and coworkers for support. You have a better chance of quitting if you have help and support. · Join a support group, such as Nicotine Anonymous, for people who are trying to quit smoking. · Consider signing up for a smoking cessation program, such as the American Lung Association's Freedom from Smoking program.  · Set a quit date. Pick your date carefully so that it is not right in the middle of a big deadline or stressful time. Once you quit, do not even take a puff. Get rid of all ashtrays and lighters after your last cigarette. Clean your house and your clothes so that they do not smell of smoke. · Learn how to be a nonsmoker. Think about ways you can avoid those things that make you reach for a cigarette. ¨ Avoid situations that put you at greatest risk for smoking. For some people, it is hard to have a drink with friends without smoking. For others, they might skip a coffee break with coworkers who smoke. ¨ Change your daily routine. Take a different route to work or eat a meal in a different place. · Cut down on stress. Calm yourself or release tension by doing an activity you enjoy, such as reading a book, taking a hot bath, or gardening. · Talk to your doctor or pharmacist about nicotine replacement therapy, which replaces the nicotine in your body. You still get nicotine but you do not use tobacco. Nicotine replacement products help you slowly reduce the amount of nicotine you need.  These products come in several forms, many of them available over-the-counter:  ¨ Nicotine patches  ¨ Nicotine gum and lozenges  ¨ Nicotine inhaler  · Ask your doctor about bupropion (Wellbutrin) or varenicline (Chantix), which are prescription medicines. They do not contain nicotine. They help you by reducing withdrawal symptoms, such as stress and anxiety. · Some people find hypnosis, acupuncture, and massage helpful for ending the smoking habit. · Eat a healthy diet and get regular exercise. Having healthy habits will help your body move past its craving for nicotine. · Be prepared to keep trying. Most people are not successful the first few times they try to quit. Do not get mad at yourself if you smoke again. Make a list of things you learned and think about when you want to try again, such as next week, next month, or next year. Where can you learn more? Go to http://maria victoriaVinoboignacio.info/. Enter T530 in the search box to learn more about \"Stopping Smoking: Care Instructions. \"  Current as of: March 20, 2017  Content Version: 11.4  © 2608-0302 [employer]. Care instructions adapted under license by [employer] (which disclaims liability or warranty for this information). If you have questions about a medical condition or this instruction, always ask your healthcare professional. [address] any warranty or liability for your use of this information.

## 2018-04-05 LAB
ALBUMIN SERPL-MCNC: 4.2 G/DL (ref 3.6–4.8)
ALBUMIN/GLOB SERPL: 1.4 {RATIO} (ref 1.2–2.2)
ALP SERPL-CCNC: 111 IU/L (ref 39–117)
ALT SERPL-CCNC: 19 IU/L (ref 0–32)
AST SERPL-CCNC: 10 IU/L (ref 0–40)
BILIRUB SERPL-MCNC: 0.3 MG/DL (ref 0–1.2)
BUN SERPL-MCNC: 14 MG/DL (ref 8–27)
BUN/CREAT SERPL: 16 (ref 12–28)
CALCIUM SERPL-MCNC: 9.8 MG/DL (ref 8.7–10.3)
CHLORIDE SERPL-SCNC: 102 MMOL/L (ref 96–106)
CHOLEST SERPL-MCNC: 195 MG/DL (ref 100–199)
CO2 SERPL-SCNC: 22 MMOL/L (ref 18–29)
CREAT SERPL-MCNC: 0.89 MG/DL (ref 0.57–1)
EST. AVERAGE GLUCOSE BLD GHB EST-MCNC: 131 MG/DL
GFR SERPLBLD CREATININE-BSD FMLA CKD-EPI: 70 ML/MIN/1.73
GFR SERPLBLD CREATININE-BSD FMLA CKD-EPI: 81 ML/MIN/1.73
GLOBULIN SER CALC-MCNC: 3 G/DL (ref 1.5–4.5)
GLUCOSE SERPL-MCNC: 98 MG/DL (ref 65–99)
HBA1C MFR BLD: 6.2 % (ref 4.8–5.6)
HDLC SERPL-MCNC: 44 MG/DL
INTERPRETATION, 910389: NORMAL
LDLC SERPL CALC-MCNC: 91 MG/DL (ref 0–99)
POTASSIUM SERPL-SCNC: 4.6 MMOL/L (ref 3.5–5.2)
PROT SERPL-MCNC: 7.2 G/DL (ref 6–8.5)
SODIUM SERPL-SCNC: 140 MMOL/L (ref 134–144)
TRIGL SERPL-MCNC: 301 MG/DL (ref 0–149)
VLDLC SERPL CALC-MCNC: 60 MG/DL (ref 5–40)

## 2018-04-05 NOTE — PROGRESS NOTES
A1C is stable but very close to being in the diabetes range. Triglycerides are elevated, worse than last check. Need to improve diet, cut back on sugar and simple carbs, walk 20 minutes daily, work on weight loss. Recheck fasting labs in 3 months. Will need to consider adding metformin if not improved at that point.

## 2018-04-27 ENCOUNTER — TELEPHONE (OUTPATIENT)
Dept: BEHAVIORAL/MENTAL HEALTH CLINIC | Age: 63
End: 2018-04-27

## 2018-04-27 ENCOUNTER — DOCUMENTATION ONLY (OUTPATIENT)
Dept: BEHAVIORAL/MENTAL HEALTH CLINIC | Age: 63
End: 2018-04-27

## 2018-04-27 NOTE — TELEPHONE ENCOUNTER
called for pt and stated that she is not feeling well and would like for you to give her a call. He didn't give me any other specifics other than that but he would like to speak with you first and then have his wife talk to you.  He is on HiPPA

## 2018-04-27 NOTE — PROGRESS NOTES
Returned call and spoke with Bethany Patterson who reports \"euphoria. .. High and nervous\" feeling for 2 days/ She is more paranoid and is having a harder time getting to sleep. Main trigger is her 's work hours changing this month. She reports \"I can't keep up. \" Increased her Risperdal dose from 1mg qhs to 1mg bid. Edmundjean carlos Patterson affirmed understanding of plan. She agrees to go to the ER should symptoms worsen this weekend, and to call provider back on Monday to let us know how she is doing. Called and left msg for her , Eloisa Hernández (consent in chart) at #[phone].

## 2018-05-01 DIAGNOSIS — F41.9 ANXIETY: Primary | ICD-10-CM

## 2018-05-01 RX ORDER — POTASSIUM CHLORIDE 1500 MG/1
TABLET, FILM COATED, EXTENDED RELEASE ORAL
Qty: 30 TAB | Refills: 2 | Status: SHIPPED | OUTPATIENT
Start: 2018-05-01 | End: 2018-07-28 | Stop reason: SDUPTHER

## 2018-05-01 RX ORDER — LORAZEPAM 0.5 MG/1
TABLET ORAL
Qty: 30 TAB | Refills: 0 | OUTPATIENT
Start: 2018-05-01 | End: 2018-06-06 | Stop reason: SDUPTHER

## 2018-05-01 NOTE — TELEPHONE ENCOUNTER
Returned her 's call and spoke with him and Marcela Reed. Increase in Risperdal dose has helped but she is still anxious and paranoid. She agrees to go back on low dose Ativan temporarily. Discussed risk of dependency with her as well as plan, which is to not to have her on Ativan long term. Rx called into her pharmacy by provider.

## 2018-05-01 NOTE — TELEPHONE ENCOUNTER
Please call pt's  on this number [phone], he has some more information for you regarding his wife since you have increase whatever medication it was. I believe he said wellbutrin but I could be wrong.

## 2018-05-03 ENCOUNTER — TELEPHONE (OUTPATIENT)
Dept: BEHAVIORAL/MENTAL HEALTH CLINIC | Age: 63
End: 2018-05-03

## 2018-05-03 DIAGNOSIS — F31.9 BIPOLAR 1 DISORDER (HCC): ICD-10-CM

## 2018-05-03 RX ORDER — RISPERIDONE 1 MG/1
1 TABLET, FILM COATED ORAL 2 TIMES DAILY
Qty: 60 TAB | Refills: 2 | Status: SHIPPED | OUTPATIENT
Start: 2018-05-03 | End: 2018-08-01 | Stop reason: SDUPTHER

## 2018-05-03 NOTE — TELEPHONE ENCOUNTER
Pt's  Leander Hauser called in stating his wife will need a new prescription for Risperdal as you increase her dosage and she has ran out early. He states she has seen an big improvement with her and she is doing way better. Pt has an appt with you on 6/13/18 for a 6 month f/u.

## 2018-05-04 ENCOUNTER — TELEPHONE (OUTPATIENT)
Dept: INTERNAL MEDICINE CLINIC | Age: 63
End: 2018-05-04

## 2018-05-04 NOTE — TELEPHONE ENCOUNTER
Patient called in and said that she is doing better on the medication.  She just wanted to let you know

## 2018-06-06 ENCOUNTER — TELEPHONE (OUTPATIENT)
Dept: BEHAVIORAL/MENTAL HEALTH CLINIC | Age: 63
End: 2018-06-06

## 2018-06-06 DIAGNOSIS — F41.9 ANXIETY: ICD-10-CM

## 2018-06-06 NOTE — TELEPHONE ENCOUNTER
Patient of Sammy:  Has an appointment on June 13 and is out of Lorazepam. Can we call in a 7 day supply for this patient?  Call  [phone]

## 2018-06-13 ENCOUNTER — DOCUMENTATION ONLY (OUTPATIENT)
Dept: BEHAVIORAL/MENTAL HEALTH CLINIC | Age: 63
End: 2018-06-13

## 2018-06-13 DIAGNOSIS — F41.9 ANXIETY: ICD-10-CM

## 2018-06-13 DIAGNOSIS — F31.9 BIPOLAR 1 DISORDER (HCC): ICD-10-CM

## 2018-06-13 RX ORDER — RISPERIDONE 1 MG/1
1 TABLET, FILM COATED ORAL 2 TIMES DAILY
Qty: 60 TAB | Refills: 2 | OUTPATIENT
Start: 2018-06-13

## 2018-06-13 RX ORDER — LORAZEPAM 0.5 MG/1
TABLET ORAL
Qty: 30 TAB | Refills: 1 | Status: SHIPPED | OUTPATIENT
Start: 2018-06-13 | End: 2018-07-10 | Stop reason: SDUPTHER

## 2018-06-13 RX ORDER — SERTRALINE HYDROCHLORIDE 50 MG/1
50 TABLET, FILM COATED ORAL DAILY
Qty: 30 TAB | Refills: 1 | Status: SHIPPED | OUTPATIENT
Start: 2018-06-13 | End: 2018-08-01 | Stop reason: SDUPTHER

## 2018-06-13 NOTE — PROGRESS NOTES
Provider called in rx for Ativan 0.5mg- take 1/2 to 1 whole tab by mouth daily- with 1 refill to her [facility] pharmacy.

## 2018-06-15 ENCOUNTER — TELEPHONE (OUTPATIENT)
Dept: BEHAVIORAL/MENTAL HEALTH CLINIC | Age: 63
End: 2018-06-15

## 2018-06-15 NOTE — TELEPHONE ENCOUNTER
Pt's  called and wanted to speak with you regarding an \"emergency or emergency referral\". He would like a call back at [phone]. Pt's  called back and stated that Mrs. Jagdeep Stone came to him this morning and stated that she was not feeling well and she is afraid that she will hurt herself. They would like to speak with you as to a plan of action. Please advice or call pt back.

## 2018-06-20 ENCOUNTER — OFFICE VISIT (OUTPATIENT)
Dept: FAMILY MEDICINE CLINIC | Age: 63
End: 2018-06-20

## 2018-06-20 VITALS
DIASTOLIC BLOOD PRESSURE: 67 MMHG | HEART RATE: 89 BPM | BODY MASS INDEX: 28.94 KG/M2 | HEIGHT: 65 IN | TEMPERATURE: 97.7 F | OXYGEN SATURATION: 93 % | RESPIRATION RATE: 17 BRPM | WEIGHT: 173.7 LBS | SYSTOLIC BLOOD PRESSURE: 118 MMHG

## 2018-06-20 DIAGNOSIS — I10 ESSENTIAL HYPERTENSION WITH GOAL BLOOD PRESSURE LESS THAN 130/80: ICD-10-CM

## 2018-06-20 DIAGNOSIS — E78.00 HYPERCHOLESTEREMIA: Primary | ICD-10-CM

## 2018-06-20 DIAGNOSIS — R73.03 PREDIABETES: ICD-10-CM

## 2018-06-20 DIAGNOSIS — F31.9 BIPOLAR 1 DISORDER (HCC): ICD-10-CM

## 2018-06-20 NOTE — PROGRESS NOTES
Subjective:   Estiven Mtz is a 65 y.o. female who is seen for follow up of prediabetes, hypertension, hyperlipidemia and obesity. Cardiovascular risk analysis - 65 y.o. female LDL goal is under 100. Compliance with treatment thus far has been good. ROS: taking medications as instructed, no medication side effects noted, no TIA's, no chest pain on exertion, no dyspnea on exertion, no swelling of ankles, no orthostatic dizziness or lightheadedness. She is following low carb, higher protein diet along with her  and has lost weight. BP not monitored at home. Following heart healthy diet. Smoker. No chest pain, shortness of breath, headaches, blurred vision, or LE edema. Sees her mental health provider regularly. Reports good compliance with meds. No hallucinations or psychosis. No thoughts of harming self or others. New concerns: none. Patient Active Problem List   Diagnosis Code    [facility] F31.9    Essential hypertension with goal blood pressure less than 130/80 I10    Prediabetes R73.03    Hypercholesteremia E78.00    ALEX (acute kidney injury) ([facility] N17.9    Lithium toxicity T56.891A    Anemia D64.9    Cigarette nicotine dependence without complication I02.859    Anxiety F41.9     No Known Allergies  Past Medical History:   Diagnosis Date    Abscess of buttock 7/23/2012    Bronchitis     Common bile duct calculus 7/23/2012    Gallstones 7/12/2012    GERD (gastroesophageal reflux disease)     High cholesterol     Hypercholesterolemia     Hypertension     Ill-defined condition     missing upper front tooth    Ill-defined condition     lower leg bilateral reddened rash.  Insomnia     Other ill-defined conditions(799.89)     Large boil under right arm-pit.     Paranoia ([facility]     Psychiatric disorder     bipolar     Past Surgical History:   Procedure Laterality Date    ABDOMEN SURGERY PROC UNLISTED      cholecystectomy 7/23/2012     Family History   Problem Relation Age of Onset    Cancer Father      lung    Diabetes Brother     Heart Disease Brother     Malignant Hyperthermia Neg Hx     Pseudocholinesterase Deficiency Neg Hx     Delayed Awakening Neg Hx     Post-op Nausea/Vomiting Neg Hx     Emergence Delirium Neg Hx     Post-op Cognitive Dysfunction Neg Hx     Other Neg Hx      Social History   Substance Use Topics    Smoking status: Current Every Day Smoker     Packs/day: 0.50    Smokeless tobacco: Never Used    Alcohol use No      Lab Results  Component Value Date/Time   Hemoglobin A1c 6.2 (H) 04/04/2018 11:39 AM   Hemoglobin A1c 6.2 (H) 12/06/2017 11:34 AM   Hemoglobin A1c 5.9 (H) 11/02/2016 10:29 AM   Glucose 98 04/04/2018 11:39 AM   Glucose (POC) 115 (H) 10/31/2011 08:08 PM   LDL, calculated 91 04/04/2018 11:39 AM   Creatinine (POC) 0.6 10/31/2011 08:08 PM   Creatinine 0.89 04/04/2018 11:39 AM      Lab Results  Component Value Date/Time   Cholesterol, total 195 04/04/2018 11:39 AM   HDL Cholesterol 44 04/04/2018 11:39 AM   LDL, calculated 91 04/04/2018 11:39 AM   Triglyceride 301 (H) 04/04/2018 11:39 AM   CHOL/HDL Ratio 2.5 05/23/2017 06:16 AM     Lab Results  Component Value Date/Time   ALT (SGPT) 19 04/04/2018 11:39 AM   AST (SGOT) 10 04/04/2018 11:39 AM   Alk.  phosphatase 111 04/04/2018 11:39 AM   Bilirubin, direct 0.1 10/31/2011 08:05 PM   Bilirubin, total 0.3 04/04/2018 11:39 AM   Albumin 4.2 04/04/2018 11:39 AM   Protein, total 7.2 04/04/2018 11:39 AM   Ammonia <10 05/25/2017 12:51 AM   INR 1.0 10/31/2011 08:05 PM   Prothrombin time 10.2 10/31/2011 08:05 PM   PLATELET 232 32/00/4061 11:25 AM       Lab Results  Component Value Date/Time   GFR est non-AA 70 04/04/2018 11:39 AM   GFRNA, POC >60 10/31/2011 08:08 PM   GFR est AA 81 04/04/2018 11:39 AM   GFRAA, POC >60 10/31/2011 08:08 PM   Creatinine 0.89 04/04/2018 11:39 AM   Creatinine (POC) 0.6 10/31/2011 08:08 PM   BUN 14 04/04/2018 11:39 AM   BUN (POC) 12 10/31/2011 08:08 PM Sodium 140 04/04/2018 11:39 AM   Sodium (POC) 136 (L) 10/31/2011 08:08 PM   Potassium 4.6 04/04/2018 11:39 AM   Potassium (POC) 3.6 10/31/2011 08:08 PM   Chloride 102 04/04/2018 11:39 AM   Chloride (POC) 102 10/31/2011 08:08 PM   CO2 22 04/04/2018 11:39 AM   Magnesium 1.6 05/26/2017 11:25 AM   Phosphorus 3.6 12/06/2017 11:34 AM        Objective:     Visit Vitals    /67    Pulse 89    Temp 97.7 °F (36.5 °C) (Oral)    Resp 17    Ht 5' 5\" (1.651 m)    Wt 173 lb 11.2 oz (78.8 kg)    SpO2 93%    BMI 28.91 kg/m2      Wt Readings from Last 3 Encounters:   06/20/18 173 lb 11.2 oz (78.8 kg)   03/28/18 194 lb (88 kg)   12/13/17 200 lb (90.7 kg)       Appearance: alert, well appearing, and in no distress, oriented to person, place, and time, overweight and well hydrated. CVS exam BP noted to be well controlled today in office, S1, S2 normal, no gallop, no murmur, chest clear, no JVD, no HSM, no edema. Flat affect today. Assessment/Plan:     reviewed diet, exercise and weight control  copy of written low fat low cholesterol diet provided and reviewed  cardiovascular risk and specific lipid/LDL goals reviewed  reviewed medications and side effects in detail  reviewed potential future medication changes and side effects. Diagnoses and all orders for this visit:    1. Hypercholesteremia  TLC Diet:   Saturated fat <7% of calories, cholesterol <200 mg/day   Consider increased viscous (soluble) fiber (10-25 g/day) and plant stanols/sterols  (2g/day) as therapeutic options to enhance LDL lowering  Weight management  Increased physical activity. Continue atorvastatin  -     LIPID PANEL    2. Prediabetes  TLC  -     HEMOGLOBIN A1C WITH EAG    3. Essential hypertension with goal blood pressure less than 130/80  At goal  Continue amlodipine, toprol, lisinopril  DASH diet  Smoking cessation  Weight loss    4. Bipolar 1 disorder ([facility]  Continue regular f/u with mental health provider    5.  BMI 28  I have reviewed/discussed the above normal BMI with the patient and spouse. I have recommended the following interventions: dietary management education, guidance, and counseling, encourage exercise and monitor weight . Jumana Hannah Having success on lower carb, higher protein diet, recommend she continue  Add daily walking    Follow-up Disposition:  Return in about 3 months (around 9/20/2018). .    I have discussed the diagnosis with the patient and the intended plan as seen in the above orders. The patient has received an after-visit summary and questions were answered concerning future plans. Patient conveyed understanding of the plan at the time of the visit.     Raffi Price NP  06/20/18

## 2018-06-20 NOTE — PROGRESS NOTES
Chief Complaint   Patient presents with    Follow-up     HTN     Pt here for follow up with provider for HTN, pt has been taking medication as ordered. Pt denies any headaches or blurred vision, but states she krishna have occasional dizziness.

## 2018-06-20 NOTE — MR AVS SNAPSHOT
[address] [address] 
[phone] Patient: Austin Navarrete MRN: [id number] Visit Information Date & Time Provider Department Dept. Phone Encounter #  
 6/20/2018 10:45 AM Hima Bruce NP [address] [place] [address] [id number] Follow-up Instructions Return in about 3 months (around 9/20/2018). Your Appointments 8/1/2018 10:00 AM  
ESTABLISHED PATIENT with Lesa Martin NP [facility] ([facility]) Appt Note: r/s from 6.13.18   
 [address] [facility] [address] [facility] [address]  
  
   
 [address] [address] Upcoming Health Maintenance Date Due Hepatitis C Screening 1955 PAP AKA CERVICAL CYTOLOGY 11/16/1977 BREAST CANCER SCRN MAMMOGRAM 11/16/2006 FOBT Q 1 YEAR AGE 50-75 11/16/2006 Influenza Age 5 to Adult 8/1/2018 DTaP/Tdap/Td series (2 - Td) 12/6/2027 Allergies as of 6/20/2018  Review Complete On: 6/20/2018 By: Aidan Otto LPN No Known Allergies Current Immunizations  Never Reviewed No immunizations on file. Not reviewed this visit You Were Diagnosed With   
  
 Codes Comments Hypercholesteremia    -  Primary ICD-10-CM: E78.00 ICD-9-CM: 272.0 Prediabetes     ICD-10-CM: R73.03 
ICD-9-CM: 790.29 Essential hypertension with goal blood pressure less than 130/80     ICD-10-CM: I10 
ICD-9-CM: 401.9 Bipolar 1 disorder (HCC)     ICD-10-CM: F31.9 ICD-9-CM: 296.7 Vitals BP Pulse Temp Resp Height(growth percentile) Weight(growth percentile)  
 118/67 89 97.7 °F (36.5 °C) (Oral) 17 5' 5\" (1.651 m) 173 lb 11.2 oz (78.8 kg) SpO2 BMI OB Status Smoking Status 93% 28.91 kg/m2 Postmenopausal Current Every Day Smoker BMI and BSA Data  Body Mass Index Body Surface Area  
 28.91 kg/m 2 1.9 m 2  
  
  
 Preferred Pharmacy Pharmacy Name Phone [facility], [address] [phone] Your Updated Medication List  
  
   
This list is accurate as of 6/20/18 11:17 AM.  Always use your most recent med list. amLODIPine 10 mg tablet Commonly known as:  Harlon Doyne Take 0.5 Tabs by mouth daily. atorvastatin 40 mg tablet Commonly known as:  LIPITOR Take 1 Tab by mouth daily. erythromycin ophthalmic ointment Commonly known as:  ILOTYCIN Apply thin ribbon to inner lids every 6 hours for 7 days  
  
 lisinopril 40 mg tablet Commonly known as:  Renny Peek Take 0.5 Tabs by mouth daily. LORazepam 0.5 mg tablet Commonly known as:  ATIVAN  
TAKE ONE-HALF TO ONE TABLET BY MOUTH DAILY AS NEEDED  
  
 metoprolol succinate 25 mg XL tablet Commonly known as:  TOPROL-XL Take 1 Tab by mouth daily. potassium chloride SR 20 mEq tablet Commonly known as:  K-TAB  
TAKE ONE TABLET BY MOUTH DAILY  
  
 risperiDONE 1 mg tablet Commonly known as:  RisperDAL Take 1 Tab by mouth two (2) times a day. sertraline 50 mg tablet Commonly known as:  ZOLOFT Take 1 Tab by mouth daily. We Performed the Following HEMOGLOBIN A1C WITH EAG [16262 CPT(R)] LIPID PANEL [12400 CPT(R)] Follow-up Instructions Return in about 3 months (around 9/20/2018). Patient Instructions DASH Diet: Care Instructions Your Care Instructions The DASH diet is an eating plan that can help lower your blood pressure. DASH stands for Dietary Approaches to Stop Hypertension. Hypertension is high blood pressure. The DASH diet focuses on eating foods that are high in calcium, potassium, and magnesium. These nutrients can lower blood pressure. The foods that are highest in these nutrients are fruits, vegetables, low-fat dairy products, nuts, seeds, and legumes.  But taking calcium, potassium, and magnesium supplements instead of eating foods that are high in those nutrients does not have the same effect. The DASH diet also includes whole grains, fish, and poultry. The DASH diet is one of several lifestyle changes your doctor may recommend to lower your high blood pressure. Your doctor may also want you to decrease the amount of sodium in your diet. Lowering sodium while following the DASH diet can lower blood pressure even further than just the DASH diet alone. Follow-up care is a key part of your treatment and safety. Be sure to make and go to all appointments, and call your doctor if you are having problems. It's also a good idea to know your test results and keep a list of the medicines you take. How can you care for yourself at home? Following the DASH diet · Eat 4 to 5 servings of fruit each day. A serving is 1 medium-sized piece of fruit, ½ cup chopped or canned fruit, 1/4 cup dried fruit, or 4 ounces (½ cup) of fruit juice. Choose fruit more often than fruit juice. · Eat 4 to 5 servings of vegetables each day. A serving is 1 cup of lettuce or raw leafy vegetables, ½ cup of chopped or cooked vegetables, or 4 ounces (½ cup) of vegetable juice. Choose vegetables more often than vegetable juice. · Get 2 to 3 servings of low-fat and fat-free dairy each day. A serving is 8 ounces of milk, 1 cup of yogurt, or 1 ½ ounces of cheese. · Eat 6 to 8 servings of grains each day. A serving is 1 slice of bread, 1 ounce of dry cereal, or ½ cup of cooked rice, pasta, or cooked cereal. Try to choose whole-grain products as much as possible. · Limit lean meat, poultry, and fish to 2 servings each day. A serving is 3 ounces, about the size of a deck of cards. · Eat 4 to 5 servings of nuts, seeds, and legumes (cooked dried beans, lentils, and split peas) each week. A serving is 1/3 cup of nuts, 2 tablespoons of seeds, or ½ cup of cooked beans or peas. · Limit fats and oils to 2 to 3 servings each day. A serving is 1 teaspoon of vegetable oil or 2 tablespoons of salad dressing. · Limit sweets and added sugars to 5 servings or less a week. A serving is 1 tablespoon jelly or jam, ½ cup sorbet, or 1 cup of lemonade. · Eat less than 2,300 milligrams (mg) of sodium a day. If you limit your sodium to 1,500 mg a day, you can lower your blood pressure even more. Tips for success · Start small. Do not try to make dramatic changes to your diet all at once. You might feel that you are missing out on your favorite foods and then be more likely to not follow the plan. Make small changes, and stick with them. Once those changes become habit, add a few more changes. · Try some of the following: ¨ Make it a goal to eat a fruit or vegetable at every meal and at snacks. This will make it easy to get the recommended amount of fruits and vegetables each day. ¨ Try yogurt topped with fruit and nuts for a snack or healthy dessert. ¨ Add lettuce, tomato, cucumber, and onion to sandwiches. ¨ Combine a ready-made pizza crust with low-fat mozzarella cheese and lots of vegetable toppings. Try using tomatoes, squash, spinach, broccoli, carrots, cauliflower, and onions. ¨ Have a variety of cut-up vegetables with a low-fat dip as an appetizer instead of chips and dip. ¨ Sprinkle sunflower seeds or chopped almonds over salads. Or try adding chopped walnuts or almonds to cooked vegetables. ¨ Try some vegetarian meals using beans and peas. Add garbanzo or kidney beans to salads. Make burritos and tacos with mashed marques beans or black beans. Where can you learn more? Go to http://maria victoria-ignacio.info/. Enter U302 in the search box to learn more about \"DASH Diet: Care Instructions. \" Current as of: September 21, 2016 Content Version: 11.4 © 7608-5943 Healthwise, [employer].  Care instructions adapted under license by [address] (which disclaims liability or warranty for this information). If you have questions about a medical condition or this instruction, always ask your healthcare professional. [address] any warranty or liability for your use of this information. Stopping Smoking: Care Instructions Your Care Instructions Cigarette smokers crave the nicotine in cigarettes. Giving it up is much harder than simply changing a habit. Your body has to stop craving the nicotine. It is hard to quit, but you can do it. There are many tools that people use to quit smoking. You may find that combining tools works best for you. There are several steps to quitting. First you get ready to quit. Then you get support to help you. After that, you learn new skills and behaviors to become a nonsmoker. For many people, a necessary step is getting and using medicine. Your doctor will help you set up the plan that best meets your needs. You may want to attend a smoking cessation program to help you quit smoking. When you choose a program, look for one that has proven success. Ask your doctor for ideas. You will greatly increase your chances of success if you take medicine as well as get counseling or join a cessation program. 
Some of the changes you feel when you first quit tobacco are uncomfortable. Your body will miss the nicotine at first, and you may feel short-tempered and grumpy. You may have trouble sleeping or concentrating. Medicine can help you deal with these symptoms. You may struggle with changing your smoking habits and rituals. The last step is the tricky one: Be prepared for the smoking urge to continue for a time. This is a lot to deal with, but keep at it. You will feel better. Follow-up care is a key part of your treatment and safety.  Be sure to make and go to all appointments, and call your doctor if you are having problems. It's also a good idea to know your test results and keep a list of the medicines you take. How can you care for yourself at home? · Ask your family, friends, and coworkers for support. You have a better chance of quitting if you have help and support. · Join a support group, such as Nicotine Anonymous, for people who are trying to quit smoking. · Consider signing up for a smoking cessation program, such as the American Lung Association's Freedom from Smoking program. 
· Set a quit date. Pick your date carefully so that it is not right in the middle of a big deadline or stressful time. Once you quit, do not even take a puff. Get rid of all ashtrays and lighters after your last cigarette. Clean your house and your clothes so that they do not smell of smoke. · Learn how to be a nonsmoker. Think about ways you can avoid those things that make you reach for a cigarette. ¨ Avoid situations that put you at greatest risk for smoking. For some people, it is hard to have a drink with friends without smoking. For others, they might skip a coffee break with coworkers who smoke. ¨ Change your daily routine. Take a different route to work or eat a meal in a different place. · Cut down on stress. Calm yourself or release tension by doing an activity you enjoy, such as reading a book, taking a hot bath, or gardening. · Talk to your doctor or pharmacist about nicotine replacement therapy, which replaces the nicotine in your body. You still get nicotine but you do not use tobacco. Nicotine replacement products help you slowly reduce the amount of nicotine you need. These products come in several forms, many of them available over-the-counter: ¨ Nicotine patches ¨ Nicotine gum and lozenges ¨ Nicotine inhaler · Ask your doctor about bupropion (Wellbutrin) or varenicline (Chantix), which are prescription medicines. They do not contain nicotine.  They help you by reducing withdrawal symptoms, such as stress and anxiety. · Some people find hypnosis, acupuncture, and massage helpful for ending the smoking habit. · Eat a healthy diet and get regular exercise. Having healthy habits will help your body move past its craving for nicotine. · Be prepared to keep trying. Most people are not successful the first few times they try to quit. Do not get mad at yourself if you smoke again. Make a list of things you learned and think about when you want to try again, such as next week, next month, or next year. Where can you learn more? Go to http://maria victoria-ignacio.info/. Enter D194 in the search box to learn more about \"Stopping Smoking: Care Instructions. \" Current as of: March 20, 2017 Content Version: 11.4 © 6908-8432 [employer]. Care instructions adapted under license by [employer] (which disclaims liability or warranty for this information). If you have questions about a medical condition or this instruction, always ask your healthcare professional. [place] [address] any warranty or liability for your use of this information. Introducing [facility]! Dear Cher Che: Thank you for requesting a [employer] account. Our records indicate that you already have an active [employer] account. You can access your account anytime at [email or web address] Did you know that you can access your hospital and ER discharge instructions at any time in [employer]? You can also review all of your test results from your hospital stay or ER visit. Additional Information If you have questions, please visit the Frequently Asked Questions section of the [employer] website at [email or web address] Remember, [employer] is NOT to be used for urgent needs. For medical emergencies, dial 911. Now available from your iPhone and Android! Please provide this summary of care documentation to your next provider. Your primary care clinician is listed as Diana Tang. If you have any questions after today's visit, please call [phone].

## 2018-06-20 NOTE — PATIENT INSTRUCTIONS
DASH Diet: Care Instructions  Your Care Instructions    The DASH diet is an eating plan that can help lower your blood pressure. DASH stands for Dietary Approaches to Stop Hypertension. Hypertension is high blood pressure. The DASH diet focuses on eating foods that are high in calcium, potassium, and magnesium. These nutrients can lower blood pressure. The foods that are highest in these nutrients are fruits, vegetables, low-fat dairy products, nuts, seeds, and legumes. But taking calcium, potassium, and magnesium supplements instead of eating foods that are high in those nutrients does not have the same effect. The DASH diet also includes whole grains, fish, and poultry. The DASH diet is one of several lifestyle changes your doctor may recommend to lower your high blood pressure. Your doctor may also want you to decrease the amount of sodium in your diet. Lowering sodium while following the DASH diet can lower blood pressure even further than just the DASH diet alone. Follow-up care is a key part of your treatment and safety. Be sure to make and go to all appointments, and call your doctor if you are having problems. It's also a good idea to know your test results and keep a list of the medicines you take. How can you care for yourself at home? Following the DASH diet  · Eat 4 to 5 servings of fruit each day. A serving is 1 medium-sized piece of fruit, ½ cup chopped or canned fruit, 1/4 cup dried fruit, or 4 ounces (½ cup) of fruit juice. Choose fruit more often than fruit juice. · Eat 4 to 5 servings of vegetables each day. A serving is 1 cup of lettuce or raw leafy vegetables, ½ cup of chopped or cooked vegetables, or 4 ounces (½ cup) of vegetable juice. Choose vegetables more often than vegetable juice. · Get 2 to 3 servings of low-fat and fat-free dairy each day. A serving is 8 ounces of milk, 1 cup of yogurt, or 1 ½ ounces of cheese. · Eat 6 to 8 servings of grains each day.  A serving is 1 slice of bread, 1 ounce of dry cereal, or ½ cup of cooked rice, pasta, or cooked cereal. Try to choose whole-grain products as much as possible. · Limit lean meat, poultry, and fish to 2 servings each day. A serving is 3 ounces, about the size of a deck of cards. · Eat 4 to 5 servings of nuts, seeds, and legumes (cooked dried beans, lentils, and split peas) each week. A serving is 1/3 cup of nuts, 2 tablespoons of seeds, or ½ cup of cooked beans or peas. · Limit fats and oils to 2 to 3 servings each day. A serving is 1 teaspoon of vegetable oil or 2 tablespoons of salad dressing. · Limit sweets and added sugars to 5 servings or less a week. A serving is 1 tablespoon jelly or jam, ½ cup sorbet, or 1 cup of lemonade. · Eat less than 2,300 milligrams (mg) of sodium a day. If you limit your sodium to 1,500 mg a day, you can lower your blood pressure even more. Tips for success  · Start small. Do not try to make dramatic changes to your diet all at once. You might feel that you are missing out on your favorite foods and then be more likely to not follow the plan. Make small changes, and stick with them. Once those changes become habit, add a few more changes. · Try some of the following:  ¨ Make it a goal to eat a fruit or vegetable at every meal and at snacks. This will make it easy to get the recommended amount of fruits and vegetables each day. ¨ Try yogurt topped with fruit and nuts for a snack or healthy dessert. ¨ Add lettuce, tomato, cucumber, and onion to sandwiches. ¨ Combine a ready-made pizza crust with low-fat mozzarella cheese and lots of vegetable toppings. Try using tomatoes, squash, spinach, broccoli, carrots, cauliflower, and onions. ¨ Have a variety of cut-up vegetables with a low-fat dip as an appetizer instead of chips and dip. ¨ Sprinkle sunflower seeds or chopped almonds over salads. Or try adding chopped walnuts or almonds to cooked vegetables.   ¨ Try some vegetarian meals using beans and peas. Add garbanzo or kidney beans to salads. Make burritos and tacos with mashed marques beans or black beans. Where can you learn more? Go to http://maria victoria-ignacio.info/. Enter Q456 in the search box to learn more about \"DASH Diet: Care Instructions. \"  Current as of: September 21, 2016  Content Version: 11.4  © 7086-2452 [employer]. Care instructions adapted under license by [employer] (which disclaims liability or warranty for this information). If you have questions about a medical condition or this instruction, always ask your healthcare professional. [address] any warranty or liability for your use of this information. Stopping Smoking: Care Instructions  Your Care Instructions    Cigarette smokers crave the nicotine in cigarettes. Giving it up is much harder than simply changing a habit. Your body has to stop craving the nicotine. It is hard to quit, but you can do it. There are many tools that people use to quit smoking. You may find that combining tools works best for you. There are several steps to quitting. First you get ready to quit. Then you get support to help you. After that, you learn new skills and behaviors to become a nonsmoker. For many people, a necessary step is getting and using medicine. Your doctor will help you set up the plan that best meets your needs. You may want to attend a smoking cessation program to help you quit smoking. When you choose a program, look for one that has proven success. Ask your doctor for ideas. You will greatly increase your chances of success if you take medicine as well as get counseling or join a cessation program.  Some of the changes you feel when you first quit tobacco are uncomfortable. Your body will miss the nicotine at first, and you may feel short-tempered and grumpy. You may have trouble sleeping or concentrating. Medicine can help you deal with these symptoms.  You may struggle with changing your smoking habits and rituals. The last step is the tricky one: Be prepared for the smoking urge to continue for a time. This is a lot to deal with, but keep at it. You will feel better. Follow-up care is a key part of your treatment and safety. Be sure to make and go to all appointments, and call your doctor if you are having problems. It's also a good idea to know your test results and keep a list of the medicines you take. How can you care for yourself at home? · Ask your family, friends, and coworkers for support. You have a better chance of quitting if you have help and support. · Join a support group, such as Nicotine Anonymous, for people who are trying to quit smoking. · Consider signing up for a smoking cessation program, such as the American Lung Association's Freedom from Smoking program.  · Set a quit date. Pick your date carefully so that it is not right in the middle of a big deadline or stressful time. Once you quit, do not even take a puff. Get rid of all ashtrays and lighters after your last cigarette. Clean your house and your clothes so that they do not smell of smoke. · Learn how to be a nonsmoker. Think about ways you can avoid those things that make you reach for a cigarette. ¨ Avoid situations that put you at greatest risk for smoking. For some people, it is hard to have a drink with friends without smoking. For others, they might skip a coffee break with coworkers who smoke. ¨ Change your daily routine. Take a different route to work or eat a meal in a different place. · Cut down on stress. Calm yourself or release tension by doing an activity you enjoy, such as reading a book, taking a hot bath, or gardening. · Talk to your doctor or pharmacist about nicotine replacement therapy, which replaces the nicotine in your body. You still get nicotine but you do not use tobacco. Nicotine replacement products help you slowly reduce the amount of nicotine you need. These products come in several forms, many of them available over-the-counter:  ¨ Nicotine patches  ¨ Nicotine gum and lozenges  ¨ Nicotine inhaler  · Ask your doctor about bupropion (Wellbutrin) or varenicline (Chantix), which are prescription medicines. They do not contain nicotine. They help you by reducing withdrawal symptoms, such as stress and anxiety. · Some people find hypnosis, acupuncture, and massage helpful for ending the smoking habit. · Eat a healthy diet and get regular exercise. Having healthy habits will help your body move past its craving for nicotine. · Be prepared to keep trying. Most people are not successful the first few times they try to quit. Do not get mad at yourself if you smoke again. Make a list of things you learned and think about when you want to try again, such as next week, next month, or next year. Where can you learn more? Go to http://maria victoria-ignacio.info/. Enter W107 in the search box to learn more about \"Stopping Smoking: Care Instructions. \"  Current as of: March 20, 2017  Content Version: 11.4  © 8501-4691 [employer]. Care instructions adapted under license by [employer] (which disclaims liability or warranty for this information). If you have questions about a medical condition or this instruction, always ask your healthcare professional. [address] any warranty or liability for your use of this information.

## 2018-06-21 LAB
CHOLEST SERPL-MCNC: 119 MG/DL (ref 100–199)
EST. AVERAGE GLUCOSE BLD GHB EST-MCNC: 117 MG/DL
HBA1C MFR BLD: 5.7 % (ref 4.8–5.6)
HDLC SERPL-MCNC: 38 MG/DL
INTERPRETATION, 910389: NORMAL
LDLC SERPL CALC-MCNC: 58 MG/DL (ref 0–99)
TRIGL SERPL-MCNC: 117 MG/DL (ref 0–149)
VLDLC SERPL CALC-MCNC: 23 MG/DL (ref 5–40)

## 2018-06-21 NOTE — PROGRESS NOTES
Labs look good. Cholesterol is well controlled- continue atorvastatin. A1C is now nearly in normal range. This is likely due to the diet changes and weight loss- keep up the good work! Recheck labs in 6 months.

## 2018-06-25 NOTE — PROGRESS NOTES
Spoke with pt , verified , advised per provider note, verbalized understanding and had no follow up questions. Follow up office visit already scheduled.

## 2018-07-07 DIAGNOSIS — F41.9 ANXIETY: ICD-10-CM

## 2018-07-09 ENCOUNTER — TELEPHONE (OUTPATIENT)
Dept: BEHAVIORAL/MENTAL HEALTH CLINIC | Age: 63
End: 2018-07-09

## 2018-07-09 RX ORDER — LORAZEPAM 0.5 MG/1
TABLET ORAL
Qty: 30 TAB | Refills: 0 | OUTPATIENT
Start: 2018-07-09

## 2018-07-09 NOTE — TELEPHONE ENCOUNTER
Pt's  called regarding his wife's lorazepam. She states that he has been out for 10 days because the directions were changed to taking a whole tablet in the morning and at night. He states that his wife has been resorting back to being unstable and since he does not have the money he rather not take her to the hospital for a medication issue.

## 2018-07-10 ENCOUNTER — DOCUMENTATION ONLY (OUTPATIENT)
Dept: BEHAVIORAL/MENTAL HEALTH CLINIC | Age: 63
End: 2018-07-10

## 2018-07-10 DIAGNOSIS — F41.9 ANXIETY: ICD-10-CM

## 2018-07-10 RX ORDER — LORAZEPAM 0.5 MG/1
TABLET ORAL
Qty: 60 TAB | Refills: 2 | OUTPATIENT
Start: 2018-07-10 | End: 2018-08-01 | Stop reason: SDUPTHER

## 2018-07-10 NOTE — PROGRESS NOTES
Provider called in rx for Ativan 0.5mg bid #60, 2 refills, to her pharmacy. Asked MA to call patient and her  and make them aware.

## 2018-07-30 RX ORDER — POTASSIUM CHLORIDE 1500 MG/1
TABLET, FILM COATED, EXTENDED RELEASE ORAL
Qty: 30 TAB | Refills: 1 | Status: SHIPPED | OUTPATIENT
Start: 2018-07-30 | End: 2019-03-25 | Stop reason: SDUPTHER

## 2018-08-01 ENCOUNTER — OFFICE VISIT (OUTPATIENT)
Dept: BEHAVIORAL/MENTAL HEALTH CLINIC | Age: 63
End: 2018-08-01

## 2018-08-01 VITALS
SYSTOLIC BLOOD PRESSURE: 116 MMHG | HEIGHT: 65 IN | WEIGHT: 164 LBS | DIASTOLIC BLOOD PRESSURE: 59 MMHG | BODY MASS INDEX: 27.32 KG/M2 | HEART RATE: 76 BPM

## 2018-08-01 DIAGNOSIS — F41.9 ANXIETY: ICD-10-CM

## 2018-08-01 DIAGNOSIS — F31.9 BIPOLAR 1 DISORDER (HCC): Primary | ICD-10-CM

## 2018-08-01 RX ORDER — LORAZEPAM 0.5 MG/1
TABLET ORAL
Qty: 60 TAB | Refills: 0 | Status: SHIPPED | OUTPATIENT
Start: 2018-08-01 | End: 2018-08-29 | Stop reason: SDUPTHER

## 2018-08-01 RX ORDER — SERTRALINE HYDROCHLORIDE 100 MG/1
100 TABLET, FILM COATED ORAL DAILY
Qty: 30 TAB | Refills: 2 | Status: SHIPPED | OUTPATIENT
Start: 2018-08-01 | End: 2018-08-15 | Stop reason: SDUPTHER

## 2018-08-01 RX ORDER — RISPERIDONE 1 MG/1
1 TABLET, FILM COATED ORAL 2 TIMES DAILY
Qty: 60 TAB | Refills: 2 | Status: SHIPPED | OUTPATIENT
Start: 2018-08-01 | End: 2018-10-11 | Stop reason: SDUPTHER
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
## 2018-08-11 DIAGNOSIS — E78.00 HYPERCHOLESTEREMIA: ICD-10-CM

## 2018-08-13 RX ORDER — ATORVASTATIN CALCIUM 40 MG/1
TABLET, FILM COATED ORAL
Qty: 90 TAB | Refills: 0 | Status: SHIPPED | OUTPATIENT
Start: 2018-08-13 | End: 2019-02-06 | Stop reason: SDUPTHER

## 2018-08-15 ENCOUNTER — TELEPHONE (OUTPATIENT)
Dept: BEHAVIORAL/MENTAL HEALTH CLINIC | Age: 63
End: 2018-08-15

## 2018-08-15 RX ORDER — SERTRALINE HYDROCHLORIDE 100 MG/1
150 TABLET, FILM COATED ORAL DAILY
Qty: 45 TAB | Refills: 1 | Status: SHIPPED | OUTPATIENT
Start: 2018-08-15 | End: 2018-08-29 | Stop reason: SDUPTHER

## 2018-08-15 NOTE — TELEPHONE ENCOUNTER
Returned call and spoke with pt who reports improvement in depression with increase in Zoloft dose to 100mg every day, but she is still struggling with depression and passive SI without plan or intent. She agrees to increase in her Zoloft dose to 150mg every day. She will take 1.5 pills daily. She agrees to call provider back and let us know how she is doing.

## 2018-08-15 NOTE — TELEPHONE ENCOUNTER
Pt called back like you said to do to follow up with the increase of her Zoloft, pt stated on vm that it seems to be helping her but she feels like her depression has room to improve. Not sure if she wanted a call back or not.

## 2018-08-23 ENCOUNTER — TELEPHONE (OUTPATIENT)
Dept: BEHAVIORAL/MENTAL HEALTH CLINIC | Age: 63
End: 2018-08-23

## 2018-08-23 NOTE — TELEPHONE ENCOUNTER
Says that there Sertraline that you prescribed her on the 15th isnt working. How long should she anticipate the delay in relief?

## 2018-08-29 ENCOUNTER — TELEPHONE (OUTPATIENT)
Dept: BEHAVIORAL/MENTAL HEALTH CLINIC | Age: 63
End: 2018-08-29

## 2018-08-29 ENCOUNTER — DOCUMENTATION ONLY (OUTPATIENT)
Dept: BEHAVIORAL/MENTAL HEALTH CLINIC | Age: 63
End: 2018-08-29

## 2018-08-29 DIAGNOSIS — F41.9 ANXIETY: ICD-10-CM

## 2018-08-29 NOTE — TELEPHONE ENCOUNTER
Pt called back to let us know how she is doing. She says that her situation has not improved and she is still rocking, she cant stop moving, and she is concerned. What should she do now?

## 2018-08-29 NOTE — TELEPHONE ENCOUNTER
Returned call and spoke with both pt and her . She is severely depressed- rocking, not bathing regularly, isolating, not going out in public, and sleep is poor. She is having a difficult time focusing and has racing thoughts. Provider recommended that she be brought to the ER for admission, but both pt and her  are reluctant. Finances are tight and her  reports that they \"can barely afford to live. \" They have not had insurance for the past 3 years. They want her managed in outpt setting. Limitations of this discussed with both of them. Pt agrees to increase her Zoloft dose from 150mg to 200mg every day and increase her Ativan dose from 0.5mg bid to 1mg bid. Rxs sent into her pharmacy. She will call back in 1 week to let me know how she is doing.

## 2018-08-31 RX ORDER — LORAZEPAM 1 MG/1
TABLET ORAL
Qty: 60 TAB | Refills: 0 | OUTPATIENT
Start: 2018-08-31 | End: 2018-10-11 | Stop reason: SDUPTHER

## 2018-08-31 RX ORDER — SERTRALINE HYDROCHLORIDE 100 MG/1
200 TABLET, FILM COATED ORAL DAILY
Qty: 60 TAB | Refills: 1 | OUTPATIENT
Start: 2018-08-31 | End: 2018-10-11 | Stop reason: SDUPTHER

## 2018-10-11 ENCOUNTER — OFFICE VISIT (OUTPATIENT)
Dept: BEHAVIORAL/MENTAL HEALTH CLINIC | Age: 63
End: 2018-10-11

## 2018-10-11 VITALS
WEIGHT: 155 LBS | SYSTOLIC BLOOD PRESSURE: 120 MMHG | HEART RATE: 80 BPM | DIASTOLIC BLOOD PRESSURE: 69 MMHG | BODY MASS INDEX: 25.79 KG/M2 | TEMPERATURE: 97.7 F

## 2018-10-11 DIAGNOSIS — F31.9 BIPOLAR 1 DISORDER (HCC): ICD-10-CM

## 2018-10-11 DIAGNOSIS — F41.9 ANXIETY: ICD-10-CM

## 2018-10-11 RX ORDER — BUPROPION HYDROCHLORIDE 100 MG/1
TABLET, EXTENDED RELEASE ORAL
Qty: 30 TAB | Refills: 3 | Status: SHIPPED | OUTPATIENT
Start: 2018-10-11 | End: 2019-02-06 | Stop reason: SDUPTHER

## 2018-10-11 RX ORDER — SERTRALINE HYDROCHLORIDE 100 MG/1
200 TABLET, FILM COATED ORAL DAILY
Qty: 60 TAB | Refills: 3 | Status: SHIPPED | OUTPATIENT
Start: 2018-10-11 | End: 2019-02-06 | Stop reason: SDUPTHER

## 2018-10-11 RX ORDER — RISPERIDONE 3 MG/1
TABLET, FILM COATED ORAL
Qty: 30 TAB | Refills: 3 | Status: SHIPPED | OUTPATIENT
Start: 2018-10-11 | End: 2019-02-06 | Stop reason: SDUPTHER

## 2018-10-11 RX ORDER — LORAZEPAM 1 MG/1
TABLET ORAL
Qty: 60 TAB | Refills: 3 | Status: SHIPPED | OUTPATIENT
Start: 2018-10-11 | End: 2019-02-27 | Stop reason: SDUPTHER
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
## 2019-02-06 ENCOUNTER — OFFICE VISIT (OUTPATIENT)
Dept: FAMILY MEDICINE CLINIC | Age: 64
End: 2019-02-06

## 2019-02-06 VITALS
TEMPERATURE: 98 F | SYSTOLIC BLOOD PRESSURE: 151 MMHG | HEIGHT: 65 IN | RESPIRATION RATE: 18 BRPM | WEIGHT: 173.56 LBS | OXYGEN SATURATION: 94 % | BODY MASS INDEX: 28.92 KG/M2 | DIASTOLIC BLOOD PRESSURE: 89 MMHG | HEART RATE: 107 BPM

## 2019-02-06 DIAGNOSIS — I10 ESSENTIAL HYPERTENSION WITH GOAL BLOOD PRESSURE LESS THAN 130/80: Primary | ICD-10-CM

## 2019-02-06 DIAGNOSIS — J20.9 ACUTE BRONCHITIS, UNSPECIFIED ORGANISM: ICD-10-CM

## 2019-02-06 DIAGNOSIS — F31.9 BIPOLAR 1 DISORDER (HCC): ICD-10-CM

## 2019-02-06 DIAGNOSIS — R73.03 PREDIABETES: ICD-10-CM

## 2019-02-06 DIAGNOSIS — F41.9 ANXIETY: ICD-10-CM

## 2019-02-06 DIAGNOSIS — E78.00 HYPERCHOLESTEREMIA: ICD-10-CM

## 2019-02-06 RX ORDER — METOPROLOL SUCCINATE 25 MG/1
25 TABLET, EXTENDED RELEASE ORAL DAILY
Qty: 90 TAB | Refills: 1 | Status: SHIPPED | OUTPATIENT
Start: 2019-02-06 | End: 2021-08-12

## 2019-02-06 RX ORDER — AMLODIPINE BESYLATE 10 MG/1
5 TABLET ORAL DAILY
Qty: 90 TAB | Refills: 1 | Status: SHIPPED | OUTPATIENT
Start: 2019-02-06 | End: 2021-08-12

## 2019-02-06 RX ORDER — ATORVASTATIN CALCIUM 40 MG/1
TABLET, FILM COATED ORAL
Qty: 90 TAB | Refills: 1 | Status: SHIPPED | OUTPATIENT
Start: 2019-02-06 | End: 2021-08-12

## 2019-02-06 RX ORDER — BENZONATATE 200 MG/1
200 CAPSULE ORAL
Qty: 30 CAP | Refills: 1 | Status: SHIPPED | OUTPATIENT
Start: 2019-02-06 | End: 2019-02-13

## 2019-02-06 RX ORDER — LISINOPRIL 40 MG/1
20 TABLET ORAL DAILY
Qty: 90 TAB | Refills: 1 | Status: SHIPPED | OUTPATIENT
Start: 2019-02-06 | End: 2021-08-12

## 2019-02-06 RX ORDER — SERTRALINE HYDROCHLORIDE 100 MG/1
200 TABLET, FILM COATED ORAL DAILY
Qty: 180 TAB | Refills: 0 | Status: SHIPPED | OUTPATIENT
Start: 2019-02-06 | End: 2019-03-25 | Stop reason: SDUPTHER

## 2019-02-06 RX ORDER — AZITHROMYCIN 250 MG/1
TABLET, FILM COATED ORAL
Qty: 6 TAB | Refills: 0 | Status: SHIPPED | OUTPATIENT
Start: 2019-02-06 | End: 2019-02-11

## 2019-02-06 RX ORDER — RISPERIDONE 3 MG/1
TABLET, FILM COATED ORAL
Qty: 90 TAB | Refills: 0 | Status: SHIPPED | OUTPATIENT
Start: 2019-02-06 | End: 2019-03-25 | Stop reason: SDUPTHER

## 2019-02-06 RX ORDER — BUPROPION HYDROCHLORIDE 100 MG/1
TABLET, EXTENDED RELEASE ORAL
Qty: 90 TAB | Refills: 0 | Status: SHIPPED | OUTPATIENT
Start: 2019-02-06 | End: 2019-03-25

## 2019-02-06 NOTE — PROGRESS NOTES
1. Have you been to the ER, urgent care clinic since your last visit? Hospitalized since your last visit? No    2. Have you seen or consulted any other health care providers outside of the [address] [place] [address] since your last visit? Include any pap smears or colon screening. No     Chief Complaint   Patient presents with   [facility] Annual Wellness Visit     Patient stated cold symptoms started a week ago. C/O cough, chest and nasal congestion, and generalized body pain. She denied fever or sore throat.

## 2019-02-06 NOTE — PATIENT INSTRUCTIONS
DASH Diet: Care Instructions  Your Care Instructions    The DASH diet is an eating plan that can help lower your blood pressure. DASH stands for Dietary Approaches to Stop Hypertension. Hypertension is high blood pressure. The DASH diet focuses on eating foods that are high in calcium, potassium, and magnesium. These nutrients can lower blood pressure. The foods that are highest in these nutrients are fruits, vegetables, low-fat dairy products, nuts, seeds, and legumes. But taking calcium, potassium, and magnesium supplements instead of eating foods that are high in those nutrients does not have the same effect. The DASH diet also includes whole grains, fish, and poultry. The DASH diet is one of several lifestyle changes your doctor may recommend to lower your high blood pressure. Your doctor may also want you to decrease the amount of sodium in your diet. Lowering sodium while following the DASH diet can lower blood pressure even further than just the DASH diet alone. Follow-up care is a key part of your treatment and safety. Be sure to make and go to all appointments, and call your doctor if you are having problems. It's also a good idea to know your test results and keep a list of the medicines you take. How can you care for yourself at home? Following the DASH diet  · Eat 4 to 5 servings of fruit each day. A serving is 1 medium-sized piece of fruit, ½ cup chopped or canned fruit, 1/4 cup dried fruit, or 4 ounces (½ cup) of fruit juice. Choose fruit more often than fruit juice. · Eat 4 to 5 servings of vegetables each day. A serving is 1 cup of lettuce or raw leafy vegetables, ½ cup of chopped or cooked vegetables, or 4 ounces (½ cup) of vegetable juice. Choose vegetables more often than vegetable juice. · Get 2 to 3 servings of low-fat and fat-free dairy each day. A serving is 8 ounces of milk, 1 cup of yogurt, or 1 ½ ounces of cheese. · Eat 6 to 8 servings of grains each day.  A serving is 1 slice of bread, 1 ounce of dry cereal, or ½ cup of cooked rice, pasta, or cooked cereal. Try to choose whole-grain products as much as possible. · Limit lean meat, poultry, and fish to 2 servings each day. A serving is 3 ounces, about the size of a deck of cards. · Eat 4 to 5 servings of nuts, seeds, and legumes (cooked dried beans, lentils, and split peas) each week. A serving is 1/3 cup of nuts, 2 tablespoons of seeds, or ½ cup of cooked beans or peas. · Limit fats and oils to 2 to 3 servings each day. A serving is 1 teaspoon of vegetable oil or 2 tablespoons of salad dressing. · Limit sweets and added sugars to 5 servings or less a week. A serving is 1 tablespoon jelly or jam, ½ cup sorbet, or 1 cup of lemonade. · Eat less than 2,300 milligrams (mg) of sodium a day. If you limit your sodium to 1,500 mg a day, you can lower your blood pressure even more. Tips for success  · Start small. Do not try to make dramatic changes to your diet all at once. You might feel that you are missing out on your favorite foods and then be more likely to not follow the plan. Make small changes, and stick with them. Once those changes become habit, add a few more changes. · Try some of the following:  ? Make it a goal to eat a fruit or vegetable at every meal and at snacks. This will make it easy to get the recommended amount of fruits and vegetables each day. ? Try yogurt topped with fruit and nuts for a snack or healthy dessert. ? Add lettuce, tomato, cucumber, and onion to sandwiches. ? Combine a ready-made pizza crust with low-fat mozzarella cheese and lots of vegetable toppings. Try using tomatoes, squash, spinach, broccoli, carrots, cauliflower, and onions. ? Have a variety of cut-up vegetables with a low-fat dip as an appetizer instead of chips and dip. ? Sprinkle sunflower seeds or chopped almonds over salads. Or try adding chopped walnuts or almonds to cooked vegetables.   ? Try some vegetarian meals using beans and peas. Add garbanzo or kidney beans to salads. Make burritos and tacos with mashed marques beans or black beans. Where can you learn more? Go to http://maria victoria-ignacio.info/. Enter O031 in the search box to learn more about \"DASH Diet: Care Instructions. \"  Current as of: July 22, 2018  Content Version: 11.9  © 4766-4549 [employer]. Care instructions adapted under license by [employer] (which disclaims liability or warranty for this information). If you have questions about a medical condition or this instruction, always ask your healthcare professional. [address] any warranty or liability for your use of this information. Starting a Weight Loss Plan: Care Instructions  Your Care Instructions    If you are thinking about losing weight, it can be hard to know where to start. Your doctor can help you set up a weight loss plan that best meets your needs. You may want to take a class on nutrition or exercise, or join a weight loss support group. If you have questions about how to make changes to your eating or exercise habits, ask your doctor about seeing a registered dietitian or an exercise specialist.  It can be a big challenge to lose weight. But you do not have to make huge changes at once. Make small changes, and stick with them. When those changes become habit, add a few more changes. If you do not think you are ready to make changes right now, try to pick a date in the future. Make an appointment to see your doctor to discuss whether the time is right for you to start a plan. Follow-up care is a key part of your treatment and safety. Be sure to make and go to all appointments, and call your doctor if you are having problems. It's also a good idea to know your test results and keep a list of the medicines you take. How can you care for yourself at home? · Set realistic goals. Many people expect to lose much more weight than is likely.  A weight loss of 5% to 10% of your body weight may be enough to improve your health. · Get family and friends involved to provide support. Talk to them about why you are trying to lose weight, and ask them to help. They can help by participating in exercise and having meals with you, even if they may be eating something different. · Find what works best for you. If you do not have time or do not like to cook, a program that offers meal replacement bars or shakes may be better for you. Or if you like to prepare meals, finding a plan that includes daily menus and recipes may be best.  · Ask your doctor about other health professionals who can help you achieve your weight loss goals. ? A dietitian can help you make healthy changes in your diet. ? An exercise specialist or  can help you develop a safe and effective exercise program.  ? A counselor or psychiatrist can help you cope with issues such as depression, anxiety, or family problems that can make it hard to focus on weight loss. · Consider joining a support group for people who are trying to lose weight. Your doctor can suggest groups in your area. Where can you learn more? Go to http://maria victoria-ignacio.info/. Enter C751 in the search box to learn more about \"Starting a Weight Loss Plan: Care Instructions. \"  Current as of: June 25, 2018  Content Version: 11.9  © 2671-9491 [employer]. Care instructions adapted under license by [employer] (which disclaims liability or warranty for this information). If you have questions about a medical condition or this instruction, always ask your healthcare professional. [place] [address] any warranty or liability for your use of this information.

## 2019-02-07 LAB
ALBUMIN SERPL-MCNC: 4.2 G/DL (ref 3.6–4.8)
ALBUMIN/GLOB SERPL: 1.1 {RATIO} (ref 1.2–2.2)
ALP SERPL-CCNC: 112 IU/L (ref 39–117)
ALT SERPL-CCNC: 22 IU/L (ref 0–32)
AST SERPL-CCNC: 18 IU/L (ref 0–40)
BILIRUB SERPL-MCNC: <0.2 MG/DL (ref 0–1.2)
BUN SERPL-MCNC: 11 MG/DL (ref 8–27)
BUN/CREAT SERPL: 13 (ref 12–28)
CALCIUM SERPL-MCNC: 9.8 MG/DL (ref 8.7–10.3)
CHLORIDE SERPL-SCNC: 99 MMOL/L (ref 96–106)
CHOLEST SERPL-MCNC: 368 MG/DL (ref 100–199)
CO2 SERPL-SCNC: 22 MMOL/L (ref 20–29)
CREAT SERPL-MCNC: 0.83 MG/DL (ref 0.57–1)
EST. AVERAGE GLUCOSE BLD GHB EST-MCNC: 120 MG/DL
GLOBULIN SER CALC-MCNC: 3.7 G/DL (ref 1.5–4.5)
GLUCOSE SERPL-MCNC: 125 MG/DL (ref 65–99)
HBA1C MFR BLD: 5.8 % (ref 4.8–5.6)
HDLC SERPL-MCNC: 59 MG/DL
INTERPRETATION, 910389: NORMAL
LDLC SERPL CALC-MCNC: 251 MG/DL (ref 0–99)
POTASSIUM SERPL-SCNC: 4.5 MMOL/L (ref 3.5–5.2)
PROT SERPL-MCNC: 7.9 G/DL (ref 6–8.5)
SODIUM SERPL-SCNC: 137 MMOL/L (ref 134–144)
TRIGL SERPL-MCNC: 288 MG/DL (ref 0–149)
VLDLC SERPL CALC-MCNC: 58 MG/DL (ref 5–40)

## 2019-02-12 NOTE — PROGRESS NOTES
Total and LDL cholesterol are very elevated, it does not appear that you are taking your cholesterol medication. Blood glucose and triglycerides are elevated- need to cut back on sugar, junk food, and simple carbs. Restart atorvastatin, recheck cholesterol fasting in 3 months. A1C is stable, recheck in 6 months.

## 2019-02-13 ENCOUNTER — TELEPHONE (OUTPATIENT)
Dept: FAMILY MEDICINE CLINIC | Age: 64
End: 2019-02-13

## 2019-02-18 NOTE — PROGRESS NOTES
Subjective:     Leonardo Marr is a 59 y.o. female who presents for follow up of diabetes, hypertension, hyperlipidemia, and bipolar depression, anxiety. She is accompanied by her . Diet and Lifestyle: not attempting to follow a low fat, low cholesterol diet, not attempting to follow a low sodium diet, does not rigorously follow a diabetic diet, sedentary, smoker 1 ppd  Home BP Monitoring: is not measured at home    Cardiovascular ROS: taking medications as instructed, no medication side effects noted, no TIA's, no chest pain on exertion, no dyspnea on exertion, no swelling of ankles, no orthostatic dizziness or lightheadedness, no orthopnea or paroxysmal nocturnal dyspnea, no palpitations, no intermittent claudication symptoms. Cardiovascular risk analysis - LDL goal is under 100  hypertension  hyperlipidemia  smoker  sedentary lifestyle. Compliance with treatment thus far has been inadequate. Her provider at [facility] has left the practice. She has an appt with a new provider there next month. New concerns: c/o cold symptoms for the past 1-2 weeks with cough productive for green sputum, nasal congestion. No fever or chills. No shortness of breath or wheezing.       Patient Active Problem List   Diagnosis Code    Bipolar 1 disorder ([facility] [address] F31.9    Essential hypertension with goal blood pressure less than 130/80 I10    Prediabetes R73.03    Hypercholesteremia E78.00    ALEX (acute kidney injury) ([facility] [address] N17.9    Lithium toxicity T56.891A    Anemia D64.9    Cigarette nicotine dependence without complication X45.844    Anxiety F41.9     No Known Allergies  Past Medical History:   Diagnosis Date    Abscess of buttock 7/23/2012    Bronchitis     Common bile duct calculus 7/23/2012    Gallstones 7/12/2012    GERD (gastroesophageal reflux disease)     High cholesterol     Hypercholesterolemia     Hypertension     Ill-defined condition     missing upper front tooth    Ill-defined condition     lower leg bilateral reddened rash.  Insomnia     Other ill-defined conditions(799.89)     Large boil under right arm-pit.  Paranoia ([address]     Psychiatric disorder     bipolar     Past Surgical History:   Procedure Laterality Date    ABDOMEN SURGERY PROC UNLISTED      cholecystectomy 7/23/2012     Family History   Problem Relation Age of Onset    Cancer Father         lung    Diabetes Brother     Heart Disease Brother     Malignant Hyperthermia Neg Hx     Pseudocholinesterase Deficiency Neg Hx     Delayed Awakening Neg Hx     Post-op Nausea/Vomiting Neg Hx     Emergence Delirium Neg Hx     Post-op Cognitive Dysfunction Neg Hx     Other Neg Hx      Social History     Tobacco Use    Smoking status: Current Every Day Smoker     Packs/day: 0.50    Smokeless tobacco: Never Used   Substance Use Topics    Alcohol use: No        Review of Systems, additional:  A comprehensive review of systems was negative except for that written in the HPI. Objective:     Visit Vitals  /89 (BP 1 Location: Right arm, BP Patient Position: Sitting)   Pulse (!) 107   Temp 98 °F (36.7 °C) (Oral)   Resp 18   Ht 5' 5\" (1.651 m)   Wt 173 lb 9 oz (78.7 kg)   SpO2 94%   BMI 28.88 kg/m²     Physical Exam   Constitutional: She is oriented to person, place, and time and well-developed, well-nourished, and in no distress. No distress. HENT:   Head: Normocephalic and atraumatic. Right Ear: External ear normal.   Left Ear: External ear normal.   Nose: Nose normal.   Mouth/Throat: Oropharynx is clear and moist. No oropharyngeal exudate. Eyes: Conjunctivae are normal. Right eye exhibits no discharge. Left eye exhibits no discharge. Neck: Normal range of motion. Neck supple. No JVD present. No tracheal deviation present. No thyromegaly present. Cardiovascular: Normal rate, regular rhythm, normal heart sounds and intact distal pulses. Exam reveals no gallop and no friction rub.    No murmur heard.  Pulmonary/Chest: Effort normal and breath sounds normal. She has no wheezes. She has no rales. Abdominal: Soft. Bowel sounds are normal. She exhibits no distension and no mass. There is no tenderness. Musculoskeletal: She exhibits no tenderness or deformity. Lymphadenopathy:     She has no cervical adenopathy. Neurological: She is alert and oriented to person, place, and time. Gait normal.   Skin: Skin is warm and dry. No rash noted. She is not diaphoretic. Psychiatric: Mood, memory, affect and judgment normal.         Assessment/Plan:     .  reviewed diet, exercise and weight control  very strongly urged to quit smoking to reduce cardiovascular risk  copy of written low fat low cholesterol diet provided and reviewed  cardiovascular risk and specific lipid/LDL goals reviewed  reviewed medications and side effects in detail  reviewed potential future medication changes and side effects. Diagnoses and all orders for this visit:    1. Essential hypertension with goal blood pressure less than 130/80  Above goal  Did not take meds this am  Improve compliance  DASH diet  Daily walking  Smoking cessation  -     amLODIPine (NORVASC) 10 mg tablet; Take 0.5 Tabs by mouth daily. -     metoprolol succinate (TOPROL-XL) 25 mg XL tablet; Take 1 Tab by mouth daily. -     lisinopril (PRINIVIL, ZESTRIL) 40 mg tablet; Take 0.5 Tabs by mouth daily. 2. [facility] ([facility]  Will refill meds- patient visit with new provider  -     risperiDONE (RISPERDAL) 3 mg tablet; Take 1 pill PO qhs.    3. Acute bronchitis, unspecified organism  Add rx  -     azithromycin (ZITHROMAX) 250 mg tablet; Take 2 tablets today, then take 1 tablet daily  -     benzonatate (TESSALON) 200 mg capsule; Take 1 Cap by mouth three (3) times daily as needed for Cough for up to 7 days.     4. Hypercholesteremia  At goal at last check,  Check   TLC Diet:  -- Saturated fat <7% of calories, cholesterol <200 mg/day  -- Consider increased viscous (soluble) fiber (10-25 g/day) and plant stanols/sterols  (2g/day) as therapeutic options to enhance LDL lowering  Weight management  Increased physical activity.  -     LIPID PANEL  -     METABOLIC PANEL, COMPREHENSIVE  -     atorvastatin (LIPITOR) 40 mg tablet; TAKE ONE TABLET BY MOUTH DAILY    5. Prediabetes  Counseled on therapeutic lifestyle changes including diet, exercise, and weight loss  -     HEMOGLOBIN A1C WITH EAG    6. Anxiety  Refill psychiatric rx due to delay in f/u care since her provider left the practice  -     buPROPion SR (WELLBUTRIN SR) 100 mg SR tablet; Take 1 pill PO qam.  -     sertraline (ZOLOFT) 100 mg tablet; Take 2 Tabs by mouth daily. -     CVD REPORT      Follow-up Disposition:  Return in about 2 weeks (around 2/20/2019). For BP f/u    I have discussed the diagnosis with the patient and the intended plan as seen in the above orders. The patient has received an after-visit summary and questions were answered concerning future plans. Patient conveyed understanding of the plan at the time of the visit.     Daniel Zhao NP

## 2019-02-27 DIAGNOSIS — F41.9 ANXIETY: ICD-10-CM

## 2019-02-27 RX ORDER — LORAZEPAM 1 MG/1
TABLET ORAL
Qty: 60 TAB | Refills: 0 | Status: SHIPPED | OUTPATIENT
Start: 2019-02-27 | End: 2019-03-25 | Stop reason: SDUPTHER

## 2019-03-20 ENCOUNTER — OFFICE VISIT (OUTPATIENT)
Dept: FAMILY MEDICINE CLINIC | Age: 64
End: 2019-03-20

## 2019-03-20 VITALS
HEIGHT: 65 IN | WEIGHT: 183.31 LBS | SYSTOLIC BLOOD PRESSURE: 134 MMHG | DIASTOLIC BLOOD PRESSURE: 90 MMHG | BODY MASS INDEX: 30.54 KG/M2 | TEMPERATURE: 97.5 F | RESPIRATION RATE: 17 BRPM | OXYGEN SATURATION: 95 % | HEART RATE: 83 BPM

## 2019-03-20 DIAGNOSIS — F31.9 BIPOLAR 1 DISORDER (HCC): ICD-10-CM

## 2019-03-20 DIAGNOSIS — E78.00 HYPERCHOLESTEREMIA: ICD-10-CM

## 2019-03-20 DIAGNOSIS — I10 ESSENTIAL HYPERTENSION WITH GOAL BLOOD PRESSURE LESS THAN 130/80: Primary | ICD-10-CM

## 2019-03-20 NOTE — PATIENT INSTRUCTIONS
High Cholesterol: Care Instructions Your Care Instructions Cholesterol is a type of fat in your blood. It is needed for many body functions, such as making new cells. Cholesterol is made by your body. It also comes from food you eat. High cholesterol means that you have too much of the fat in your blood. This raises your risk of a heart attack and stroke. LDL and HDL are part of your total cholesterol. LDL is the \"bad\" cholesterol. High LDL can raise your risk for heart disease, heart attack, and stroke. HDL is the \"good\" cholesterol. It helps clear bad cholesterol from the body. High HDL is linked with a lower risk of heart disease, heart attack, and stroke. Your cholesterol levels help your doctor find out your risk for having a heart attack or stroke. You and your doctor can talk about whether you need to lower your risk and what treatment is best for you. A heart-healthy lifestyle along with medicines can help lower your cholesterol and your risk. The way you choose to lower your risk will depend on how high your risk is for heart attack and stroke. It will also depend on how you feel about taking medicines. Follow-up care is a key part of your treatment and safety. Be sure to make and go to all appointments, and call your doctor if you are having problems. It's also a good idea to know your test results and keep a list of the medicines you take. How can you care for yourself at home? · Eat a variety of foods every day. Good choices include fruits, vegetables, whole grains (like oatmeal), dried beans and peas, nuts and seeds, soy products (like tofu), and fat-free or low-fat dairy products. · Replace butter, margarine, and hydrogenated or partially hydrogenated oils with olive and canola oils. (Canola oil margarine without trans fat is fine.) · Replace red meat with fish, poultry, and soy protein (like tofu). · Limit processed and packaged foods like chips, crackers, and cookies. · Bake, broil, or steam foods. Don't west them. · Be physically active. Get at least 30 minutes of exercise on most days of the week. Walking is a good choice. You also may want to do other activities, such as running, swimming, cycling, or playing tennis or team sports. · Stay at a healthy weight or lose weight by making the changes in eating and physical activity listed above. Losing just a small amount of weight, even 5 to 10 pounds, can reduce your risk for having a heart attack or stroke. · Do not smoke. When should you call for help? Watch closely for changes in your health, and be sure to contact your doctor if: 
  · You need help making lifestyle changes.  
  · You have questions about your medicine. Where can you learn more? Go to http://maria victoriaFigmaignacio.info/. Enter K880 in the search box to learn more about \"High Cholesterol: Care Instructions. \" Current as of: July 22, 2018 Content Version: 11.9 © 7621-0157 [employer]. Care instructions adapted under license by [employer] (which disclaims liability or warranty for this information). If you have questions about a medical condition or this instruction, always ask your healthcare professional. [address] any warranty or liability for your use of this information. High Blood Pressure: Care Instructions Overview It's normal for blood pressure to go up and down throughout the day. But if it stays up, you have high blood pressure. Another name for high blood pressure is hypertension. Despite what a lot of people think, high blood pressure usually doesn't cause headaches or make you feel dizzy or lightheaded. It usually has no symptoms. But it does increase your risk of stroke, heart attack, and other problems. You and your doctor will talk about your risks of these problems based on your blood pressure. Your doctor will give you a goal for your blood pressure. Your goal will be based on your health and your age. Lifestyle changes, such as eating healthy and being active, are always important to help lower blood pressure. You might also take medicine to reach your blood pressure goal. 
Follow-up care is a key part of your treatment and safety. Be sure to make and go to all appointments, and call your doctor if you are having problems. It's also a good idea to know your test results and keep a list of the medicines you take. How can you care for yourself at home? Medical treatment · If you stop taking your medicine, your blood pressure will go back up. You may take one or more types of medicine to lower your blood pressure. Be safe with medicines. Take your medicine exactly as prescribed. Call your doctor if you think you are having a problem with your medicine. · Talk to your doctor before you start taking aspirin every day. Aspirin can help certain people lower their risk of a heart attack or stroke. But taking aspirin isn't right for everyone, because it can cause serious bleeding. · See your doctor regularly. You may need to see the doctor more often at first or until your blood pressure comes down. · If you are taking blood pressure medicine, talk to your doctor before you take decongestants or anti-inflammatory medicine, such as ibuprofen. Some of these medicines can raise blood pressure. · Learn how to check your blood pressure at home. Lifestyle changes · Stay at a healthy weight. This is especially important if you put on weight around the waist. Losing even 10 pounds can help you lower your blood pressure. · If your doctor recommends it, get more exercise. Walking is a good choice. Bit by bit, increase the amount you walk every day. Try for at least 30 minutes on most days of the week. You also may want to swim, bike, or do other activities. · Avoid or limit alcohol. Talk to your doctor about whether you can drink any alcohol. · Try to limit how much sodium you eat to less than 2,300 milligrams (mg) a day. Your doctor may ask you to try to eat less than 1,500 mg a day. · Eat plenty of fruits (such as bananas and oranges), vegetables, legumes, whole grains, and low-fat dairy products. · Lower the amount of saturated fat in your diet. Saturated fat is found in animal products such as milk, cheese, and meat. Limiting these foods may help you lose weight and also lower your risk for heart disease. · Do not smoke. Smoking increases your risk for heart attack and stroke. If you need help quitting, talk to your doctor about stop-smoking programs and medicines. These can increase your chances of quitting for good. When should you call for help? Call 911 anytime you think you may need emergency care. This may mean having symptoms that suggest that your blood pressure is causing a serious heart or blood vessel problem. Your blood pressure may be over 180/120. 
 For example, call 911 if: 
  · You have symptoms of a heart attack. These may include: 
? Chest pain or pressure, or a strange feeling in the chest. 
? Sweating. ? Shortness of breath. ? Nausea or vomiting. ? Pain, pressure, or a strange feeling in the back, neck, jaw, or upper belly or in one or both shoulders or arms. ? Lightheadedness or sudden weakness. ? A fast or irregular heartbeat.  
  · You have symptoms of a stroke. These may include: 
? Sudden numbness, tingling, weakness, or loss of movement in your face, arm, or leg, especially on only one side of your body. ? Sudden vision changes. ? Sudden trouble speaking. ? Sudden confusion or trouble understanding simple statements. ? Sudden problems with walking or balance. ? A sudden, severe headache that is different from past headaches.  
  · You have severe back or belly pain.  Do not wait until your blood pressure comes down on its own. Get help right away. 
 Call your doctor now or seek immediate care if: 
  · Your blood pressure is much higher than normal (such as 180/120 or higher), but you don't have symptoms.  
  · You think high blood pressure is causing symptoms, such as: 
? Severe headache. 
? Blurry vision.  
 Watch closely for changes in your health, and be sure to contact your doctor if: 
  · Your blood pressure measures higher than your doctor recommends at least 2 times. That means the top number is higher or the bottom number is higher, or both.  
  · You think you may be having side effects from your blood pressure medicine. Where can you learn more? Go to http://maria victoria-ignacio.info/. Enter F385 in the search box to learn more about \"High Blood Pressure: Care Instructions. \" Current as of: July 22, 2018 Content Version: 11.9 © 4467-4877 [employer]. Care instructions adapted under license by [employer] (which disclaims liability or warranty for this information). If you have questions about a medical condition or this instruction, always ask your healthcare professional. [place] [address] any warranty or liability for your use of this information. Stopping Smoking: Care Instructions Your Care Instructions Cigarette smokers crave the nicotine in cigarettes. Giving it up is much harder than simply changing a habit. Your body has to stop craving the nicotine. It is hard to quit, but you can do it. There are many tools that people use to quit smoking. You may find that combining tools works best for you. There are several steps to quitting. First you get ready to quit. Then you get support to help you. After that, you learn new skills and behaviors to become a nonsmoker. For many people, a necessary step is getting and using medicine. Your doctor will help you set up the plan that best meets your needs. You may want to attend a smoking cessation program to help you quit smoking. When you choose a program, look for one that has proven success. Ask your doctor for ideas. You will greatly increase your chances of success if you take medicine as well as get counseling or join a cessation program. 
Some of the changes you feel when you first quit tobacco are uncomfortable. Your body will miss the nicotine at first, and you may feel short-tempered and grumpy. You may have trouble sleeping or concentrating. Medicine can help you deal with these symptoms. You may struggle with changing your smoking habits and rituals. The last step is the tricky one: Be prepared for the smoking urge to continue for a time. This is a lot to deal with, but keep at it. You will feel better. Follow-up care is a key part of your treatment and safety. Be sure to make and go to all appointments, and call your doctor if you are having problems. It's also a good idea to know your test results and keep a list of the medicines you take. How can you care for yourself at home? · Ask your family, friends, and coworkers for support. You have a better chance of quitting if you have help and support. · Join a support group, such as Nicotine Anonymous, for people who are trying to quit smoking. · Consider signing up for a smoking cessation program, such as the American Lung Association's Freedom from Smoking program. 
· Get text messaging support. Go to the website at www.smokefree. gov to sign up for the [facility] program. 
· Set a quit date. Pick your date carefully so that it is not right in the middle of a big deadline or stressful time. Once you quit, do not even take a puff. Get rid of all ashtrays and lighters after your last cigarette. Clean your house and your clothes so that they do not smell of smoke. · Learn how to be a nonsmoker. Think about ways you can avoid those things that make you reach for a cigarette. ? Avoid situations that put you at greatest risk for smoking. For some people, it is hard to have a drink with friends without smoking. For others, they might skip a coffee break with coworkers who smoke. ? Change your daily routine. Take a different route to work or eat a meal in a different place. · Cut down on stress. Calm yourself or release tension by doing an activity you enjoy, such as reading a book, taking a hot bath, or gardening. · Talk to your doctor or pharmacist about nicotine replacement therapy, which replaces the nicotine in your body. You still get nicotine but you do not use tobacco. Nicotine replacement products help you slowly reduce the amount of nicotine you need. These products come in several forms, many of them available over-the-counter: ? Nicotine patches ? Nicotine gum and lozenges ? Nicotine inhaler · Ask your doctor about bupropion (Wellbutrin) or varenicline (Chantix), which are prescription medicines. They do not contain nicotine. They help you by reducing withdrawal symptoms, such as stress and anxiety. · Some people find hypnosis, acupuncture, and massage helpful for ending the smoking habit. · Eat a healthy diet and get regular exercise. Having healthy habits will help your body move past its craving for nicotine. · Be prepared to keep trying. Most people are not successful the first few times they try to quit. Do not get mad at yourself if you smoke again. Make a list of things you learned and think about when you want to try again, such as next week, next month, or next year. Where can you learn more? Go to http://maria victoria-ignacio.info/. Enter R348 in the search box to learn more about \"Stopping Smoking: Care Instructions. \" Current as of: September 26, 2018 Content Version: 11.9 © 2989-8913 [employer]. Care instructions adapted under license by [employer] (which disclaims liability or warranty for this information). If you have questions about a medical condition or this instruction, always ask your healthcare professional. [address] any warranty or liability for your use of this information.

## 2019-03-20 NOTE — PROGRESS NOTES
Subjective:  
 
Anson Marlow is a 59 y.o. female who presents for follow up of hypertension, hyperlipidemia and bipolar depression. Her  is not with her in the exam room today. Diet and Lifestyle: not attempting to follow a low fat, low cholesterol diet, not attempting to follow a low sodium diet, sedentary, smoker 1/2-1 ppd Home BP Monitoring: is not measured at home She forgot to take 1 of her medications today. Cardiovascular risk analysis - LDL goal is under 100 
hypertension 
hyperlipidemia 
smoker 
obese 
sedentary lifestyle. Compliance with treatment thus far has been fair. ROS: taking medications as instructed, no medication side effects noted. Cardiovascular ROS: taking medications as instructed, no medication side effects noted, no TIA's, no chest pain on exertion, no dyspnea on exertion, no swelling of ankles, no orthostatic dizziness or lightheadedness, no orthopnea or paroxysmal nocturnal dyspnea, no palpitations, no intermittent claudication symptoms. She had stopped taking her cholesterol medication, restarted after her labs 6 weeks ago, reports improved compliance. Reports good compliance with risperidone, bupropion, and sertraline. Denies paranoia. Has appt with mental health provider next week. New concerns: none. Patient Active Problem List  
Diagnosis Code  Bipolar 1 disorder ([facility] [address] F31.9  
 Essential hypertension with goal blood pressure less than 130/80 I10  Prediabetes R73.03  
 Hypercholesteremia E78.00  ALEX (acute kidney injury) ([facility] [address] N17.9  Lithium toxicity W27.356Y  Anemia D64.9  Cigarette nicotine dependence without complication Q89.686  
 Anxiety F41.9 No Known Allergies Past Medical History:  
Diagnosis Date  Abscess of buttock 7/23/2012  Bronchitis  Common bile duct calculus 7/23/2012  Gallstones 7/12/2012  GERD (gastroesophageal reflux disease)  High cholesterol  Hypercholesterolemia  Hypertension  Ill-defined condition   
 missing upper front tooth  Ill-defined condition   
 lower leg bilateral reddened rash.  Insomnia  Other ill-defined conditions(799.89) Large boil under right arm-pit.  Paranoia ([address]  Psychiatric disorder   
 bipolar Past Surgical History:  
Procedure Laterality Date  ABDOMEN SURGERY PROC UNLISTED    
 cholecystectomy 7/23/2012 Family History Problem Relation Age of Onset  Cancer Father   
     lung  Diabetes Brother  Heart Disease Brother  Malignant Hyperthermia Neg Hx  Pseudocholinesterase Deficiency Neg Hx  Delayed Awakening Neg Hx  Post-op Nausea/Vomiting Neg Hx  Emergence Delirium Neg Hx  Post-op Cognitive Dysfunction Neg Hx   
 Other Neg Hx Social History Tobacco Use  Smoking status: Current Every Day Smoker Packs/day: 0.50  Smokeless tobacco: Never Used Substance Use Topics  Alcohol use: No  
  
 
Lab Results Component Value Date/Time Hemoglobin A1c 5.8 (H) 02/06/2019 11:16 AM  
 Hemoglobin A1c 5.7 (H) 06/20/2018 11:20 AM  
 Hemoglobin A1c 6.2 (H) 04/04/2018 11:39 AM  
 Glucose 125 (H) 02/06/2019 11:16 AM  
 Glucose (POC) 115 (H) 10/31/2011 08:08 PM  
 LDL, calculated 251 (H) 02/06/2019 11:16 AM  
 Creatinine (POC) 0.6 10/31/2011 08:08 PM  
 Creatinine 0.83 02/06/2019 11:16 AM  
  
Lab Results Component Value Date/Time Cholesterol, total 368 (H) 02/06/2019 11:16 AM  
 HDL Cholesterol 59 02/06/2019 11:16 AM  
 LDL, calculated 251 (H) 02/06/2019 11:16 AM  
 Triglyceride 288 (H) 02/06/2019 11:16 AM  
 CHOL/HDL Ratio 2.5 05/23/2017 06:16 AM  
 
Lab Results Component Value Date/Time ALT (SGPT) 22 02/06/2019 11:16 AM  
 AST (SGOT) 18 02/06/2019 11:16 AM  
 Alk.  phosphatase 112 02/06/2019 11:16 AM  
 Bilirubin, direct 0.1 10/31/2011 08:05 PM  
 Bilirubin, total <0.2 02/06/2019 11:16 AM  
 Albumin 4.2 02/06/2019 11:16 AM  
 Protein, total 7.9 02/06/2019 11:16 AM  
 Ammonia <10 05/25/2017 12:51 AM  
 INR 1.0 10/31/2011 08:05 PM  
 Prothrombin time 10.2 10/31/2011 08:05 PM  
 PLATELET 159 54/49/3410 11:25 AM  
 
Lab Results Component Value Date/Time GFR est non-AA 76 02/06/2019 11:16 AM  
 GFRNA, POC >60 10/31/2011 08:08 PM  
 GFR est AA 87 02/06/2019 11:16 AM  
 GFRAA, POC >60 10/31/2011 08:08 PM  
 Creatinine 0.83 02/06/2019 11:16 AM  
 Creatinine (POC) 0.6 10/31/2011 08:08 PM  
 BUN 11 02/06/2019 11:16 AM  
 BUN (POC) 12 10/31/2011 08:08 PM  
 Sodium 137 02/06/2019 11:16 AM  
 Sodium (POC) 136 (L) 10/31/2011 08:08 PM  
 Potassium 4.5 02/06/2019 11:16 AM  
 Potassium (POC) 3.6 10/31/2011 08:08 PM  
 Chloride 99 02/06/2019 11:16 AM  
 Chloride (POC) 102 10/31/2011 08:08 PM  
 CO2 22 02/06/2019 11:16 AM  
 Magnesium 1.6 05/26/2017 11:25 AM  
 Phosphorus 3.6 12/06/2017 11:34 AM  
  
 
Review of Systems, additional: A comprehensive review of systems was negative except for that written in the HPI. Objective:  
 
Visit Vitals /90 (BP 1 Location: Left arm, BP Patient Position: Sitting) Pulse 83 Temp 97.5 °F (36.4 °C) (Oral) Resp 17 Ht 5' 5\" (1.651 m) Wt 183 lb 5 oz (83.2 kg) SpO2 95% BMI 30.50 kg/m² Physical Exam  
Constitutional: She is oriented to person, place, and time and well-developed, well-nourished, and in no distress. No distress. HENT:  
Head: Atraumatic. Mouth/Throat: No oropharyngeal exudate. Eyes: Conjunctivae are normal. No scleral icterus. Neck: Neck supple. No JVD present. No tracheal deviation present. No thyromegaly present. Cardiovascular: Normal rate, regular rhythm, normal heart sounds and intact distal pulses. Exam reveals no gallop and no friction rub. No murmur heard. Pulmonary/Chest: Effort normal and breath sounds normal. She has no wheezes. She has no rales. Abdominal: Soft. Bowel sounds are normal. She exhibits no distension and no mass. There is no tenderness. Musculoskeletal: She exhibits no edema. Lymphadenopathy:  
  She has no cervical adenopathy. Neurological: She is alert and oriented to person, place, and time. Gait normal.  
Skin: Skin is dry. No rash noted. She is not diaphoretic. Psychiatric: Memory and judgment normal. Her mood appears anxious. She has a flat affect. Assessment/Plan:  
 
. 
reviewed diet, exercise and weight control 
copy of written low fat low cholesterol diet provided and reviewed 
cardiovascular risk and specific lipid/LDL goals reviewed 
reviewed medications and side effects in detail 
reviewed potential future medication changes and side effects. Diagnoses and all orders for this visit: 1. Essential hypertension with goal blood pressure less than 130/80 At goal 
Continue rx DASH diet Smoking cessation strongly encouraged Daily walking or other exercise 2. Hypercholesteremia Above goal, was not taking prescribed medication TLC Diet: 
 Saturated fat <7% of calories, cholesterol <200 mg/day  Consider increased viscous (soluble) fiber (10-25 g/day) and plant stanols/sterols 
(2g/day) as therapeutic options to enhance LDL lowering Weight management Increased physical activity. Continue atorvastatin current dose pending results -     LIPID PANEL 
-     METABOLIC PANEL, COMPREHENSIVE 3. Bipolar 1 disorder ([facility] [address] Management per behavioral health provider 
appt scheduled next [facility] Follow-up Disposition: RTC in 3 months. I have discussed the diagnosis with the patient and the intended plan as seen in the above orders. The patient has received an after-visit summary and questions were answered concerning future plans. Patient conveyed understanding of the plan at the time of the visit. Sanchez Braswell NP 
03/20/19

## 2019-03-21 LAB
ALBUMIN SERPL-MCNC: 4 G/DL (ref 3.6–4.8)
ALBUMIN/GLOB SERPL: 1.1 {RATIO} (ref 1.2–2.2)
ALP SERPL-CCNC: 94 IU/L (ref 39–117)
ALT SERPL-CCNC: 21 IU/L (ref 0–32)
AST SERPL-CCNC: 18 IU/L (ref 0–40)
BILIRUB SERPL-MCNC: 0.2 MG/DL (ref 0–1.2)
BUN SERPL-MCNC: 11 MG/DL (ref 8–27)
BUN/CREAT SERPL: 12 (ref 12–28)
CALCIUM SERPL-MCNC: 9.7 MG/DL (ref 8.7–10.3)
CHLORIDE SERPL-SCNC: 99 MMOL/L (ref 96–106)
CHOLEST SERPL-MCNC: 219 MG/DL (ref 100–199)
CO2 SERPL-SCNC: 23 MMOL/L (ref 20–29)
CREAT SERPL-MCNC: 0.93 MG/DL (ref 0.57–1)
GLOBULIN SER CALC-MCNC: 3.6 G/DL (ref 1.5–4.5)
GLUCOSE SERPL-MCNC: 131 MG/DL (ref 65–99)
HDLC SERPL-MCNC: 46 MG/DL
INTERPRETATION, 910389: NORMAL
LDLC SERPL CALC-MCNC: ABNORMAL MG/DL (ref 0–99)
PDF IMAGE, 910387: NORMAL
POTASSIUM SERPL-SCNC: 4.2 MMOL/L (ref 3.5–5.2)
PROT SERPL-MCNC: 7.6 G/DL (ref 6–8.5)
SODIUM SERPL-SCNC: 136 MMOL/L (ref 134–144)
TRIGL SERPL-MCNC: 427 MG/DL (ref 0–149)
VLDLC SERPL CALC-MCNC: ABNORMAL MG/DL (ref 5–40)

## 2019-03-25 ENCOUNTER — OFFICE VISIT (OUTPATIENT)
Dept: BEHAVIORAL/MENTAL HEALTH CLINIC | Age: 64
End: 2019-03-25

## 2019-03-25 VITALS
WEIGHT: 184.6 LBS | HEIGHT: 65 IN | DIASTOLIC BLOOD PRESSURE: 71 MMHG | HEART RATE: 106 BPM | BODY MASS INDEX: 30.75 KG/M2 | SYSTOLIC BLOOD PRESSURE: 119 MMHG

## 2019-03-25 DIAGNOSIS — F31.4 BIPOLAR DISORDER WITH SEVERE DEPRESSION (HCC): ICD-10-CM

## 2019-03-25 DIAGNOSIS — F41.9 ANXIETY: ICD-10-CM

## 2019-03-25 DIAGNOSIS — F31.9 BIPOLAR 1 DISORDER (HCC): ICD-10-CM

## 2019-03-25 DIAGNOSIS — F31.89 SEVERE BIPOLAR AFFECTIVE DISORDER WITH PSYCHOSIS (HCC): ICD-10-CM

## 2019-03-25 DIAGNOSIS — F40.10 SOCIAL ANXIETY DISORDER: ICD-10-CM

## 2019-03-25 DIAGNOSIS — I10 ESSENTIAL HYPERTENSION WITH GOAL BLOOD PRESSURE LESS THAN 130/80: ICD-10-CM

## 2019-03-25 DIAGNOSIS — F31.89 SEVERE BIPOLAR AFFECTIVE DISORDER WITH PSYCHOSIS (HCC): Primary | ICD-10-CM

## 2019-03-25 DIAGNOSIS — Z86.59 HISTORY OF POSTTRAUMATIC STRESS DISORDER (PTSD): ICD-10-CM

## 2019-03-25 RX ORDER — METOPROLOL SUCCINATE 25 MG/1
25 TABLET, EXTENDED RELEASE ORAL DAILY
Qty: 90 TAB | Refills: 1 | Status: CANCELLED | OUTPATIENT
Start: 2019-03-25

## 2019-03-25 RX ORDER — LORAZEPAM 1 MG/1
TABLET ORAL
Qty: 60 TAB | Refills: 1 | Status: SHIPPED | OUTPATIENT
Start: 2019-03-25 | End: 2019-06-07 | Stop reason: SDUPTHER

## 2019-03-25 RX ORDER — AMLODIPINE BESYLATE 10 MG/1
5 TABLET ORAL DAILY
Qty: 90 TAB | Refills: 1 | Status: CANCELLED | OUTPATIENT
Start: 2019-03-25

## 2019-03-25 RX ORDER — BUPROPION HYDROCHLORIDE 150 MG/1
TABLET, EXTENDED RELEASE ORAL
Qty: 30 TAB | Refills: 1 | Status: SHIPPED | OUTPATIENT
Start: 2019-03-25 | End: 2019-07-05 | Stop reason: SDUPTHER

## 2019-03-25 RX ORDER — SERTRALINE HYDROCHLORIDE 100 MG/1
200 TABLET, FILM COATED ORAL DAILY
Qty: 60 TAB | Refills: 1 | Status: SHIPPED | OUTPATIENT
Start: 2019-03-25 | End: 2019-07-05 | Stop reason: SDUPTHER

## 2019-03-25 RX ORDER — RISPERIDONE 3 MG/1
TABLET, FILM COATED ORAL
Qty: 30 TAB | Refills: 1 | Status: SHIPPED | OUTPATIENT
Start: 2019-03-25 | End: 2019-07-22 | Stop reason: SDUPTHER

## 2019-03-25 RX ORDER — SERTRALINE HYDROCHLORIDE 100 MG/1
200 TABLET, FILM COATED ORAL DAILY
Qty: 60 TAB | Refills: 1 | Status: CANCELLED | OUTPATIENT
Start: 2019-03-25

## 2019-03-25 RX ORDER — LISINOPRIL 40 MG/1
20 TABLET ORAL DAILY
Qty: 90 TAB | Refills: 1 | Status: CANCELLED | OUTPATIENT
Start: 2019-03-25

## 2019-03-25 NOTE — TELEPHONE ENCOUNTER
Tried to call patient to informed that it's too soon for refills but no voicemail. Prescription sent 2/6/2019 for 90 day supplies.  called back requesting a refill on potassium.  Informed him that the request was sent and is awaiting provider approval.

## 2019-03-25 NOTE — PATIENT INSTRUCTIONS
Anxiety Disorder: Care Instructions  Your Care Instructions    Anxiety is a normal reaction to stress. Difficult situations can cause you to have symptoms such as sweaty palms and a nervous feeling. In an anxiety disorder, the symptoms are far more severe. Constant worry, muscle tension, trouble sleeping, nausea and diarrhea, and other symptoms can make normal daily activities difficult or impossible. These symptoms may occur for no reason, and they can affect your work, school, or social life. Medicines, counseling, and self-care can all help. Follow-up care is a key part of your treatment and safety. Be sure to make and go to all appointments, and call your doctor if you are having problems. It's also a good idea to know your test results and keep a list of the medicines you take. How can you care for yourself at home? · Take medicines exactly as directed. Call your doctor if you think you are having a problem with your medicine. · Go to your counseling sessions and follow-up appointments. · Recognize and accept your anxiety. Then, when you are in a situation that makes you anxious, say to yourself, \"This is not an emergency. I feel uncomfortable, but I am not in danger. I can keep going even if I feel anxious. \"  · Be kind to your body:  ? Relieve tension with exercise or a massage. ? Get enough rest.  ? Avoid alcohol, caffeine, nicotine, and illegal drugs. They can increase your anxiety level and cause sleep problems. ? Learn and do relaxation techniques. See below for more about these techniques. · Engage your mind. Get out and do something you enjoy. Go to a funny movie, or take a walk or hike. Plan your day. Having too much or too little to do can make you anxious. · Keep a record of your symptoms. Discuss your fears with a good friend or family member, or join a support group for people with similar problems. Talking to others sometimes relieves stress.   · Get involved in social groups, or volunteer to help others. Being alone sometimes makes things seem worse than they are. · Get at least 30 minutes of exercise on most days of the week to relieve stress. Walking is a good choice. You also may want to do other activities, such as running, swimming, cycling, or playing tennis or team sports. Relaxation techniques  Do relaxation exercises 10 to 20 minutes a day. You can play soothing, relaxing music while you do them, if you wish. · Tell others in your house that you are going to do your relaxation exercises. Ask them not to disturb you. · Find a comfortable place, away from all distractions and noise. · Lie down on your back, or sit with your back straight. · Focus on your breathing. Make it slow and steady. · Breathe in through your nose. Breathe out through either your nose or mouth. · Breathe deeply, filling up the area between your navel and your rib cage. Breathe so that your belly goes up and down. · Do not hold your breath. · Breathe like this for 5 to 10 minutes. Notice the feeling of calmness throughout your whole body. As you continue to breathe slowly and deeply, relax by doing the following for another 5 to 10 minutes:  · Tighten and relax each muscle group in your body. You can begin at your toes and work your way up to your head. · Imagine your muscle groups relaxing and becoming heavy. · Empty your mind of all thoughts. · Let yourself relax more and more deeply. · Become aware of the state of calmness that surrounds you. · When your relaxation time is over, you can bring yourself back to alertness by moving your fingers and toes and then your hands and feet and then stretching and moving your entire body. Sometimes people fall asleep during relaxation, but they usually wake up shortly afterward. · Always give yourself time to return to full alertness before you drive a car or do anything that might cause an accident if you are not fully alert.  Never play a relaxation tape while you drive a car. When should you call for help? Call 911 anytime you think you may need emergency care. For example, call if:    · You feel you cannot stop from hurting yourself or someone else.   [employer] [place] the numbers for these national suicide hotlines: 9-131-338-TALK (8-549.842.4543) and 8-041-APJHSKG (0-664.840.3315). If you or someone you know talks about suicide or feeling hopeless, get help right away.   Watch closely for changes in your health, and be sure to contact your doctor if:    · You have anxiety or fear that affects your life.     · You have symptoms of anxiety that are new or different from those you had before. Where can you learn more? Go to http://maria victoria-ignacio.info/. Enter P754 in the search box to learn more about \"Anxiety Disorder: Care Instructions. \"  Current as of: September 11, 2018  Content Version: 11.9  © 8304-7936 [employer], [employer]. Care instructions adapted under license by [employer] (which disclaims liability or warranty for this information). If you have questions about a medical condition or this instruction, always ask your healthcare professional. [place] [address] any warranty or liability for your use of this information. Recovering From Depression: Care Instructions  Your Care Instructions    Taking good care of yourself is important as you recover from depression. In time, your symptoms will fade as your treatment takes hold. Do not give up. Instead, focus your energy on getting better. Your mood will improve. It just takes some time. Focus on things that can help you feel better, such as being with friends and family, eating well, and getting enough rest. But take things slowly. Do not do too much too soon. You will begin to feel better gradually. Follow-up care is a key part of your treatment and safety. Be sure to make and go to all appointments, and call your doctor if you are having problems. It's also a good idea to know your test results and keep a list of the medicines you take. How can you care for yourself at home? Be realistic  · If you have a large task to do, break it up into smaller steps you can handle, and just do what you can. · You may want to put off important decisions until your depression has lifted. If you have plans that will have a major impact on your life, such as marriage, divorce, or a job change, try to wait a bit. Talk it over with friends and loved ones who can help you look at the overall picture first.  · Reaching out to people for help is important. Do not isolate yourself. Let your family and friends help you. Find someone you can trust and confide in, and talk to that person. · Be patient, and be kind to yourself. Remember that depression is not your fault and is not something you can overcome with willpower alone. Treatment is necessary for depression, just like for any other illness. Feeling better takes time, and your mood will improve little by little. Stay active  · Stay busy and get outside. Take a walk, or try some other light exercise. · Talk with your doctor about an exercise program. Exercise can help with mild depression. · Go to a movie or concert. Take part in a [employer] activity or other social gathering. Go to a ball game. · Ask a friend to have dinner with you. Take care of yourself  · Eat a balanced diet with plenty of fresh fruits and vegetables, whole grains, and lean protein. If you have lost your appetite, eat small snacks rather than large meals. · Avoid drinking alcohol or using illegal drugs. Do not take medicines that have not been prescribed for you. They may interfere with medicines you may be taking for depression, or they may make your depression worse. · Take your medicines exactly as they are prescribed. You may start to feel better within 1 to 3 weeks of taking antidepressant medicine.  But it can take as many as 6 to 8 weeks to see more improvement. If you have questions or concerns about your medicines, or if you do not notice any improvement by 3 weeks, talk to your doctor. · If you have any side effects from your medicine, tell your doctor. Antidepressants can make you feel tired, dizzy, or nervous. Some people have dry mouth, constipation, headaches, sexual problems, or diarrhea. Many of these side effects are mild and will go away on their own after you have been taking the medicine for a few weeks. Some may last longer. Talk to your doctor if side effects are bothering you too much. You might be able to try a different medicine. · Get enough sleep. If you have problems sleeping:  ? Go to bed at the same time every night, and get up at the same time every morning. ? Keep your bedroom dark and quiet. ? Do not exercise after 5:00 p.m.  ? Avoid drinks with caffeine after 5:00 p.m. · Avoid sleeping pills unless they are prescribed by the doctor treating your depression. Sleeping pills may make you groggy during the day, and they may interact with other medicine you are taking. · If you have any other illnesses, such as diabetes, heart disease, or high blood pressure, make sure to continue with your treatment. Tell your doctor about all of the medicines you take, including those with or without a prescription. · Keep the numbers for these national suicide hotlines: 7-300-921-TALK (7-928.722.7943) and 6-036-QGOYXFN (9-414.836.7548). If you or someone you know talks about suicide or feeling hopeless, get help right away. When should you call for help? Call 911 anytime you think you may need emergency care.  For example, call if:    · You feel like hurting yourself or someone else.     · Someone you know has depression and is about to attempt or is attempting suicide.    Call your doctor now or seek immediate medical care if:    · You hear voices.     · Someone you know has depression and:  ? Starts to give away his or her possessions. ? Uses illegal drugs or drinks alcohol heavily. ? Talks or writes about death, including writing suicide notes or talking about guns, knives, or pills. ? Starts to spend a lot of time alone. ? Acts very aggressively or suddenly appears calm.    Watch closely for changes in your health, and be sure to contact your doctor if:    · You do not get better as expected. Where can you learn more? Go to http://maria victoria-ignacio.info/. Enter H854 in the search box to learn more about \"Recovering From Depression: Care Instructions. \"  Current as of: September 11, 2018  Content Version: 11.9  © 8052-4081 [employer], [employer]. Care instructions adapted under license by [employer] (which disclaims liability or warranty for this information). If you have questions about a medical condition or this instruction, always ask your healthcare professional. [place] [address] any warranty or liability for your use of this information.
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
## 2019-03-26 RX ORDER — POTASSIUM CHLORIDE 1500 MG/1
TABLET, FILM COATED, EXTENDED RELEASE ORAL
Qty: 30 TAB | Refills: 0 | Status: SHIPPED | OUTPATIENT
Start: 2019-03-26 | End: 2019-05-08 | Stop reason: SDUPTHER

## 2019-04-05 NOTE — PROGRESS NOTES
It does not appear that she is taking the cholesterol medication (atorvastatin). I'd like for her  to start monitoring this, make sure she is taking every evening. Recheck in 6 weeks.

## 2019-05-09 RX ORDER — POTASSIUM CHLORIDE 1500 MG/1
TABLET, FILM COATED, EXTENDED RELEASE ORAL
Qty: 30 TAB | Refills: 0 | Status: SHIPPED | OUTPATIENT
Start: 2019-05-09 | End: 2021-08-12

## 2019-07-05 DIAGNOSIS — F31.89 SEVERE BIPOLAR AFFECTIVE DISORDER WITH PSYCHOSIS (HCC): ICD-10-CM

## 2019-07-05 DIAGNOSIS — F40.10 SOCIAL ANXIETY DISORDER: ICD-10-CM

## 2019-07-05 DIAGNOSIS — F31.4 BIPOLAR DISORDER WITH SEVERE DEPRESSION (HCC): ICD-10-CM

## 2019-07-09 RX ORDER — BUPROPION HYDROCHLORIDE 150 MG/1
TABLET, EXTENDED RELEASE ORAL
Qty: 30 TAB | Refills: 0 | Status: SHIPPED | OUTPATIENT
Start: 2019-07-09 | End: 2019-08-06 | Stop reason: SDUPTHER

## 2019-07-09 RX ORDER — SERTRALINE HYDROCHLORIDE 100 MG/1
TABLET, FILM COATED ORAL
Qty: 60 TAB | Refills: 0 | Status: SHIPPED | OUTPATIENT
Start: 2019-07-09 | End: 2019-08-06 | Stop reason: SDUPTHER

## 2019-07-22 DIAGNOSIS — F31.4 BIPOLAR DISORDER WITH SEVERE DEPRESSION (HCC): ICD-10-CM

## 2019-07-22 DIAGNOSIS — F31.89 SEVERE BIPOLAR AFFECTIVE DISORDER WITH PSYCHOSIS (HCC): ICD-10-CM

## 2019-07-23 RX ORDER — RISPERIDONE 3 MG/1
TABLET, FILM COATED ORAL
Qty: 30 TAB | Refills: 0 | Status: SHIPPED | OUTPATIENT
Start: 2019-07-23 | End: 2019-08-12 | Stop reason: SDUPTHER

## 2019-08-12 ENCOUNTER — OFFICE VISIT (OUTPATIENT)
Dept: BEHAVIORAL/MENTAL HEALTH CLINIC | Age: 64
End: 2019-08-12

## 2019-08-12 VITALS
DIASTOLIC BLOOD PRESSURE: 55 MMHG | BODY MASS INDEX: 30.05 KG/M2 | HEART RATE: 141 BPM | WEIGHT: 180.6 LBS | SYSTOLIC BLOOD PRESSURE: 76 MMHG

## 2019-08-12 DIAGNOSIS — G47.00 INSOMNIA, UNSPECIFIED TYPE: ICD-10-CM

## 2019-08-12 DIAGNOSIS — F31.4 BIPOLAR DISORDER WITH SEVERE DEPRESSION (HCC): ICD-10-CM

## 2019-08-12 DIAGNOSIS — F40.10 SOCIAL ANXIETY DISORDER: ICD-10-CM

## 2019-08-12 DIAGNOSIS — F31.89 SEVERE BIPOLAR AFFECTIVE DISORDER WITH PSYCHOSIS (HCC): Primary | ICD-10-CM

## 2019-08-12 DIAGNOSIS — F41.9 ANXIETY: ICD-10-CM

## 2019-08-12 RX ORDER — SERTRALINE HYDROCHLORIDE 100 MG/1
TABLET, FILM COATED ORAL
Qty: 62 TAB | Refills: 1 | Status: SHIPPED | OUTPATIENT
Start: 2019-08-12 | End: 2019-10-17 | Stop reason: SDUPTHER

## 2019-08-12 RX ORDER — RISPERIDONE 3 MG/1
TABLET, FILM COATED ORAL
Qty: 31 TAB | Refills: 1 | Status: SHIPPED | OUTPATIENT
Start: 2019-08-12 | End: 2019-10-17 | Stop reason: SDUPTHER

## 2019-08-12 RX ORDER — BUPROPION HYDROCHLORIDE 300 MG/1
300 TABLET ORAL
Qty: 31 TAB | Refills: 1 | Status: SHIPPED | OUTPATIENT
Start: 2019-08-12 | End: 2019-10-17 | Stop reason: SDUPTHER

## 2019-08-12 RX ORDER — CHOLECALCIFEROL (VITAMIN D3) 25 MCG
TABLET ORAL
Qty: 62 TAB | Refills: 1 | Status: SHIPPED | OUTPATIENT
Start: 2019-08-12 | End: 2019-11-04 | Stop reason: RX

## 2019-08-12 RX ORDER — LORAZEPAM 1 MG/1
TABLET ORAL
Qty: 62 TAB | Refills: 1 | Status: SHIPPED | OUTPATIENT
Start: 2019-08-12 | End: 2019-10-09 | Stop reason: SDUPTHER
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
## 2019-11-04 ENCOUNTER — OFFICE VISIT (OUTPATIENT)
Dept: BEHAVIORAL/MENTAL HEALTH CLINIC | Age: 64
End: 2019-11-04

## 2019-11-04 VITALS
SYSTOLIC BLOOD PRESSURE: 98 MMHG | HEIGHT: 65 IN | DIASTOLIC BLOOD PRESSURE: 66 MMHG | BODY MASS INDEX: 29.72 KG/M2 | HEART RATE: 100 BPM | WEIGHT: 178.4 LBS

## 2019-11-04 DIAGNOSIS — F31.4 BIPOLAR DISORDER WITH SEVERE DEPRESSION (HCC): Primary | ICD-10-CM

## 2019-11-04 DIAGNOSIS — F31.89 SEVERE BIPOLAR AFFECTIVE DISORDER WITH PSYCHOSIS (HCC): ICD-10-CM

## 2019-11-04 DIAGNOSIS — F41.9 ANXIETY: ICD-10-CM

## 2019-11-04 DIAGNOSIS — F40.10 SOCIAL ANXIETY DISORDER: ICD-10-CM

## 2019-11-04 RX ORDER — LORAZEPAM 0.5 MG/1
0.5 TABLET ORAL
Qty: 60 TAB | Refills: 2 | Status: SHIPPED | OUTPATIENT
Start: 2019-11-04 | End: 2021-08-12

## 2019-11-04 RX ORDER — BUPROPION HYDROCHLORIDE 300 MG/1
TABLET ORAL
Qty: 30 TAB | Refills: 2 | Status: SHIPPED | OUTPATIENT
Start: 2019-11-04 | End: 2021-08-19

## 2019-11-04 RX ORDER — SERTRALINE HYDROCHLORIDE 100 MG/1
200 TABLET, FILM COATED ORAL DAILY
Qty: 60 TAB | Refills: 2 | Status: SHIPPED | OUTPATIENT
Start: 2019-11-04 | End: 2021-08-12

## 2019-11-04 RX ORDER — RISPERIDONE 3 MG/1
TABLET, FILM COATED ORAL
Qty: 30 TAB | Refills: 2 | Status: SHIPPED | OUTPATIENT
Start: 2019-11-04 | End: 2021-08-12

## 2019-11-04 NOTE — PATIENT INSTRUCTIONS
Sleep Tips    What to avoid    · Do not have drinks with caffeine, such as coffee or black tea, for 8 hours before bed. · Do not smoke or use other types of tobacco near bedtime. Nicotine is a stimulant and can keep you awake. · Avoid drinking alcohol late in the evening, because it can cause you to wake in the middle of the night. · Do not eat a big meal close to bedtime. If you are hungry, eat a light snack. · Do not drink a lot of water close to bedtime, because the need to urinate may wake you up during the night. · Do not read or watch TV in bed. Use the bed only for sleeping and sexual activity. What to try    · Go to bed at the same time every night, and wake up at the same time every morning. Do not take naps during the day. · Keep your bedroom quiet, dark, and cool. · Get regular exercise, but not within 3 to 4 hours of your bedtime. · Sleep on a comfortable pillow and mattress. · If watching the clock makes you anxious, turn it facing away from you so you cannot see the time. · If you worry when you lie down, start a worry book. Well before bedtime, write down your worries, and then set the book and your concerns aside. · Try meditation or other relaxation techniques before you go to bed. · If you cannot fall asleep, get up and go to another room until you feel sleepy. Do something relaxing. Repeat your bedtime routine before you go to bed again. Make your house quiet and calm about an hour before bedtime. Turn down the lights, turn off the TV, log off the computer, and turn down the volume on music. This can help you relax after a busy day. Anxiety Disorder: Care Instructions  Your Care Instructions    Anxiety is a normal reaction to stress. Difficult situations can cause you to have symptoms such as sweaty palms and a nervous feeling. In an anxiety disorder, the symptoms are far more severe.  Constant worry, muscle tension, trouble sleeping, nausea and diarrhea, and other symptoms can make normal daily activities difficult or impossible. These symptoms may occur for no reason, and they can affect your work, school, or social life. Medicines, counseling, and self-care can all help. Follow-up care is a key part of your treatment and safety. Be sure to make and go to all appointments, and call your doctor if you are having problems. It's also a good idea to know your test results and keep a list of the medicines you take. How can you care for yourself at home? Take medicines exactly as directed. Call your doctor if you think you are having a problem with your medicine. Go to your counseling sessions and follow-up appointments. Recognize and accept your anxiety. Then, when you are in a situation that makes you anxious, say to yourself, \"This is not an emergency. I feel uncomfortable, but I am not in danger. I can keep going even if I feel anxious. \"  Be kind to your body:  Relieve tension with exercise or a massage. Get enough rest.  Avoid alcohol, caffeine, nicotine, and illegal drugs. They can increase your anxiety level and cause sleep problems. Learn and do relaxation techniques. See below for more about these techniques. Engage your mind. Get out and do something you enjoy. Go to a [employer] movie, or take a walk or hike. Plan your day. Having too much or too little to do can make you anxious. Keep a record of your symptoms. Discuss your fears with a good friend or family member, or join a support group for people with similar problems. Talking to others sometimes relieves stress. Get involved in social groups, or volunteer to help others. Being alone sometimes makes things seem worse than they are. Get at least 30 minutes of exercise on most days of the week to relieve stress. Walking is a good choice. You also may want to do other activities, such as running, swimming, cycling, or playing tennis or team sports. Relaxation techniques  Do relaxation exercises 10 to 20 minutes a day.  You can play soothing, relaxing music while you do them, if you wish. Tell others in your house that you are going to do your relaxation exercises. Ask them not to disturb you. Find a comfortable place, away from all distractions and noise. Lie down on your back, or sit with your back straight. Focus on your breathing. Make it slow and steady. Breathe in through your nose. Breathe out through either your nose or mouth. Breathe deeply, filling up the area between your navel and your rib cage. Breathe so that your belly goes up and down. Do not hold your breath. Breathe like this for 5 to 10 minutes. Notice the feeling of calmness throughout your whole body. As you continue to breathe slowly and deeply, relax by doing the following for another 5 to 10 minutes:  Tighten and relax each muscle group in your body. You can begin at your toes and work your way up to your head. Imagine your muscle groups relaxing and becoming heavy. Empty your mind of all thoughts. Let yourself relax more and more deeply. Become aware of the state of calmness that surrounds you. When your relaxation time is over, you can bring yourself back to alertness by moving your fingers and toes and then your hands and feet and then stretching and moving your entire body. Sometimes people fall asleep during relaxation, but they usually wake up shortly afterward. Always give yourself time to return to full alertness before you drive a car or do anything that might cause an accident if you are not fully alert. Never play a relaxation tape while you drive a car. When should you call for help? Call 911 anytime you think you may need emergency care. For example, call if:    You feel you cannot stop from hurting yourself or someone else.   Jonelle Figueroa the numbers for these national suicide hotlines: 5-051-987-TALK (4-991.928.7512) and 8-528-RJSAOLB (4-932.527.9611).  If you or someone you know talks about suicide or feeling hopeless, get help right away.   Watch closely for changes in your health, and be sure to contact your doctor if:    You have anxiety or fear that affects your life.     You have symptoms of anxiety that are new or different from those you had before. Where can you learn more? Go to http://maria victoria-ignacio.info/. Enter P754 in the search box to learn more about \"Anxiety Disorder: Care Instructions. \"  Current as of: May 28, 2019  Content Version: 12.2  © 9530-2489 [employer]. Care instructions adapted under license by [employer] (which disclaims liability or warranty for this information). If you have questions about a medical condition or this instruction, always ask your healthcare professional. [address] any warranty or liability for your use of this information.     ·
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
## 2021-08-09 NOTE — PROGRESS NOTES
1. Have you been to the ER, urgent care clinic since your last visit? Hospitalized since your last visit? No 
 
2. Have you seen or consulted any other health care providers outside of the [address] [place] [address] since your last visit? Include any pap smears or colon screening. No 
Chief Complaint Patient presents with  Hypertension  Cholesterol Problem Patient here to FU on HTN  And discuss cholesterol lab results. Patient is taking medication as prescribed. [FreeTextEntry1] : Ms. 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 \par \par Testing review: Normal brain PET, MRI and EEG. LP from 12/2019 shows Protein: 27, Glucose: 53, Cells: 1, OGB: 0, PATI: 3.76 (<0.02). Further serology showed IL 1b: 11 (<6.7) and IL 10: 7 (<2.8). \par \par Treatment includes: IVMP: 1g x 5 8/20, PLEX x5 8/20, Rituxan #1: 8/25/20 ( stopped early due to side effects of  SOB and tachycardia, unsure if related as patient ate something containing sunflower seeds with a known allergy prior to infusion)and 9/11/20 (completed without side effects)and IVIG 10/1 at 2g/kg/month over 5 days. \par \par Of note, patient had poor toleration to the IVIG and developed migraines, GI upset and flu-like symptoms. IVIG was changed to 0.5g/kg weekly. Infusions remain over 6-7 hours with premed of Tylenol, Benadryl and 500CC NS pre and post infusions that began the week of 10/26. \par _____________________________________________________________\par \par Since last seen patient has had some changes. Had an outpatient psych admission with several medication adjustments. Reports she is feeling better but continues to have symptoms of brain fog, fatigue, STM, difficulty with emotional regulation, and overall thought process. \par \par Working weekly with psychiatry for medication changes, family and individual therapy. \par \par Migraine HA stable. Working with treating MD, recently started Dexamethasone 4mg 2 x months, Aimovig and Botox.\par \par Spoke with rheum at length back in may, had elected to repeat Rituxan. When CD 19 count was checked, was out of range at 4 (6-24) In May 2021\par . \par Current meds:\par Depakote 1g qHS, Zyprexa 25mg, Klonopin 1mg BID, Neurontin [id number], Prozac 30mg, Plaquenil 400mg, Aimovig 140mg, marijuana PRN

## 2021-08-12 ENCOUNTER — APPOINTMENT (OUTPATIENT)
Dept: GENERAL RADIOLOGY | Age: 66
DRG: 041 | End: 2021-08-12
Attending: EMERGENCY MEDICINE
Payer: COMMERCIAL

## 2021-08-12 ENCOUNTER — APPOINTMENT (OUTPATIENT)
Dept: CT IMAGING | Age: 66
DRG: 041 | End: 2021-08-12
Attending: EMERGENCY MEDICINE
Payer: COMMERCIAL

## 2021-08-12 ENCOUNTER — APPOINTMENT (OUTPATIENT)
Dept: MRI IMAGING | Age: 66
DRG: 041 | End: 2021-08-12
Attending: PSYCHIATRY & NEUROLOGY
Payer: COMMERCIAL

## 2021-08-12 ENCOUNTER — APPOINTMENT (OUTPATIENT)
Dept: CT IMAGING | Age: 66
DRG: 041 | End: 2021-08-12
Attending: PHYSICIAN ASSISTANT
Payer: COMMERCIAL

## 2021-08-12 ENCOUNTER — HOSPITAL ENCOUNTER (INPATIENT)
Age: 66
LOS: 7 days | Discharge: NURSING, REHAB OR LONG-TERM CARE | DRG: 041 | End: 2021-08-19
Attending: EMERGENCY MEDICINE | Admitting: INTERNAL MEDICINE
Payer: COMMERCIAL

## 2021-08-12 DIAGNOSIS — G93.41 ACUTE METABOLIC ENCEPHALOPATHY: ICD-10-CM

## 2021-08-12 DIAGNOSIS — R56.9 SEIZURE (HCC): ICD-10-CM

## 2021-08-12 DIAGNOSIS — R41.82 ALTERED MENTAL STATUS, UNSPECIFIED ALTERED MENTAL STATUS TYPE: Primary | ICD-10-CM

## 2021-08-12 DIAGNOSIS — R25.1 TREMOR: ICD-10-CM

## 2021-08-12 DIAGNOSIS — S09.90XA INJURY OF HEAD, INITIAL ENCOUNTER: ICD-10-CM

## 2021-08-12 LAB
ALBUMIN SERPL-MCNC: 3.1 G/DL (ref 3.5–5)
ALBUMIN/GLOB SERPL: 0.8 {RATIO} (ref 1.1–2.2)
ALP SERPL-CCNC: 97 U/L (ref 45–117)
ALT SERPL-CCNC: 26 U/L (ref 12–78)
AMPHET UR QL SCN: POSITIVE
ANION GAP SERPL CALC-SCNC: 8 MMOL/L (ref 5–15)
APPEARANCE UR: CLEAR
APPEARANCE UR: CLEAR
AST SERPL-CCNC: 20 U/L (ref 15–37)
BACTERIA URNS QL MICRO: NEGATIVE /HPF
BACTERIA URNS QL MICRO: NEGATIVE /HPF
BARBITURATES UR QL SCN: NEGATIVE
BASOPHILS # BLD: 0 K/UL (ref 0–0.1)
BASOPHILS NFR BLD: 0 % (ref 0–1)
BENZODIAZ UR QL: NEGATIVE
BILIRUB SERPL-MCNC: 0.3 MG/DL (ref 0.2–1)
BILIRUB UR QL: NEGATIVE
BILIRUB UR QL: NEGATIVE
BUN SERPL-MCNC: 19 MG/DL (ref 6–20)
BUN/CREAT SERPL: 18 (ref 12–20)
CALCIUM SERPL-MCNC: 7.8 MG/DL (ref 8.5–10.1)
CANNABINOIDS UR QL SCN: NEGATIVE
CHLORIDE SERPL-SCNC: 102 MMOL/L (ref 97–108)
CK SERPL-CCNC: 259 U/L (ref 26–192)
CO2 SERPL-SCNC: 21 MMOL/L (ref 21–32)
COCAINE UR QL SCN: NEGATIVE
COLOR UR: ABNORMAL
COLOR UR: ABNORMAL
COMMENT, HOLDF: NORMAL
CREAT SERPL-MCNC: 1.06 MG/DL (ref 0.55–1.02)
DIFFERENTIAL METHOD BLD: ABNORMAL
DRUG SCRN COMMENT,DRGCM: ABNORMAL
EOSINOPHIL # BLD: 0 K/UL (ref 0–0.4)
EOSINOPHIL NFR BLD: 0 % (ref 0–7)
EPITH CASTS URNS QL MICRO: ABNORMAL /LPF
EPITH CASTS URNS QL MICRO: ABNORMAL /LPF
ERYTHROCYTE [DISTWIDTH] IN BLOOD BY AUTOMATED COUNT: 12.9 % (ref 11.5–14.5)
ETHANOL SERPL-MCNC: <10 MG/DL
GLOBULIN SER CALC-MCNC: 3.8 G/DL (ref 2–4)
GLUCOSE BLD STRIP.AUTO-MCNC: 120 MG/DL (ref 65–117)
GLUCOSE SERPL-MCNC: 102 MG/DL (ref 65–100)
GLUCOSE UR STRIP.AUTO-MCNC: NEGATIVE MG/DL
GLUCOSE UR STRIP.AUTO-MCNC: NEGATIVE MG/DL
HCT VFR BLD AUTO: 37.8 % (ref 35–47)
HGB BLD-MCNC: 12.4 G/DL (ref 11.5–16)
HGB UR QL STRIP: ABNORMAL
HGB UR QL STRIP: ABNORMAL
HYALINE CASTS URNS QL MICRO: ABNORMAL /LPF (ref 0–5)
HYALINE CASTS URNS QL MICRO: ABNORMAL /LPF (ref 0–5)
IMM GRANULOCYTES # BLD AUTO: 0.1 K/UL (ref 0–0.04)
IMM GRANULOCYTES NFR BLD AUTO: 1 % (ref 0–0.5)
INR PPP: 1 (ref 0.9–1.1)
KETONES UR QL STRIP.AUTO: NEGATIVE MG/DL
KETONES UR QL STRIP.AUTO: NEGATIVE MG/DL
LEUKOCYTE ESTERASE UR QL STRIP.AUTO: ABNORMAL
LEUKOCYTE ESTERASE UR QL STRIP.AUTO: NEGATIVE
LYMPHOCYTES # BLD: 1 K/UL (ref 0.8–3.5)
LYMPHOCYTES NFR BLD: 6 % (ref 12–49)
MCH RBC QN AUTO: 28.5 PG (ref 26–34)
MCHC RBC AUTO-ENTMCNC: 32.8 G/DL (ref 30–36.5)
MCV RBC AUTO: 86.9 FL (ref 80–99)
METHADONE UR QL: NEGATIVE
MONOCYTES # BLD: 0.9 K/UL (ref 0–1)
MONOCYTES NFR BLD: 5 % (ref 5–13)
NEUTS SEG # BLD: 14.6 K/UL (ref 1.8–8)
NEUTS SEG NFR BLD: 88 % (ref 32–75)
NITRITE UR QL STRIP.AUTO: NEGATIVE
NITRITE UR QL STRIP.AUTO: NEGATIVE
NRBC # BLD: 0 K/UL (ref 0–0.01)
NRBC BLD-RTO: 0 PER 100 WBC
OPIATES UR QL: NEGATIVE
PCP UR QL: NEGATIVE
PH UR STRIP: 5.5 [PH] (ref 5–8)
PH UR STRIP: 5.5 [PH] (ref 5–8)
PLATELET # BLD AUTO: 284 K/UL (ref 150–400)
PMV BLD AUTO: 10.2 FL (ref 8.9–12.9)
POTASSIUM SERPL-SCNC: 3.6 MMOL/L (ref 3.5–5.1)
PROLACTIN SERPL-MCNC: 1.8 NG/ML
PROT SERPL-MCNC: 6.9 G/DL (ref 6.4–8.2)
PROT UR STRIP-MCNC: NEGATIVE MG/DL
PROT UR STRIP-MCNC: NEGATIVE MG/DL
PROTHROMBIN TIME: 10.7 SEC (ref 9–11.1)
RBC # BLD AUTO: 4.35 M/UL (ref 3.8–5.2)
RBC #/AREA URNS HPF: ABNORMAL /HPF (ref 0–5)
RBC #/AREA URNS HPF: ABNORMAL /HPF (ref 0–5)
SAMPLES BEING HELD,HOLD: NORMAL
SERVICE CMNT-IMP: ABNORMAL
SODIUM SERPL-SCNC: 131 MMOL/L (ref 136–145)
SP GR UR REFRACTOMETRY: 1.01 (ref 1–1.03)
SP GR UR REFRACTOMETRY: 1.02 (ref 1–1.03)
TROPONIN I SERPL-MCNC: <0.05 NG/ML
UA: UC IF INDICATED,UAUC: ABNORMAL
UROBILINOGEN UR QL STRIP.AUTO: 0.2 EU/DL (ref 0.2–1)
UROBILINOGEN UR QL STRIP.AUTO: 0.2 EU/DL (ref 0.2–1)
WBC # BLD AUTO: 16.7 K/UL (ref 3.6–11)
WBC URNS QL MICRO: ABNORMAL /HPF (ref 0–4)
WBC URNS QL MICRO: ABNORMAL /HPF (ref 0–4)

## 2021-08-12 PROCEDURE — 81001 URINALYSIS AUTO W/SCOPE: CPT

## 2021-08-12 PROCEDURE — 73030 X-RAY EXAM OF SHOULDER: CPT

## 2021-08-12 PROCEDURE — 4A03X5D MEASUREMENT OF ARTERIAL FLOW, INTRACRANIAL, EXTERNAL APPROACH: ICD-10-PCS | Performed by: INTERNAL MEDICINE

## 2021-08-12 PROCEDURE — 70498 CT ANGIOGRAPHY NECK: CPT

## 2021-08-12 PROCEDURE — 74011000636 HC RX REV CODE- 636: Performed by: RADIOLOGY

## 2021-08-12 PROCEDURE — 82077 ASSAY SPEC XCP UR&BREATH IA: CPT

## 2021-08-12 PROCEDURE — 82962 GLUCOSE BLOOD TEST: CPT

## 2021-08-12 PROCEDURE — 51798 US URINE CAPACITY MEASURE: CPT

## 2021-08-12 PROCEDURE — 84146 ASSAY OF PROLACTIN: CPT

## 2021-08-12 PROCEDURE — 84484 ASSAY OF TROPONIN QUANT: CPT

## 2021-08-12 PROCEDURE — 36415 COLL VENOUS BLD VENIPUNCTURE: CPT

## 2021-08-12 PROCEDURE — 77030038269 HC DRN EXT URIN PURWCK BARD -A

## 2021-08-12 PROCEDURE — 72125 CT NECK SPINE W/O DYE: CPT

## 2021-08-12 PROCEDURE — 82550 ASSAY OF CK (CPK): CPT

## 2021-08-12 PROCEDURE — 70551 MRI BRAIN STEM W/O DYE: CPT

## 2021-08-12 PROCEDURE — 74011000258 HC RX REV CODE- 258: Performed by: EMERGENCY MEDICINE

## 2021-08-12 PROCEDURE — 0042T CT CODE NEURO PERF W CBF: CPT

## 2021-08-12 PROCEDURE — 95816 EEG AWAKE AND DROWSY: CPT | Performed by: PSYCHIATRY & NEUROLOGY

## 2021-08-12 PROCEDURE — 77030019905 HC CATH URETH INTMIT MDII -A

## 2021-08-12 PROCEDURE — 99222 1ST HOSP IP/OBS MODERATE 55: CPT | Performed by: PSYCHIATRY & NEUROLOGY

## 2021-08-12 PROCEDURE — 93005 ELECTROCARDIOGRAM TRACING: CPT

## 2021-08-12 PROCEDURE — 85610 PROTHROMBIN TIME: CPT

## 2021-08-12 PROCEDURE — 70450 CT HEAD/BRAIN W/O DYE: CPT

## 2021-08-12 PROCEDURE — 96365 THER/PROPH/DIAG IV INF INIT: CPT

## 2021-08-12 PROCEDURE — 73200 CT UPPER EXTREMITY W/O DYE: CPT

## 2021-08-12 PROCEDURE — 80307 DRUG TEST PRSMV CHEM ANLYZR: CPT

## 2021-08-12 PROCEDURE — 74011000258 HC RX REV CODE- 258: Performed by: INTERNAL MEDICINE

## 2021-08-12 PROCEDURE — 99285 EMERGENCY DEPT VISIT HI MDM: CPT

## 2021-08-12 PROCEDURE — 85025 COMPLETE CBC W/AUTO DIFF WBC: CPT

## 2021-08-12 PROCEDURE — 80053 COMPREHEN METABOLIC PANEL: CPT

## 2021-08-12 PROCEDURE — 74011250636 HC RX REV CODE- 250/636: Performed by: EMERGENCY MEDICINE

## 2021-08-12 PROCEDURE — 73080 X-RAY EXAM OF ELBOW: CPT

## 2021-08-12 PROCEDURE — 65660000000 HC RM CCU STEPDOWN

## 2021-08-12 PROCEDURE — 71045 X-RAY EXAM CHEST 1 VIEW: CPT

## 2021-08-12 PROCEDURE — 74011250636 HC RX REV CODE- 250/636: Performed by: INTERNAL MEDICINE

## 2021-08-12 RX ORDER — SODIUM CHLORIDE 0.9 % (FLUSH) 0.9 %
5-40 SYRINGE (ML) INJECTION EVERY 8 HOURS
Status: DISCONTINUED | OUTPATIENT
Start: 2021-08-12 | End: 2021-08-19 | Stop reason: HOSPADM

## 2021-08-12 RX ORDER — POLYETHYLENE GLYCOL 3350 17 G/17G
17 POWDER, FOR SOLUTION ORAL DAILY PRN
Status: DISCONTINUED | OUTPATIENT
Start: 2021-08-12 | End: 2021-08-19 | Stop reason: HOSPADM

## 2021-08-12 RX ORDER — BUSPIRONE HYDROCHLORIDE 10 MG/1
10 TABLET ORAL 2 TIMES DAILY
COMMUNITY

## 2021-08-12 RX ORDER — ACETAMINOPHEN 650 MG/1
650 SUPPOSITORY RECTAL
Status: DISCONTINUED | OUTPATIENT
Start: 2021-08-12 | End: 2021-08-19 | Stop reason: HOSPADM

## 2021-08-12 RX ORDER — SODIUM CHLORIDE 0.9 % (FLUSH) 0.9 %
5-40 SYRINGE (ML) INJECTION AS NEEDED
Status: DISCONTINUED | OUTPATIENT
Start: 2021-08-12 | End: 2021-08-19 | Stop reason: HOSPADM

## 2021-08-12 RX ORDER — METOPROLOL SUCCINATE 25 MG/1
25 TABLET, EXTENDED RELEASE ORAL DAILY
Status: DISCONTINUED | OUTPATIENT
Start: 2021-08-12 | End: 2021-08-14

## 2021-08-12 RX ORDER — LAMOTRIGINE 100 MG/1
100 TABLET ORAL DAILY
COMMUNITY

## 2021-08-12 RX ORDER — AMLODIPINE BESYLATE 5 MG/1
5 TABLET ORAL DAILY
Status: DISCONTINUED | OUTPATIENT
Start: 2021-08-12 | End: 2021-08-14

## 2021-08-12 RX ORDER — SODIUM CHLORIDE, SODIUM LACTATE, POTASSIUM CHLORIDE, CALCIUM CHLORIDE 600; 310; 30; 20 MG/100ML; MG/100ML; MG/100ML; MG/100ML
125 INJECTION, SOLUTION INTRAVENOUS CONTINUOUS
Status: DISCONTINUED | OUTPATIENT
Start: 2021-08-12 | End: 2021-08-19 | Stop reason: HOSPADM

## 2021-08-12 RX ORDER — ONDANSETRON 4 MG/1
4 TABLET, ORALLY DISINTEGRATING ORAL
Status: DISCONTINUED | OUTPATIENT
Start: 2021-08-12 | End: 2021-08-19 | Stop reason: HOSPADM

## 2021-08-12 RX ORDER — ONDANSETRON 2 MG/ML
4 INJECTION INTRAMUSCULAR; INTRAVENOUS
Status: DISCONTINUED | OUTPATIENT
Start: 2021-08-12 | End: 2021-08-19 | Stop reason: HOSPADM

## 2021-08-12 RX ORDER — ARIPIPRAZOLE 5 MG/1
7 TABLET ORAL DAILY
COMMUNITY

## 2021-08-12 RX ORDER — ACETAMINOPHEN 325 MG/1
650 TABLET ORAL
Status: DISCONTINUED | OUTPATIENT
Start: 2021-08-12 | End: 2021-08-19 | Stop reason: HOSPADM

## 2021-08-12 RX ORDER — ENOXAPARIN SODIUM 100 MG/ML
40 INJECTION SUBCUTANEOUS DAILY
Status: DISCONTINUED | OUTPATIENT
Start: 2021-08-12 | End: 2021-08-16

## 2021-08-12 RX ADMIN — Medication 10 ML: at 10:42

## 2021-08-12 RX ADMIN — SODIUM CHLORIDE 1000 ML: 9 INJECTION, SOLUTION INTRAVENOUS at 08:35

## 2021-08-12 RX ADMIN — ENOXAPARIN SODIUM 40 MG: 40 INJECTION SUBCUTANEOUS at 10:39

## 2021-08-12 RX ADMIN — LEVETIRACETAM 1000 MG: 100 INJECTION, SOLUTION INTRAVENOUS at 22:40

## 2021-08-12 RX ADMIN — SODIUM CHLORIDE, POTASSIUM CHLORIDE, SODIUM LACTATE AND CALCIUM CHLORIDE 125 ML/HR: 600; 310; 30; 20 INJECTION, SOLUTION INTRAVENOUS at 12:33

## 2021-08-12 RX ADMIN — SODIUM CHLORIDE, POTASSIUM CHLORIDE, SODIUM LACTATE AND CALCIUM CHLORIDE 125 ML/HR: 600; 310; 30; 20 INJECTION, SOLUTION INTRAVENOUS at 23:11

## 2021-08-12 RX ADMIN — SODIUM CHLORIDE, POTASSIUM CHLORIDE, SODIUM LACTATE AND CALCIUM CHLORIDE 1000 ML: 600; 310; 30; 20 INJECTION, SOLUTION INTRAVENOUS at 10:53

## 2021-08-12 RX ADMIN — IOPAMIDOL 140 ML: 755 INJECTION, SOLUTION INTRAVENOUS at 07:48

## 2021-08-12 RX ADMIN — LEVETIRACETAM 1000 MG: 100 INJECTION, SOLUTION INTRAVENOUS at 08:13

## 2021-08-12 RX ADMIN — Medication 10 ML: at 22:00

## 2021-08-12 NOTE — CONSULTS
ORTHOPEDIC SURGERY CONSULT    Subjective:     Date of Consultation:  August 12, 2021    Referring Physician:  Dr. Cory Thomas is a 60 y.o. female who is being seen for a left proximal humerus fracture. She has a past medical history of seizures, anxiety, anemia, ALEX, lithium toxicity, and bipolar I disorder. She presented to the ER this morning after suffering a what sounds like a seizure at home. Her  who is at bedside states he found her on the ground at 2 am.  She was not actively seizing at that time. He states that she was alert, but unable to verbalize to him. She had dried blood around her mouth. She was brought to the [facility] ER and found to have a left proximal humerus fracture. We have been asked to manage the fracture.       Patient Active Problem List    Diagnosis Date Noted    Seizure [facility]) 08/12/2021    Acute metabolic encephalopathy 00/54/3477    Anxiety 05/01/2018    Cigarette nicotine dependence without complication 67/76/7571    Anemia 05/23/2017    ALEX (acute kidney injury) ([facility] [address] 05/21/2017    Lithium toxicity 05/21/2017    Hypercholesteremia 03/15/2017    Prediabetes 09/20/2016    Essential hypertension with goal blood pressure less than 130/80 07/13/2016    Bipolar 1 disorder ([facility] [address] 06/01/2016     Family History   Problem Relation Age of Onset    Cancer Father         lung    Diabetes Brother     Heart Disease Brother     Malignant Hyperthermia Neg Hx     Pseudocholinesterase Deficiency Neg Hx     Delayed Awakening Neg Hx     Post-op Nausea/Vomiting Neg Hx     Emergence Delirium Neg Hx     Post-op Cognitive Dysfunction Neg Hx     Other Neg Hx       Social History     Tobacco Use    Smoking status: Current Every Day Smoker     Packs/day: 0.50    Smokeless tobacco: Never Used   Substance Use Topics    Alcohol use: No     Past Medical History:   Diagnosis Date    Abscess of buttock 7/23/2012    Bronchitis     Common bile duct calculus 7/23/2012    Gallstones 7/12/2012    GERD (gastroesophageal reflux disease)     High cholesterol     Hypercholesterolemia     Hypertension     Ill-defined condition     missing upper front tooth    Ill-defined condition     lower leg bilateral reddened rash.  Insomnia     Other ill-defined conditions(799.89)     Large boil under right arm-pit.  Paranoia ([address]     Psychiatric disorder     bipolar      Past Surgical History:   Procedure Laterality Date    HI ABDOMEN SURGERY PROC UNLISTED      cholecystectomy 7/23/2012      Prior to Admission medications    Medication Sig Start Date End Date Taking? Authorizing Provider   buPROPion XL (WELLBUTRIN XL) 300 mg XL tablet TAKE ONE TABLET BY MOUTH EVERY MORNING 11/4/19   Rosalba Venegas NP   LORazepam (ATIVAN) 0.5 mg tablet Take 1 Tab by mouth two (2) times daily as needed for Anxiety. Max Daily Amount: 1 mg. 11/4/19   Rosalba Venegas NP   risperiDONE (RISPERDAL) 3 mg tablet TAKE ONE TABLET BY MOUTH EVERY NIGHT AT BEDTIME 11/4/19   Rosalba Venegas NP   sertraline (ZOLOFT) 100 mg tablet Take 2 Tabs by mouth daily. 11/4/19   Rosalba Venegas NP   potassium chloride SR (K-TAB) 20 mEq tablet TAKE ONE TABLET BY MOUTH DAILY 5/9/19   Amaury Mccabe, NP   atorvastatin (LIPITOR) 40 mg tablet TAKE ONE TABLET BY MOUTH DAILY 2/6/19   Amaury Mccabe, NP   amLODIPine (NORVASC) 10 mg tablet Take 0.5 Tabs by mouth daily. 2/6/19   Kaci Conde NP   metoprolol succinate (TOPROL-XL) 25 mg XL tablet Take 1 Tab by mouth daily. 2/6/19   Mellisa Mccabe, NP   lisinopril (PRINIVIL, ZESTRIL) 40 mg tablet Take 0.5 Tabs by mouth daily.  2/6/19   Kaci Conde NP     Current Facility-Administered Medications   Medication Dose Route Frequency    sodium chloride (NS) flush 5-40 mL  5-40 mL IntraVENous Q8H    sodium chloride (NS) flush 5-40 mL  5-40 mL IntraVENous PRN    acetaminophen (TYLENOL) tablet 650 mg  650 mg Oral Q6H PRN    Or    acetaminophen (TYLENOL) suppository 650 mg  650 mg Rectal Q6H PRN    polyethylene glycol (MIRALAX) packet 17 g  17 g Oral DAILY PRN    ondansetron (ZOFRAN ODT) tablet 4 mg  4 mg Oral Q8H PRN    Or    ondansetron (ZOFRAN) injection 4 mg  4 mg IntraVENous Q6H PRN    enoxaparin (LOVENOX) injection 40 mg  40 mg SubCUTAneous DAILY    levETIRAcetam (KEPPRA) 1,000 mg in 0.9% sodium chloride 100 mL IVPB  1,000 mg IntraVENous Q12H    metoprolol succinate (TOPROL-XL) XL tablet 25 mg  25 mg Oral DAILY    amLODIPine (NORVASC) tablet 5 mg  5 mg Oral DAILY    lactated Ringers infusion  125 mL/hr IntraVENous CONTINUOUS    lactated ringers bolus infusion 1,000 mL  1,000 mL IntraVENous ONCE     Current Outpatient Medications   Medication Sig    buPROPion XL (WELLBUTRIN XL) 300 mg XL tablet TAKE ONE TABLET BY MOUTH EVERY MORNING    LORazepam (ATIVAN) 0.5 mg tablet Take 1 Tab by mouth two (2) times daily as needed for Anxiety. Max Daily Amount: 1 mg.  risperiDONE (RISPERDAL) 3 mg tablet TAKE ONE TABLET BY MOUTH EVERY NIGHT AT BEDTIME    sertraline (ZOLOFT) 100 mg tablet Take 2 Tabs by mouth daily.  potassium chloride SR (K-TAB) 20 mEq tablet TAKE ONE TABLET BY MOUTH DAILY    atorvastatin (LIPITOR) 40 mg tablet TAKE ONE TABLET BY MOUTH DAILY    amLODIPine (NORVASC) 10 mg tablet Take 0.5 Tabs by mouth daily.  metoprolol succinate (TOPROL-XL) 25 mg XL tablet Take 1 Tab by mouth daily.  lisinopril (PRINIVIL, ZESTRIL) 40 mg tablet Take 0.5 Tabs by mouth daily. No Known Allergies     Review of Systems:  Pertinent items are noted in HPI.     Objective:     Patient Vitals for the past 8 hrs:   BP Temp Pulse Resp SpO2 Height Weight   21 1045 (!) 141/94  (!) 109 26      21 1039 132/80  (!) 109 25      21 0816  Aris Charles   5' 5\" (1.651 m) 76.1 kg (167 lb 11.2 oz)   21 0815 (!) 157/82 98.3 °F (36.8 °C) (!) 116 20 93 %       Temp (24hrs), Av.3 °F (36.8 °C), Min:98.3 °F (36.8 °C), Max:98.3 °F (36.8 °C)        EXAM: GEN: Well developed and in NAD   PSYCH: AAO x 1  MUSC: Left shoulder with no ecchymosis. There is no erythema. Deltoid contracts. Able to move her wrist.  Able to make  left hand. Elbow abrasion, but no active bleeding. No obvious elbow effusion. BCR of all digits. +radial and ulnar pulses. IMAGING:  EXAM: CT UP EXT LT WO CONT     INDICATION: Evaluate for proximal humerus fracture      COMPARISON: Left shoulder radiographs 8/12/2021     TECHNIQUE: Helical CT of the left humerus with coronal and sagittal reformats. Images reviewed in soft tissue and bone windows. CT dose reduction was achieved  through the use of a standardized protocol tailored for this examination and  automatic exposure control for dose modulation.     CONTRAST: None.     FINDINGS:     Study limited by motion.     Bones: Redemonstrated comminuted intra-articular proximal humerus fracture. Fracture of the posterior glenoid. Posterior glenohumeral dislocation     Joint fluid: Small joint effusion.     Articulations: Degenerative changes in the spine.     Tendons: Not well evaluated.     Muscles: No intramuscular hematoma. No focal atrophy.     Soft tissue mass: None.      Other: Atherosclerosis     IMPRESSION  1. Study limited by motion. 2.  Comminuted intra-articular proximal humerus fracture and posterior glenoid  fracture.  Posterior glenohumeral dislocation    Data Review   Recent Results (from the past 24 hour(s))   CBC WITH AUTOMATED DIFF    Collection Time: 08/12/21  7:57 AM   Result Value Ref Range    WBC 16.7 (H) 3.6 - 11.0 K/uL    RBC 4.35 3.80 - 5.20 M/uL    HGB 12.4 11.5 - 16.0 g/dL    HCT 37.8 35.0 - 47.0 %    MCV 86.9 80.0 - 99.0 FL    MCH 28.5 26.0 - 34.0 PG    MCHC 32.8 30.0 - 36.5 g/dL    RDW 12.9 11.5 - 14.5 %    PLATELET 442 106 - 794 K/uL    MPV 10.2 8.9 - 12.9 FL    NRBC 0.0 0  WBC    ABSOLUTE NRBC 0.00 0.00 - 0.01 K/uL    NEUTROPHILS 88 (H) 32 - 75 %    LYMPHOCYTES 6 (L) 12 - 49 %    MONOCYTES 5 5 - 13 % EOSINOPHILS 0 0 - 7 %    BASOPHILS 0 0 - 1 %    IMMATURE GRANULOCYTES 1 (H) 0.0 - 0.5 %    ABS. NEUTROPHILS 14.6 (H) 1.8 - 8.0 K/UL    ABS. LYMPHOCYTES 1.0 0.8 - 3.5 K/UL    ABS. MONOCYTES 0.9 0.0 - 1.0 K/UL    ABS. EOSINOPHILS 0.0 0.0 - 0.4 K/UL    ABS. BASOPHILS 0.0 0.0 - 0.1 K/UL    ABS. IMM. GRANS. 0.1 (H) 0.00 - 0.04 K/UL    DF AUTOMATED     METABOLIC PANEL, COMPREHENSIVE    Collection Time: 08/12/21  7:57 AM   Result Value Ref Range    Sodium 131 (L) 136 - 145 mmol/L    Potassium 3.6 3.5 - 5.1 mmol/L    Chloride 102 97 - 108 mmol/L    CO2 21 21 - 32 mmol/L    Anion gap 8 5 - 15 mmol/L    Glucose 102 (H) 65 - 100 mg/dL    BUN 19 6 - 20 MG/DL    Creatinine 1.06 (H) 0.55 - 1.02 MG/DL    BUN/Creatinine ratio 18 12 - 20      GFR est AA >60 >60 ml/min/1.73m2    GFR est non-AA 52 (L) >60 ml/min/1.73m2    Calcium 7.8 (L) 8.5 - 10.1 MG/DL    Bilirubin, total 0.3 0.2 - 1.0 MG/DL    ALT (SGPT) 26 12 - 78 U/L    AST (SGOT) 20 15 - 37 U/L    Alk. phosphatase 97 45 - 117 U/L    Protein, total 6.9 6.4 - 8.2 g/dL    Albumin 3.1 (L) 3.5 - 5.0 g/dL    Globulin 3.8 2.0 - 4.0 g/dL    A-G Ratio 0.8 (L) 1.1 - 2.2     PROTHROMBIN TIME + INR    Collection Time: 08/12/21  7:57 AM   Result Value Ref Range    INR 1.0 0.9 - 1.1      Prothrombin time 10.7 9.0 - 11.1 sec   TROPONIN I    Collection Time: 08/12/21  7:57 AM   Result Value Ref Range    Troponin-I, Qt. <0.05 <0.05 ng/mL   CK    Collection Time: 08/12/21  7:57 AM   Result Value Ref Range     (H) 26 - 192 U/L   SAMPLES BEING HELD    Collection Time: 08/12/21  7:57 AM   Result Value Ref Range    SAMPLES BEING HELD 1RED,1PST     COMMENT        Add-on orders for these samples will be processed based on acceptable specimen integrity and analyte stability, which may vary by analyte.    ETHYL ALCOHOL    Collection Time: 08/12/21  7:57 AM   Result Value Ref Range    ALCOHOL(ETHYL),SERUM <10 <10 MG/DL   EKG, 12 LEAD, INITIAL    Collection Time: 08/12/21  8:22 AM   Result Value Ref Range    Ventricular Rate 114 BPM    Atrial Rate 114 BPM    P-R Interval 152 ms    QRS Duration 98 ms    Q-T Interval 344 ms    QTC Calculation (Bezet) 474 ms    Calculated P Axis 13 degrees    Calculated R Axis -40 degrees    Calculated T Axis 80 degrees    Diagnosis       Sinus tachycardia  Left axis deviation  Incomplete right bundle branch block  Minimal voltage criteria for LVH, may be normal variant  Abnormal ECG  When compared with ECG of 21-MAY-2017 10:33,  Incomplete right bundle branch block is now present  Minimal criteria for Anterior infarct are no longer present  Criteria for Inferior infarct are no longer present     GLUCOSE, POC    Collection Time: 08/12/21  8:26 AM   Result Value Ref Range    Glucose (POC) 120 (H) 65 - 117 mg/dL    Performed by Charanjittelly Lang W/MICROSCOPIC    Collection Time: 08/12/21  9:51 AM   Result Value Ref Range    Color YELLOW/STRAW      Appearance CLEAR CLEAR      Specific gravity 1.015 1.003 - 1.030      pH (UA) 5.5 5.0 - 8.0      Protein Negative NEG mg/dL    Glucose Negative NEG mg/dL    Ketone Negative NEG mg/dL    Bilirubin Negative NEG      Blood TRACE (A) NEG      Urobilinogen 0.2 0.2 - 1.0 EU/dL    Nitrites Negative NEG      Leukocyte Esterase SMALL (A) NEG      WBC 10-20 0 - 4 /hpf    RBC 0-5 0 - 5 /hpf    Epithelial cells MODERATE (A) FEW /lpf    Bacteria Negative NEG /hpf    Hyaline cast 0-2 0 - 5 /lpf         Assessment/Plan:   A: 1. Left proximal humerus fracture and posterior dislocation    P: 1. Long discussion with the patient's  about the diagnosis. Explained the pathology to the  and explained the need for reverse total shoulder arthroplasty. Discussed case in detail with the . Plan for left reverse total shoulder arthroplasty on Monday with Dr. Richard Wolfe. Case posted. Will monitor status this weekend. Monitor tongue swelling and airway. Sling. NWB LUE. 2. Neurovascular checks. 3. Ok for DVT ppx.  From orthopedic standpoint. Discussed case with Dr. Giuseppe Toribio who agrees with plan.     Berta Luna, [place]   [facility]  [address] [place] [address]

## 2021-08-12 NOTE — PROGRESS NOTES
[facility]: MED RECONCILIATION    Comments:    Called . Patient takes 4-5 pills per  such as Lamotrigene, Abilify, Bupropion. She [facility] all [facility] med[facility] in [place] per . I called [facility]. Only recent RX dispensed from [facility] acute hospital was Buspirone 10 mg twice daily on 7/23/21. I called  back and informed of such.  stated the others are from [address] on [address] [facility] but they are going to switch to [facility] acute hospital from now on. I called [address] It did not have any recent dispenses. Lamotrigene 100 mg daily, Aripiprazole 5 mg daily and Bupropion Xl 300 mg daily were last dispensed on May 13 for 30 days supply. So I am not sure how consistently patient is taking these meds at home. Allergies: Patient has no known allergies. Prior to Admission Medications:     Medication Documentation Review Audit       Reviewed by DEMOND JenkinsD (Pharmacist) on 08/12/21 at [address] [facility]      Medication Sig Documenting Provider Last Dose Status Taking? ARIPiprazole (Abilify) 5 mg tablet Take 5 mg by mouth daily. Provider, Historical  Active Yes   buPROPion XL (WELLBUTRIN XL) 300 mg XL tablet TAKE ONE TABLET BY MOUTH EVERY MORNING Rosalba Venegas, NP  Active Yes   busPIRone (BUSPAR) 10 mg tablet Take 10 mg by mouth two (2) times a day. Provider, Historical  Active Yes   lamoTRIgine (LaMICtal) 100 mg tablet Take 100 mg by mouth daily.  Provider, Historical  Active Yes                  [address] [facility]   Contact: [phone]

## 2021-08-12 NOTE — CONSULTS
NEUROLOGY CONSULT NOTE    Patient ID:  Toyin Lazar  [id number]  57 y.o.  1955    Date of Consultation:  August 12, 2021    Referring Physician: Dr. Sis Charlton    Reason for Consultation:  Altered mental status    History of Present Illness:     Patient Active Problem List    Diagnosis Date Noted    Seizure [facility]) 08/12/2021    Acute metabolic encephalopathy 46/82/9854    Anxiety 05/01/2018    Cigarette nicotine dependence without complication 43/89/2711    Anemia 05/23/2017    ALEX (acute kidney injury) ([facility] 05/21/2017    Lithium toxicity 05/21/2017    Hypercholesteremia 03/15/2017    Prediabetes 09/20/2016    Essential hypertension with goal blood pressure less than 130/80 07/13/2016    Bipolar 1 disorder ([facility] [address] 06/01/2016     Past Medical History:   Diagnosis Date    Abscess of buttock 7/23/2012    Bronchitis     Common bile duct calculus 7/23/2012    Gallstones 7/12/2012    GERD (gastroesophageal reflux disease)     High cholesterol     Hypercholesterolemia     Hypertension     Ill-defined condition     missing upper front tooth    Ill-defined condition     lower leg bilateral reddened rash.  Insomnia     Other ill-defined conditions(799.89)     Large boil under right arm-pit.  Paranoia ([facility] [address]     Psychiatric disorder     bipolar      Past Surgical History:   Procedure Laterality Date    ND ABDOMEN SURGERY PROC UNLISTED      cholecystectomy 7/23/2012      Prior to Admission medications    Medication Sig Start Date End Date Taking? Authorizing Provider   buPROPion XL (WELLBUTRIN XL) 300 mg XL tablet TAKE ONE TABLET BY MOUTH EVERY MORNING 11/4/19   Rosalba Venegas, NP   LORazepam (ATIVAN) 0.5 mg tablet Take 1 Tab by mouth two (2) times daily as needed for Anxiety.  Max Daily Amount: 1 mg. 11/4/19   Rosalba Venegas, ROQUE   risperiDONE (RISPERDAL) 3 mg tablet TAKE ONE TABLET BY MOUTH EVERY NIGHT AT BEDTIME 11/4/19   Rosalba Venegas NP   sertraline (ZOLOFT) 100 mg tablet Take 2 Tabs by mouth daily. 11/4/19   Rosalba Venegas NP   potassium chloride SR (K-TAB) 20 mEq tablet TAKE ONE TABLET BY MOUTH DAILY 5/9/19   Amaury Mccabe NP   atorvastatin (LIPITOR) 40 mg tablet TAKE ONE TABLET BY MOUTH DAILY 2/6/19   Amaury Mccabe NP   amLODIPine (NORVASC) 10 mg tablet Take 0.5 Tabs by mouth daily. 2/6/19   Jemima Yap NP   metoprolol succinate (TOPROL-XL) 25 mg XL tablet Take 1 Tab by mouth daily. 2/6/19   Lucía Mccabe NP   lisinopril (PRINIVIL, ZESTRIL) 40 mg tablet Take 0.5 Tabs by mouth daily. 2/6/19   Jemima Yap NP     No Known Allergies   Social History     Tobacco Use    Smoking status: Current Every Day Smoker     Packs/day: 0.50    Smokeless tobacco: Never Used   Substance Use Topics    Alcohol use: No      Family History   Problem Relation Age of Onset    Cancer Father         lung    Diabetes Brother     Heart Disease Brother     Malignant Hyperthermia Neg Hx     Pseudocholinesterase Deficiency Neg Hx     Delayed Awakening Neg Hx     Post-op Nausea/Vomiting Neg Hx     Emergence Delirium Neg Hx     Post-op Cognitive Dysfunction Neg Hx     Other Neg Hx         Subjective:      Cassie Henderson is a 60 y.o. WF with history of hypercholesterolemia, hypertension, bipolar disorder and GERD who was admitted from the ER for altered mental status. Patient was apparently sleeping on the chair when 3:57 in the morning her  found her on the floor and bleeding from her mouth.  put her back on the chair and ask his son to check on his mother. At 57 son checked on the patient and found her confused with swelling of her tongue. EMS was called. When seen she was incontinent with pinpoint pupils. In the ER blood pressure was 157/82 and heart rate of 116. Labs revealed decreased sodium at 131, increased creatinine, decreased GFR, decreased calcium, decreased albumin, increased WBC and urinalysis showing UTI. Increase CK to 59.   Chest x-ray revealed left humeral head fracture. Cervical CT revealed degenerative changes C3-C5. Head CT did not reveal any acute process. Head and neck CTA did not reveal any flow-limiting stenosis or aneurysm. Mild bilateral ICA stenosis. When seen, patient was initially asleep but easily arousable to name calling. Had difficulty speaking due to injury to her tongue. Able to follow commands and name objects. Outside reports reviewed: ER records, radiology reports, lab reports. Review of Systems:    Pertinent items are noted in HPI. Objective:     Patient Vitals for the past 8 hrs:   BP Temp Pulse Resp SpO2 Height Weight   08/12/21 0816      5' 5\" (1.651 m) 76.1 kg (167 lb 11.2 oz)   08/12/21 0815 (!) 157/82 98.3 °F (36.8 °C) (!) 116 20 93 %       PHYSICAL EXAM:    NEUROLOGICAL EXAM:    Appearance: The patient is in no acute distress. Mental Status: Lethargic. Oriented to place and person. Speech is difficulty due to tongue injury but appropriate. Follows simple commands. Able to name objects. Cranial Nerves:   Intact visual fields. GODWIN, EOM's full, no nystagmus, no ptosis. Facial sensation is normal. Corneal reflexes are intact. Facial movement is symmetric. Hearing is normal bilaterally. Palate is midline with normal elevation. Sternocleidomastoid and trapezius muscles are normal. Tongue is midline but with injury. Motor:  5/5 strength. Normal bulk and tone. No fasciculations. Left arm is in a sling. Reflexes:   Deep tendon reflexes 2+/4 and symmetrical. Downgoing toes. Sensory:   Normal to noxious. Gait:  Not tested. Tremor:   No tremor noted. Cerebellar:  Not tested.        Imaging  CT Head, head/neck CTA: reviewed    Lab Review    Recent Results (from the past 24 hour(s))   CBC WITH AUTOMATED DIFF    Collection Time: 08/12/21  7:57 AM   Result Value Ref Range    WBC 16.7 (H) 3.6 - 11.0 K/uL    RBC 4.35 3.80 - 5.20 M/uL    HGB 12.4 11.5 - 16.0 g/dL    HCT 37.8 35.0 - 47.0 % MCV 86.9 80.0 - 99.0 FL    MCH 28.5 26.0 - 34.0 PG    MCHC 32.8 30.0 - 36.5 g/dL    RDW 12.9 11.5 - 14.5 %    PLATELET 284 108 - 300 K/uL    MPV 10.2 8.9 - 12.9 FL    NRBC 0.0 0  WBC    ABSOLUTE NRBC 0.00 0.00 - 0.01 K/uL    NEUTROPHILS 88 (H) 32 - 75 %    LYMPHOCYTES 6 (L) 12 - 49 %    MONOCYTES 5 5 - 13 %    EOSINOPHILS 0 0 - 7 %    BASOPHILS 0 0 - 1 %    IMMATURE GRANULOCYTES 1 (H) 0.0 - 0.5 %    ABS. NEUTROPHILS 14.6 (H) 1.8 - 8.0 K/UL    ABS. LYMPHOCYTES 1.0 0.8 - 3.5 K/UL    ABS. MONOCYTES 0.9 0.0 - 1.0 K/UL    ABS. EOSINOPHILS 0.0 0.0 - 0.4 K/UL    ABS. BASOPHILS 0.0 0.0 - 0.1 K/UL    ABS. IMM. GRANS. 0.1 (H) 0.00 - 0.04 K/UL    DF AUTOMATED     METABOLIC PANEL, COMPREHENSIVE    Collection Time: 08/12/21  7:57 AM   Result Value Ref Range    Sodium 131 (L) 136 - 145 mmol/L    Potassium 3.6 3.5 - 5.1 mmol/L    Chloride 102 97 - 108 mmol/L    CO2 21 21 - 32 mmol/L    Anion gap 8 5 - 15 mmol/L    Glucose 102 (H) 65 - 100 mg/dL    BUN 19 6 - 20 MG/DL    Creatinine 1.06 (H) 0.55 - 1.02 MG/DL    BUN/Creatinine ratio 18 12 - 20      GFR est AA >60 >60 ml/min/1.73m2    GFR est non-AA 52 (L) >60 ml/min/1.73m2    Calcium 7.8 (L) 8.5 - 10.1 MG/DL    Bilirubin, total 0.3 0.2 - 1.0 MG/DL    ALT (SGPT) 26 12 - 78 U/L    AST (SGOT) 20 15 - 37 U/L    Alk.  phosphatase 97 45 - 117 U/L    Protein, total 6.9 6.4 - 8.2 g/dL    Albumin 3.1 (L) 3.5 - 5.0 g/dL    Globulin 3.8 2.0 - 4.0 g/dL    A-G Ratio 0.8 (L) 1.1 - 2.2     PROTHROMBIN TIME + INR    Collection Time: 08/12/21  7:57 AM   Result Value Ref Range    INR 1.0 0.9 - 1.1      Prothrombin time 10.7 9.0 - 11.1 sec   TROPONIN I    Collection Time: 08/12/21  7:57 AM   Result Value Ref Range    Troponin-I, Qt. <0.05 <0.05 ng/mL   CK    Collection Time: 08/12/21  7:57 AM   Result Value Ref Range     (H) 26 - 192 U/L   SAMPLES BEING HELD    Collection Time: 08/12/21  7:57 AM   Result Value Ref Range    SAMPLES BEING HELD 1RED,1PST     COMMENT        Add-on orders for these samples will be processed based on acceptable specimen integrity and analyte stability, which may vary by analyte. ETHYL ALCOHOL    Collection Time: 08/12/21  7:57 AM   Result Value Ref Range    ALCOHOL(ETHYL),SERUM <10 <10 MG/DL   EKG, 12 LEAD, INITIAL    Collection Time: 08/12/21  8:22 AM   Result Value Ref Range    Ventricular Rate 114 BPM    Atrial Rate 114 BPM    P-R Interval 152 ms    QRS Duration 98 ms    Q-T Interval 344 ms    QTC Calculation (Bezet) 474 ms    Calculated P Axis 13 degrees    Calculated R Axis -40 degrees    Calculated T Axis 80 degrees    Diagnosis       Sinus tachycardia  Left axis deviation  Incomplete right bundle branch block  Minimal voltage criteria for LVH, may be normal variant  Abnormal ECG  When compared with ECG of 21-MAY-2017 10:33,  Incomplete right bundle branch block is now present  Minimal criteria for Anterior infarct are no longer present  Criteria for Inferior infarct are no longer present     GLUCOSE, POC    Collection Time: 08/12/21  8:26 AM   Result Value Ref Range    Glucose (POC) 120 (H) 65 - 117 mg/dL    Performed by Dagoberto Caro W/MICROSCOPIC    Collection Time: 08/12/21  9:51 AM   Result Value Ref Range    Color YELLOW/STRAW      Appearance CLEAR CLEAR      Specific gravity 1.015 1.003 - 1.030      pH (UA) 5.5 5.0 - 8.0      Protein Negative NEG mg/dL    Glucose Negative NEG mg/dL    Ketone Negative NEG mg/dL    Bilirubin Negative NEG      Blood TRACE (A) NEG      Urobilinogen 0.2 0.2 - 1.0 EU/dL    Nitrites Negative NEG      Leukocyte Esterase SMALL (A) NEG      WBC 10-20 0 - 4 /hpf    RBC 0-5 0 - 5 /hpf    Epithelial cells MODERATE (A) FEW /lpf    Bacteria Negative NEG /hpf    Hyaline cast 0-2 0 - 5 /lpf         Assessment:     Active Problems:    Seizure ([facility] [address] (8/12/2021)      Acute metabolic encephalopathy (0/63/3815)      Plan:   Neurological examination reveals cognitive impairment with no focal deficits.   There is injury to her tongue and left arm currently in a sling due to a fracture. History and event is consistent with seizure and in this case likely provoked. Need to assess with primary epilepsy. Also need to assess for possible stroke as a trigger. Head CT without contrast did not reveal any acute process. Brain MRI was ordered to further assess. Head and neck CTA did not reveal any flow-limiting stenosis or aneurysm. Mild ICA stenosis. No intervention necessary. EEG was ordered to assess for subclinical seizures versus focus for future seizures. Patient has been loaded with levetiracetam and currently on 1000 mg every 12 hours. Unclear yet whether patient will require maintenance AED. Correct electrolyte issues as well as treat UTI. Further intervention be done pending results of testing. Thank you for the consult. This note was created using voice recognition software. Despite editing, there may be syntax errors.

## 2021-08-12 NOTE — PROCEDURES
[address]     Electroencephalogram Report    Procedure ID: SFA  Procedure Date: 08/12/2021   Patient Name: Aquilino Barajas YOB: 1955   Procedure Type: Routine Medical Record No: [id number]     INDICATION: Seizure    STATE: Awake and drowsy . DESCRIPTION OF PROCEDURE: Electrodes were applied in accordance with the international 10-20 system of electrode placement. EEG was reviewed in both bipolar and referential montages. Description of Activity:  During wakefulness, patient is only able to mount 6 Hz posterior frequency that spread anteriorly with delta background slowing. During drowsiness, there is attenuation of the underlying frequency and slower theta activity occurs bilaterally. Intermittent photic stimulation was performed and did not induce posterior driving responses. No sharp or spike discharges, seizures or epileptiform discharges seen. No focal asymmetry. Clinical Interpretation: This EEG, performed during wakefulness and drowsiness is abnormal. There is moderate generalized slowing as seen in encephalopathies. There is no focal asymmetry, seizures or epileptiform discharges seen.        Medications:  Current Facility-Administered Medications   Medication Dose Route Frequency    sodium chloride (NS) flush 5-40 mL  5-40 mL IntraVENous Q8H    sodium chloride (NS) flush 5-40 mL  5-40 mL IntraVENous PRN    acetaminophen (TYLENOL) tablet 650 mg  650 mg Oral Q6H PRN    Or    acetaminophen (TYLENOL) suppository 650 mg  650 mg Rectal Q6H PRN    polyethylene glycol (MIRALAX) packet 17 g  17 g Oral DAILY PRN    ondansetron (ZOFRAN ODT) tablet 4 mg  4 mg Oral Q8H PRN    Or    ondansetron (ZOFRAN) injection 4 mg  4 mg IntraVENous Q6H PRN    enoxaparin (LOVENOX) injection 40 mg  40 mg SubCUTAneous DAILY    levETIRAcetam (KEPPRA) 1,000 mg in 0.9% sodium chloride 100 mL IVPB  1,000 mg IntraVENous Q12H    metoprolol succinate (TOPROL-XL) XL tablet 25 mg  25 mg Oral DAILY    amLODIPine (NORVASC) tablet 5 mg  5 mg Oral DAILY    lactated Ringers infusion  125 mL/hr IntraVENous CONTINUOUS

## 2021-08-12 NOTE — ED NOTES
Returned to room from [address].   Tele neuro - Dr Betzy Live on monitor to assess pt  MD to speak with ED provider

## 2021-08-12 NOTE — ED NOTES
Dysphagia screen not done at this time. Pt with swollen/bit tongue and bloody mouth.  Will assess before PO meds given

## 2021-08-12 NOTE — PROGRESS NOTES
TRANSFER - OUT REPORT:    Verbal report given to Deyvi Che RN (name) on Cherise Elizabeth  being transferred to  [facility] (unit) for routine progression of care       Report consisted of patients Situation, Background, Assessment and   Recommendations(SBAR). Information from the following report(s) SBAR was reviewed with the receiving nurse. Lines:   Peripheral IV 08/12/21 Right Antecubital (Active)   Site Assessment Clean, dry, & intact 08/12/21 1236   Phlebitis Assessment 0 08/12/21 1236   Infiltration Assessment 0 08/12/21 1236   Dressing Status Clean, dry, & intact 08/12/21 1236   Dressing Type Transparent;Tape 08/12/21 1236   Hub Color/Line Status Capped;Blue 08/12/21 1236   Action Taken Open ports on tubing capped 08/12/21 1236   Alcohol Cap Used Yes 08/12/21 1236       Peripheral IV 08/12/21 Right Hand (Active)   Site Assessment Clean, dry, & intact 08/12/21 1236   Phlebitis Assessment 0 08/12/21 1236   Infiltration Assessment 0 08/12/21 1236   Dressing Status Clean, dry, & intact 08/12/21 1236   Dressing Type Transparent;Tape 08/12/21 1236   Hub Color/Line Status Infusing 08/12/21 1236   Action Taken Open ports on tubing capped 08/12/21 1236   Alcohol Cap Used Yes 08/12/21 1236        Opportunity for questions and clarification was provided.       Patient transported with:   [employer]

## 2021-08-12 NOTE — ED TRIAGE NOTES
Pt arrives via EMS after  found pt on the floor around 3am this morning bleeding from the mouth with tongue swelling.  cleaned her up and went to work, called son to call 911. When EMS arrived on scene she was still bleeding from the mouth with altered mental status. Pt had GCS of 12. LKW \"last night before bed\". BS for . Pt was incontinent and had pin point pupils for EMS. Pt  also reports pt has \"been off her medications for a few days\". Pt answering questions, slurred speech, but unsure of baseline. Pt reports back pain and left wrist pain.

## 2021-08-12 NOTE — ED PROVIDER NOTES
23-year-old female presents from home via EMS with a complaint of altered mental status. Patient's last known well was sometime yesterday evening when she went to bed. Unknown exactly what time by EMS. They report the  told them patient fell asleep last night on the couch. He woke up around 3 or 4 AM and found her on the floor next to the couch with some bleeding near her mouth. He was able to get her back up into bed. This morning, the  got up to go to work and had their son check on the patient. The son arrived around 57 to find her somewhat confused and altered some swelling of her tongue. No other focal neurologic deficits are identified. Patient was somewhat confused but was able to answer most questions. EMS gave a GCS of 13. No other history available at this time. Review of the record reveals a history significant for gallstones, abscess, bipolar disorder. Past Medical History:   Diagnosis Date    Abscess of buttock 7/23/2012    Bronchitis     Common bile duct calculus 7/23/2012    Gallstones 7/12/2012    GERD (gastroesophageal reflux disease)     High cholesterol     Hypercholesterolemia     Hypertension     Ill-defined condition     missing upper front tooth    Ill-defined condition     lower leg bilateral reddened rash.  Insomnia     Other ill-defined conditions(799.89)     Large boil under right arm-pit.     Paranoia ([facility] [address]     Psychiatric disorder     bipolar       Past Surgical History:   Procedure Laterality Date    ABDOMEN SURGERY PROC UNLISTED      cholecystectomy 7/23/2012         Family History:   Problem Relation Age of Onset    Cancer Father         lung    Diabetes Brother     Heart Disease Brother     Malignant Hyperthermia Neg Hx     Pseudocholinesterase Deficiency Neg Hx     Delayed Awakening Neg Hx     Post-op Nausea/Vomiting Neg Hx     Emergence Delirium Neg Hx     Post-op Cognitive Dysfunction Neg Hx     Other Neg Hx        Social History     Socioeconomic History    Marital status:      Spouse name: Not on file    Number of children: Not on file    Years of education: Not on file    Highest education level: Not on file   Occupational History    Not on file   Tobacco Use    Smoking status: Current Every Day Smoker     Packs/day: 0.50    Smokeless tobacco: Never Used   Substance and Sexual Activity    Alcohol use: No    Drug use: No    Sexual activity: Not on file   Other Topics Concern    Not on file   Social History Narrative    Not on file     Social Determinants of Health     Financial Resource Strain:     Difficulty of Paying Living Expenses:    Food Insecurity:     Worried About Running Out of Food in the Last Year:     [address] in the Last Year:    Transportation Needs:     Lack of Transportation (Medical):  Lack of Transportation (Non-Medical):    Physical Activity:     Days of Exercise per Week:     Minutes of Exercise per Session:    Stress:     Feeling of Stress :    Social Connections:     Frequency of Communication with Friends and Family:     Frequency of Social Gatherings with Friends and Family:     Attends Sabian[employer] Services:     Active Member of Clubs or Organizations:     Attends Club or Organization Meetings:     Marital Status:    Intimate Partner Violence:     Fear of Current or Ex-Partner:     Emotionally Abused:     Physically Abused:     Sexually Abused: ALLERGIES: Patient has no known allergies. Review of Systems   Unable to perform ROS: Mental status change       There were no vitals filed for this visit. Physical Exam  Vitals and nursing note reviewed. Constitutional:       General: She is not in acute distress. Appearance: She is well-developed. HENT:      Head: Normocephalic and atraumatic. Eyes:      General: No scleral icterus. Conjunctiva/sclera: Conjunctivae normal.      Pupils: Pupils are equal, round, and reactive to light. Cardiovascular:      Rate and Rhythm: Normal rate. Heart sounds: No murmur heard. Pulmonary:      Effort: Pulmonary effort is normal. No respiratory distress. Abdominal:      General: There is no distension. Musculoskeletal:         General: Normal range of motion. Cervical back: Normal range of motion and neck supple. Skin:     General: Skin is warm and dry. Findings: No rash. Neurological:      Mental Status: She is alert and oriented to person, place, and time. [facility]  ED Course as of Aug 12 0853   Thu Aug 12, 2021   4812 7:38 AM  Spoke with Dr. Doroteo Burkitt on the phone. Given concern for possible seizure, he recommended starting IV Keppra and sending CPK and prolactin levels. We will also obtain imaging of her C-spine to rule out any significant injury. He will evaluate the patient on screen. [JM]   0837 Patient reassessed at this time. She is awake and alert able to answer questions. She has dried blood and swelling to her tongue I suspect intraoral laceration. She is also complaining of pain to the left shoulder and the right elbow. X-rays have been ordered. I doubt stroke at this point. They were concerned about possible seizure or just traumatic injuries as a result of falling off the couch. Noncontrast head CT was unremarkable. CTA does not show evidence of any flow-limiting stenosis. [JM]   3439 ED EKG interpretation:  Rhythm: sinus tach. Rate (approx.): 114. Axis: normal.  ST segment:  No concerning ST elevations or depressions. This EKG was interpreted by Marcela Sandoval MD,ED Provider. EKG, 12 LEAD, INITIAL []      ED Course User Index  [JM] Golden eBdolla MD     Perfect Serve Consult for Admission  8:53 AM    ED Room Number: ER16/16  Patient Name and age:  Janel Mccollum 60 y.o.  female  Working Diagnosis:   1. Altered mental status, unspecified altered mental status type    2. Injury of head, initial encounter    3.  Seizure ([address] COVID-19 Suspicion:  no  Sepsis present:  no  Reassessment needed: no  Code Status:  Full Code  Readmission: no  Isolation Requirements:  no  Recommended Level of Care:  telemetry  Department:  [facility] ED - [phone]    Other:  Pt found on floor by couch where she slept last night. Confused with blood and swelling to tongue. Unknown if injures from fall or seizure. Neuro imaging unremarkable. Seen by teleneuro and loaded with Keppra. More alert now and answering questions. Total critical care time spent exclusive of procedures:  35 minutes.     Procedures

## 2021-08-12 NOTE — PROGRESS NOTES
1400-TRANSFER - IN REPORT:    Verbal report received from Nasrin Montoya RN(name) on Piero Cochran  being received from ED(unit) for routine progression of care      Report consisted of patients Situation, Background, Assessment and   Recommendations(SBAR). Information from the following report(s) SBAR, Kardex, ED Summary, OR Summary, Procedure Summary, Intake/Output, MAR, Accordion, Recent Results and Med Rec Status was reviewed with the receiving nurse. Opportunity for questions and clarification was provided. Assessment completed upon patients arrival to unit and care assumed. Dysphasia screening failed due to alertness and tongue trauma. Stroke Education provided to spouse/SO and the following topics were discussed    1. Patients personal risk factors for stroke are hypertension and smoking    2. Warning signs of Stroke:        * Sudden numbness or weakness of the face, arm or leg, especially on one side of          The body            * Sudden confusion, trouble speaking or understanding        * Sudden trouble seeing in one or both eyes        * Sudden trouble walking, dizziness, loss of balance or coordination        * Sudden severe headache with no known cause      3. Importance of activation Emergency Medical Services ( 9-1-1 ) immediately if experience any warning signs of stroke. 4. Be sure and schedule a follow-up appointment with your primary care doctor or any specialists as instructed. 5. You must take medicine every day to treat your risk factors for stroke. Be sure to take your medicines exactly as your doctor tells you: no more, no less. Know what your medicines are for , what they do. Anti-thrombotics /anticoagulants can help prevent strokes. You are taking the following medicine(s)  Lovenox     6. Smoking and second-hand smoke greatly increase your risk of stroke, cardiovascular disease and death. Smoking history cigarettes, several per day    7.  Information provided was [facility] Stroke Education Binder or Stroke Handouts    8. Documentation of teaching completed in Patient Education Activity and on Care Plan with teaching response noted? yes      1930-Bedside and Verbal shift change report given to RN (oncoming nurse) by ALISA John (offgoing nurse). Report included the following information SBAR, Kardex, ED Summary, OR Summary, Procedure Summary, Intake/Output, MAR, Accordion, Recent Results and Med Rec Status.

## 2021-08-12 NOTE — PROGRESS NOTES
8/12/2021  10:12 AM  CM completed assessment w/ pt's spouse Aj Patterson (JENNIFER) [phone] via phone. Charted demographics verified, tp lives w/  Aj Patterson, son, DIL and 10 yo granddaughter in 2 story home, there are 3 entry steps, pt has a GF bed/bath. Had a fll this AM, at baseline reports to be ambulatory, iADLs, relies on family, however  says pt does not manage her medication well, refuses to use a pill sorter, spouse questions if she is attempting \"to end things\" by not taking meds  Reason for Admission:  Emergent for AMS, Seizure                     RUR Score:        Not yet assigned, Low Risk of [address] for utilizing home health:   No history, pt would benefit from PT, OT evals to determine skilled needs at DC      DME: None  Rx: [address], pt has BC uses [facility]  COVID Vaccine Hx: Unvaccinated  PCP: First and Last name:  Harvey Hinkle NP     Name of Practice:    Are you a current patient: Yes/No: Yes   Approximate date of last visit: > 30 days   Can you participate in a virtual visit with your PCP: Yes                    Current Advanced Directive/Advance Care Plan: Full Code  HCDM spouse Jayla Magana [phone]    Healthcare Decision Maker:   Click here to complete [address] including selection of the Healthcare Decision Maker Relationship (ie \"Primary\")                             Transition of Care Plan:                    RUR N/A  1. Hospital admission for medical management  2. Neurology consult  3. Updated Dr Amanda Wasserman on conversation w/ spouse, recommend psych consult  4. CM to follow through for treatment/resposne  5. DC when stable to home w/ family assistance  6. Outpatient f/u PCP, specialists  7. Family will transport at [address] [place] [address] Management Interventions  PCP Verified by CM:  Yes Deanna Conrad NP; last visit > 30 days)  Palliative Care Criteria Met (RRAT>21 & CHF Dx)?: No  Mode of Transport at Discharge: Self (Family)  Physical Therapy Consult: No  Occupational Therapy Consult: No  Speech Therapy Consult: No  Current Support Network: Lives with Spouse, Own Home (pt lives w/ spouse, son, DIL and granddaughter in pvt residence, at baseline pt is ambulatory, iADLs, relies on family for transprot)  Discharge Location  Discharge Placement: Home with outpatient services  FELICITY Ruggiero

## 2021-08-13 ENCOUNTER — ANESTHESIA EVENT (OUTPATIENT)
Dept: SURGERY | Age: 66
DRG: 041 | End: 2021-08-13
Payer: COMMERCIAL

## 2021-08-13 LAB
ALBUMIN SERPL-MCNC: 3 G/DL (ref 3.5–5)
ALBUMIN/GLOB SERPL: 0.9 {RATIO} (ref 1.1–2.2)
ALP SERPL-CCNC: 95 U/L (ref 45–117)
ALT SERPL-CCNC: 29 U/L (ref 12–78)
ANION GAP SERPL CALC-SCNC: 7 MMOL/L (ref 5–15)
AST SERPL-CCNC: 36 U/L (ref 15–37)
ATRIAL RATE: 114 BPM
BASOPHILS # BLD: 0 K/UL (ref 0–0.1)
BASOPHILS NFR BLD: 0 % (ref 0–1)
BILIRUB SERPL-MCNC: 0.5 MG/DL (ref 0.2–1)
BUN SERPL-MCNC: 11 MG/DL (ref 6–20)
BUN/CREAT SERPL: 14 (ref 12–20)
CALCIUM SERPL-MCNC: 8 MG/DL (ref 8.5–10.1)
CALCULATED P AXIS, ECG09: 13 DEGREES
CALCULATED R AXIS, ECG10: -40 DEGREES
CALCULATED T AXIS, ECG11: 80 DEGREES
CHLORIDE SERPL-SCNC: 109 MMOL/L (ref 97–108)
CO2 SERPL-SCNC: 24 MMOL/L (ref 21–32)
CREAT SERPL-MCNC: 0.76 MG/DL (ref 0.55–1.02)
DIAGNOSIS, 93000: NORMAL
DIFFERENTIAL METHOD BLD: ABNORMAL
EOSINOPHIL # BLD: 0 K/UL (ref 0–0.4)
EOSINOPHIL NFR BLD: 0 % (ref 0–7)
ERYTHROCYTE [DISTWIDTH] IN BLOOD BY AUTOMATED COUNT: 13 % (ref 11.5–14.5)
GLOBULIN SER CALC-MCNC: 3.4 G/DL (ref 2–4)
GLUCOSE SERPL-MCNC: 105 MG/DL (ref 65–100)
HCT VFR BLD AUTO: 33.9 % (ref 35–47)
HGB BLD-MCNC: 11.2 G/DL (ref 11.5–16)
IMM GRANULOCYTES # BLD AUTO: 0.1 K/UL (ref 0–0.04)
IMM GRANULOCYTES NFR BLD AUTO: 1 % (ref 0–0.5)
LYMPHOCYTES # BLD: 1.5 K/UL (ref 0.8–3.5)
LYMPHOCYTES NFR BLD: 12 % (ref 12–49)
MCH RBC QN AUTO: 28.4 PG (ref 26–34)
MCHC RBC AUTO-ENTMCNC: 33 G/DL (ref 30–36.5)
MCV RBC AUTO: 85.8 FL (ref 80–99)
MONOCYTES # BLD: 0.7 K/UL (ref 0–1)
MONOCYTES NFR BLD: 6 % (ref 5–13)
NEUTS SEG # BLD: 10.7 K/UL (ref 1.8–8)
NEUTS SEG NFR BLD: 81 % (ref 32–75)
NRBC # BLD: 0 K/UL (ref 0–0.01)
NRBC BLD-RTO: 0 PER 100 WBC
P-R INTERVAL, ECG05: 152 MS
PLATELET # BLD AUTO: 272 K/UL (ref 150–400)
PMV BLD AUTO: 10.7 FL (ref 8.9–12.9)
POTASSIUM SERPL-SCNC: 3.2 MMOL/L (ref 3.5–5.1)
PROT SERPL-MCNC: 6.4 G/DL (ref 6.4–8.2)
Q-T INTERVAL, ECG07: 344 MS
QRS DURATION, ECG06: 98 MS
QTC CALCULATION (BEZET), ECG08: 474 MS
RBC # BLD AUTO: 3.95 M/UL (ref 3.8–5.2)
SODIUM SERPL-SCNC: 140 MMOL/L (ref 136–145)
VENTRICULAR RATE, ECG03: 114 BPM
WBC # BLD AUTO: 13 K/UL (ref 3.6–11)

## 2021-08-13 PROCEDURE — 85025 COMPLETE CBC W/AUTO DIFF WBC: CPT

## 2021-08-13 PROCEDURE — 74011250636 HC RX REV CODE- 250/636: Performed by: INTERNAL MEDICINE

## 2021-08-13 PROCEDURE — 65660000000 HC RM CCU STEPDOWN

## 2021-08-13 PROCEDURE — 74011250637 HC RX REV CODE- 250/637: Performed by: INTERNAL MEDICINE

## 2021-08-13 PROCEDURE — 80053 COMPREHEN METABOLIC PANEL: CPT

## 2021-08-13 PROCEDURE — 74011000258 HC RX REV CODE- 258: Performed by: INTERNAL MEDICINE

## 2021-08-13 PROCEDURE — 99232 SBSQ HOSP IP/OBS MODERATE 35: CPT | Performed by: PSYCHIATRY & NEUROLOGY

## 2021-08-13 PROCEDURE — 36415 COLL VENOUS BLD VENIPUNCTURE: CPT

## 2021-08-13 RX ORDER — SODIUM CHLORIDE 0.9 % (FLUSH) 0.9 %
5-40 SYRINGE (ML) INJECTION EVERY 8 HOURS
Status: CANCELLED | OUTPATIENT
Start: 2021-08-13

## 2021-08-13 RX ORDER — NALOXONE HYDROCHLORIDE 0.4 MG/ML
0.04 INJECTION, SOLUTION INTRAMUSCULAR; INTRAVENOUS; SUBCUTANEOUS
Status: CANCELLED | OUTPATIENT
Start: 2021-08-13

## 2021-08-13 RX ORDER — SODIUM CHLORIDE, SODIUM LACTATE, POTASSIUM CHLORIDE, CALCIUM CHLORIDE 600; 310; 30; 20 MG/100ML; MG/100ML; MG/100ML; MG/100ML
125 INJECTION, SOLUTION INTRAVENOUS CONTINUOUS
Status: CANCELLED | OUTPATIENT
Start: 2021-08-13

## 2021-08-13 RX ORDER — ONDANSETRON 2 MG/ML
4 INJECTION INTRAMUSCULAR; INTRAVENOUS AS NEEDED
Status: CANCELLED | OUTPATIENT
Start: 2021-08-13

## 2021-08-13 RX ORDER — POTASSIUM CHLORIDE 7.45 MG/ML
10 INJECTION INTRAVENOUS
Status: COMPLETED | OUTPATIENT
Start: 2021-08-13 | End: 2021-08-14

## 2021-08-13 RX ORDER — HYDRALAZINE HYDROCHLORIDE 20 MG/ML
20 INJECTION INTRAMUSCULAR; INTRAVENOUS
Status: DISCONTINUED | OUTPATIENT
Start: 2021-08-13 | End: 2021-08-19 | Stop reason: HOSPADM

## 2021-08-13 RX ORDER — DIPHENHYDRAMINE HYDROCHLORIDE 50 MG/ML
12.5 INJECTION, SOLUTION INTRAMUSCULAR; INTRAVENOUS AS NEEDED
Status: CANCELLED | OUTPATIENT
Start: 2021-08-13 | End: 2021-08-13

## 2021-08-13 RX ORDER — FENTANYL CITRATE 50 UG/ML
25 INJECTION, SOLUTION INTRAMUSCULAR; INTRAVENOUS
Status: CANCELLED | OUTPATIENT
Start: 2021-08-13

## 2021-08-13 RX ORDER — HYDROMORPHONE HYDROCHLORIDE 1 MG/ML
.25-1 INJECTION, SOLUTION INTRAMUSCULAR; INTRAVENOUS; SUBCUTANEOUS
Status: CANCELLED | OUTPATIENT
Start: 2021-08-13

## 2021-08-13 RX ORDER — CLONIDINE 0.2 MG/24H
1 PATCH, EXTENDED RELEASE TRANSDERMAL
Status: DISCONTINUED | OUTPATIENT
Start: 2021-08-13 | End: 2021-08-14

## 2021-08-13 RX ORDER — IPRATROPIUM BROMIDE AND ALBUTEROL SULFATE 2.5; .5 MG/3ML; MG/3ML
3 SOLUTION RESPIRATORY (INHALATION)
Status: DISCONTINUED | OUTPATIENT
Start: 2021-08-13 | End: 2021-08-19 | Stop reason: HOSPADM

## 2021-08-13 RX ORDER — FLUMAZENIL 0.1 MG/ML
0.2 INJECTION INTRAVENOUS
Status: CANCELLED | OUTPATIENT
Start: 2021-08-13

## 2021-08-13 RX ORDER — LIDOCAINE HYDROCHLORIDE 10 MG/ML
0.1 INJECTION, SOLUTION EPIDURAL; INFILTRATION; INTRACAUDAL; PERINEURAL AS NEEDED
Status: CANCELLED | OUTPATIENT
Start: 2021-08-13

## 2021-08-13 RX ORDER — ALBUTEROL SULFATE 0.83 MG/ML
2.5 SOLUTION RESPIRATORY (INHALATION) AS NEEDED
Status: CANCELLED | OUTPATIENT
Start: 2021-08-13

## 2021-08-13 RX ORDER — SODIUM CHLORIDE 0.9 % (FLUSH) 0.9 %
5-40 SYRINGE (ML) INJECTION AS NEEDED
Status: CANCELLED | OUTPATIENT
Start: 2021-08-13

## 2021-08-13 RX ORDER — SODIUM CHLORIDE, SODIUM LACTATE, POTASSIUM CHLORIDE, CALCIUM CHLORIDE 600; 310; 30; 20 MG/100ML; MG/100ML; MG/100ML; MG/100ML
125 INJECTION, SOLUTION INTRAVENOUS CONTINUOUS
Status: CANCELLED | OUTPATIENT
Start: 2021-08-13 | End: 2021-08-14

## 2021-08-13 RX ORDER — POTASSIUM CHLORIDE 750 MG/1
40 TABLET, FILM COATED, EXTENDED RELEASE ORAL
Status: DISCONTINUED | OUTPATIENT
Start: 2021-08-13 | End: 2021-08-13

## 2021-08-13 RX ORDER — LEVETIRACETAM 500 MG/1
500 TABLET ORAL 2 TIMES DAILY
Status: DISCONTINUED | OUTPATIENT
Start: 2021-08-13 | End: 2021-08-13

## 2021-08-13 RX ORDER — HYDRALAZINE HYDROCHLORIDE 20 MG/ML
10 INJECTION INTRAMUSCULAR; INTRAVENOUS
Status: DISCONTINUED | OUTPATIENT
Start: 2021-08-13 | End: 2021-08-13

## 2021-08-13 RX ADMIN — POTASSIUM CHLORIDE 10 MEQ: 10 INJECTION, SOLUTION INTRAVENOUS at 18:25

## 2021-08-13 RX ADMIN — Medication 10 ML: at 21:00

## 2021-08-13 RX ADMIN — LEVETIRACETAM 500 MG: 100 INJECTION, SOLUTION INTRAVENOUS at 22:37

## 2021-08-13 RX ADMIN — HYDRALAZINE HYDROCHLORIDE 10 MG: 20 INJECTION INTRAMUSCULAR; INTRAVENOUS at 12:55

## 2021-08-13 RX ADMIN — Medication 10 ML: at 06:38

## 2021-08-13 RX ADMIN — ENOXAPARIN SODIUM 40 MG: 40 INJECTION SUBCUTANEOUS at 10:27

## 2021-08-13 RX ADMIN — HYDRALAZINE HYDROCHLORIDE 20 MG: 20 INJECTION INTRAMUSCULAR; INTRAVENOUS at 21:10

## 2021-08-13 RX ADMIN — POTASSIUM CHLORIDE 10 MEQ: 10 INJECTION, SOLUTION INTRAVENOUS at 22:06

## 2021-08-13 RX ADMIN — Medication 10 ML: at 16:12

## 2021-08-13 RX ADMIN — SODIUM CHLORIDE, POTASSIUM CHLORIDE, SODIUM LACTATE AND CALCIUM CHLORIDE 75 ML/HR: 600; 310; 30; 20 INJECTION, SOLUTION INTRAVENOUS at 22:37

## 2021-08-13 RX ADMIN — POTASSIUM CHLORIDE 10 MEQ: 10 INJECTION, SOLUTION INTRAVENOUS at 17:16

## 2021-08-13 RX ADMIN — POTASSIUM CHLORIDE 10 MEQ: 10 INJECTION, SOLUTION INTRAVENOUS at 16:12

## 2021-08-13 RX ADMIN — POTASSIUM CHLORIDE 10 MEQ: 10 INJECTION, SOLUTION INTRAVENOUS at 23:00

## 2021-08-13 RX ADMIN — LEVETIRACETAM 1000 MG: 100 INJECTION, SOLUTION INTRAVENOUS at 10:27

## 2021-08-13 NOTE — PROGRESS NOTES
0700: Bedside and Verbal shift change report given to Chris Cota (oncoming nurse) by Manny Hoffman RN (offgoing nurse). Report included the following information SBAR, Kardex, MAR, Accordion and Cardiac Rhythm Sinus tachy . This patient was assisted with Intentional Toileting every 2 hours during this shift as appropriate. Documentation of ambulation and output reflected on Flowsheet as appropriate. Purposeful hourly rounding was completed using AIDET and 5Ps. Outcomes of PHR documented as they occurred. Bed alarm in use as appropriate. Dual Suction and ambubag in place. 0: Spoke with Dr. Ron Mann. She agreed to place a consult to speech.

## 2021-08-13 NOTE — PROGRESS NOTES
Neurology Progress Note    Patient ID:  Kathleen Duke  [id number]  57 y.o.  1955    CC: seizure    Subjective:      Patient has had no recurrence of any seizure activity. Lab work-up revealed increased WBC at 13, decreased hemoglobin at 11.2. Urinalysis was positive for UTI. CMP revealed decreased potassium at 3.2, decreased calcium and decreased albumin. Brain MRI without contrast did not reveal any acute stroke. Mild white matter disease. Current Facility-Administered Medications   Medication Dose Route Frequency    potassium chloride SR (KLOR-CON 10) tablet 40 mEq  40 mEq Oral NOW    levETIRAcetam (KEPPRA) tablet 500 mg  500 mg Oral BID    sodium chloride (NS) flush 5-40 mL  5-40 mL IntraVENous Q8H    sodium chloride (NS) flush 5-40 mL  5-40 mL IntraVENous PRN    acetaminophen (TYLENOL) tablet 650 mg  650 mg Oral Q6H PRN    Or    acetaminophen (TYLENOL) suppository 650 mg  650 mg Rectal Q6H PRN    polyethylene glycol (MIRALAX) packet 17 g  17 g Oral DAILY PRN    ondansetron (ZOFRAN ODT) tablet 4 mg  4 mg Oral Q8H PRN    Or    ondansetron (ZOFRAN) injection 4 mg  4 mg IntraVENous Q6H PRN    enoxaparin (LOVENOX) injection 40 mg  40 mg SubCUTAneous DAILY    metoprolol succinate (TOPROL-XL) XL tablet 25 mg  25 mg Oral DAILY    amLODIPine (NORVASC) tablet 5 mg  5 mg Oral DAILY    lactated Ringers infusion  125 mL/hr IntraVENous CONTINUOUS        Review of Systems:    Pertinent items are noted in HPI.     Objective:     Patient Vitals for the past 8 hrs:   BP Temp Pulse Resp SpO2   08/13/21 0700   (!) 109     08/13/21 0634 (!) 154/78 98.6 °F (37 °C) (!) 108 22 97 %   08/13/21 0451 139/78 99.7 °F (37.6 °C) (!) 107 25    08/13/21 0317 136/62  (!) 112 24 98 %       08/13 0701 - 08/13 1900  In: -   Out: 1000 [Urine:1000]  08/11 1901 - 08/13 0700  In: 1000 [I.V.:1000]  Out: 3050 [Urine:3050]    Lab Review   Recent Results (from the past 24 hour(s))   URINALYSIS W/ REFLEX CULTURE Collection Time: 08/12/21  6:08 PM    Specimen: Urine   Result Value Ref Range    Color YELLOW/STRAW      Appearance CLEAR CLEAR      Specific gravity 1.025 1.003 - 1.030      pH (UA) 5.5 5.0 - 8.0      Protein Negative NEG mg/dL    Glucose Negative NEG mg/dL    Ketone Negative NEG mg/dL    Bilirubin Negative NEG      Blood TRACE (A) NEG      Urobilinogen 0.2 0.2 - 1.0 EU/dL    Nitrites Negative NEG      Leukocyte Esterase Negative NEG      WBC 5-10 0 - 4 /hpf    RBC 0-5 0 - 5 /hpf    Epithelial cells FEW FEW /lpf    Bacteria Negative NEG /hpf    UA:UC IF INDICATED CULTURE NOT INDICATED BY UA RESULT CNI      Hyaline cast 0-2 0 - 5 /lpf   METABOLIC PANEL, COMPREHENSIVE    Collection Time: 08/13/21 12:36 AM   Result Value Ref Range    Sodium 140 136 - 145 mmol/L    Potassium 3.2 (L) 3.5 - 5.1 mmol/L    Chloride 109 (H) 97 - 108 mmol/L    CO2 24 21 - 32 mmol/L    Anion gap 7 5 - 15 mmol/L    Glucose 105 (H) 65 - 100 mg/dL    BUN 11 6 - 20 MG/DL    Creatinine 0.76 0.55 - 1.02 MG/DL    BUN/Creatinine ratio 14 12 - 20      GFR est AA >60 >60 ml/min/1.73m2    GFR est non-AA >60 >60 ml/min/1.73m2    Calcium 8.0 (L) 8.5 - 10.1 MG/DL    Bilirubin, total 0.5 0.2 - 1.0 MG/DL    ALT (SGPT) 29 12 - 78 U/L    AST (SGOT) 36 15 - 37 U/L    Alk.  phosphatase 95 45 - 117 U/L    Protein, total 6.4 6.4 - 8.2 g/dL    Albumin 3.0 (L) 3.5 - 5.0 g/dL    Globulin 3.4 2.0 - 4.0 g/dL    A-G Ratio 0.9 (L) 1.1 - 2.2     CBC WITH AUTOMATED DIFF    Collection Time: 08/13/21 12:36 AM   Result Value Ref Range    WBC 13.0 (H) 3.6 - 11.0 K/uL    RBC 3.95 3.80 - 5.20 M/uL    HGB 11.2 (L) 11.5 - 16.0 g/dL    HCT 33.9 (L) 35.0 - 47.0 %    MCV 85.8 80.0 - 99.0 FL    MCH 28.4 26.0 - 34.0 PG    MCHC 33.0 30.0 - 36.5 g/dL    RDW 13.0 11.5 - 14.5 %    PLATELET 454 838 - 858 K/uL    MPV 10.7 8.9 - 12.9 FL    NRBC 0.0 0  WBC    ABSOLUTE NRBC 0.00 0.00 - 0.01 K/uL    NEUTROPHILS 81 (H) 32 - 75 %    LYMPHOCYTES 12 12 - 49 %    MONOCYTES 6 5 - 13 % EOSINOPHILS 0 0 - 7 %    BASOPHILS 0 0 - 1 %    IMMATURE GRANULOCYTES 1 (H) 0.0 - 0.5 %    ABS. NEUTROPHILS 10.7 (H) 1.8 - 8.0 K/UL    ABS. LYMPHOCYTES 1.5 0.8 - 3.5 K/UL    ABS. MONOCYTES 0.7 0.0 - 1.0 K/UL    ABS. EOSINOPHILS 0.0 0.0 - 0.4 K/UL    ABS. BASOPHILS 0.0 0.0 - 0.1 K/UL    ABS. IMM. GRANS. 0.1 (H) 0.00 - 0.04 K/UL    DF AUTOMATED       PHYSICAL EXAM:     NEUROLOGICAL EXAM:     Appearance: The patient is in no acute distress. Mental Status: Lethargic. Oriented to place and person. Speech is difficulty due to tongue injury but appropriate. Follows simple commands. Able to name objects. Cranial Nerves:   Intact visual fields. GODWIN, EOM's full, no nystagmus, no ptosis. Facial sensation is normal. Corneal reflexes are intact. Facial movement is symmetric. Hearing is normal bilaterally. Palate is midline with normal elevation. Sternocleidomastoid and trapezius muscles are normal. Tongue is midline but with injury. Motor:  5/5 strength. Normal bulk and tone. No fasciculations. Left arm is in a sling. Reflexes:   Deep tendon reflexes 2+/4 and symmetrical. Downgoing toes. Sensory:   Normal to noxious. Gait:  Not tested. Tremor:   Intentional and postural UE tremors   Cerebellar:  Not tested. Assessment:     Active Problems:    Seizure ([facility] [address] (8/12/2021)      Acute metabolic encephalopathy (2/78/8862)    Tremors    Plan:   Neurological examination reveals cognitive impairment with no focal deficits. Postural or intentional tremors. No signs of Parkinson's disease. There is injury to her tongue and left arm currently in a sling due to a fracture. History and event is consistent with seizure and in this case likely provoked.       Head CT without contrast did not reveal any acute process. Brain MRI without contrast did not reveal any acute stroke. Mild white matter disease.      Head and neck CTA did not reveal any flow-limiting stenosis or aneurysm. Mild ICA stenosis.   No intervention necessary.     EEG was was abnormal.  Showed moderate generalized slowing typically seen in encephalopathies. No seizures.     Levetiracetam was changed to 500 mg twice daily. If patient continues to do well and remains to be seizure-free then recommend stoppage of medication on discharge.     Correct electrolyte issues as well as treat UTI. Tremor seen likely consistent with either medication adverse effect or essential tremors. No evidence for Parkinson's disease. No further recommendations from a neurological standpoint.     Signed:  Brady Martínez MD  8/13/2021  10:45 AM

## 2021-08-13 NOTE — PROGRESS NOTES
Fercho Mcfadden [facility] [address]  [address] [facility], [place], [address] [place] [address]  [phone]      Medical Progress Note      NAME: Anel Arguello   :  [id number]  MRM:  [id number]    Date/Time: 2021  3:09 PM         Subjective:     Chief Complaint:  \"I don't know\"     Pt seen and examined. No complaints. NPO due to her tongue swelling from bitten tongue     ROS:  (bold if positive, if negative)           Objective:       Vitals:          Last 24hrs VS reviewed since prior progress note. Most recent are:    Visit Vitals  BP (!) 184/92 (BP 1 Location: Right upper arm, BP Patient Position: Lying)   Pulse (!) 109   Temp 98.8 °F (37.1 °C)   Resp 20   Ht 5' 5\" (1.651 m)   Wt 76.1 kg (167 lb 11.2 oz)   SpO2 97%   BMI 27.91 kg/m²     SpO2 Readings from Last 6 Encounters:   21 97%   19 95%   19 94%   18 93%   18 93%   17 94%    O2 Flow Rate (L/min): 2 l/min       Intake/Output Summary (Last 24 hours) at 2021 1509  Last data filed at 2021 1018  Gross per 24 hour   Intake    Output 3950 ml   Net -3950 ml          Exam:     Physical Exam:    Gen: Disheveled female.  Tongue injury/laceration   HEENT:  Pink conjunctivae, PERRL, hearing intact to voice, moist mucous membranes  Neck:  Supple, without masses, thyroid non-tender  Resp:  No accessory muscle use, clear breath sounds without wheezes rales or rhonchi  Card:  No murmurs, normal S1, S2 without thrills, bruits    Abd:  Soft, non-tender, non-distended, normoactive bowel sounds are present  Musc:  No cyanosis or clubbing  Skin:  No rashes or ulcers, skin turgor is good  Neuro: Mild diffuse weakness   Psych: Poor insight     Medications Reviewed: (see below)    Lab Data Reviewed: (see below)    ______________________________________________________________________    Medications:     Current Facility-Administered Medications   Medication Dose Route Frequency    potassium chloride SR (KLOR-CON 10) tablet 40 mEq  40 mEq Oral NOW    levETIRAcetam (KEPPRA) tablet 500 mg  500 mg Oral BID    hydrALAZINE (APRESOLINE) 20 mg/mL injection 20 mg  20 mg IntraVENous Q6H PRN    sodium chloride (NS) flush 5-40 mL  5-40 mL IntraVENous Q8H    sodium chloride (NS) flush 5-40 mL  5-40 mL IntraVENous PRN    acetaminophen (TYLENOL) tablet 650 mg  650 mg Oral Q6H PRN    Or    acetaminophen (TYLENOL) suppository 650 mg  650 mg Rectal Q6H PRN    polyethylene glycol (MIRALAX) packet 17 g  17 g Oral DAILY PRN    ondansetron (ZOFRAN ODT) tablet 4 mg  4 mg Oral Q8H PRN    Or    ondansetron (ZOFRAN) injection 4 mg  4 mg IntraVENous Q6H PRN    enoxaparin (LOVENOX) injection 40 mg  40 mg SubCUTAneous DAILY    metoprolol succinate (TOPROL-XL) XL tablet 25 mg  25 mg Oral DAILY    amLODIPine (NORVASC) tablet 5 mg  5 mg Oral DAILY    lactated Ringers infusion  75 mL/hr IntraVENous CONTINUOUS            Lab Review:     Recent Labs     08/13/21  0036 08/12/21  0757   WBC 13.0* 16.7*   HGB 11.2* 12.4   HCT 33.9* 37.8    284     Recent Labs     08/13/21  0036 08/12/21  0757    131*   K 3.2* 3.6   * 102   CO2 24 21   * 102*   BUN 11 19   CREA 0.76 1.06*   CA 8.0* 7.8*   ALB 3.0* 3.1*   ALT 29 26   INR  --  1.0     No components found for: GLPOC         Assessment / Plan:   Seizure ([facility] [address] (8/12/2021) - EEG without out e/o ictus   -head CT and MRI without acute process  -currently NPO due to her tongue; change keppra to IV   -can consider stopping keppra at d/c as thought to be provoked by medications and/or benzo withdrawal      Acute metabolic encephalopathy (9/95/0068) - likely post ictal   -monitor     Leukocytosis - likely reactive. UA not c/w UTI, CXR without PNA. Downtrending.  Monitor     Elevated CK - trend     Humeral head fracture  -appreciate ortho's assistance  -sling   -NWB LUE   -plan for surgery repair on Monday; NPO at midnight     Hypertension   -on amlodipine PTA but currently NPO   -hydralazine PRN   -clonidine patch     Hypokalemia   -replete     COPD - not on meds  -PRN duonebs     Bipolar disorder   -ideally need to resume buspar, lamictal, abilify when able  -?resumption of Wellbutrin considering recent seizure     Total time spent with patient: 28 895 [address] discussed with: Patient    Discussed:  Care Plan    Prophylaxis:  SCD's    Disposition:  TBD            ___________________________________________________    Attending Physician: Laura Shen MD

## 2021-08-13 NOTE — PROGRESS NOTES
Orthopaedic Progress Note    2021 10:54 AM     Patient: Kyra Martin MRN: [id number]  SSN: [id number]    YOB: 1955  Age: 60 y.o. Sex: female      Admit date:  2021  Admitting Physician:  Franklin Davalos MD   Consulting Physician(s): Treatment Team: Attending Provider: Marya Dakin, MD; Consulting Provider: Jaime Hendrickson MD; Consulting Provider: Antionette Samson MD; Care Manager: Vida Maradiaga; Consulting Provider: BAMBI Cavazos; Surgeon: Sammie Kelly MD; Utilization Review: Ashlee Pfeiffer; Staff Nurse: Olga Cordero RN    SUBJECTIVE:     Kyra Martin is a 60 y.o. female is resting in bed. She is more lucid today. She reports no worsening pain in her left shoulder. She has her sling on. No complaints of nausea, vomiting, dizziness, lightheadedness, chest pain, or shortness of breath. OBJECTIVE:       Physical Exam:  General: Alert, cooperative, no distress. Lucid. Oriented x 3  Respiratory: Respirations unlabored  Neurological:  Neurovascular exam limited due to left proximal humerus fracture/dislocation- WF/WE/bicep and tricep intact. SILT LUE. Musculoskeletal: Calves soft, supple, non-tender upon palpation. Shoulder ROM not tested due to fracture  +radial and ulnar pulses. Vital Signs:        Patient Vitals for the past 8 hrs:   BP Temp Pulse Resp SpO2   21 1405 (!) 184/92       21 1145 (!) 197/94 98.8 °F (37.1 °C) (!) 109 20 97 %   21 0700   (!) 109  97 %   21 0634 (!) 154/78 98.6 °F (37 °C) (!) 108 22 97 %                                          Temp (24hrs), Av.3 °F (37.4 °C), Min:98.6 °F (37 °C), Max:99.9 °F (37.7 °C)      Labs:        Recent Labs     21  0036 21  0757 21  0757   HCT 33.9*   < > 37.8   HGB 11.2*   < > 12.4   INR  --   --  1.0    < > = values in this interval not displayed.      Lab Results   Component Value Date/Time Sodium 140 08/13/2021 12:36 AM    Potassium 3.2 (L) 08/13/2021 12:36 AM    Chloride 109 (H) 08/13/2021 12:36 AM    CO2 24 08/13/2021 12:36 AM    Glucose 105 (H) 08/13/2021 12:36 AM    BUN 11 08/13/2021 12:36 AM    Creatinine 0.76 08/13/2021 12:36 AM    Calcium 8.0 (L) 08/13/2021 12:36 AM       PT/OT:                Patient mobility                         ASSESSMENT / PLAN:   Active Problems:    Seizure ([facility] [address] (8/12/2021)      Acute metabolic encephalopathy (8/46/8677)         A: 1. Left proximal humerus fracture with posterior dislocation and head splitting    P: 1. Keep inpatient for left reverse total shoulder arthroplasty on Monday with Dr. Odilia Matthew. Explained to  that plan may change depending on mental status, seizure activity, and her respiratory status (tongue swelling). 2. Sling  3. NWB LUE. 4. DVT ppx appropriate from our perspective. 5. NPO Sunday night for OR Monday. 6. Orthopedics to follow.           Signed By:  Avtar Shaw, [address] [facility] [address]

## 2021-08-13 NOTE — PROGRESS NOTES
Critera met for insert Yes  Reason for insert: Urinary obstruction  Date of insert:08/12/21  Order is current: Yes  MD or RN driven: Provider  Removal Discussed Yes  Last CHG Bath: 08/13/21  Ricci Care Last Completed: Date/Time: 08/13/21 0817  Ricci Care After Each Bowel Movement: Yes  Education documented every 24 hours Yes  Care Plan (Risk for UTI, Ricci) Updated Every 24 hours Yes  Bag below Bladder Yes        Bag off Floor Yes      Sheet Clip used Yes          Tubing free of dependant loops Yes          Seal Intact Yes            Bag less than 1/2 full Yes                Bedside and Verbal shift change report given to Bhanu Mercedes RN (oncoming nurse) by ALISA Langston/ALISA Arredondo (offgoing nurse).  Report included the following information SBAR, Kardex, ED Summary, MAR, Recent Results and Cardiac Rhythm SB.

## 2021-08-13 NOTE — PROGRESS NOTES
Speech pathology note  Reviewed chart and discussed case with RN. Note patient had a seizure and bit her tongue, and her tongue is now swollen and injured. Patient appropriately, politely refused all PO intake at this time due to her tongue. Patient also unable to sit upright due to shoulder pain. Note MRI did not reveal acute stroke, therefore doubt patient will have dysphagia. Recommend RN complete the STAND when patient agreeable and tongue has improved. SLP will follow up next week if needed. Thank you.     Colin Condon, Chiqui Lewis., CCC-SLP

## 2021-08-13 NOTE — ADT AUTH CERT NOTES
NOTIFICATION OF EMERGENT INPATIENT ADMISSION   ADMITTED: 21     PT STILL IN HOUSE **    PLEASE BUILD AN AUTHORIZATION FOR THIS PATIENT     UR CONTACT : DAVE RUCKER   UR FAX NUMBER: [phone]    PLEASE FAX BACK REFERENCE NUMBER, AUTH STATUS UPDATE(S) AND CLINICAL REQUESTS TO SECURE FAX NUMBER ABOVE. THANK YOU SO MUCH! [address]      FACILITY NPI : [id number]     [facility] 3 [facility] CARE TELE 2  [address] [facility]  [phone]            Patient Name :Deneen Chan   :  (65 yrs)  MRN : [id number]     Patient Mailing Address [address] [place], [place] [address] [facility] [47] , [address]                                                               .         Insurance Plan Payor: Rosalia Collins / Plan: Thais Fee / Product Type: HMO /      Primary Coverage Subscriber ID : [id number]           Current Patient Class : INPATIENT  Admit Date : 2021     REQUESTED LEVEL OF CARE: INPATIENT [101]                                                           Diagnosis : Acute metabolic encephalopathy;Seizure ([facility] [address]                          ICD10 Code : Acute metabolic encephalopathy [S18.28]  Seizure ([facility] [address] [R56.9]          Admitting and Attending Info:  Admitting Provider : Mary Hennessy MD   NPI: [id number]  Admitting Provider Phone.  [phone]  Admitting Provider Address: SAME AS FACILITY                     Patient Demographics    Patient Name   Aj Donnelly Legal Sex   Female    1955 Address   [address] [place] [address],  [address] [place] [address] Phone   [phone] (Home) *Preferred*   [phone] [facility])   Hospital Account    Name Acct ID Class Status Primary Coverage   Aj Donnelly [id number] [address]          Guarantor Account (for Hospital Account [de-identified])    Name Relation to Pt Service Area Active? Acct Type   Tony Cooler [facility] Yes Personal/Family   Address Phone     [address]  [facility] [phone](A)            Coverage Information (for Hospital Account [de-identified])    F/O Payor/Plan Subscriber  Subscriber Sex Precert #   [employer] 62 M    Subscriber Subscriber #   Fady Mckeon [id number]   Grp # Group Name   [id number]    Address Phone   [address]    Policy Number Status Effective Date Benefits Phone   [id number] -  -   Auth/Cert             Diagnosis     Codes Comments   Altered mental status, unspecified altered mental status type  ICD-10-CM: R41.82   ICD-9-CM: 780.97           Admission Information    Arrival Date/Time: 2021 Admit Date/Time: 2021 0723 IP Adm.  Date/Time: 2021 0911   Admission Type: Emergency Point of Origin: Non-health Care Facility/self Admit Category: Home   Means of Arrival: Ambulance Primary Service: Medicine Secondary Service: N/A   Transfer Source:  Service Area: [facility] Unit: [facility] 3 PROG CARE TELE 2   Admit Provider: Laurie Romero MD Attending Provider: Shameka Zee MD Referring Provider:    Admission Information    Attending Provider Admission Dx Admitted on   Darlene Radford MD Acute metabolic encephalopathy, Seizure ([facility] [address] 21   Service Isolation Code Status   MEDICINE  Full Code   Allergies Advance Care Planning    No Known Allergies Jump to the Activity     Admission Information    Unit/Bed: [facility] 3 PROG CARE TELE  Service: MEDICINE   Admitting provider: Laurie Romero MD Phone: [phone]   Attending provider: Darlene Radford MD Phone: [phone]   PCP: Suni Otto NP Phone: [phone]   Admission dx:  Patient class: I   Admission type: ER     Patient Demographics    Patient Name   Leander Howard   [id number] Legal Sex   Female    1955 Address   [address] [place] [address],  [address] [place] [address] Phone   [phone] (Home) *Preferred*   [phone] (Mobile)   H&P Notes     H&P by Lynne Beckford MD at 21 documented on ED to Hosp-Admission (Current) from 2021 in [facility] 3 [facility] 2  Author: Lynne Beckford MD Author Type: Physician Filed: 21 1100   Note Status: Signed Cosign: Cosign Not Required Date of Service: 21   : Lynne Beckford MD (Physician)   Expand AllCollapse All    Hospitalist Admission Note     NAME:            Ling Reyna   :               1955   MRN:               [id number]      Date/Time:      2021 9:12 AM     Patient PCP: Fabian Fabry, NP  ________________________________________________________________________     Given the patient's current clinical presentation, I have a high level of concern for decompensation if discharged from the emergency department.  Complex decision making was performed, which includes reviewing the patient's available past medical records, laboratory results, and x-ray films.        My assessment of this patient's clinical condition and my plan of care is as follows.     Assessment / Plan:     #Acute Metabolic Encephalopathy: Clinical hx most consistent with seizure activity. She is on multiple medications that could contribute (bupropion; w/d from benzos). Toxidrome therefore a consideration. She is hypertensive and tachycardic, which would raise c/f EtOH/benzo withdrawal or even serotonin syndrome (no fever though). Leukocytosis raises c/f infectious etiology. Less likely CVA. - UA, UDS pending              - Based on these, will consider MRI, LP              - Seizure precautions              - Keppra load              - Hold bupropion              - Neuro c/s; appreciate recs     #Leukocytosis: Reactive in s/o seizure vs infectious etiology. Afebrile              - f/u imaging as above              - Hold on empiric abx for now     #Elevated CK: again, c/f sz.  Low at present, low c/f true rhabdo              - IVF, trend     #Humeral head fx: Likely sustained during above              - Appreciate ortho recs     #Bipolar: Home meds include bupropion XL 300mg, loraz 0.5mg BID, risperidone 3mg nightly, sertraline 200mg daily. - f/u pharm med rec to verify these     #ALEX: Mild; volume down              - IVF              - Renally dose meds, avoid nephrotoxins     #HTN: Metop succ 25mg, amlod 5mg, lisin 40mg              - Cont metop, amlod when taking PO (tongue too swollen at present)              - Hold lisin     #COPD/Tobacco        I have personally reviewed the radiographs, laboratory data in Epic and decisions and statements above are based partially on this personal interpretation.     Code Status: Full Code  DVT Prophylaxis: Lovenox  GI Prophylaxis: not indicated                   Subjective:   CHIEF COMPLAINT: \"ams\"     HISTORY OF PRESENT ILLNESS:     Bette Juares is a 60 y.o. F with PMHx most notable for bipolar disorder on multiple medications, who presents after family found her on the floor next to the couch, where she had gone to sleep the night before. She was last known well yesterday evening, pt  reports pt went to bed on couch. He checked on her at 0300 and found her on the floor with some bleeding near her mouth. He got her back to bed but did not call EMS. He then got up at 06:30 and he and son found pt confused and with tongue swelling. EMS gave GCS 13.      In ED, code stroke called. CT imaging reassuring. Teleneuro recommended Keppra load.       reports that she has overdosed on lithium unintentionally in the past. He tries to manage her pills, but pt doesn't let him.  He denies that she has any recent or distant SI             Past Medical History:   Diagnosis Date    Abscess of buttock 7/23/2012    Bronchitis      Common bile duct calculus 7/23/2012    Gallstones 7/12/2012    GERD (gastroesophageal reflux disease)      High cholesterol      Hypercholesterolemia      Hypertension      Ill-defined condition       missing upper front tooth    Ill-defined condition       lower leg bilateral reddened rash.  Insomnia      Other ill-defined conditions(799.89)       Large boil under right arm-pit.  Paranoia ([facility] [address]      Psychiatric disorder       bipolar            Past Surgical History:   Procedure Laterality Date    ID ABDOMEN SURGERY PROC UNLISTED         cholecystectomy 7/23/2012      Social History            Tobacco Use    Smoking status: Current Every Day Smoker       Packs/day: 0.50    Smokeless tobacco: Never Used   Substance Use Topics    Alcohol use: No            Family History   Problem Relation Age of Onset    Cancer Father           lung    Diabetes Brother      Heart Disease Brother      Malignant Hyperthermia Neg Hx      Pseudocholinesterase Deficiency Neg Hx      Delayed Awakening Neg Hx      Post-op Nausea/Vomiting Neg Hx      Emergence Delirium Neg Hx      Post-op Cognitive Dysfunction Neg Hx      Other Neg Hx           No Known Allergies              Prior to Admission medications    Medication Sig Start Date End Date Taking? Authorizing Provider   buPROPion XL (WELLBUTRIN XL) 300 mg XL tablet TAKE ONE TABLET BY MOUTH EVERY MORNING 11/4/19     Rosalba Venegas NP   LORazepam (ATIVAN) 0.5 mg tablet Take 1 Tab by mouth two (2) times daily as needed for Anxiety. Max Daily Amount: 1 mg. 11/4/19     Rosalba Venegas NP   risperiDONE (RISPERDAL) 3 mg tablet TAKE ONE TABLET BY MOUTH EVERY NIGHT AT BEDTIME 11/4/19     Rosalba Venegas NP   sertraline (ZOLOFT) 100 mg tablet Take 2 Tabs by mouth daily.  11/4/19     Rosalba Venegas NP   potassium chloride SR (K-TAB) 20 mEq tablet TAKE ONE TABLET BY MOUTH DAILY 5/9/19     Amaury Mccabe NP   atorvastatin (LIPITOR) 40 mg tablet TAKE ONE TABLET BY MOUTH DAILY 2/6/19     Amaury Mccabe NP   amLODIPine (NORVASC) 10 mg tablet Take 0.5 Tabs by mouth daily. 2/6/19     Nehemias Mcgee NP   metoprolol succinate (TOPROL-XL) 25 mg XL tablet Take 1 Tab by mouth daily. 2/6/19     Amaury Mccabe, NP   lisinopril (PRINIVIL, ZESTRIL) 40 mg tablet Take 0.5 Tabs by mouth daily. 2/6/19     Nehemias Mcgee NP      REVIEW OF SYSTEMS:  See HPI for details     Patient was not able to provide review of systems due to mental status change/acute illness        Objective:   VITALS:    Visit Vitals  BP (!) 157/82   Pulse (!) 116   Temp 98.3 °F (36.8 °C)   Resp 20   Ht 5' 5\" (1.651 m)   Wt 76.1 kg (167 lb 11.2 oz)   SpO2 93%   BMI 27.91 kg/m²      PHYSICAL EXAM:     Physical Exam:     Gen:    Chronically ill appearing, disheveled   HEENT:  Dried blood around swollen tongue an don teeth; airway patent  Neck:  Supple, without masses, thyroid non-tender  Resp:  No accessory muscle use, clear breath sounds without wheezes rales or rhonchi  Card:   No murmurs, normal S1, S2 without thrills, bruits or peripheral edema  Abd:  Soft, non-tender, non-distended, normoactive bowel sounds are present, no palpable organomegaly and no detectable hernias  Lymph:  No cervical or inguinal adenopathy  Musc:  No cyanosis or clubbing  Skin:   No rashes or ulcers, skin turgor is good  Neuro:  Cranial nerves are grossly intact, no focal motor weakness, follows commands appropriately but slowly; no tremor  Psych:  Good insight, oriented to person, place and time, alert              _______________________________________________________________________  Care Plan discussed with:      Comments   Patient x Discussed with patient in room.  POC outlined and Questions answered    Family  x     RN x     Care Manager                      Consultant:  destiny PERRY MD   _______________________________________________________________________  Recommended Disposition:   Home with Family x   HH/PT/OT/RN     SNF/LTC     YIFAN     ________________________________________________________________________  TOTAL TIME:  61 Minutes            Comments   >50% of visit spent in counseling and coordination of care   Chart reviewed  Discussion with patient and/or family and questions answered      ________________________________________________________________________  Signed: Zoila Swan MD     This note will not be viewable in [facility].       Procedures: see electronic medical records for all procedures/Xrays and details which were not copied into this note but were reviewed prior to creation of Plan.     LAB DATA REVIEWED:    Recent Results          Recent Results (from the past 24 hour(s))   CBC WITH AUTOMATED DIFF     Collection Time: 08/12/21  7:57 AM   Result Value Ref Range     WBC 16.7 (H) 3.6 - 11.0 K/uL     RBC 4.35 3.80 - 5.20 M/uL     HGB 12.4 11.5 - 16.0 g/dL     HCT 37.8 35.0 - 47.0 %     MCV 86.9 80.0 - 99.0 FL     MCH 28.5 26.0 - 34.0 PG     MCHC 32.8 30.0 - 36.5 g/dL     RDW 12.9 11.5 - 14.5 %     PLATELET 265 544 - 140 K/uL     MPV 10.2 8.9 - 12.9 FL     NRBC 0.0 0  WBC     ABSOLUTE NRBC 0.00 0.00 - 0.01 K/uL     NEUTROPHILS 88 (H) 32 - 75 %     LYMPHOCYTES 6 (L) 12 - 49 %     MONOCYTES 5 5 - 13 %     EOSINOPHILS 0 0 - 7 %     BASOPHILS 0 0 - 1 %     IMMATURE GRANULOCYTES 1 (H) 0.0 - 0.5 %     ABS. NEUTROPHILS 14.6 (H) 1.8 - 8.0 K/UL     ABS. LYMPHOCYTES 1.0 0.8 - 3.5 K/UL     ABS. MONOCYTES 0.9 0.0 - 1.0 K/UL     ABS. EOSINOPHILS 0.0 0.0 - 0.4 K/UL     ABS. BASOPHILS 0.0 0.0 - 0.1 K/UL     ABS. IMM.  GRANS. 0.1 (H) 0.00 - 0.04 K/UL     DF AUTOMATED     METABOLIC PANEL, COMPREHENSIVE     Collection Time: 08/12/21  7:57 AM   Result Value Ref Range     Sodium 131 (L) 136 - 145 mmol/L     Potassium 3.6 3.5 - 5.1 mmol/L     Chloride 102 97 - 108 mmol/L     CO2 21 21 - 32 mmol/L     Anion gap 8 5 - 15 mmol/L     Glucose 102 (H) 65 - 100 mg/dL     BUN 19 6 - 20 MG/DL     Creatinine 1.06 (H) 0.55 - 1.02 MG/DL     BUN/Creatinine ratio 18 12 - 20       GFR est AA >60 >60 ml/min/1.73m2     GFR est non-AA 52 (L) >60 ml/min/1.73m2     Calcium 7.8 (L) 8.5 - 10.1 MG/DL     Bilirubin, total 0.3 0.2 - 1.0 MG/DL     ALT (SGPT) 26 12 - 78 U/L     AST (SGOT) 20 15 - 37 U/L     Alk. phosphatase 97 45 - 117 U/L     Protein, total 6.9 6.4 - 8.2 g/dL     Albumin 3.1 (L) 3.5 - 5.0 g/dL     Globulin 3.8 2.0 - 4.0 g/dL     A-G Ratio 0.8 (L) 1.1 - 2.2     PROTHROMBIN TIME + INR     Collection Time: 21  7:57 AM   Result Value Ref Range     INR 1.0 0.9 - 1.1       Prothrombin time 10.7 9.0 - 11.1 sec   TROPONIN I     Collection Time: 21  7:57 AM   Result Value Ref Range     Troponin-I, Qt. <0.05 <0.05 ng/mL   CK     Collection Time: 21  7:57 AM   Result Value Ref Range      (H) 26 - 192 U/L   SAMPLES BEING HELD     Collection Time: 21  7:57 AM   Result Value Ref Range     SAMPLES BEING HELD 1RED,1PST       COMMENT           Add-on orders for these samples will be processed based on acceptable specimen integrity and analyte stability, which may vary by analyte.    GLUCOSE, POC     Collection Time: 21  8:26 AM   Result Value Ref Range     Glucose (POC) 120 (H) 65 - 117 mg/dL     Performed by Reji Flores                  Patient Demographics    Patient Name   Tina Bragg   [id number] Legal Sex   Female    1955 Address   [address] [place] [address], [place] [address] Phone   [phone] (Home) *Preferred*   [phone] (Mobile)   CSN:   [id number]   Admit Date: Admit Time Room Bed   Aug 12, 2021  7:23  [id number]   Attending Providers    Provider Pager From To   Mir Dc MD  21   Nickie Rivero MD  21   Robbie Richter MD  21    Emergency Contact(s)    Name Relation Home Work Mobile   Venu Mejía Spouse [phone]   Utilization Reviews       Seizure - Care Day 2 (2021) by Chevy Tariq       Review Entered Review Status   2021 15:44 Completed      Criteria Review      Care Day: 2 Care Date: 8/13/2021 Level of Care: Telemetry    Guideline Day 2    Clinical Status    (X) * Hemodynamic stability    8/13/2021 15:44:21 EDT by Brad Campos      197/82    ( ) * Mental status at baseline    ( ) * No evidence of CNS bleeding or infection    ( ) * No new neurologic deficits    ( ) * Seizures absent or managed    ( ) * No evidence of seizure etiology requiring inpatient care    ( ) * Discharge plans and education understood    Activity    ( ) * Ambulatory or acceptable for next level of care    Routes    ( ) * Oral hydration    ( ) * Oral medications or regimen acceptable for next level of care    ( ) * Oral diet or acceptable for next level of care    Interventions    (X) Neurologic checks and seizure monitoring    8/13/2021 15:44:21 EDT by Brad Campos      q4hr and seizure precautions in place    (X) Possible cardiac monitoring    8/13/2021 15:44:21 EDT by Brad Campos      tele    Medications    ( ) * Antiepileptic regimen suitable for next level of care in place, if indicated    * Milestone   Additional Notes   8/13/2021   VS- 98.1 108-109 22 97% 197/82 2L nc    WBC 13.0 (H)   HGB 11.2 (L)   HCT 33.9 (L)   NEUTROPHILS 81 (H)   IMMATURE GRANULOCYTES 1 (H)   ABS. NEUTROPHILS 10.7 (H)   ABS. IMM. GRANS. 0.1 (H)   Potassium 3.2 (L)   Chloride 109 (H)   Glucose 105 (H)   Creatinine 0.76   Calcium 8.0 (L)   GFR est non-AA >60   Albumin 3.0 (L)   A-G Ratio 0.9 (L)      Meds: lovenox 40mg sc x1, LR 75ml/hr, apresoline 10mg iv x1, Keppra 1000mg iv x1    Pt seen and examined. No complaints.  NPO due to her tongue swelling from bitten tongue    Neuro: Mild diffuse weakness    Psych: Poor insight    Assessment / Plan:   Seizure ([address] (8/12/2021) - EEG without out e/o ictus    -head CT and MRI without acute process   -currently NPO due to her tongue; change keppra to IV    -can consider stopping keppra at d/c as thought to be provoked by medications and/or benzo withdrawal         Acute metabolic encephalopathy (8/12/2021) - likely post ictal    -monitor        Leukocytosis - likely reactive. UA not c/w UTI, CXR without PNA. Downtrending. Monitor        Elevated CK - trend        Humeral head fracture   -appreciate ortho's assistance   -sling    -NWVISHAL LUE    -plan for surgery repair on Monday; NPO at midnight        Hypertension    -on amlodipine PTA but currently NPO    -hydralazine PRN    -clonidine patch        Hypokalemia    -replete        COPD - not on meds   -PRN duonebs        Bipolar disorder    -ideally need to resume buspar, lamictal, abilify when able   -?resumption of Wellbutrin considering recent seizure           CM note   Current status   Patient continues to require medical management including IV fluids, potassium replete. Ongoing assessment and monitoring. Talked with  at bedside, face mask on.  Discussed possible follow up needed after left shoulder arthroplasty, will monitor post surgical needs.       Transition of Care Plan   1. Monitor patient status and response to treatment. 2. Patient continues to require medical management. 3. Unsure of DC needs at this time, surgery Monday 8/16/21.   4. Family to transport at DC. CM to monitor progress and recommendations. Ortho note   Alvaro Cabral is a 60 y.o. female is resting in bed.  She is more lucid today.  She reports no worsening pain in her left shoulder.  She has her sling on.  No complaints of nausea, vomiting, dizziness, lightheadedness, chest pain, or shortness of breath. ASSESSMENT / PLAN:   Active Problems:     Seizure ([address] (8/12/2021)         Acute metabolic encephalopathy (2/71/9646)               A: 1. Left proximal humerus fracture with posterior dislocation and head splitting       P: 1. Keep inpatient for left reverse total shoulder arthroplasty on Monday with Dr. Tiara Chong to  that plan may change depending on mental status, seizure activity, and her respiratory status (tongue swelling).    2. Sling 3. NWB LUE. 4. DVT ppx appropriate from our perspective.     5. NPO Sunday night for OR Monday. 6. Orthopedics to follow.                     Seizure - Care Day 1 (8/12/2021) by Cayla Fong       Review Entered Review Status   8/13/2021 08:50 Completed      Criteria Review      Care Day: 1 Care Date: 8/12/2021 Level of Care: Telemetry    Guideline Day 1    Clinical Status    (X) * Clinical Indications met    Activity    (X) Bed rest    Routes    (X) Oral medications as tolerated    8/13/2021 08:50:36 EDT by Tucker Papa      usual diet and meds as below    Interventions    (X) Lab tests    8/13/2021 08:50:36 EDT by Tucker Papa      WBC16.7 (H)  TTTYDABALSY53 (H)  LYMPHOCYTES6 (L)  IMMATURE GRANULOCYTES1 (H)  ABS. YOKUXZVGOKZ02.6 (H)  ABS. IMM. GRANS.0.1 (H)    (X) Neurologic checks and seizure monitoring    8/13/2021 08:50:36 EDT by Tucker Papa      q4 and precuations in place    (X) Possible cardiac monitoring    8/13/2021 08:50:36 EDT by Tucker Papa      tele    (X) Possible EEG, head imaging, lumbar puncture    8/13/2021 08:50:36 EDT by Tucker Papa      EEG: Clinical Interpretation: This EEG, performed during wakefulness and drowsiness is abnormal. There is moderate generalized slowing as seen in encephalopathies. There is no focal asymmetry, seizures or epileptiform discharges seen    (X) Possible pulse oximetry    8/13/2021 08:50:36 EDT by Tucker Papa      cont    Medications    (X) Possible antiepileptic drug    8/13/2021 08:50:36 EDT by Tucker Papa      Keppra 1000mg IV x1,    * Milestone   Additional Notes   8/12   VS- 98.3 116 20 93% 157/82    GLUCOSE,FAST -  (H)    Blood TRACE (A) TRACE (A)   Leukocyte Esterase SMALL (A) Negative   Epithelial cells MODERATE (A) FEW   GFR est non-AA 52 (L)    Albumin 3.1 (L)    A-G Ratio 0.8 (L)     (H)    AMPHETAMINES Positive (A)       Head ct       IMPRESSION   No acute findings. IMPRESSION   CTA Head:   1. Evaluation is significantly limited by motion. No evidence of flow-limiting   stenosis. Scattered atherosclerotic disease (versus artifact) as above.       CTA Neck:   1. No evidence of flow-limiting stenosis. 2. Mild stenosis (less than 50% by NASCET criteria) of the proximal bilateral   internal carotid arteries.       CT Brain Perfusion:   1. No acute abnormality. 2. IMPRESSION   3. No fracture or subluxation. No spinal stenosis. Degenerative changes as   4. described above primarily in the facets. 5. IMPRESSION   6. 1.  Study limited by motion. 7. 2.  Comminuted intra-articular proximal humerus fracture and posterior glenoid   8. fracture. Posterior glenohumeral dislocation   Left Elbow   IMPRESSION   1. No evidence of acute, displaced fracture or dislocation. 2. No elbow joint effusion identified. IMPRESSION   Evidence of apparent irregularity of the proximal humerus suggestive   of a fracture   IMPRESSION   No acute cardiopulmonary process. Age-indeterminate fracture   deformity of the left humeral head which clinical correlation and dedicated   plain film of the humerus should be considered      IMPRESSION   Evaluation is significantly limited by motion.       1. No acute intracranial abnormality. 2. Mild chronic microvascular ischemic disease. Sinus tachycardia    Left axis deviation    Incomplete right bundle branch block    Minimal voltage criteria for LVH, may be normal variant    Abnormal ECG      Meds: Lovenox 40mg sc x1, LR 125ml/hr, LR 1000mg IV x1, ns 1000ml iv x1      ED Notes   Pt arrives via EMS after  found pt on the floor around 3am this morning bleeding from the mouth with tongue swelling.   cleaned her up and went to work, called son to call [facility] arrived on scene she was still bleeding from the mouth with altered mental status.  Pt had GCS of 12.  LKW \"last night before bed\".  BS for .  Pt was incontinent and had pin point pupils for EMS.  Pt  also reports pt has \"been off her medications for a few days\".  Pt answering questions, slurred speech, but unsure of baseline.  Pt reports back pain and left wrist pain. HISTORY OF PRESENT ILLNESS:      Pernell Blandon is a 60 y.o. F with PMHx most notable for bipolar disorder on multiple medications, who presents after family found her on the floor next to the couch, where she had gone to sleep the night before. She was last known well yesterday evening, pt  reports pt went to bed on couch. He checked on her at 0300 and found her on the floor with some bleeding near her mouth. He got her back to bed but did not call EMS. He then got up at 06:30 and he and son found pt confused and with tongue swelling. EMS gave GCS 13.   Gen:    Chronically ill appearing, disheveled    HEENT:  Dried blood around swollen tongue an don teeth; airway patent   Neck:  Supple, without masses, thyroid non-tender   Resp:  No accessory muscle use, clear breath sounds without wheezes rales or rhonchi   Card:   No murmurs, normal S1, S2 without thrills, bruits or peripheral edema   Abd:  Soft, non-tender, non-distended, normoactive bowel sounds are present, no palpable organomegaly and no detectable hernias   Lymph:  No cervical or inguinal adenopathy   Musc:  No cyanosis or clubbing   Skin:   No rashes or ulcers, skin turgor is good   Neuro:  Cranial nerves are grossly intact, no focal motor weakness, follows commands appropriately but slowly; no tremor   Psych:  Good insight, oriented to person, place and time, alert   Assessment / Plan:       #Acute Metabolic Encephalopathy: Clinical hx most consistent with seizure activity. (no fever though). Leukocytosis raises c/f infectious etiology. Less likely CVA.                 - UA, UDS pending               - Based on these, will consider MRI, LP               - Seizure precautions               - Keppra load               - Hold bupropion               - Neuro c/s; appreciate recs     #Leukocytosis: Reactive in s/o seizure vs infectious etiology. Afebrile               - f/u imaging as above               - Hold on empiric abx for now       #Elevated CK: again, c/f sz. Low at present, low c/f true rhabdo               - IVF, trend       #Humeral head fx: Likely sustained during above               - Appreciate ortho recs       #Bipolar: Home meds include bupropion XL 300mg, loraz 0.5mg BID, risperidone 3mg nightly, sertraline 200mg daily.             - f/u pharm med rec to verify these       #ALEX: Mild; volume down               - IVF               - Renally dose meds, avoid nephrotoxins       #HTN: Metop succ 25mg, amlod 5mg, lisin 40mg               - Cont metop, amlod when taking PO (tongue too swollen at present)               - Hold lisin       #COPD/Tobacco       [facility] Consult   Henry Friend is a 60 y.o.  WF with history of hypercholesterolemia, hypertension, bipolar disorder and GERD who was admitted from the ER for altered mental status.       Patient was apparently sleeping on the chair when 3:57 in the morning her  found her on the floor and bleeding from her mouth.   put her back on the chair and ask his son to check on his mother. Giuseppe Frost 57 son checked on the patient and found her confused with swelling of her tongue.  EMS was called.  When seen she was incontinent with pinpoint pupils.       In the ER blood pressure was 157/82 and heart rate of 116.  Labs revealed decreased sodium at 131, increased creatinine, decreased GFR, decreased calcium, decreased albumin, increased WBC and urinalysis showing UTI.  Increase CK to 61.  Chest x-ray revealed left humeral head fracture.  Cervical CT revealed degenerative changes C3-C5.  Head CT did not reveal any acute process.  Head and neck CTA did not reveal any flow-limiting stenosis or aneurysm.  Mild bilateral ICA stenosis.       When seen, patient was initially asleep but easily arousable to name calling. Pasquale Quach difficulty speaking due to injury to her tongue.  Able to follow commands and name objects.           Assessment:       Active Problems:     Seizure ([address] (8/12/2021)         Acute metabolic encephalopathy (6/16/1988)           Plan:   Neurological examination reveals cognitive impairment with no focal deficits.  There is injury to her tongue and left arm currently in a sling due to a fracture.  History and event is consistent with seizure and in this case likely provoked.  Need to assess with primary epilepsy.  Also need to assess for possible stroke as a trigger.       Head CT without contrast did not reveal any acute process.  Brain MRI was ordered to further assess.       Head and neck CTA did not reveal any flow-limiting stenosis or aneurysm.  Mild ICA stenosis.  No intervention necessary.       EEG was ordered to assess for subclinical seizures versus focus for future seizures.       Patient has been loaded with levetiracetam and currently on 1000 mg every 12 hours. Harles Blood yet whether patient will require maintenance AED.       Correct electrolyte issues as well as treat UTI.       Further intervention be done pending results of testing.       Thank you for the consult. Ortho note   Assessment/Plan:   A: 1. Left proximal humerus fracture and posterior dislocation       P: 1. Long discussion with the patient's  about the diagnosis.  Explained the pathology to the  and explained the need for reverse total shoulder arthroplasty.  Discussed case in detail with the . Marie Badillo for left reverse total shoulder arthroplasty on Monday with Dr. Kaitlin George posted. Carlos Heath monitor status this weekend. Monitor tongue swelling and airway.  Sling.  NWB LUE.     2. Neurovascular checks. 3. Ok for DVT ppx.  From orthopedic standpoint.       .         Seizure - Clinical Indications for Admission to Inpatient Care by Chevy Tariq       Review Entered Review Status   8/13/2021 08:49 Completed    Criteria Review      Clinical Indications for Admission to Inpatient Care    Most Recent : Sanjiv Pino Most Recent Date: 8/13/2021 08:49:17 EDT    (X) Admission is indicated for  1 or more  of the following  (1) (2) (3) (4) (5):       (X) Hemodynamic instability       8/13/2021 08:49:17 EDT by Sanjiv Pino         pt has sustained tachycardia 110-121 despite IVF       (X) Etiology (eg, drug toxicity, withdrawal, nonadherence) that requires monitoring or intervention       available only at inpatient level of care or that persists despite observation care (11)       (12)       8/13/2021 08:49:17 EDT by Symone Dove  She is on multiple medications that could contribute (bupropion; w/d from benzos). Toxidrome therefore a consideration.  She is hypertensive and tachycardic, which would raise c/f EtOH/benzo withdrawal or even serotonin syndrome       (X) Metabolic disorder (eg, hypoglycemia) that persists despite observation care       8/13/2021 08:49:17 EDT by Sanjiv Pino         [id number] (L)  [id number] (H)  Creatinine1.06 (H)  Calcium7.8 (L)

## 2021-08-13 NOTE — PROGRESS NOTES
Care Management follow up    Patient admitted for acute metabolic encephalopathy, seizure activity, Bipolar discorder, ALEX, Left humeral head fracture  History of: HTN, COPD, bipolar disorder, GERD. COVID Vaccine:  no      RUR 13 (Score %) low   Is This a Readmission NO  Is this a Bundle NO    Current status  Patient continues to require medical management including IV fluids, potassium replete. Ongoing assessment and monitoring. Talked with  at bedside, face mask on. Discussed possible follow up needed after left shoulder arthroplasty, will monitor post surgical needs. Transition of Care Plan  1. Monitor patient status and response to treatment. 2. Patient continues to require medical management. 3. Unsure of DC needs at this time, surgery Monday 8/16/21.  4. Family to transport at DC. 5. CM to monitor progress and recommendations.     Ella Flynn, RN, MSN/Care manager

## 2021-08-14 ENCOUNTER — APPOINTMENT (OUTPATIENT)
Dept: GENERAL RADIOLOGY | Age: 66
DRG: 041 | End: 2021-08-14
Attending: INTERNAL MEDICINE
Payer: COMMERCIAL

## 2021-08-14 LAB
ANION GAP SERPL CALC-SCNC: 8 MMOL/L (ref 5–15)
BASOPHILS # BLD: 0 K/UL (ref 0–0.1)
BASOPHILS NFR BLD: 0 % (ref 0–1)
BUN SERPL-MCNC: 16 MG/DL (ref 6–20)
BUN/CREAT SERPL: 24 (ref 12–20)
CALCIUM SERPL-MCNC: 8.6 MG/DL (ref 8.5–10.1)
CHLORIDE SERPL-SCNC: 105 MMOL/L (ref 97–108)
CK SERPL-CCNC: ABNORMAL U/L (ref 26–192)
CO2 SERPL-SCNC: 24 MMOL/L (ref 21–32)
CREAT SERPL-MCNC: 0.68 MG/DL (ref 0.55–1.02)
DIFFERENTIAL METHOD BLD: ABNORMAL
EOSINOPHIL # BLD: 0 K/UL (ref 0–0.4)
EOSINOPHIL NFR BLD: 0 % (ref 0–7)
ERYTHROCYTE [DISTWIDTH] IN BLOOD BY AUTOMATED COUNT: 12.9 % (ref 11.5–14.5)
GLUCOSE SERPL-MCNC: 111 MG/DL (ref 65–100)
HCT VFR BLD AUTO: 37.7 % (ref 35–47)
HGB BLD-MCNC: 12.3 G/DL (ref 11.5–16)
IMM GRANULOCYTES # BLD AUTO: 0.2 K/UL (ref 0–0.04)
IMM GRANULOCYTES NFR BLD AUTO: 1 % (ref 0–0.5)
LYMPHOCYTES # BLD: 1.6 K/UL (ref 0.8–3.5)
LYMPHOCYTES NFR BLD: 13 % (ref 12–49)
MAGNESIUM SERPL-MCNC: 2.2 MG/DL (ref 1.6–2.4)
MCH RBC QN AUTO: 28.3 PG (ref 26–34)
MCHC RBC AUTO-ENTMCNC: 32.6 G/DL (ref 30–36.5)
MCV RBC AUTO: 86.9 FL (ref 80–99)
MONOCYTES # BLD: 0.7 K/UL (ref 0–1)
MONOCYTES NFR BLD: 6 % (ref 5–13)
NEUTS SEG # BLD: 10.4 K/UL (ref 1.8–8)
NEUTS SEG NFR BLD: 80 % (ref 32–75)
NRBC # BLD: 0 K/UL (ref 0–0.01)
NRBC BLD-RTO: 0 PER 100 WBC
PLATELET # BLD AUTO: 286 K/UL (ref 150–400)
PMV BLD AUTO: 10.8 FL (ref 8.9–12.9)
POTASSIUM SERPL-SCNC: 3.4 MMOL/L (ref 3.5–5.1)
PROCALCITONIN SERPL-MCNC: 0.05 NG/ML
RBC # BLD AUTO: 4.34 M/UL (ref 3.8–5.2)
SODIUM SERPL-SCNC: 137 MMOL/L (ref 136–145)
WBC # BLD AUTO: 12.9 K/UL (ref 3.6–11)

## 2021-08-14 PROCEDURE — 71045 X-RAY EXAM CHEST 1 VIEW: CPT

## 2021-08-14 PROCEDURE — 85025 COMPLETE CBC W/AUTO DIFF WBC: CPT

## 2021-08-14 PROCEDURE — 74011250636 HC RX REV CODE- 250/636: Performed by: INTERNAL MEDICINE

## 2021-08-14 PROCEDURE — 36415 COLL VENOUS BLD VENIPUNCTURE: CPT

## 2021-08-14 PROCEDURE — 80048 BASIC METABOLIC PNL TOTAL CA: CPT

## 2021-08-14 PROCEDURE — 94760 N-INVAS EAR/PLS OXIMETRY 1: CPT

## 2021-08-14 PROCEDURE — 74011000258 HC RX REV CODE- 258: Performed by: INTERNAL MEDICINE

## 2021-08-14 PROCEDURE — 65270000029 HC RM PRIVATE

## 2021-08-14 PROCEDURE — 84145 PROCALCITONIN (PCT): CPT

## 2021-08-14 PROCEDURE — 83735 ASSAY OF MAGNESIUM: CPT

## 2021-08-14 PROCEDURE — 74011000250 HC RX REV CODE- 250: Performed by: INTERNAL MEDICINE

## 2021-08-14 PROCEDURE — 82550 ASSAY OF CK (CPK): CPT

## 2021-08-14 RX ORDER — METOPROLOL TARTRATE 5 MG/5ML
1.25 INJECTION INTRAVENOUS EVERY 6 HOURS
Status: DISCONTINUED | OUTPATIENT
Start: 2021-08-14 | End: 2021-08-15

## 2021-08-14 RX ADMIN — METOPROLOL TARTRATE 1.25 MG: 5 INJECTION INTRAVENOUS at 18:18

## 2021-08-14 RX ADMIN — Medication 10 ML: at 23:45

## 2021-08-14 RX ADMIN — LEVETIRACETAM 500 MG: 100 INJECTION, SOLUTION INTRAVENOUS at 09:16

## 2021-08-14 RX ADMIN — METOPROLOL TARTRATE 1.25 MG: 5 INJECTION INTRAVENOUS at 08:21

## 2021-08-14 RX ADMIN — SODIUM CHLORIDE, POTASSIUM CHLORIDE, SODIUM LACTATE AND CALCIUM CHLORIDE 75 ML/HR: 600; 310; 30; 20 INJECTION, SOLUTION INTRAVENOUS at 15:21

## 2021-08-14 RX ADMIN — Medication 10 ML: at 08:22

## 2021-08-14 RX ADMIN — METOPROLOL TARTRATE 1.25 MG: 5 INJECTION INTRAVENOUS at 12:11

## 2021-08-14 RX ADMIN — ENOXAPARIN SODIUM 40 MG: 40 INJECTION SUBCUTANEOUS at 08:21

## 2021-08-14 RX ADMIN — Medication 10 ML: at 15:21

## 2021-08-14 RX ADMIN — LEVETIRACETAM 500 MG: 100 INJECTION, SOLUTION INTRAVENOUS at 23:39

## 2021-08-14 NOTE — PROGRESS NOTES
Physician Progress Note      PATIENT:               Hossein Joe  CSN #:                  [id number]  :                       1955  ADMIT DATE:       2021 7:23 AM  DISCH DATE:  RESPONDING  PROVIDER #:        Andie Herndon MD          QUERY TEXT:    Pt admitted with Metabolic encephalopathy. Pt noted to have ALEX in H&P. If possible, please document in the progress notes and discharge summary if you are evaluating and/or treating any of the following: The medical record reflects the following:  Risk Factors: 59 y/o female admitted with AMS, found down in home by . found to have seizure like activity, abnormal urinary analysis, abnormal renal labs. Clinical Indicators: H&P presents after family found her on the floor next to the couch,confused and with tongue swelling  ALEX: Mild; volume down    2021 07:57 Creatinine: 1.06 (H) GFR est non-AA: 52 (L)  2021 00:36 Creatinine: 0.76 GFR est non-AA: >60    Treatment: Admit, hospitalist consult, Labs,  IVF, renally dose meds, avoid nephrotoxins    Defined by Kidney Disease Improving Global Outcomes (KDIGO) clinical practice guideline for acute kidney injury:  -Increase in SCr by greater than or equal to 0.3 mg/dl within 48 hours; or  -Increase in SCr to greater than or equal to 1.5 times baseline, which is known or presumed to have occurred within the prior 7 days; or  -Urine volume < 0.5ml/kg/h for 6 hours  Options provided:  -- Acute kidney injury  -- Acute kidney insufficiency  -- Other - I will add my own diagnosis  -- Disagree - Not applicable / Not valid  -- Disagree - Clinically unable to determine / Unknown  -- Refer to Clinical Documentation Reviewer    PROVIDER RESPONSE TEXT:    This patient has an Acute kidney insufficiency. Query created by: Effie Handing on 2021 12:08 PM      QUERY TEXT:    Patient admitted with AMS, noted to have Abnormal urinary analysis 21 with blood and leukocytosis only. there is documentation of additional UA 8/12/21 at 1808 with blood only. Neurology MD 8/13/21 notes UTI,  no antibiotics ordered or given per [facility]. If possible, please document in progress notes and discharge summary if you are evaluating and/or treating any of the following: The medical record reflects the following:    Risk Factors: 61 y/o female admitted with AMS, found down in home by . found to have seizure like activity, abnormal urinary analysis, abnormal renal labs    Clinical Indicators:  H&P presents after family found her on the floor next to the couch, confused and with tongue swelling  8/13/21 Neurology MD PN:  Urinalysis was positive for UTI; Correct electrolyte issues as well as treat UTI. UA 8/12/2021 09:51 Color: YELLOW/STRAW Appearance: CLEAR Blood: TRACE (A)  Nitrites: Negative Leukocyte Esterase: SMALL (A) Bacteria: Negative    UA 8/12/2021 18:08 Color: YELLOW/STRAW Appearance: CLEAR Blood: TRACE (A) Leukocyte Esterase: Negative Bacteria: Negative    Treatment: Urinary analysis, no urine culture, no antibiotics ordered or given per [facility]  Options provided:  -- UTI  -- UTI ruled out  -- Other - I will add my own diagnosis  -- Disagree - Not applicable / Not valid  -- Disagree - Clinically unable to determine / Unknown  -- Refer to Clinical Documentation Reviewer    PROVIDER RESPONSE TEXT:    UTI ruled out. Query created by: Deb Wick on 8/13/2021 12:37 PM      QUERY TEXT:    Pt admitted with AMS, seizure activity and has Acute metabolic encephalopathy due to seizure as well as contributing medications to encephalopathy status. If possible, please document in progress notes and discharge summary further specificity regarding addition type of encephalopathy:    The medical record reflects the following:  Risk Factors: 61 y/o female admitted with AMS, found down in home by . found to have seizure like activity.     Clinical Indicators: H&P presents after family found her on the floor next to the couch, confused and with tongue swelling. Acute Metabolic Encephalopathy: Clinical hx most consistent with seizure activity. She is on multiple medications that could contribute (bupropion; w/d from benzos). Toxidrome therefore a consideration. 8/12/21 EEG Clinical Interpretation: This EEG, performed during wakefulness and drowsiness is abnormal. There is moderate generalized slowing as seen in encephalopathies. There is no focal asymmetry, seizures or epileptiform discharges seen. ?    Treatment: Hospitalist consult, Neurology consult, seizure precautions, Keppra load, hold bupropion, EEG  Options provided:  -- Metabolic encephalopathy due to seizure and Drug-induced encephalopathy due to bupropion; w/d from benzos  -- Drug-induced encephalopathy due to bupropion; w/d from benzos  -- Drug-induced encephalopathy, substance(s) unknown  -- Other - I will add my own diagnosis  -- Disagree - Not applicable / Not valid  -- Disagree - Clinically unable to determine / Unknown  -- Refer to Clinical Documentation Reviewer    PROVIDER RESPONSE TEXT:    This patient has Metabolic encephalopathy due to seizure and drug-induced encephalopathy due to bupropion; w/d from benzos.     Query created by: Pearl Sterling on 8/13/2021 1:09 PM      Electronically signed by:  Pancho Celestin MD 8/14/2021 5:10 PM

## 2021-08-14 NOTE — PROGRESS NOTES
Bedside and Verbal shift change report given to [address] (oncoming nurse) by Jairo Mesa RN (offgoing nurse).  Report included the following information SBAR, Kardex, MAR, Accordion and Cardiac Rhythm Sinus tachy .

## 2021-08-14 NOTE — PROGRESS NOTES
ORTHO PROGRESS NOTE      SUBJECTIVE:  Kyra Martin denies pain. Confused. Wants out of bed to lay in floor. She denies problems breathing. OBJECTIVE:  Patient Vitals for the past 24 hrs:   Temp Pulse BP   08/14/21 0809 98.3 °F (36.8 °C) (!) 115 (!) 156/82   08/14/21 0700  (!) 121    08/14/21 0403 98.5 °F (36.9 °C) (!) 120 128/67   08/13/21 2333 98 °F (36.7 °C) (!) 112 137/73   08/13/21 2329  (!) 112    08/13/21 2110  (!) 112 (!) 193/97   08/13/21 2012 100.2 °F (37.9 °C) (!) 112 (!) 183/87   08/13/21 1617 99.8 °F (37.7 °C) (!) 109 (!) 170/83   08/13/21 1500  (!) 109    08/13/21 1405   (!) 184/92       No distress. No family present. Respirations unlabored. Tongue edema and dried blood. Sling LUE disheveled. Left shoulder and upper arm ecchymosis but skin intact. SILT LUE. Good AROM wrist.  Palp radial pulse. Recent Labs     08/14/21  0037 08/13/21  0036 08/13/21  0036 08/12/21  0757 08/12/21  0757   HGB 12.3   < > 11.2*   < > 12.4   HCT 37.7   < > 33.9*   < > 37.8      < > 272   < > 284   INR  --   --   --   --  1.0   BUN  --   --  11   < > 19   CREA  --   --  0.76   < > 1.06*   GFRAA  --   --  >60   < > >60   GFRNA  --   --  >60   < > 52*    < > = values in this interval not displayed. ASSESSMENT:  Left proximal humerus fracture  Per neuro notes, suspected provoked seizure  Tongue injury    PLAN:  Recommend reverse shoulder arthroplasty for this fracture pattern . Tentatively posted for 8/16 if patient is cleared by medicine and neuro. I also notified anesthesia so they can eval tongue swelling and airway pre-op. NPO for now per medicine. OK lovenox now. Hold Monday if cleared for surgery. I adjusted sling LUE. NWB LUE. OK hand/wrist/elbow motion as jamie.     BAMBI Connors  Orthopedic Trauma Service  [facility]

## 2021-08-14 NOTE — PROGRESS NOTES
Writer received call from monitor tech reporting that pt is tachy, checked the patient to find her laying in bed in no distress. Informed Dr. Amanda Wasserman of the call from the tele tech and no orders were given stated that if pt is stable and not in distress then it was okay.

## 2021-08-14 NOTE — PROGRESS NOTES
Bedside and Verbal shift change report given to Jose Carlos Cifuentes RN (oncoming nurse) by Damaris Washington RN (offgoing nurse). Report included the following information SBAR, Kardex, ED Summary, MAR, Recent Results and Cardiac Rhythm SB. Critera met for insert Yes  Reason for insert: Urinary obstruction  Date of insert: 08/12/21  Order is current: Yes  MD or RN driven: Provider  Removal Discussed Yes  Last CHG Bath: 08/14/21   Ricci Care Last Completed: Date/Time: 08/14/21 1521  Ricci Care After Each Bowel Movement: Yes  Education documented every 24 hours Yes  Care Plan (Risk for UTI, Ricci) Updated Every 24 hours Yes  Bag below Bladder Yes        Bag off Floor Yes      Sheet Clip used Yes          Tubing free of dependant loops Yes          Seal Intact Yes            Bag less than 1/2 full Yes            TRANSFER - OUT REPORT:    Verbal report given to Eleazar Rivers RN(name) on Johnathan Muhammad  being transferred to [facility] FLOOR(unit) for routine progression of care       Report consisted of patients Situation, Background, Assessment and   Recommendations(SBAR). Information from the following report(s) SBAR, Kardex, ED Summary, MAR, Recent Results and Cardiac Rhythm ST was reviewed with the receiving nurse. Lines:   Peripheral IV 08/12/21 Right Antecubital (Active)   Site Assessment Clean, dry, & intact 08/14/21 1549   Phlebitis Assessment 0 08/14/21 1549   Infiltration Assessment 0 08/14/21 1549   Dressing Status Clean, dry, & intact 08/14/21 1549   Dressing Type Transparent;Tape 08/14/21 1549   Hub Color/Line Status Blue; Infusing 08/14/21 1549   Action Taken Open ports on tubing capped 08/14/21 1549   Alcohol Cap Used Yes 08/14/21 1549        Opportunity for questions and clarification was provided.       Patient transported with:   O2 @ 2 liters  Registered Nurse

## 2021-08-14 NOTE — PROGRESS NOTES
[facility]  [address] [employer], [place], [address] [place] [address]  [phone]      Medical Progress Note      NAME: Anel Arguello   :  [id number]  MRM:  [id number]    Date/Time: 2021  3:09 PM         Subjective:     Chief Complaint:  \"I am okay\"     Pt seen and examined. No complaints. Tongue appears less swollen. Wants to try some liquids. Tmax noted today of 100.3    ROS:  (bold if positive, if negative)           Objective:       Vitals:          Last 24hrs VS reviewed since prior progress note. Most recent are:    Visit Vitals  /77 (BP 1 Location: Left leg, BP Patient Position: At rest)   Pulse (!) 106   Temp 100.3 °F (37.9 °C)   Resp 22   Ht 5' 5\" (1.651 m)   Wt 76.1 kg (167 lb 11.2 oz)   SpO2 95%   BMI 27.91 kg/m²     SpO2 Readings from Last 6 Encounters:   21 95%   19 95%   19 94%   18 93%   18 93%   17 94%    O2 Flow Rate (L/min): 2 l/min       Intake/Output Summary (Last 24 hours) at 2021 1440  Last data filed at 2021 1352  Gross per 24 hour   Intake 900 ml   Output 2375 ml   Net -1475 ml          Exam:     Physical Exam:    Gen: Disheveled female. Tongue injury/laceration   HEENT:  Pink conjunctivae, PERRL, hearing intact to voice, moist mucous membranes  Neck:  Supple, without masses, thyroid non-tender  Resp:  No accessory muscle use, clear breath sounds without wheezes rales or rhonchi  Card: Tachycardic.  Regular  Abd:  Soft, non-tender, non-distended, normoactive bowel sounds are present  Musc:  No cyanosis or clubbing  Skin:  No rashes or ulcers, skin turgor is good  Neuro: Mild diffuse weakness   Psych: Poor insight   LUE in sling    Medications Reviewed: (see below)    Lab Data Reviewed: (see below)    ______________________________________________________________________    Medications:     Current Facility-Administered Medications   Medication Dose Route Frequency    metoprolol (LOPRESSOR) injection 1.25 mg  1.25 mg IntraVENous Q6H    hydrALAZINE (APRESOLINE) 20 mg/mL injection 20 mg  20 mg IntraVENous Q6H PRN    levETIRAcetam (KEPPRA) 500 mg in 0.9% sodium chloride 100 mL IVPB  500 mg IntraVENous Q12H    albuterol-ipratropium (DUO-NEB) 2.5 MG-0.5 MG/3 ML  3 mL Nebulization Q4H PRN    sodium chloride (NS) flush 5-40 mL  5-40 mL IntraVENous Q8H    sodium chloride (NS) flush 5-40 mL  5-40 mL IntraVENous PRN    acetaminophen (TYLENOL) tablet 650 mg  650 mg Oral Q6H PRN    Or    acetaminophen (TYLENOL) suppository 650 mg  650 mg Rectal Q6H PRN    polyethylene glycol (MIRALAX) packet 17 g  17 g Oral DAILY PRN    ondansetron (ZOFRAN ODT) tablet 4 mg  4 mg Oral Q8H PRN    Or    ondansetron (ZOFRAN) injection 4 mg  4 mg IntraVENous Q6H PRN    enoxaparin (LOVENOX) injection 40 mg  40 mg SubCUTAneous DAILY    lactated Ringers infusion  75 mL/hr IntraVENous CONTINUOUS            Lab Review:     Recent Labs     08/14/21  0037 08/13/21  0036 08/12/21  0757   WBC 12.9* 13.0* 16.7*   HGB 12.3 11.2* 12.4   HCT 37.7 33.9* 37.8    272 284     Recent Labs     08/13/21  0036 08/12/21  0757    131*   K 3.2* 3.6   * 102   CO2 24 21   * 102*   BUN 11 19   CREA 0.76 1.06*   CA 8.0* 7.8*   ALB 3.0* 3.1*   ALT 29 26   INR  --  1.0     No components found for: [place]         Assessment / Plan:   Fever - ?etiology. Recent UA without UTI. ? aspiration/PNA since pt has been mostly in bed/seizure precautions   -check CXR   -may need VRP + COVID if CXR unrevealing or has a COVID distribution   -UA not c/w UTI   -check procalcitonin, WBC count etc     Seizure ([facility] [address] (8/12/2021) - EEG without out e/o ictus   -head CT and MRI without acute process  -bedside swallow nursing eval   -can consider stopping keppra at d/c as thought to be provoked by medications and/or benzo withdrawal      Acute metabolic encephalopathy (8/87/0248) - likely post ictal and ?dementia at baseline   -monitor     Leukocytosis - likely reactive.  UA not c/w UTI, CXR without PNA on 8/12. Will repeat CXR today and repeat labs considering fever     Elevated CK - trend CK     Humeral head fracture  -appreciate ortho's assistance  -sling   -NWB LUE   -plan for surgery repair on Monday; NPO at midnight    -tongue does appear to be less swollen; discussed with anesthesia.  Hold on steroids as pt with a fever and unclear if source of infection process     Hypertension   -on amlodipine PTA as well as metoprolol but currently NPO   -on scheduled IV metoprolol for now; if passes bedside swallow will resume PO meds    Hypokalemia   -replete PRN     COPD - not on meds  -PRN duonebs     Bipolar disorder   -ideally need to resume buspar, lamictal, abilify when able to tolerate PO   -would NOT resume Wellbutrin considering recent seizure     Total time spent with patient: [phone] discussed with: Patient,      Discussed:  Care Plan    Prophylaxis:  Lovenox     Disposition:  TBD            ___________________________________________________    Attending Physician: Pamella Becerra MD

## 2021-08-15 ENCOUNTER — APPOINTMENT (OUTPATIENT)
Dept: VASCULAR SURGERY | Age: 66
DRG: 041 | End: 2021-08-15
Attending: INTERNAL MEDICINE
Payer: COMMERCIAL

## 2021-08-15 LAB
ANION GAP SERPL CALC-SCNC: 10 MMOL/L (ref 5–15)
B PERT DNA SPEC QL NAA+PROBE: NOT DETECTED
BASOPHILS # BLD: 0 K/UL (ref 0–0.1)
BASOPHILS NFR BLD: 0 % (ref 0–1)
BORDETELLA PARAPERTUSSIS PCR, BORPAR: NOT DETECTED
BUN SERPL-MCNC: 19 MG/DL (ref 6–20)
BUN/CREAT SERPL: 24 (ref 12–20)
C PNEUM DNA SPEC QL NAA+PROBE: NOT DETECTED
CALCIUM SERPL-MCNC: 8.4 MG/DL (ref 8.5–10.1)
CHLORIDE SERPL-SCNC: 107 MMOL/L (ref 97–108)
CK SERPL-CCNC: 9485 U/L (ref 26–192)
CO2 SERPL-SCNC: 23 MMOL/L (ref 21–32)
CREAT SERPL-MCNC: 0.78 MG/DL (ref 0.55–1.02)
D DIMER PPP FEU-MCNC: 4.08 MG/L FEU (ref 0–0.65)
DIFFERENTIAL METHOD BLD: ABNORMAL
EOSINOPHIL # BLD: 0 K/UL (ref 0–0.4)
EOSINOPHIL NFR BLD: 0 % (ref 0–7)
ERYTHROCYTE [DISTWIDTH] IN BLOOD BY AUTOMATED COUNT: 13.1 % (ref 11.5–14.5)
FLUAV H1 2009 PAND RNA SPEC QL NAA+PROBE: NOT DETECTED
FLUAV H1 RNA SPEC QL NAA+PROBE: NOT DETECTED
FLUAV H3 RNA SPEC QL NAA+PROBE: NOT DETECTED
FLUAV SUBTYP SPEC NAA+PROBE: NOT DETECTED
FLUBV RNA SPEC QL NAA+PROBE: NOT DETECTED
GLUCOSE SERPL-MCNC: 104 MG/DL (ref 65–100)
HADV DNA SPEC QL NAA+PROBE: NOT DETECTED
HCOV 229E RNA SPEC QL NAA+PROBE: NOT DETECTED
HCOV HKU1 RNA SPEC QL NAA+PROBE: NOT DETECTED
HCOV NL63 RNA SPEC QL NAA+PROBE: NOT DETECTED
HCOV OC43 RNA SPEC QL NAA+PROBE: NOT DETECTED
HCT VFR BLD AUTO: 37.6 % (ref 35–47)
HGB BLD-MCNC: 12.3 G/DL (ref 11.5–16)
HMPV RNA SPEC QL NAA+PROBE: NOT DETECTED
HPIV1 RNA SPEC QL NAA+PROBE: NOT DETECTED
HPIV2 RNA SPEC QL NAA+PROBE: NOT DETECTED
HPIV3 RNA SPEC QL NAA+PROBE: NOT DETECTED
HPIV4 RNA SPEC QL NAA+PROBE: NOT DETECTED
IMM GRANULOCYTES # BLD AUTO: 0.1 K/UL (ref 0–0.04)
IMM GRANULOCYTES NFR BLD AUTO: 1 % (ref 0–0.5)
LYMPHOCYTES # BLD: 2.3 K/UL (ref 0.8–3.5)
LYMPHOCYTES NFR BLD: 16 % (ref 12–49)
M PNEUMO DNA SPEC QL NAA+PROBE: NOT DETECTED
MCH RBC QN AUTO: 28.1 PG (ref 26–34)
MCHC RBC AUTO-ENTMCNC: 32.7 G/DL (ref 30–36.5)
MCV RBC AUTO: 86 FL (ref 80–99)
MONOCYTES # BLD: 1 K/UL (ref 0–1)
MONOCYTES NFR BLD: 7 % (ref 5–13)
NEUTS SEG # BLD: 10.6 K/UL (ref 1.8–8)
NEUTS SEG NFR BLD: 76 % (ref 32–75)
NRBC # BLD: 0 K/UL (ref 0–0.01)
NRBC BLD-RTO: 0 PER 100 WBC
PLATELET # BLD AUTO: 312 K/UL (ref 150–400)
PMV BLD AUTO: 10.7 FL (ref 8.9–12.9)
POTASSIUM SERPL-SCNC: 3.3 MMOL/L (ref 3.5–5.1)
RBC # BLD AUTO: 4.37 M/UL (ref 3.8–5.2)
RSV RNA SPEC QL NAA+PROBE: NOT DETECTED
RV+EV RNA SPEC QL NAA+PROBE: NOT DETECTED
SARS-COV-2 PCR, COVPCR: NOT DETECTED
SODIUM SERPL-SCNC: 140 MMOL/L (ref 136–145)
WBC # BLD AUTO: 14 K/UL (ref 3.6–11)

## 2021-08-15 PROCEDURE — 85025 COMPLETE CBC W/AUTO DIFF WBC: CPT

## 2021-08-15 PROCEDURE — 80048 BASIC METABOLIC PNL TOTAL CA: CPT

## 2021-08-15 PROCEDURE — 65270000029 HC RM PRIVATE

## 2021-08-15 PROCEDURE — 74011000258 HC RX REV CODE- 258: Performed by: INTERNAL MEDICINE

## 2021-08-15 PROCEDURE — 74011250636 HC RX REV CODE- 250/636: Performed by: INTERNAL MEDICINE

## 2021-08-15 PROCEDURE — 82550 ASSAY OF CK (CPK): CPT

## 2021-08-15 PROCEDURE — 36415 COLL VENOUS BLD VENIPUNCTURE: CPT

## 2021-08-15 PROCEDURE — 0202U NFCT DS 22 TRGT SARS-COV-2: CPT

## 2021-08-15 PROCEDURE — 85379 FIBRIN DEGRADATION QUANT: CPT

## 2021-08-15 PROCEDURE — 74011250637 HC RX REV CODE- 250/637: Performed by: INTERNAL MEDICINE

## 2021-08-15 PROCEDURE — 74011000250 HC RX REV CODE- 250: Performed by: INTERNAL MEDICINE

## 2021-08-15 RX ORDER — LAMOTRIGINE 100 MG/1
100 TABLET ORAL DAILY
Status: DISCONTINUED | OUTPATIENT
Start: 2021-08-16 | End: 2021-08-19 | Stop reason: HOSPADM

## 2021-08-15 RX ORDER — BUSPIRONE HYDROCHLORIDE 10 MG/1
10 TABLET ORAL 2 TIMES DAILY
Status: DISCONTINUED | OUTPATIENT
Start: 2021-08-15 | End: 2021-08-19 | Stop reason: HOSPADM

## 2021-08-15 RX ORDER — DEXAMETHASONE SODIUM PHOSPHATE 10 MG/ML
4 INJECTION INTRAMUSCULAR; INTRAVENOUS EVERY 6 HOURS
Status: DISCONTINUED | OUTPATIENT
Start: 2021-08-15 | End: 2021-08-16

## 2021-08-15 RX ORDER — POTASSIUM CHLORIDE 7.45 MG/ML
10 INJECTION INTRAVENOUS
Status: DISPENSED | OUTPATIENT
Start: 2021-08-15 | End: 2021-08-15

## 2021-08-15 RX ORDER — METOPROLOL TARTRATE 25 MG/1
25 TABLET, FILM COATED ORAL EVERY 12 HOURS
Status: DISCONTINUED | OUTPATIENT
Start: 2021-08-15 | End: 2021-08-19 | Stop reason: HOSPADM

## 2021-08-15 RX ORDER — SODIUM CHLORIDE 9 MG/ML
250 INJECTION, SOLUTION INTRAVENOUS AS NEEDED
Status: DISCONTINUED | OUTPATIENT
Start: 2021-08-15 | End: 2021-08-19 | Stop reason: HOSPADM

## 2021-08-15 RX ORDER — ARIPIPRAZOLE 5 MG/1
5 TABLET ORAL DAILY
Status: DISCONTINUED | OUTPATIENT
Start: 2021-08-15 | End: 2021-08-19 | Stop reason: HOSPADM

## 2021-08-15 RX ORDER — LEVETIRACETAM 500 MG/1
500 TABLET ORAL 2 TIMES DAILY
Status: DISCONTINUED | OUTPATIENT
Start: 2021-08-15 | End: 2021-08-19 | Stop reason: HOSPADM

## 2021-08-15 RX ORDER — POTASSIUM CHLORIDE 7.45 MG/ML
10 INJECTION INTRAVENOUS
Status: COMPLETED | OUTPATIENT
Start: 2021-08-15 | End: 2021-08-15

## 2021-08-15 RX ADMIN — Medication 10 ML: at 07:05

## 2021-08-15 RX ADMIN — ARIPIPRAZOLE 5 MG: 5 TABLET ORAL at 18:13

## 2021-08-15 RX ADMIN — HYDRALAZINE HYDROCHLORIDE 20 MG: 20 INJECTION INTRAMUSCULAR; INTRAVENOUS at 07:14

## 2021-08-15 RX ADMIN — METOPROLOL TARTRATE 1.25 MG: 5 INJECTION INTRAVENOUS at 11:26

## 2021-08-15 RX ADMIN — METOPROLOL TARTRATE 1.25 MG: 5 INJECTION INTRAVENOUS at 07:09

## 2021-08-15 RX ADMIN — POTASSIUM CHLORIDE 10 MEQ: 10 INJECTION, SOLUTION INTRAVENOUS at 18:13

## 2021-08-15 RX ADMIN — SODIUM CHLORIDE, POTASSIUM CHLORIDE, SODIUM LACTATE AND CALCIUM CHLORIDE 125 ML/HR: 600; 310; 30; 20 INJECTION, SOLUTION INTRAVENOUS at 07:04

## 2021-08-15 RX ADMIN — POTASSIUM CHLORIDE 10 MEQ: 10 INJECTION, SOLUTION INTRAVENOUS at 10:09

## 2021-08-15 RX ADMIN — ENOXAPARIN SODIUM 40 MG: 40 INJECTION SUBCUTANEOUS at 10:09

## 2021-08-15 RX ADMIN — POTASSIUM CHLORIDE 10 MEQ: 10 INJECTION, SOLUTION INTRAVENOUS at 11:26

## 2021-08-15 RX ADMIN — METOPROLOL TARTRATE 25 MG: 25 TABLET, FILM COATED ORAL at 18:14

## 2021-08-15 RX ADMIN — BUSPIRONE HYDROCHLORIDE 10 MG: 10 TABLET ORAL at 18:14

## 2021-08-15 RX ADMIN — Medication 10 ML: at 11:27

## 2021-08-15 RX ADMIN — DEXAMETHASONE SODIUM PHOSPHATE 4 MG: 10 INJECTION, SOLUTION INTRAMUSCULAR; INTRAVENOUS at 18:13

## 2021-08-15 RX ADMIN — POTASSIUM CHLORIDE 10 MEQ: 10 INJECTION, SOLUTION INTRAVENOUS at 14:17

## 2021-08-15 RX ADMIN — LEVETIRACETAM 500 MG: 100 INJECTION, SOLUTION INTRAVENOUS at 11:25

## 2021-08-15 RX ADMIN — METOPROLOL TARTRATE 1.25 MG: 5 INJECTION INTRAVENOUS at 00:40

## 2021-08-15 NOTE — ANESTHESIA PREPROCEDURE EVALUATION
Anesthetic History   No history of anesthetic complications            Review of Systems / Medical History  Patient summary reviewed and pertinent labs reviewed    Pulmonary          Smoker         Neuro/Psych     seizures: well controlled    Psychiatric history    Comments: Taking Keppra. Seizure likely from withdrawal from anxiety meds? Cardiovascular    Hypertension              Exercise tolerance: >4 METS     GI/Hepatic/Renal     GERD           Endo/Other  Within defined limits           Other Findings   Comments: Frail, elderly female found down after having a seizure. Bit her tongue which was very swollen but has improved. CPK trending down; 10k, 9k         Physical Exam    Airway  Mallampati: III    Neck ROM: normal range of motion   Mouth opening: Normal    Comments: Airway assessed at bedside. Small lacerations on the underside of her tongue. Mild global swelling. No active bleeding  Posterior pharynx and tongue examined with fiberoptic scope. No gross swelling or bleeding.  Could visualize epiglottis and larynx with clear view of vocal cords Cardiovascular    Rhythm: regular  Rate: normal         Dental    Dentition: Poor dentition  Comments: Loose teeth   Pulmonary  Breath sounds clear to auscultation               Abdominal  GI exam deferred       Other Findings            Anesthetic Plan    ASA: 3  Anesthesia type: general and regional - interscalene block      Post-op pain plan if not by surgeon: regional    Induction: Intravenous

## 2021-08-15 NOTE — PROGRESS NOTES
Problem: Falls - Risk of  Goal: *Absence of Falls  Description: Document Fannie Encarnacion Fall Risk and appropriate interventions in the flowsheet. Outcome: Progressing Towards Goal  Note: Fall Risk Interventions:  Mobility Interventions: Bed/chair exit alarm, Patient to call before getting OOB, Strengthening exercises (ROM-active/passive), Utilize gait belt for transfers/ambulation, Utilize walker, cane, or other assistive device    Mentation Interventions: Adequate sleep, hydration, pain control, Bed/chair exit alarm, More frequent rounding, Reorient patient    Medication Interventions: Bed/chair exit alarm, Teach patient to arise slowly, Utilize gait belt for transfers/ambulation    Elimination Interventions: Bed/chair exit alarm, Call light in reach, Stay With Me (per policy), Toilet paper/wipes in reach, Toileting schedule/hourly rounds    History of Falls Interventions: Bed/chair exit alarm, Utilize gait belt for transfer/ambulation         Problem: Patient Education: Go to Patient Education Activity  Goal: Patient/Family Education  Outcome: Progressing Towards Goal     Problem: Pressure Injury - Risk of  Goal: *Prevention of pressure injury  Description: Document Cade Scale and appropriate interventions in the flowsheet.   Outcome: Progressing Towards Goal  Note: Pressure Injury Interventions:  Sensory Interventions: Float heels, Minimize linen layers    Moisture Interventions: Absorbent underpads, Minimize layers    Activity Interventions: Increase time out of bed, PT/OT evaluation    Mobility Interventions: Float heels, HOB 30 degrees or less, PT/OT evaluation    Nutrition Interventions: Document food/fluid/supplement intake, Offer support with meals,snacks and hydration    Friction and Shear Interventions: Lift sheet, HOB 30 degrees or less, Minimize layers                Problem: Patient Education: Go to Patient Education Activity  Goal: Patient/Family Education  Outcome: Progressing Towards Goal

## 2021-08-15 NOTE — PROGRESS NOTES
TRANSFER - IN REPORT:    Verbal report received from Ivis(name) on Deneen Chan  being received from [facility](unit) for routine progression of care      Report consisted of patients Situation, Background, Assessment and   Recommendations(SBAR). Information from the following report(s) SBAR, Kardex, Intake/Output, MAR, Recent Results and Cardiac Rhythm STach was reviewed with the receiving nurse. Opportunity for questions and clarification was provided. Assessment completed upon patients arrival to unit and care assumed.

## 2021-08-15 NOTE — PROGRESS NOTES
Ortho:    Chart reviewed. Resp viral panel pending. Ordered for Temp 100.3 and leukocytosis. Left prox humerus fracture/dislocation. Tentatively posted for reverse shoulder arthroplasty  8/16 pending clearance by hospitalist and neuro. Anesthesia will eval airway later today. Will hold Lovenox after dose this am.    She's currently NPO due to tongue trauma but need to make NPO p mn if decision to advance diet today. Addendum 18:00:  Resp viral panel resulted negative. D/W Dr. Mike Saleh who agrees droplet precautions no longer needed. Some left shoulder pain.  clarifies history. Patient was on floor max 10-15 minutes. He also reports chronic tachycardia controlled with baseline beta blocker (which was held while NPO for tongue swelling). Tongue swelling seems improved. No obvious deep laceration. LUE ecchymosis as expected. Mild edema. Skin intact. SILT. Palp radial pulse. Moves wrist/digits OK. RUE no edema, NTTP. BLE no edema, NTTP. Anesthesia will eval airway. Plan for reverse TSA 8/16 am Dr. Marcia Gutierrez.     BAMBI Chavez

## 2021-08-15 NOTE — PROGRESS NOTES
Bedside and Verbal shift change report given to  [facility] oncoming nurse) by Loy Marroquin (offgoing nurse).  Report included the following information SBAR, Kardex, Intake/Output, MAR, Recent Results

## 2021-08-15 NOTE — PROGRESS NOTES
Fer[facility] Mcf[facility] [place] [address]  [address]  [phone]      Medical Progress Note      NAME: John Montes   :    MRM:  [id number]    Date/Time: 8/15/2021  3:09 PM         Subjective:     Chief Complaint:  \"I am okay\"     Pt seen and examined. No complaints. Tmax overnight. No source of infection isolated to date. VRP + COVID pending      ROS:  (bold if positive, if negative)           Objective:       Vitals:          Last 24hrs VS reviewed since prior progress note. Most recent are:    Visit Vitals  BP (!) 175/83 (BP 1 Location: Right upper arm, BP Patient Position: At rest;Lying)   Pulse (!) 113   Temp 98.4 °F (36.9 °C)   Resp 20   Ht 5' 5\" (1.651 m)   Wt 76.1 kg (167 lb 11.2 oz)   SpO2 94%   BMI 27.91 kg/m²     SpO2 Readings from Last 6 Encounters:   08/15/21 94%   19 95%   19 94%   18 93%   18 93%   17 94%    O2 Flow Rate (L/min): 2 l/min       Intake/Output Summary (Last 24 hours) at 8/15/2021 1430  Last data filed at 2021 1549  Gross per 24 hour   Intake 60 ml   Output 400 ml   Net -340 ml          Exam:     Physical Exam:    Gen: Disheveled female. Tongue injury/laceration   HEENT:  Pink conjunctivae, PERRL, hearing intact to voice, moist mucous membranes  Neck:  Supple, without masses, thyroid non-tender  Resp:  No accessory muscle use, clear breath sounds without wheezes rales or rhonchi  Card: Tachycardic.  Regular  Abd:  Soft, non-tender, non-distended, normoactive bowel sounds are present  Musc:  No cyanosis or clubbing  Skin:  No rashes or ulcers, skin turgor is good  Neuro: Mild diffuse weakness   Psych: Poor insight   LUE in sling    Medications Reviewed: (see below)    Lab Data Reviewed: (see below)    ______________________________________________________________________    Medications:     Current Facility-Administered Medications   Medication Dose Route Frequency    0.9% sodium chloride infusion 250 mL 250 mL IntraVENous PRN    potassium chloride 10 mEq in 100 ml IVPB  10 mEq IntraVENous Q1H    ARIPiprazole (ABILIFY) tablet 5 mg  5 mg Oral DAILY    busPIRone (BUSPAR) tablet 10 mg  10 mg Oral BID    [START ON 8/16/2021] lamoTRIgine (LaMICtal) tablet 100 mg  100 mg Oral DAILY    metoprolol tartrate (LOPRESSOR) tablet 25 mg  25 mg Oral Q12H    levETIRAcetam (KEPPRA) tablet 500 mg  500 mg Oral BID    hydrALAZINE (APRESOLINE) 20 mg/mL injection 20 mg  20 mg IntraVENous Q6H PRN    albuterol-ipratropium (DUO-NEB) 2.5 MG-0.5 MG/3 ML  3 mL Nebulization Q4H PRN    sodium chloride (NS) flush 5-40 mL  5-40 mL IntraVENous Q8H    sodium chloride (NS) flush 5-40 mL  5-40 mL IntraVENous PRN    acetaminophen (TYLENOL) tablet 650 mg  650 mg Oral Q6H PRN    Or    acetaminophen (TYLENOL) suppository 650 mg  650 mg Rectal Q6H PRN    polyethylene glycol (MIRALAX) packet 17 g  17 g Oral DAILY PRN    ondansetron (ZOFRAN ODT) tablet 4 mg  4 mg Oral Q8H PRN    Or    ondansetron (ZOFRAN) injection 4 mg  4 mg IntraVENous Q6H PRN    [Held by provider] enoxaparin (LOVENOX) injection 40 mg  40 mg SubCUTAneous DAILY    lactated Ringers infusion  125 mL/hr IntraVENous CONTINUOUS            Lab Review:     Recent Labs     08/15/21  0035 08/14/21 0037 08/13/21  0036   WBC 14.0* 12.9* 13.0*   HGB 12.3 12.3 11.2*   HCT 37.6 37.7 33.9*    286 272     Recent Labs     08/15/21  0035 08/14/21  1549 08/13/21 0036    137 140   K 3.3* 3.4* 3.2*    105 109*   CO2 23 24 24   * 111* 105*   BUN 19 16 11   CREA 0.78 0.68 0.76   CA 8.4* 8.6 8.0*   MG  --  2.2  --    ALB  --   --  3.0*   ALT  --   --  29     No components found for: [place]         Assessment / Plan:   Fever - ?etiology.  ?viral  -UA/CXR not c/w UTI   -procalcitonin low   -check VRP + COVID     Seizure ([facility] [address] (8/12/2021) - EEG without out e/o ictus   -head CT and MRI without acute process  -bedside swallow passed; start diet   -can consider stopping keppra at d/c as thought to be provoked by medications and/or benzo withdrawal      Acute metabolic encephalopathy (7/92/7774) - likely post ictal and ?dementia at baseline   -monitor     Leukocytosis - likely reactive.  UA not c/w UTI, CXR without PNA on 8/14  -monitor     Rhabdo - likely 2/2 seizure   -IVF's   -trend CK   -Cr WNL     Humeral head fracture  -appreciate ortho's assistance  -sling   -NWB LUE   -plan for surgery repair on Monday; NPO at midnight    -start steroids for now considering tongue swelling and no clear bacterial source of infection     Hypertension - not on meds PTA   -start PO metoprolol     Hypokalemia   -repleted     COPD - not on meds  -PRN duonebs     Bipolar disorder   -resume buspar, lamictal, abilify when able to tolerate PO   -would NOT resume Wellbutrin considering recent seizure     Total time spent with patient: [phone] discussed with: Patient       Discussed:  Care Plan    Prophylaxis:  Lovenox; on hold     Disposition:  TBD            ___________________________________________________    Attending Physician: Kassandra Lockwood MD

## 2021-08-16 ENCOUNTER — APPOINTMENT (OUTPATIENT)
Dept: VASCULAR SURGERY | Age: 66
DRG: 041 | End: 2021-08-16
Attending: INTERNAL MEDICINE
Payer: COMMERCIAL

## 2021-08-16 ENCOUNTER — ANESTHESIA (OUTPATIENT)
Dept: SURGERY | Age: 66
DRG: 041 | End: 2021-08-16
Payer: COMMERCIAL

## 2021-08-16 ENCOUNTER — APPOINTMENT (OUTPATIENT)
Dept: GENERAL RADIOLOGY | Age: 66
DRG: 041 | End: 2021-08-16
Attending: ORTHOPAEDIC SURGERY
Payer: COMMERCIAL

## 2021-08-16 LAB
ANION GAP SERPL CALC-SCNC: 8 MMOL/L (ref 5–15)
BASOPHILS # BLD: 0 K/UL (ref 0–0.1)
BASOPHILS NFR BLD: 0 % (ref 0–1)
BUN SERPL-MCNC: 19 MG/DL (ref 6–20)
BUN/CREAT SERPL: 37 (ref 12–20)
CALCIUM SERPL-MCNC: 8.5 MG/DL (ref 8.5–10.1)
CHLORIDE SERPL-SCNC: 108 MMOL/L (ref 97–108)
CO2 SERPL-SCNC: 22 MMOL/L (ref 21–32)
CREAT SERPL-MCNC: 0.51 MG/DL (ref 0.55–1.02)
DIFFERENTIAL METHOD BLD: ABNORMAL
EOSINOPHIL # BLD: 0 K/UL (ref 0–0.4)
EOSINOPHIL NFR BLD: 0 % (ref 0–7)
ERYTHROCYTE [DISTWIDTH] IN BLOOD BY AUTOMATED COUNT: 13.2 % (ref 11.5–14.5)
GLUCOSE SERPL-MCNC: 112 MG/DL (ref 65–100)
HCT VFR BLD AUTO: 36.7 % (ref 35–47)
HGB BLD-MCNC: 12 G/DL (ref 11.5–16)
HISTORY CHECKED?,CKHIST: NORMAL
IMM GRANULOCYTES # BLD AUTO: 0.1 K/UL (ref 0–0.04)
IMM GRANULOCYTES NFR BLD AUTO: 1 % (ref 0–0.5)
LYMPHOCYTES # BLD: 1.4 K/UL (ref 0.8–3.5)
LYMPHOCYTES NFR BLD: 13 % (ref 12–49)
MCH RBC QN AUTO: 28.3 PG (ref 26–34)
MCHC RBC AUTO-ENTMCNC: 32.7 G/DL (ref 30–36.5)
MCV RBC AUTO: 86.6 FL (ref 80–99)
MONOCYTES # BLD: 0.2 K/UL (ref 0–1)
MONOCYTES NFR BLD: 2 % (ref 5–13)
NEUTS SEG # BLD: 9 K/UL (ref 1.8–8)
NEUTS SEG NFR BLD: 84 % (ref 32–75)
NRBC # BLD: 0 K/UL (ref 0–0.01)
NRBC BLD-RTO: 0 PER 100 WBC
PLATELET # BLD AUTO: 297 K/UL (ref 150–400)
PMV BLD AUTO: 10.6 FL (ref 8.9–12.9)
POTASSIUM SERPL-SCNC: 3.8 MMOL/L (ref 3.5–5.1)
RBC # BLD AUTO: 4.24 M/UL (ref 3.8–5.2)
SODIUM SERPL-SCNC: 138 MMOL/L (ref 136–145)
WBC # BLD AUTO: 10.8 K/UL (ref 3.6–11)

## 2021-08-16 PROCEDURE — 77030003601 HC NDL NRV BLK BBMI -A: Performed by: STUDENT IN AN ORGANIZED HEALTH CARE EDUCATION/TRAINING PROGRAM

## 2021-08-16 PROCEDURE — 77030003652 HC NDL RC MINI BIOM -B: Performed by: ORTHOPAEDIC SURGERY

## 2021-08-16 PROCEDURE — 74011250637 HC RX REV CODE- 250/637: Performed by: ORTHOPAEDIC SURGERY

## 2021-08-16 PROCEDURE — 74011250636 HC RX REV CODE- 250/636: Performed by: INTERNAL MEDICINE

## 2021-08-16 PROCEDURE — 77030031139 HC SUT VCRL2 J&J -A: Performed by: ORTHOPAEDIC SURGERY

## 2021-08-16 PROCEDURE — 74011250636 HC RX REV CODE- 250/636: Performed by: ORTHOPAEDIC SURGERY

## 2021-08-16 PROCEDURE — 77030038692 HC WND DEB SYS IRMX -B: Performed by: ORTHOPAEDIC SURGERY

## 2021-08-16 PROCEDURE — 3E0T3BZ INTRODUCTION OF ANESTHETIC AGENT INTO PERIPHERAL NERVES AND PLEXI, PERCUTANEOUS APPROACH: ICD-10-PCS | Performed by: STUDENT IN AN ORGANIZED HEALTH CARE EDUCATION/TRAINING PROGRAM

## 2021-08-16 PROCEDURE — 74011250636 HC RX REV CODE- 250/636: Performed by: NURSE ANESTHETIST, CERTIFIED REGISTERED

## 2021-08-16 PROCEDURE — 85025 COMPLETE CBC W/AUTO DIFF WBC: CPT

## 2021-08-16 PROCEDURE — 77030019908 HC STETH ESOPH SIMS -A: Performed by: STUDENT IN AN ORGANIZED HEALTH CARE EDUCATION/TRAINING PROGRAM

## 2021-08-16 PROCEDURE — 77030011208: Performed by: ORTHOPAEDIC SURGERY

## 2021-08-16 PROCEDURE — 76210000016 HC OR PH I REC 1 TO 1.5 HR: Performed by: ORTHOPAEDIC SURGERY

## 2021-08-16 PROCEDURE — 77030021303 HC BUR FLUT MIDAS BRSM -B: Performed by: ORTHOPAEDIC SURGERY

## 2021-08-16 PROCEDURE — 74011000250 HC RX REV CODE- 250: Performed by: ORTHOPAEDIC SURGERY

## 2021-08-16 PROCEDURE — 73020 X-RAY EXAM OF SHOULDER: CPT

## 2021-08-16 PROCEDURE — C1776 JOINT DEVICE (IMPLANTABLE): HCPCS | Performed by: ORTHOPAEDIC SURGERY

## 2021-08-16 PROCEDURE — 77030040361 HC SLV COMPR DVT MDII -B

## 2021-08-16 PROCEDURE — 77030026438 HC STYL ET INTUB CARD -A: Performed by: STUDENT IN AN ORGANIZED HEALTH CARE EDUCATION/TRAINING PROGRAM

## 2021-08-16 PROCEDURE — 77030006835 HC BLD SAW SAG STRY -B: Performed by: ORTHOPAEDIC SURGERY

## 2021-08-16 PROCEDURE — 74011250636 HC RX REV CODE- 250/636: Performed by: STUDENT IN AN ORGANIZED HEALTH CARE EDUCATION/TRAINING PROGRAM

## 2021-08-16 PROCEDURE — 77030027138 HC INCENT SPIROMETER -A

## 2021-08-16 PROCEDURE — 92610 EVALUATE SWALLOWING FUNCTION: CPT

## 2021-08-16 PROCEDURE — 77030020471 HC BIT DRL CREV ZIMM -C: Performed by: ORTHOPAEDIC SURGERY

## 2021-08-16 PROCEDURE — 65270000029 HC RM PRIVATE

## 2021-08-16 PROCEDURE — 76010000171 HC OR TIME 2 TO 2.5 HR INTENSV-TIER 1: Performed by: ORTHOPAEDIC SURGERY

## 2021-08-16 PROCEDURE — 80048 BASIC METABOLIC PNL TOTAL CA: CPT

## 2021-08-16 PROCEDURE — L3670 SO ACRO/CLAV CAN WEB PRE OTS: HCPCS | Performed by: ORTHOPAEDIC SURGERY

## 2021-08-16 PROCEDURE — 77030002933 HC SUT MCRYL J&J -A: Performed by: ORTHOPAEDIC SURGERY

## 2021-08-16 PROCEDURE — 0RRK00Z REPLACEMENT OF LEFT SHOULDER JOINT WITH REVERSE BALL AND SOCKET SYNTHETIC SUBSTITUTE, OPEN APPROACH: ICD-10-PCS | Performed by: ORTHOPAEDIC SURGERY

## 2021-08-16 PROCEDURE — 77030028700 HC BLD TISS PLSM MEDT -E: Performed by: ORTHOPAEDIC SURGERY

## 2021-08-16 PROCEDURE — 0LM20ZZ REATTACHMENT OF LEFT SHOULDER TENDON, OPEN APPROACH: ICD-10-PCS | Performed by: ORTHOPAEDIC SURGERY

## 2021-08-16 PROCEDURE — 77030040922 HC BLNKT HYPOTHRM STRY -A

## 2021-08-16 PROCEDURE — 36415 COLL VENOUS BLD VENIPUNCTURE: CPT

## 2021-08-16 PROCEDURE — 77030018673: Performed by: ORTHOPAEDIC SURGERY

## 2021-08-16 PROCEDURE — 77030010512 HC APPL CLP LIG J&J -C: Performed by: ORTHOPAEDIC SURGERY

## 2021-08-16 PROCEDURE — 86901 BLOOD TYPING SEROLOGIC RH(D): CPT

## 2021-08-16 PROCEDURE — 74011250637 HC RX REV CODE- 250/637: Performed by: INTERNAL MEDICINE

## 2021-08-16 PROCEDURE — 77030002986 HC SUT PROL J&J -A: Performed by: ORTHOPAEDIC SURGERY

## 2021-08-16 PROCEDURE — 94760 N-INVAS EAR/PLS OXIMETRY 1: CPT

## 2021-08-16 PROCEDURE — 2709999900 HC NON-CHARGEABLE SUPPLY: Performed by: ORTHOPAEDIC SURGERY

## 2021-08-16 PROCEDURE — 77030020268 HC MISC GENERAL SUPPLY: Performed by: ORTHOPAEDIC SURGERY

## 2021-08-16 PROCEDURE — 77030002922 HC SUT FBRWRE ARTH -B: Performed by: ORTHOPAEDIC SURGERY

## 2021-08-16 PROCEDURE — 86923 COMPATIBILITY TEST ELECTRIC: CPT

## 2021-08-16 PROCEDURE — 77030008684 HC TU ET CUF COVD -B: Performed by: STUDENT IN AN ORGANIZED HEALTH CARE EDUCATION/TRAINING PROGRAM

## 2021-08-16 PROCEDURE — 76060000035 HC ANESTHESIA 2 TO 2.5 HR: Performed by: ORTHOPAEDIC SURGERY

## 2021-08-16 PROCEDURE — 93970 EXTREMITY STUDY: CPT

## 2021-08-16 PROCEDURE — 74011000250 HC RX REV CODE- 250: Performed by: NURSE ANESTHETIST, CERTIFIED REGISTERED

## 2021-08-16 PROCEDURE — 77030018547 HC SUT ETHBND1 J&J -B: Performed by: ORTHOPAEDIC SURGERY

## 2021-08-16 PROCEDURE — C1713 ANCHOR/SCREW BN/BN,TIS/BN: HCPCS | Performed by: ORTHOPAEDIC SURGERY

## 2021-08-16 DEVICE — SHOULDR S3 TOT ADV REVRS IMPL CAPPED S3: Type: IMPLANTABLE DEVICE | Status: FUNCTIONAL

## 2021-08-16 DEVICE — IMPLANTABLE DEVICE
Type: IMPLANTABLE DEVICE | Site: SHOULDER | Status: FUNCTIONAL
Brand: COMPREHENSIVE®

## 2021-08-16 DEVICE — IMPLANTABLE DEVICE
Type: IMPLANTABLE DEVICE | Site: SHOULDER | Status: FUNCTIONAL
Brand: COMPREHENSIVE REVERSE SHOULDER

## 2021-08-16 DEVICE — STEM HUM PRI MINI 12MM -- COMPREHENSIVE: Type: IMPLANTABLE DEVICE | Site: SHOULDER | Status: FUNCTIONAL

## 2021-08-16 DEVICE — IMPLANTABLE DEVICE
Type: IMPLANTABLE DEVICE | Site: SHOULDER | Status: FUNCTIONAL
Brand: COMPREHENSIVE® REVERSE SHOULDER

## 2021-08-16 DEVICE — IMPLANTABLE DEVICE
Type: IMPLANTABLE DEVICE | Site: SHOULDER | Status: FUNCTIONAL
Brand: COMPREHENSIVE® VIVACIT-E®

## 2021-08-16 RX ORDER — AMOXICILLIN 250 MG
1 CAPSULE ORAL 2 TIMES DAILY
Status: DISCONTINUED | OUTPATIENT
Start: 2021-08-16 | End: 2021-08-19 | Stop reason: HOSPADM

## 2021-08-16 RX ORDER — ASPIRIN 325 MG
325 TABLET, DELAYED RELEASE (ENTERIC COATED) ORAL 2 TIMES DAILY
Status: CANCELLED | OUTPATIENT
Start: 2021-08-16

## 2021-08-16 RX ORDER — ROPIVACAINE HYDROCHLORIDE 5 MG/ML
INJECTION, SOLUTION EPIDURAL; INFILTRATION; PERINEURAL AS NEEDED
Status: DISCONTINUED | OUTPATIENT
Start: 2021-08-16 | End: 2021-08-16 | Stop reason: HOSPADM

## 2021-08-16 RX ORDER — SODIUM CHLORIDE, SODIUM LACTATE, POTASSIUM CHLORIDE, CALCIUM CHLORIDE 600; 310; 30; 20 MG/100ML; MG/100ML; MG/100ML; MG/100ML
100 INJECTION, SOLUTION INTRAVENOUS CONTINUOUS
Status: DISCONTINUED | OUTPATIENT
Start: 2021-08-16 | End: 2021-08-16 | Stop reason: HOSPADM

## 2021-08-16 RX ORDER — NALOXONE HYDROCHLORIDE 0.4 MG/ML
0.4 INJECTION, SOLUTION INTRAMUSCULAR; INTRAVENOUS; SUBCUTANEOUS AS NEEDED
Status: DISCONTINUED | OUTPATIENT
Start: 2021-08-16 | End: 2021-08-19 | Stop reason: HOSPADM

## 2021-08-16 RX ORDER — HYDROMORPHONE HYDROCHLORIDE 1 MG/ML
0.5 INJECTION, SOLUTION INTRAMUSCULAR; INTRAVENOUS; SUBCUTANEOUS ONCE
Status: COMPLETED | OUTPATIENT
Start: 2021-08-16 | End: 2021-08-16

## 2021-08-16 RX ORDER — POLYETHYLENE GLYCOL 3350 17 G/17G
17 POWDER, FOR SOLUTION ORAL DAILY
Status: DISCONTINUED | OUTPATIENT
Start: 2021-08-17 | End: 2021-08-19 | Stop reason: HOSPADM

## 2021-08-16 RX ORDER — PROPOFOL 10 MG/ML
INJECTION, EMULSION INTRAVENOUS AS NEEDED
Status: DISCONTINUED | OUTPATIENT
Start: 2021-08-16 | End: 2021-08-16 | Stop reason: HOSPADM

## 2021-08-16 RX ORDER — NEOSTIGMINE METHYLSULFATE 1 MG/ML
INJECTION, SOLUTION INTRAVENOUS AS NEEDED
Status: DISCONTINUED | OUTPATIENT
Start: 2021-08-16 | End: 2021-08-16 | Stop reason: HOSPADM

## 2021-08-16 RX ORDER — LIDOCAINE HYDROCHLORIDE 20 MG/ML
INJECTION, SOLUTION EPIDURAL; INFILTRATION; INTRACAUDAL; PERINEURAL AS NEEDED
Status: DISCONTINUED | OUTPATIENT
Start: 2021-08-16 | End: 2021-08-16 | Stop reason: HOSPADM

## 2021-08-16 RX ORDER — GLYCOPYRROLATE 0.2 MG/ML
INJECTION INTRAMUSCULAR; INTRAVENOUS AS NEEDED
Status: DISCONTINUED | OUTPATIENT
Start: 2021-08-16 | End: 2021-08-16 | Stop reason: HOSPADM

## 2021-08-16 RX ORDER — FAMOTIDINE 20 MG/1
20 TABLET, FILM COATED ORAL 2 TIMES DAILY
Status: DISCONTINUED | OUTPATIENT
Start: 2021-08-16 | End: 2021-08-19 | Stop reason: HOSPADM

## 2021-08-16 RX ORDER — FENTANYL CITRATE 50 UG/ML
INJECTION, SOLUTION INTRAMUSCULAR; INTRAVENOUS AS NEEDED
Status: DISCONTINUED | OUTPATIENT
Start: 2021-08-16 | End: 2021-08-16 | Stop reason: HOSPADM

## 2021-08-16 RX ORDER — VANCOMYCIN HYDROCHLORIDE 1 G/20ML
INJECTION, POWDER, LYOPHILIZED, FOR SOLUTION INTRAVENOUS AS NEEDED
Status: DISCONTINUED | OUTPATIENT
Start: 2021-08-16 | End: 2021-08-16 | Stop reason: HOSPADM

## 2021-08-16 RX ORDER — ROCURONIUM BROMIDE 10 MG/ML
INJECTION, SOLUTION INTRAVENOUS AS NEEDED
Status: DISCONTINUED | OUTPATIENT
Start: 2021-08-16 | End: 2021-08-16 | Stop reason: HOSPADM

## 2021-08-16 RX ORDER — PHENYLEPHRINE HCL IN 0.9% NACL 0.4MG/10ML
SYRINGE (ML) INTRAVENOUS
Status: DISCONTINUED | OUTPATIENT
Start: 2021-08-16 | End: 2021-08-16 | Stop reason: HOSPADM

## 2021-08-16 RX ORDER — DIPHENHYDRAMINE HCL 12.5MG/5ML
12.5 ELIXIR ORAL
Status: DISPENSED | OUTPATIENT
Start: 2021-08-16 | End: 2021-08-17

## 2021-08-16 RX ORDER — SUCCINYLCHOLINE CHLORIDE 20 MG/ML
INJECTION INTRAMUSCULAR; INTRAVENOUS AS NEEDED
Status: DISCONTINUED | OUTPATIENT
Start: 2021-08-16 | End: 2021-08-16 | Stop reason: HOSPADM

## 2021-08-16 RX ORDER — ONDANSETRON 2 MG/ML
INJECTION INTRAMUSCULAR; INTRAVENOUS AS NEEDED
Status: DISCONTINUED | OUTPATIENT
Start: 2021-08-16 | End: 2021-08-16 | Stop reason: HOSPADM

## 2021-08-16 RX ORDER — DEXAMETHASONE SODIUM PHOSPHATE 4 MG/ML
INJECTION, SOLUTION INTRA-ARTICULAR; INTRALESIONAL; INTRAMUSCULAR; INTRAVENOUS; SOFT TISSUE AS NEEDED
Status: DISCONTINUED | OUTPATIENT
Start: 2021-08-16 | End: 2021-08-16 | Stop reason: HOSPADM

## 2021-08-16 RX ADMIN — ACETAMINOPHEN 650 MG: 325 TABLET ORAL at 18:28

## 2021-08-16 RX ADMIN — DEXAMETHASONE SODIUM PHOSPHATE 4 MG: 10 INJECTION, SOLUTION INTRAMUSCULAR; INTRAVENOUS at 06:47

## 2021-08-16 RX ADMIN — DOCUSATE SODIUM 50MG AND SENNOSIDES 8.6MG 1 TABLET: 8.6; 5 TABLET, FILM COATED ORAL at 18:27

## 2021-08-16 RX ADMIN — DEXAMETHASONE SODIUM PHOSPHATE 4 MG: 4 INJECTION, SOLUTION INTRAMUSCULAR; INTRAVENOUS at 07:52

## 2021-08-16 RX ADMIN — Medication 10 MCG/MIN: at 07:42

## 2021-08-16 RX ADMIN — Medication 10 ML: at 12:34

## 2021-08-16 RX ADMIN — FENTANYL CITRATE 50 MCG: 50 INJECTION, SOLUTION INTRAMUSCULAR; INTRAVENOUS at 07:13

## 2021-08-16 RX ADMIN — METOPROLOL TARTRATE 25 MG: 25 TABLET, FILM COATED ORAL at 07:05

## 2021-08-16 RX ADMIN — ROCURONIUM BROMIDE 10 MG: 10 INJECTION INTRAVENOUS at 08:53

## 2021-08-16 RX ADMIN — ROCURONIUM BROMIDE 20 MG: 10 INJECTION INTRAVENOUS at 07:37

## 2021-08-16 RX ADMIN — Medication 10 ML: at 14:58

## 2021-08-16 RX ADMIN — PROPOFOL 150 MG: 10 INJECTION, EMULSION INTRAVENOUS at 07:37

## 2021-08-16 RX ADMIN — LEVETIRACETAM 500 MG: 500 TABLET ORAL at 00:11

## 2021-08-16 RX ADMIN — GLYCOPYRROLATE 0.4 MG: 0.2 INJECTION INTRAMUSCULAR; INTRAVENOUS at 09:07

## 2021-08-16 RX ADMIN — METOPROLOL TARTRATE 25 MG: 25 TABLET, FILM COATED ORAL at 18:28

## 2021-08-16 RX ADMIN — FAMOTIDINE 20 MG: 20 TABLET ORAL at 18:28

## 2021-08-16 RX ADMIN — LEVETIRACETAM 500 MG: 500 TABLET ORAL at 21:04

## 2021-08-16 RX ADMIN — SUCCINYLCHOLINE CHLORIDE 100 MG: 20 INJECTION, SOLUTION INTRAMUSCULAR; INTRAVENOUS at 07:37

## 2021-08-16 RX ADMIN — Medication 3 MG: at 09:07

## 2021-08-16 RX ADMIN — HYDROMORPHONE HYDROCHLORIDE 0.5 MG: 1 INJECTION, SOLUTION INTRAMUSCULAR; INTRAVENOUS; SUBCUTANEOUS at 22:46

## 2021-08-16 RX ADMIN — CEFAZOLIN SODIUM 2 G: 1 POWDER, FOR SOLUTION INTRAMUSCULAR; INTRAVENOUS at 07:54

## 2021-08-16 RX ADMIN — SODIUM CHLORIDE, POTASSIUM CHLORIDE, SODIUM LACTATE AND CALCIUM CHLORIDE 125 ML/HR: 600; 310; 30; 20 INJECTION, SOLUTION INTRAVENOUS at 06:34

## 2021-08-16 RX ADMIN — FENTANYL CITRATE 50 MCG: 50 INJECTION, SOLUTION INTRAMUSCULAR; INTRAVENOUS at 07:11

## 2021-08-16 RX ADMIN — Medication 10 ML: at 21:04

## 2021-08-16 RX ADMIN — BUSPIRONE HYDROCHLORIDE 10 MG: 10 TABLET ORAL at 18:28

## 2021-08-16 RX ADMIN — Medication 10 ML: at 00:25

## 2021-08-16 RX ADMIN — LIDOCAINE HYDROCHLORIDE 60 MG: 20 INJECTION, SOLUTION EPIDURAL; INFILTRATION; INTRACAUDAL; PERINEURAL at 07:37

## 2021-08-16 RX ADMIN — DEXAMETHASONE SODIUM PHOSPHATE 4 MG: 10 INJECTION, SOLUTION INTRAMUSCULAR; INTRAVENOUS at 00:19

## 2021-08-16 RX ADMIN — CEFAZOLIN 2 G: 1 INJECTION, POWDER, FOR SOLUTION INTRAMUSCULAR; INTRAVENOUS at 18:28

## 2021-08-16 RX ADMIN — ONDANSETRON HYDROCHLORIDE 4 MG: 2 SOLUTION INTRAMUSCULAR; INTRAVENOUS at 07:52

## 2021-08-16 RX ADMIN — SODIUM CHLORIDE, POTASSIUM CHLORIDE, SODIUM LACTATE AND CALCIUM CHLORIDE 125 ML/HR: 600; 310; 30; 20 INJECTION, SOLUTION INTRAVENOUS at 12:31

## 2021-08-16 RX ADMIN — ROCURONIUM BROMIDE 10 MG: 10 INJECTION INTRAVENOUS at 08:01

## 2021-08-16 RX ADMIN — ROPIVACAINE HYDROCHLORIDE 30 ML: 5 INJECTION, SOLUTION EPIDURAL; INFILTRATION; PERINEURAL at 07:21

## 2021-08-16 NOTE — PROGRESS NOTES
8/16/2021 5:20 PM Met with pt and pt's  to discuss discharge planning. Pt did not engage throughout conversation. CM discussed home health vs SNF placement for pt. SNF listing reviewed with pt's . Pt's  confirmed pt did not receive any COVID vaccines. Pt's  also confirmed pt has not had an inpatient psych hospitalization in over 10 years. CM relayed to pt's  if SNF is pursued, pt will not be able to have in person visitors for 2 weeks due to vaccination status. Pt's  reported he works during the week(3 days on 3 days off) and if pt discharged home there would be times pt would be alone at home. Pt's  reported prior to admission pt was independent with adls. Pt's  was understanding discharge recommendation are pending PT/OT evals. CM will follow. 8/16/2021 3:02 PM CM attempted to meet with pt's  at bedside. Per pt's primary RN pt's  stepped out to take a phone call. CM will follow up at a later time. 8/16/2021 12:01 PM EMR reviewed, pt underwent left shoulder reverse arthroplasty this AM with Dr. Balbir Maldonado. Will follow for PT/OT josh for discharge recommendations. EMMY Keller     RUR:  14%  Risk Level: [x]Low []Moderate []High  Value-based purchasing: [x] Yes [] No  Bundle patient: [] Yes [x] No   Specify:     Transition of care plan:  1. Admitted for fever, seizure, humeral head fracture, POD 0 left reverse shoulder arthroplasty  2. PT and OT evals pending  3. Was living at home with  prior to admission, TBD on needs  4. Outpatient follow-up.   5. Transport TBD

## 2021-08-16 NOTE — PROGRESS NOTES
Problem: Dysphagia (Adult)  Goal: *Acute Goals and Plan of Care (Insert Text)  Description: Swallowing goals initiated 8-16-21:  1) tolerate full liquids without s/s aspiration by 8-17-21  2) tolerate regular diet, thin liquids without s/s aspiration or pain by 8-23-21  Outcome: Progressing Towards Goal  Note:   [address] EVALUATION  Patient: Cassie Henderson (12 y.o. female)  Date: 8/16/2021  Primary Diagnosis: Acute metabolic encephalopathy [O41.47]  Seizure ([address] [R56.9]  Procedure(s) (LRB):  LEFT REVERSE TOTAL SHOULDER ARTHROPLASTY WITH BICEP TENDONESIS AND AXILLARY NERVE DISSECTION (Left) Day of Surgery   Precautions: aspiration       ASSESSMENT :  Based on the objective data described below, the patient presents with ability to drink liquids in small amounts with fairly good safety. Minimal PO intake limited evaluation. Her tongue laceration appears to be healing, but  reports she had pain with chewing last night. Recommend full liquids. Admitted 8-12-21 with sz: fell, bleeding from mouth, dysarthria. CXr:  L humeral fx, MRI: negative. She refused swallow evaluation 8-13 with SLP due to tongue pain and was started on regular diet 8-15 after expressing interest in PO. NPO at [facility] for shoulder surgery. PMH:  sz, _tobacco, anemia, lithium toxicity,  XOL paranoia,  DM, HTN, bipolar,  bronchitis,  GERD,  XOL, HTN. Patient will benefit from skilled intervention to address the above impairments. Patients rehabilitation potential is considered to be Good     PLAN :  Recommendations and Planned Interventions:  Start full liquids. SLP will follow up in am for tolerance and diet advance. Frequency/Duration: Patient will be followed by speech-language pathology 1 time a week to address goals. Discharge Recommendations: To Be Determined     SUBJECTIVE:   Patient stated yes.     OBJECTIVE:     Past Medical History:   Diagnosis Date    Abscess of buttock 7/23/2012 Bronchitis     Common bile duct calculus 7/23/2012    Gallstones 7/12/2012    GERD (gastroesophageal reflux disease)     High cholesterol     Hypercholesterolemia     Hypertension     Ill-defined condition     missing upper front tooth    Ill-defined condition     lower leg bilateral reddened rash. Insomnia     Other ill-defined conditions(799.89)     Large boil under right arm-pit. Paranoia ([address]     Psychiatric disorder     bipolar     Past Surgical History:   Procedure Laterality Date    NE ABDOMEN SURGERY PROC UNLISTED      cholecystectomy 7/23/2012     Prior Level of Function/Home Situation: lives with   Home Situation  Home Environment: Private residence  One/Two Story Residence: One story  Living Alone: No  Support Systems: Spouse/Significant Other/Partner  Patient Expects to be Discharged to[de-identified] House  Current DME Used/Available at Home: None  Diet prior to admission: soft, thins  Current Diet:  NPO   Cognitive and Communication Status:  Neurologic State: Drowsy  Orientation Level: Oriented to person (minimally verbal)  Cognition: Decreased command following  Perception: Appears intact  Perseveration: No perseveration noted  Safety/Judgement: Decreased insight into deficits  Oral Assessment:  Oral Assessment  Labial: No impairment  Oral Hygiene: mildly dry  Lingual: No impairment  Mandible: No impairment  P.O. Trials:  Patient Position: upright in bed  Vocal quality prior to P.O.: No impairment  Consistency Presented: Thin liquid  How Presented: Self-fed/presented;Straw   ORAL PHASE:   Bolus Acceptance:  (some difficulty with straw suck)  Bolus Formation/Control: No impairment     Propulsion: No impairment  Oral Residue: None  Tongue appears to be healing some.  reports that chewing meat hurt the tongue yesterday, so is requesting natural purees.    PHARYNGEAL PHASE:   Initiation of Swallow: No impairment  Laryngeal Elevation: Functional  Aspiration Signs/Symptoms: Clear throat (slight)                        NOMS:   The NOMS functional outcome measure was used to quantify this patient's level of swallowing impairment. Based on the NOMS, the patient was determined to be at level 3 for swallow function       NOMS Swallowing Levels:  Level 1 (CN): NPO  Level 2 (CM): NPO but takes consistency in therapy  Level 3 (CL): Takes less than 50% of nutrition p.o. and continues with nonoral feedings; and/or safe with mod cues; and/or max diet restriction  Level 4 (CK): Safe swallow but needs mod cues; and/or mod diet restriction; and/or still requires some nonoral feeding/supplements  Level 5 (CJ): Safe swallow with min diet restriction; and/or needs min cues  Level 6 (CI): Independent with p.o.; rare cues; usually self cues; may need to avoid some foods or needs extra time  Level 7 ([address] [place] [address]): Independent for all p.o.  RUTH. (2003). National Outcomes Measurement System (NOMS): Adult Speech-Language Pathology User's Guide. Pain:  Pain Scale 1: Numeric (0 - 10)  Pain Intensity 1: 0       After treatment:   Patient left in no apparent distress in bed and Nursing notified    COMMUNICATION/EDUCATION:   Patient was educated regarding her deficit(s) of dysphagia  as this relates to her diagnosis of sz. She demonstrated Guarded understanding as evidenced by lack of communication .  talked a lot for this patient  . The patient's plan of care including recommendations, planned interventions, and recommended diet changes were discussed with: Registered nurse. MD    Patient is unable to participate in goal setting and plan of care.     Thank you for this referral.  Khushboo Hamilton, CELINE  Time Calculation: 10 mins

## 2021-08-16 NOTE — PROGRESS NOTES
Successful left shoulder reverse arthroplasty this am.    Defer pain medication and anticoagulation to medical team.  Pt had interscalene block pre op.

## 2021-08-16 NOTE — INTERVAL H&P NOTE
Date of Surgery Update:  Deandra Ng was seen and examined. History and physical has been reviewed. The patient has been examined. There have been no significant clinical changes since the completion of the originally dated History and Physical.    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 Previous non-operative treatments have been tried including rest, ice, activity modifications, pain medications, and injectable treatments. The pain and impairment have progressively worsened now limiting ADLS and having a negative impact on quality of life. The risks, benefits and potential complications of the procedure have been discussed with the patient and all questions have been answered satisfactorily. The patient was counseled at length about the risks of benita Covid-19 during their perioperative period and any recovery window from their procedure. The patient was made aware that benita Covid-19  may worsen their prognosis for recovering from their procedure and lend to a higher morbidity and/or mortality risk. All material risks, benefits, and reasonable alternatives including postponing the procedure were discussed. The patient does  wish to proceed with the procedure at this time.       Signed By: Mima Bell MD     August 16, 2021 7:03 AM

## 2021-08-16 NOTE — PROGRESS NOTES
Occupational Therapy Note:  Orders acknowledged and chart reviewed. Patient is POD 0 of L shoulder TSA. Will follow-up on POD 1 for OT evaluation.     Valentino Gourd, OTR/ADEOLA

## 2021-08-16 NOTE — ROUTINE PROCESS
TRANSFER - OUT REPORT:    Verbal report given to [facility] on [address]  being transferred to  for routine post - op       Report consisted of patients Situation, Background, Assessment and   Recommendations(SBAR). Information from the following report(s) SBAR, Procedure Summary, Intake/Output and Cardiac Rhythm NSR was reviewed with the receiving nurse. Lines:   Peripheral IV 08/15/21 Anterior;Right; Inner Forearm (Active)   Site Assessment Clean, dry, & intact 08/16/21 0940   Phlebitis Assessment 0 08/16/21 0940   Infiltration Assessment 0 08/16/21 0940   Dressing Status Clean, dry, & intact 08/16/21 0940   Dressing Type Transparent 08/16/21 0940   Hub Color/Line Status Pink 08/16/21 0940   Action Taken Open ports on tubing capped 08/15/21 1658   Alcohol Cap Used Yes 08/16/21 0700        Opportunity for questions and clarification was provided.       Patient transported with:   O2 @ 2 liters  Registered Nurse

## 2021-08-16 NOTE — ANESTHESIA POSTPROCEDURE EVALUATION
Procedure(s):  LEFT REVERSE TOTAL SHOULDER ARTHROPLASTY WITH BICEP TENDONESIS AND AXILLARY NERVE DISSECTION. general, regional    Anesthesia Post Evaluation      Multimodal analgesia: multimodal analgesia used between 6 hours prior to anesthesia start to PACU discharge  Patient location during evaluation: PACU  Patient participation: complete - patient participated  Level of consciousness: awake and alert  Pain management: adequate  Airway patency: patent  Anesthetic complications: no  Cardiovascular status: acceptable  Respiratory status: acceptable  Hydration status: acceptable  Post anesthesia nausea and vomiting:  none  Final Post Anesthesia Temperature Assessment:  Normothermia (36.0-37.5 degrees C)      INITIAL Post-op Vital signs:   Vitals Value Taken Time   /80 08/16/21 0938   Temp 36.4 °C (97.6 °F) 08/16/21 0938   Pulse 84 08/16/21 0938   Resp 18 08/16/21 0938   SpO2 95 % 08/16/21 0938   Vitals shown include unvalidated device data.

## 2021-08-16 NOTE — PROGRESS NOTES
Fercho Garciaelsen [facility] [address]  [address] [place] [address]  [phone]      Medical Progress Note      NAME: Kathleen Duke   :    MRM:  [id number]    Date/Time: 2021  3:09 PM         Subjective:     Chief Complaint:  \"I am okay\"     Pt seen and examined. Shoulder surgery completed. No complaints other than tongue pain from biting her tongue    ROS:  (bold if positive, if negative)           Objective:       Vitals:          Last 24hrs VS reviewed since prior progress note. Most recent are:    Visit Vitals  /60 (BP 1 Location: Right arm)   Pulse 73   Temp 98 °F (36.7 °C)   Resp 18   Ht 5' 5\" (1.651 m)   Wt 76.1 kg (167 lb 11.2 oz)   SpO2 96%   BMI 27.91 kg/m²     SpO2 Readings from Last 6 Encounters:   21 96%   19 95%   19 94%   18 93%   18 93%   17 94%    O2 Flow Rate (L/min): 3 l/min       Intake/Output Summary (Last 24 hours) at 2021 1649  Last data filed at 2021 5838  Gross per 24 hour   Intake 1020 ml   Output 500 ml   Net 520 ml          Exam:     Physical Exam:    Gen: Disheveled female. Tongue injury/laceration   HEENT:  Pink conjunctivae, PERRL, hearing intact to voice, moist mucous membranes  Neck:  Supple, without masses, thyroid non-tender  Resp:  No accessory muscle use, clear breath sounds without wheezes rales or rhonchi  Card:  RRR.  No MRG   Abd:  Soft, non-tender, non-distended, normoactive bowel sounds are present  Musc:  No cyanosis or clubbing  Skin:  No rashes or ulcers, skin turgor is good  Neuro: Mild diffuse weakness   Psych: Poor insight   LUE in sling    Medications Reviewed: (see below)    Lab Data Reviewed: (see below)    ______________________________________________________________________    Medications:     Current Facility-Administered Medications   Medication Dose Route Frequency    0.9% sodium chloride infusion 250 mL  250 mL IntraVENous PRN    ARIPiprazole (ABILIFY) tablet 5 mg  5 mg Oral DAILY    busPIRone (BUSPAR) tablet 10 mg  10 mg Oral BID    lamoTRIgine (LaMICtal) tablet 100 mg  100 mg Oral DAILY    metoprolol tartrate (LOPRESSOR) tablet 25 mg  25 mg Oral Q12H    levETIRAcetam (KEPPRA) tablet 500 mg  500 mg Oral BID    [Held by provider] dexamethasone (PF) (DECADRON) 10 mg/mL injection 4 mg  4 mg IntraVENous Q6H    hydrALAZINE (APRESOLINE) 20 mg/mL injection 20 mg  20 mg IntraVENous Q6H PRN    albuterol-ipratropium (DUO-NEB) 2.5 MG-0.5 MG/3 ML  3 mL Nebulization Q4H PRN    sodium chloride (NS) flush 5-40 mL  5-40 mL IntraVENous Q8H    sodium chloride (NS) flush 5-40 mL  5-40 mL IntraVENous PRN    acetaminophen (TYLENOL) tablet 650 mg  650 mg Oral Q6H PRN    Or    acetaminophen (TYLENOL) suppository 650 mg  650 mg Rectal Q6H PRN    polyethylene glycol (MIRALAX) packet 17 g  17 g Oral DAILY PRN    ondansetron (ZOFRAN ODT) tablet 4 mg  4 mg Oral Q8H PRN    Or    ondansetron (ZOFRAN) injection 4 mg  4 mg IntraVENous Q6H PRN    [Held by provider] enoxaparin (LOVENOX) injection 40 mg  40 mg SubCUTAneous DAILY    lactated Ringers infusion  125 mL/hr IntraVENous CONTINUOUS            Lab Review:     Recent Labs     08/16/21  0447 08/15/21  0035 08/14/21  0037   WBC 10.8 14.0* 12.9*   HGB 12.0 12.3 12.3   HCT 36.7 37.6 37.7    312 286     Recent Labs     08/16/21  0659 08/15/21  0035 08/14/21  1549    140 137   K 3.8 3.3* 3.4*    107 105   CO2 22 23 24   * 104* 111*   BUN 19 19 16   CREA 0.51* 0.78 0.68   CA 8.5 8.4* 8.6   MG  --   --  2.2     No components found for: [place]         Assessment / Plan:   Fever - ?etiology. ? Atelectasis.  Resolved   -UA/CXR not c/w UTI   -procalcitonin low   -VRP/COVID negative    Seizure ([facility] [address] (8/12/2021) - EEG without out e/o ictus   -head CT and MRI without acute process  -appreciate speech eval   -stop keppra at d/c as thought to be provoked by medications and/or benzo withdrawal      Acute metabolic encephalopathy (8/12/2021) - likely post ictal and ?dementia at baseline   -monitor     Leukocytosis - likely reactive. UA not c/w UTI, CXR without PNA on 8/14  -monitor     Rhabdo - likely 2/2 seizure   -IVF's   -trend CK   -Cr WNL     Humeral head fracture - s/p repair on 8/16  -appreciate ortho's assistance  -sling   -PT/OT     Hypertension - not on meds PTA   -on metoprolol     Hypokalemia   -replete PRN     COPD - not on meds  -PRN duonebs     Bipolar disorder   -on buspar, lamictal, abilify    -would NOT resume Wellbutrin considering recent seizure     Total time spent with patient: 28 [address] [facility] discussed with: Patient       Discussed:  Care Plan    Prophylaxis:  Lovenox; on hold.  Resume as per ortho     Disposition:  TBD            ___________________________________________________    Attending Physician: Josh Naik MD

## 2021-08-16 NOTE — ANESTHESIA PROCEDURE NOTES
Peripheral Block    Start time: 8/16/2021 7:11 AM  End time: 8/16/2021 7:21 AM  Performed by: Carlee Olea DO  Authorized by: Carlee Olea DO       Pre-procedure: Indications: at surgeon's request and post-op pain management    Preanesthetic Checklist: patient identified, risks and benefits discussed, site marked, timeout performed, anesthesia consent given and patient being monitored    Timeout Time: 07:11 EDT          Block Type:   Block Type:   Interscalene  Laterality:  Left  Monitoring:  Continuous pulse ox, frequent vital sign checks, heart rate, responsive to questions and oxygen  Injection Technique:  Single shot  Procedures: ultrasound guided    Patient Position: supine  Prep: chlorhexidine    Location:  Interscalene  Needle Type:  Stimuplex  Needle Gauge:  21 G  Needle Localization:  Ultrasound guidance    Assessment:  Number of attempts:  1  Injection Assessment:  Incremental injection every 5 mL, local visualized surrounding nerve on ultrasound, negative aspiration for blood, no paresthesia and no intravascular symptoms  Patient tolerance:  Patient tolerated the procedure well with no immediate complications

## 2021-08-17 LAB
ABO + RH BLD: NORMAL
ANION GAP SERPL CALC-SCNC: 6 MMOL/L (ref 5–15)
BLD PROD TYP BPU: NORMAL
BLD PROD TYP BPU: NORMAL
BLOOD GROUP ANTIBODIES SERPL: NORMAL
BPU ID: NORMAL
BPU ID: NORMAL
BUN SERPL-MCNC: 22 MG/DL (ref 6–20)
BUN/CREAT SERPL: 27 (ref 12–20)
CALCIUM SERPL-MCNC: 8.2 MG/DL (ref 8.5–10.1)
CHLORIDE SERPL-SCNC: 107 MMOL/L (ref 97–108)
CK SERPL-CCNC: 4682 U/L (ref 26–192)
CO2 SERPL-SCNC: 24 MMOL/L (ref 21–32)
COMMENT, HOLDF: NORMAL
CREAT SERPL-MCNC: 0.81 MG/DL (ref 0.55–1.02)
CROSSMATCH RESULT,%XM: NORMAL
CROSSMATCH RESULT,%XM: NORMAL
GLUCOSE SERPL-MCNC: 120 MG/DL (ref 65–100)
HGB BLD-MCNC: 11.2 G/DL (ref 11.5–16)
POTASSIUM SERPL-SCNC: 3.8 MMOL/L (ref 3.5–5.1)
SAMPLES BEING HELD,HOLD: NORMAL
SODIUM SERPL-SCNC: 137 MMOL/L (ref 136–145)
SPECIMEN EXP DATE BLD: NORMAL
STATUS OF UNIT,%ST: NORMAL
STATUS OF UNIT,%ST: NORMAL
UNIT DIVISION, %UDIV: 0
UNIT DIVISION, %UDIV: 0

## 2021-08-17 PROCEDURE — 97530 THERAPEUTIC ACTIVITIES: CPT

## 2021-08-17 PROCEDURE — 94760 N-INVAS EAR/PLS OXIMETRY 1: CPT

## 2021-08-17 PROCEDURE — 92526 ORAL FUNCTION THERAPY: CPT

## 2021-08-17 PROCEDURE — 74011250637 HC RX REV CODE- 250/637: Performed by: ORTHOPAEDIC SURGERY

## 2021-08-17 PROCEDURE — 36415 COLL VENOUS BLD VENIPUNCTURE: CPT

## 2021-08-17 PROCEDURE — 2709999900 HC NON-CHARGEABLE SUPPLY

## 2021-08-17 PROCEDURE — 77010033678 HC OXYGEN DAILY

## 2021-08-17 PROCEDURE — 74011250636 HC RX REV CODE- 250/636: Performed by: ORTHOPAEDIC SURGERY

## 2021-08-17 PROCEDURE — 74011250637 HC RX REV CODE- 250/637: Performed by: INTERNAL MEDICINE

## 2021-08-17 PROCEDURE — 80048 BASIC METABOLIC PNL TOTAL CA: CPT

## 2021-08-17 PROCEDURE — 82550 ASSAY OF CK (CPK): CPT

## 2021-08-17 PROCEDURE — 85018 HEMOGLOBIN: CPT

## 2021-08-17 PROCEDURE — 74011000250 HC RX REV CODE- 250: Performed by: ORTHOPAEDIC SURGERY

## 2021-08-17 PROCEDURE — 65270000029 HC RM PRIVATE

## 2021-08-17 PROCEDURE — 74011250636 HC RX REV CODE- 250/636: Performed by: PHYSICIAN ASSISTANT

## 2021-08-17 PROCEDURE — 97165 OT EVAL LOW COMPLEX 30 MIN: CPT

## 2021-08-17 PROCEDURE — 97535 SELF CARE MNGMENT TRAINING: CPT

## 2021-08-17 PROCEDURE — 97161 PT EVAL LOW COMPLEX 20 MIN: CPT

## 2021-08-17 RX ORDER — OXYCODONE AND ACETAMINOPHEN 5; 325 MG/1; MG/1
1 TABLET ORAL
Status: DISCONTINUED | OUTPATIENT
Start: 2021-08-17 | End: 2021-08-19 | Stop reason: HOSPADM

## 2021-08-17 RX ORDER — METOPROLOL TARTRATE 25 MG/1
25 TABLET, FILM COATED ORAL EVERY 12 HOURS
Qty: 60 TABLET | Refills: 0 | Status: SHIPPED | OUTPATIENT
Start: 2021-08-17 | End: 2021-09-07 | Stop reason: SDUPTHER

## 2021-08-17 RX ORDER — OXYCODONE AND ACETAMINOPHEN 5; 325 MG/1; MG/1
1 TABLET ORAL
Qty: 10 TABLET | Refills: 0 | Status: SHIPPED | OUTPATIENT
Start: 2021-08-17 | End: 2021-08-20

## 2021-08-17 RX ORDER — ENOXAPARIN SODIUM 100 MG/ML
40 INJECTION SUBCUTANEOUS EVERY 24 HOURS
Status: DISCONTINUED | OUTPATIENT
Start: 2021-08-17 | End: 2021-08-19 | Stop reason: HOSPADM

## 2021-08-17 RX ORDER — AMOXICILLIN 250 MG
1 CAPSULE ORAL 2 TIMES DAILY
Qty: 20 TABLET | Refills: 0 | Status: SHIPPED | OUTPATIENT
Start: 2021-08-17 | End: 2021-08-27

## 2021-08-17 RX ADMIN — OXYCODONE HYDROCHLORIDE AND ACETAMINOPHEN 1 TABLET: 5; 325 TABLET ORAL at 11:51

## 2021-08-17 RX ADMIN — LEVETIRACETAM 500 MG: 500 TABLET ORAL at 20:20

## 2021-08-17 RX ADMIN — LEVETIRACETAM 500 MG: 500 TABLET ORAL at 09:15

## 2021-08-17 RX ADMIN — DOCUSATE SODIUM 50MG AND SENNOSIDES 8.6MG 1 TABLET: 8.6; 5 TABLET, FILM COATED ORAL at 17:21

## 2021-08-17 RX ADMIN — METOPROLOL TARTRATE 25 MG: 25 TABLET, FILM COATED ORAL at 17:21

## 2021-08-17 RX ADMIN — BUSPIRONE HYDROCHLORIDE 10 MG: 10 TABLET ORAL at 09:15

## 2021-08-17 RX ADMIN — Medication 10 ML: at 05:26

## 2021-08-17 RX ADMIN — FAMOTIDINE 20 MG: 20 TABLET ORAL at 17:21

## 2021-08-17 RX ADMIN — ACETAMINOPHEN 650 MG: 325 TABLET ORAL at 01:53

## 2021-08-17 RX ADMIN — ACETAMINOPHEN 650 MG: 325 TABLET ORAL at 10:19

## 2021-08-17 RX ADMIN — Medication 10 ML: at 13:32

## 2021-08-17 RX ADMIN — POLYETHYLENE GLYCOL 3350 17 G: 17 POWDER, FOR SOLUTION ORAL at 09:15

## 2021-08-17 RX ADMIN — FAMOTIDINE 20 MG: 20 TABLET ORAL at 09:15

## 2021-08-17 RX ADMIN — POLYETHYLENE GLYCOL 3350 17 G: 17 POWDER, FOR SOLUTION ORAL at 17:22

## 2021-08-17 RX ADMIN — ARIPIPRAZOLE 5 MG: 5 TABLET ORAL at 09:14

## 2021-08-17 RX ADMIN — ACETAMINOPHEN 650 MG: 325 TABLET ORAL at 20:26

## 2021-08-17 RX ADMIN — CEFAZOLIN 2 G: 1 INJECTION, POWDER, FOR SOLUTION INTRAMUSCULAR; INTRAVENOUS at 09:15

## 2021-08-17 RX ADMIN — LAMOTRIGINE 100 MG: 100 TABLET ORAL at 09:15

## 2021-08-17 RX ADMIN — DOCUSATE SODIUM 50MG AND SENNOSIDES 8.6MG 1 TABLET: 8.6; 5 TABLET, FILM COATED ORAL at 09:15

## 2021-08-17 RX ADMIN — BUSPIRONE HYDROCHLORIDE 10 MG: 10 TABLET ORAL at 17:21

## 2021-08-17 RX ADMIN — CEFAZOLIN 2 G: 1 INJECTION, POWDER, FOR SOLUTION INTRAMUSCULAR; INTRAVENOUS at 01:53

## 2021-08-17 RX ADMIN — METOPROLOL TARTRATE 25 MG: 25 TABLET, FILM COATED ORAL at 05:26

## 2021-08-17 RX ADMIN — HYDRALAZINE HYDROCHLORIDE 20 MG: 20 INJECTION INTRAMUSCULAR; INTRAVENOUS at 09:15

## 2021-08-17 RX ADMIN — ENOXAPARIN SODIUM 40 MG: 40 INJECTION SUBCUTANEOUS at 13:29

## 2021-08-17 NOTE — PROGRESS NOTES
8/17/2021 4:10 PM Bernice and Olivia [facility]. CM met with pt's  and pt at bedside. Pt's  selected [facility] as preference. Spoke with Becky in admissions at [address] [facility], [address] will be started. 8/17/2021 1:45 PM Met with pt and pt's  at bedside to discuss discharge. CM relayed again recommendation for SNF placement. Pt's  reported he would only be agreeable to SNF if pt \"really needed it\". CM reported it is recommended by all disciplines for pt to discharge to SNF. Pt's  was agreeable to SNF placement, choices provided. Pt's  selected [facility], Endy, Olivia of [address] as preferred placements. CM relayed to pt's  [facility] would not be an option due to pt not being vaccinated. Pt's  was understanding referrals will be sent to [facility], [address] [place] [address]. Referrals sent via [employer]. CM called and spoke with admissions at [address] [place] [address], [address] [place] [address] and [facility], all will review. Notified pt's attending SNF placement will now be pursued, CM will complete home health and dme order. 8/17/2021 11:30 AM Spoke with treating PT and OT, pt needing SNF placement. CM called and spoke with pt's , Lizbeth Tony relayed recommendation for SNF. Pt's  declines SNF placement for pt reported due to [place] pandemic and pt not being vaccinated he would not be able to visit her at SNF he wants pt to return home. Pt's  reported he will plan to have family with pt 24/7 at home between him, their son and son's wife. Pt's  reported he also has concerns with costs of SNF placement. CM discussed at length recommendation for SNF and benefits of SNF placement. Pt's  continued to politely decline.  Pt's  was agreeable to home health being arranged for pt, did not have a preference of provider. Pt's  reported he will be to [facility] around 1PM.   CM sent referrals to [facility] and [facility] to check availability for home health. CM met with pt at bedside to discuss SNF placement. CM discussed benefits for SNF placement. Pt also politely declined SNF placement due to limited visitation at SNF and concern for COVID exposure. CM will meet with pt and pt's  at bedside once he arrives to discuss SNF recomendation further. If pt and  become agreeable for SNF placement auth will be required once an accepting facility is found. CM did call to pt's insurance, [employer] in attempt to check pt's costs for SNF placement. Spoke with Giovanna Cuello who reported pt does have SNF benefits but will have an 80% coinsurance once her 2000$ deductible is met. Pt has an OOP max of 5000$. Pt has only met 286.68$ of deductible. 8/17/2021 8:29 AM EMR reviewed, will follow for PT and OT evals to be completed today. EMMY Chapman    RUR:  14%  Risk Level: [x]Low []Moderate []High  Value-based purchasing: [x] Yes [] No  Bundle patient: [] Yes [x] No              Specify:      Transition of care plan:  Admitted for fever, seizure, humeral head fracture, POD  l1eft reverse shoulder arthroplasty  PT and OT evals pending  Was living at home with  prior to admission, TBD on needs  Outpatient follow-up.   Transport TBD

## 2021-08-17 NOTE — PROGRESS NOTES
Hospital Follow Up Telemedicine appointment scheduled for 8/23/2021 at 11:00am Kasi Tipton NP. Patient will receive a phone call.

## 2021-08-17 NOTE — PROGRESS NOTES
Orthopaedic Progress Note  Post Op day: 1 Day Post-Op    2021 2:21 PM     Patient: Heber Jimenez MRN: [id number]  SSN: [id number]    YOB: 1955  Age: 60 y.o. Sex: female      Admit date:  2021  Date of Surgery:  2021   Procedures:  Procedure(s):  LEFT REVERSE TOTAL SHOULDER ARTHROPLASTY WITH BICEP TENDONESIS AND AXILLARY NERVE DISSECTION  Admitting Physician:  Farhana See MD   Surgeon:  Davin Jorge) and Role:     Collette Cai, MD - Primary    Consulting Physician(s): Treatment Team: Attending Provider: Juli Manzo MD; Consulting Provider: Gomez Henry MD; Consulting Provider: Julio César Wren MD; Consulting Provider: BAMBI Crandall; Surgeon: Haleigh Oliver MD; Utilization Review: [facility] Primary Nurse: Caity Underwood RN; Care Manager: Marie Delgado; Staff Nurse: Fabienne Recio RN    SUBJECTIVE:     Heber Jimenez is a 60 y.o. female is 1 Day Post-Op s/p Procedure(s):  LEFT REVERSE TOTAL SHOULDER ARTHROPLASTY WITH BICEP TENDONESIS AND [address] with an appropriate level of post-operative pain. No complaints of nausea, vomiting, dizziness, lightheadedness, chest pain, or shortness of breath. OBJECTIVE:       Physical Exam:  General: Alert, cooperative, no distress. Respiratory: Respirations unlabored  Neurological:  Neurovascular exam within normal limits. Motor: +deltoid. WF/WE: 5/5. Hand : 5/5. Musculoskeletal: Calves soft, supple, non-tender upon palpation. Dressing/Wound:  Clean, dry and intact. No significant erythema or swelling.       Vital Signs:      Patient Vitals for the past 8 hrs:   BP Temp Pulse Resp SpO2   21 1236 112/67 98.3 °F (36.8 °C) 88 18 97 %   21 0749 (!) 190/76 97.9 °F (36.6 °C) 74 18 96 %   21 0700   79                                            Temp (24hrs), Av °F (36.7 °C), Min:97.7 °F (36.5 °C), Max:98.3 °F (36.8 °C)      Labs:        Recent Labs     08/17/21  0605 08/16/21  0447 08/16/21 0447   HCT  --   --  36.7   HGB 11.2*   < > 12.0    < > = values in this interval not displayed. Lab Results   Component Value Date/Time    Sodium 137 08/17/2021 01:25 AM    Potassium 3.8 08/17/2021 01:25 AM    Chloride 107 08/17/2021 01:25 AM    CO2 24 08/17/2021 01:25 AM    Glucose 120 (H) 08/17/2021 01:25 AM    BUN 22 (H) 08/17/2021 01:25 AM    Creatinine 0.81 08/17/2021 01:25 AM    Calcium 8.2 (L) 08/17/2021 01:25 AM       PT/OT:        Gait  Gait Abnormalities: Antalgic, Decreased step clearance  Ambulation - Level of Assistance: Minimal assistance, Moderate assistance, Additional time, Assist x2  Distance (ft): 1 Feet (ft)  Assistive Device: Gait belt       Patient mobility  Bed Mobility Training  Supine to Sit: Assist x2, Additional time, Maximum assistance  Sit to Supine:  (pt remained up at end of tx)  Scooting: Maximum assistance, Assist x2 (mod A x2 from the chair )  Transfer Training  Sit to Stand: Minimum assistance, Assist x2  Stand to Sit: Minimum assistance, Assist x2  Bed to Chair: Minimum assistance, Assist x2      Gait Training  Assistive Device: Gait belt  Ambulation - Level of Assistance: Minimal assistance, Moderate assistance, Additional time, Assist x2  Distance (ft): 1 Feet (ft)            ASSESSMENT / PLAN:   Active Problems:    Seizure ([facility] [address] (8/12/2021)      Acute metabolic encephalopathy (2/68/4727)         A: 1. S/P left reverse total shoulder arthroplasty for 4 part proximal humerus fracture POD 1    P: 1. PT/OT: sling. NWB Left shoulder. Passive ROM of left shoulder- ER to neutral.  Flexion to 90. No IR. Finger/elbow/wrist ROM as tolerated. 2. DVT ppx: Lovenox 40 mg SC daily  3. Analgesics: Percocet 5/325 q 4 hours. 4. DISPO:  refusing SNF.   Home with -  working logistics with , but he may need home PT.    5. Orthopedics to follow       Signed By:  BAMBI Richards [id number] [facility]

## 2021-08-17 NOTE — PROGRESS NOTES
Problem: Self Care Deficits Care Plan (Adult)  Goal: *Acute Goals and Plan of Care (Insert Text)  Description: FUNCTIONAL STATUS PRIOR TO ADMISSION: Patient was independent and active without use of DME per chart. HOME SUPPORT: The patient lived with , son, and DIL. Occupational Therapy Goals  Initiated 8/17/2021  1. Patient will perform grooming with modified independence within 7 day(s). 2.  Patient will perform upper body dressing and bathing with minimum assistance within 7 day(s). 3.  Patient will perform lower body dressing and bathing with minimum assistance within 7 day(s). 4.  Patient will perform toilet transfers with minimal assistance using quad cane within 7 day(s). 5.  Patient will perform all aspects of toileting with minimal assistance within 7 day(s). 6.  Patient will don/doff arm sling at moderate assistance  level within 7 days. 7.  Patient will perform all shoulder exercises per physician order with supervision/set-up within 7 days. 8.  Patient will verbalize/demonstrate shoulder precautions during ADLs with 100% accuracy within 7 days. Outcome: Progressing Towards Goal   OCCUPATIONAL THERAPY EVALUATION  Patient: Kaylynn Muniz (34 y.o. female)  Date: 8/17/2021  Primary Diagnosis: Acute metabolic encephalopathy [Z47.95]  Seizure (HCC) [R56.9]  Procedure(s) (LRB):  LEFT REVERSE TOTAL SHOULDER ARTHROPLASTY WITH BICEP TENDONESIS AND AXILLARY NERVE DISSECTION (Left) 1 Day Post-Op   Precautions: fall, shoulder, NWB, sling on at all times, no external rotation >90 degrees       ASSESSMENT  Based on the objective data described below, the patient presents with decreased activity tolerance, generalized weakness, impaired balance, slowed processing + delayed responses both physically and cognitively, poor cognition, and c/o dizziness with OOB activity (vitals stable) on POD 1 of L reverse TSA.   Patient was initially admitted on 8/12 after being found down at home with tongue laceration. Patient has undergone neurological work-up since admission which revealed likely seizure and encephalopathy but negative for acute CVA. Today, patient was oriented x4 but unable to express needs and unable to answer most questions. Patient performed transfers with x2 person assist and cues for improved technique. Patient reported dizziness with upright, OOB activity however all vitals remained stable. Patient able to transfer from bed to chair today but unsafe to progress to ADL transfers d/t fatigue, dizziness, and LOB (primarily posterior). Patient with poor engagement in ADLs; She needs encouragement to contribute to tasks and direct verbal cuing for follow through. Patient would benefit from continued skilled OT to progress towards goals and improve overall independence. Current Level of Function Impacting Discharge (ADLs/self-care): Patient required max A x2 for bed mobility and min A x2 for OOB transfers; She was only able to tolerate short distance ambulation to the recliner. Patient required setup for feeding, max A for grooming, total A for bathing, dressing, and toileting. Functional Outcome Measure: The patient scored Total: 15/100 on the Barthel Index outcome measur. Patient will benefit from skilled therapy intervention to address the above noted impairments. PLAN :  Recommendations and Planned Interventions: self care training, functional mobility training, therapeutic exercise, balance training, therapeutic activities, endurance activities, patient education, home safety training, and family training/education    Frequency/Duration: Patient will be followed by occupational therapy 5 times a week to address goals. Recommendation for discharge: (in order for the patient to meet his/her long term goals)  Therapy up to 5 days/week in SNF setting   Patient and family currently refusing (per CM).   If returning home, will need 24/7 supervision/assistance and x2 person assist for all upright activity + transfers and home health. This discharge recommendation:  Has been made in collaboration with the attending provider and/or case management    IF patient discharges home will need the following DME: bedside commode; order placed to        SUBJECTIVE:   Patient stated I feel dizzy.     OBJECTIVE DATA SUMMARY:   HISTORY:   Past Medical History:   Diagnosis Date    Abscess of buttock 7/23/2012    Bronchitis     Common bile duct calculus 7/23/2012    Gallstones 7/12/2012    GERD (gastroesophageal reflux disease)     High cholesterol     Hypercholesterolemia     Hypertension     Ill-defined condition     missing upper front tooth    Ill-defined condition     lower leg bilateral reddened rash.  Insomnia     Other ill-defined conditions(799.89)     Large boil under right arm-pit.  Paranoia ([facility] [address]     Psychiatric disorder     bipolar     Past Surgical History:   Procedure Laterality Date    DE ABDOMEN SURGERY PROC UNLISTED      cholecystectomy 7/23/2012       Expanded or extensive additional review of patient history:     Home Situation  Home Environment: Private residence  One/Two Story Residence: One story  Living Alone: No  Support Systems: Spouse/Significant Other/Partner  Patient Expects to be Discharged to[de-identified] [place]  Current DME Used/Available at Home: None    Hand dominance: Right    EXAMINATION OF PERFORMANCE DEFICITS:  Cognitive/Behavioral Status:  Neurologic State: Alert  Orientation Level: Oriented X4  Cognition: Decreased attention/concentration; Impaired decision making;Decreased command following  Perception: Appears intact  Perseveration: No perseveration noted  Safety/Judgement: Decreased awareness of environment    Skin: All visualized skin intact in the uppers    Edema: Swelling noted at L surgical UE    Hearing:   Auditory  Auditory Impairment: None    Vision/Perceptual:    Corrective Lenses: Glasses (did not want to don for tx when offered)    Range of Motion:  Right upper: WDL   Left upper: Decreased non functional s/p reverse TSA    Strength:  Right upper: Decreased but functional   Left upper: Decreased non functional s/p reverse TSA    Coordination:  Fine Motor Skills-Upper: Left Impaired;Right Intact    Gross Motor Skills-Upper: Left Impaired;Right Intact    Tone & Sensation:  Tone: normal    Balance:  Sitting: Impaired  Sitting - Static: Fair (occasional)  Sitting - Dynamic: Fair (occasional)  Standing: Impaired  Standing - Static: Poor  Standing - Dynamic : Poor    Functional Mobility and Transfers for ADLs:  Bed Mobility:  Supine to Sit: Maximum assistance;Assist x2; Additional time  Sit to Supine:  (pt remained up at end of tx)  Scooting: Maximum assistance;Assist x2 (mod A x2 from the chair )    Transfers:  Sit to Stand: Minimum assistance;Assist x2  Stand to Sit: Minimum assistance;Assist x2  Bed to Chair: Minimum assistance;Assist x2    ADL Assessment:  Feeding: Setup    Oral Facial Hygiene/Grooming: Maximum assistance    Bathing: Total assistance    Upper Body Dressing: Total assistance    Lower Body Dressing: Total assistance    Toileting: Total assistance     Cognitive Retraining  Safety/Judgement: Decreased awareness of environment    Functional Measure:  Barthel Index:    Bathin  Bladder: 0  Bowels: 5  Groomin  Dressin  Feedin  Mobility: 0  Stairs: 0  Toilet Use: 0  Transfer (Bed to Chair and Back): 5  Total: 15/100        The Barthel ADL Index: Guidelines  1. The index should be used as a record of what a patient does, not as a record of what a patient could do. 2. The main aim is to establish degree of independence from any help, physical or verbal, however minor and for whatever reason. 3. The need for supervision renders the patient not independent. 4. A patient's performance should be established using the best available evidence.  Asking the patient, friends/relatives and nurses are the usual sources, but direct observation and common sense are also important. However direct testing is not needed. 5. Usually the patient's performance over the preceding 24-48 hours is important, but occasionally longer periods will be relevant. 6. Middle categories imply that the patient supplies over 50 per cent of the effort. 7. Use of aids to be independent is allowed. Margueritte Hone., Barthel, D.W. (7828). Functional evaluation: the Barthel Index. [address] [facility] (14)2. PAXTON Montejo, Katlyn Lerma., Tianna Hatch., Ayse, [address] [facility] (1999). Measuring the change indisability after inpatient rehabilitation; comparison of the responsiveness of the Barthel Index and Functional [place] Measure. Journal of Neurology, Neurosurgery, and Psychiatry, 66(4), 631-681. MARIAH Guo, MARIEL Murcia, & Teddy Gr MSALVATORE. (2004.) Assessment of post-stroke quality of life in cost-effectiveness studies: The usefulness of the Barthel Index and the EuroQoL-5D. Quality of Life Research, 15, 991-14         Occupational Therapy Evaluation Charge Determination   History Examination Decision-Making   LOW Complexity : Brief history review  LOW Complexity : 1-3 performance deficits relating to physical, cognitive , or psychosocial skils that result in activity limitations and / or participation restrictions  LOW Complexity : No comorbidities that affect functional and no verbal or physical assistance needed to complete eval tasks       Based on the above components, the patient evaluation is determined to be of the following complexity level: LOW     Activity Tolerance:   Good    After treatment patient left in no apparent distress:    Sitting in chair, Call bell within reach, and Bed / chair alarm activated    COMMUNICATION/EDUCATION:   The patients plan of care was discussed with: Physical therapist, Registered nurse, Case management, and patient .      Home safety education was provided and the patient/caregiver indicated understanding., Patient/family have participated as able in goal setting and plan of care. , and Patient/family agree to work toward stated goals and plan of care. This patients plan of care is appropriate for delegation to NE.     Thank you for this referral.  Joy Davis, OTR/L  Time Calculation: 37 mins

## 2021-08-17 NOTE — DISCHARGE SUMMARY
Physician Discharge Summary     Patient ID:  Deneen Chan  [id number]  66 y.o.  1955    Admit date: 8/12/2021    Discharge date of service and time: 8/19/2021    Admission Diagnoses: Acute metabolic encephalopathy [W53.63]  Seizure ([facility] [address] [R56.9]    Discharge Diagnoses:    Principal Diagnosis     Seizure                                              Hospital Course and other diagnoses  Seizure - Resolved. Presumed due to benzo withdrawal and wellbutrin use. EEG without out e/o ictus. Stopped old wellbutrin, Stopped new keppra. Negative head CT and MRI. Passed speech eval     Humeral head fracture / Debilitated patient - s/p repair on 8/16, appreciate ortho's assistance. PT/OT eval clearly recommend SNF and I agree, but family initially refused and asked for [place]. High risk of home failure and injury. Family then agreed to SNF, and we are awaiting authorization. She was ready 8/17     Acute metabolic encephalopathy - POA, likely post ictal and cannot rule out dementia at baseline on top of her known bipolar disorder     Bipolar disorder - Resumed buspar, lamictal, abilify, would NOT resume Wellbutrin considering recent seizure     Fever / Leukocytosis - Mild, Resolved. Unremarkable UA, xray, RVP, cx and procalcitonin. No abx     Rhabdo - likely 2/2 seizure, CK improved with IVF     Hypertension - We started metoprolol      Hypokalemia - resoled after giving K     COPD - No symptoms. Not on meds. I reject Dx.      PCP: [place] Files, NP    Consults: Neurology and Orthopedic Surgery    Significant Diagnostic Studies: See Hospital Course    Discharged home in improved condition.     Discharge Exam:  BP (!) 153/82 (BP 1 Location: Right upper arm, BP Patient Position: Sitting)   Pulse 76   Temp 98.9 °F (37.2 °C)   Resp 20   Ht 5' 5\" (1.651 m)   Wt 76.1 kg (167 lb 11.2 oz)   SpO2 95%   BMI 27.91 kg/m²      Gen:  Frail, in no acute distress  HEENT:  Pink conjunctivae, PERRL, hearing intact to voice, moist mucous membranes  Neck:  Supple, without masses, thyroid non-tender  Resp:  No accessory muscle use, clear breath sounds without wheezes rales or rhonchi  Card:  No murmurs, normal S1, S2 without thrills, bruits or peripheral edema  Abd:  Soft, non-tender, non-distended, normoactive bowel sounds are present, no mass  Lymph:  No cervical or inguinal adenopathy  Musc:  No cyanosis or clubbing, arm in sling  Skin:  No rashes or ulcers, skin turgor is good  Neuro:  Cranial nerves are grossly intact, arm pain and motor weakness, follows commands vaguely  Psych:  Poor insight, oriented to person, place     Patient Instructions:   Current Discharge Medication List      START taking these medications    Details   metoprolol tartrate (LOPRESSOR) 25 mg tablet Take 1 Tablet by mouth every twelve (12) hours for 30 days. Qty: 60 Tablet, Refills: 0      oxyCODONE-acetaminophen (PERCOCET) 5-325 mg per tablet Take 1 Tablet by mouth every six (6) hours as needed for Pain for up to 3 days. Max Daily Amount: 4 Tablets. Qty: 10 Tablet, Refills: 0    Associated Diagnoses: Injury of head, initial encounter      senna-docusate (PERICOLACE) 8.6-50 mg per tablet Take 1 Tablet by mouth two (2) times a day for 10 days. Qty: 20 Tablet, Refills: 0         CONTINUE these medications which have NOT CHANGED    Details   busPIRone (BUSPAR) 10 mg tablet Take 10 mg by mouth two (2) times a day. lamoTRIgine (LaMICtal) 100 mg tablet Take 100 mg by mouth daily. ARIPiprazole (Abilify) 5 mg tablet Take 5 mg by mouth daily. STOP taking these medications       buPROPion XL (WELLBUTRIN XL) 300 mg XL tablet Comments:   Reason for Stopping:             Activity: Activity as tolerated and PT/OT per Home Health  Diet: Cardiac Diet and Low fat, Low cholesterol  Wound Care: Keep wound clean and dry, As directed and None needed    Follow-up with your PCP and orthopedics in a few weeks.   Follow-up tests/labs - none    Signed:  Enmanuel Verde MD  8/19/2021  11:34 AM

## 2021-08-17 NOTE — PROGRESS NOTES
Problem: Mobility Impaired (Adult and Pediatric)  Goal: *Acute Goals and Plan of Care (Insert Text)  8/17/2021 1252 by Gogo Barrios, PT, DPT  Note:   PHYSICAL THERAPY EVALUATION  Patient: Johnathan Muhammad (33 y.o. female)  Date: 8/17/2021  Primary Diagnosis: Acute metabolic encephalopathy [H35.15]  Seizure ([address] [R56.9]  Procedure(s) (LRB):  LEFT REVERSE TOTAL SHOULDER ARTHROPLASTY WITH BICEP TENDONESIS AND AXILLARY NERVE DISSECTION (Left) 1 Day Post-Op   Precautions: fall      ASSESSMENT  Based on the objective data described below, the patient presents with decreased mental processing, slowed responses, confusion despite oriented x 4, decreased sitting and standing balance, decreased strength throughout, and poor tolerance for activity. Patient with stable VSS and with such poor balance in standing, unable to take more than 6 steps over to the chair. Patient up to chair and LUE positioned with support. Patient on chair alarm. Patient's hygiene is poor and appears to be an issue that has developed over weeks, not since this hopsital admission. Recommend SNF or 24/7 care and PCA if she discharges to home. Patient is not yet mobile enough to discharge to home and is not cleared by PT for d/c. High risk for fall and further fracture or damage integrity of Left Reverse TSA.  states he does not want patient to go to SNF and he and his son will take turns providing 24/7 care and supervision. CM plans to arrange [facility] to do regular checks on home environment and patient's status if she discharge to home. Dr. Hector Ocampo issued orders today for d/c. Current Level of Function Impacting Discharge (mobility/balance): max x 2 bed mobility, MOD x 2 for hand held assistance for transfers, slower Fog like cognitive processing without initiation to care for self    Functional Outcome Measure: The patient scored 15/100 on the Barthel Index outcome measure.     Other factors to consider for discharge:requires 24/7 supervision, requires increased support for hygiene; reports she was independent amb at home PTA fall, seizure and LUE fracture. Patient will benefit from skilled therapy intervention to address the above noted impairments. PLAN :  Recommendations and Planned Interventions: bed mobility training, transfer training, gait training, therapeutic exercises, patient and family training/education, and therapeutic activities      Frequency/Duration: Patient will be followed by physical therapy:  6 times a week to address goals. Recommendation for discharge: (in order for the patient to meet his/her long term goals)  Therapy up to 5 days/week in SNF setting or intensive home health therapy program, 24/7 supervision and assistance    This discharge recommendation:  Has not yet been discussed the attending provider and/or case management    IF patient discharges home will need the following DME: quad cane ? and to be determined (TBD)         SUBJECTIVE:   Patient stated I dont feel well at all.     OBJECTIVE DATA SUMMARY:   HISTORY:    Past Medical History:   Diagnosis Date    Abscess of buttock 7/23/2012    Bronchitis     Common bile duct calculus 7/23/2012    Gallstones 7/12/2012    GERD (gastroesophageal reflux disease)     High cholesterol     Hypercholesterolemia     Hypertension     Ill-defined condition     missing upper front tooth    Ill-defined condition     lower leg bilateral reddened rash. Insomnia     Other ill-defined conditions(799.89)     Large boil under right arm-pit.     Paranoia ([facility] [address]     Psychiatric disorder     bipolar     Past Surgical History:   Procedure Laterality Date    [place] ABDOMEN SURGERY PROC UNLISTED      cholecystectomy 7/23/2012       Personal factors and/or comorbidities impacting plan of care:     Home Situation  Home Environment: Private residence  One/Two Story Residence: One story  Living Alone: No  Support Systems: Spouse/Significant Other/Partner  Patient Expects to be Discharged to[de-identified] House  Current DME Used/Available at Home: None    EXAMINATION/PRESENTATION/DECISION MAKING:   Critical Behavior:  Neurologic State: Alert  Orientation Level: Oriented X4  Cognition: Decreased attention/concentration, Impaired decision making, Decreased command following  Safety/Judgement: Decreased awareness of environment  Hearing: Auditory  Auditory Impairment: None    Range Of Motion:  AROM: Generally decreased, functional           PROM: Generally decreased, functional           Strength:    Strength: Generally decreased, functional                    Tone & Sensation:   Tone: Normal              Sensation: Intact               Coordination:  Coordination: Generally decreased, functional  Vision:   Corrective Lenses: Glasses (did not want to don for tx when offered)  Functional Mobility:  Bed Mobility:     Supine to Sit: Assist x2; Additional time;Maximum assistance  Sit to Supine:  (pt remained up at end of tx)  Scooting: Maximum assistance;Assist x2 (mod A x2 from the chair )  Transfers:  Sit to Stand: Minimum assistance;Assist x2  Stand to Sit: Minimum assistance;Assist x2  Stand Pivot Transfers: Moderate assistance; Additional time;Assist x2     Bed to Chair: Minimum assistance;Assist x2              Balance:   Sitting: Impaired  Sitting - Static: Fair (occasional)  Sitting - Dynamic: Fair (occasional)  Standing: Impaired  Standing - Static: Poor  Standing - Dynamic : Poor  Ambulation/Gait Training:  Distance (ft): 1 Feet (ft)  Assistive Device: Gait belt  Ambulation - Level of Assistance: Minimal assistance; Moderate assistance; Additional time;Assist x2        Gait Abnormalities: Antalgic;Decreased step clearance    Functional Measure:  Barthel Index:    Bathin  Bladder: 0  Bowels: 5  Groomin  Dressin  Feedin  Mobility: 0  Stairs: 0  Toilet Use: 0  Transfer (Bed to Chair and Back): 5  Total: 15/100       The Barthel ADL Index: Guidelines  1.  The index should be used as a record of what a patient does, not as a record of what a patient could do. 2. The main aim is to establish degree of independence from any help, physical or verbal, however minor and for whatever reason. 3. The need for supervision renders the patient not independent. 4. A patient's performance should be established using the best available evidence. Asking the patient, friends/relatives and nurses are the usual sources, but direct observation and common sense are also important. However direct testing is not needed. 5. Usually the patient's performance over the preceding 24-48 hours is important, but occasionally longer periods will be relevant. 6. Middle categories imply that the patient supplies over 50 per cent of the effort. 7. Use of aids to be independent is allowed. Fawad Blunt., Barthel, D.W. (0124). Functional evaluation: the Barthel Index. [address] [facility] (14)2. PAXTON Nazario, Deanne Glaser., Klaus Cox., [place], [address] [facility] (1999). Measuring the change indisability after inpatient rehabilitation; comparison of the responsiveness of the Barthel Index and Functional [place] Measure. Journal of Neurology, Neurosurgery, and Psychiatry, 66(4), 600-904. Enrique Mendoza, N.J.A, PATITO Murcia.J.CYNDEE, & Juan Alexandre, M.A. (2004.) Assessment of post-stroke quality of life in cost-effectiveness studies: The usefulness of the Barthel Index and the EuroQoL-5D.  Quality of Life Research, 15, 672-98           Physical Therapy Evaluation Charge Determination   History Examination Presentation Decision-Making   HIGH Complexity :3+ comorbidities / personal factors will impact the outcome/ POC  HIGH Complexity : 4+ Standardized tests and measures addressing body structure, function, activity limitation and / or participation in recreation  HIGH Complexity : Unstable and unpredictable characteristics  Other outcome measures Barthel Index  HIGH       Based on the above components, the patient evaluation is determined to be of the following complexity level: HIGH     Pain Ratin/10 left UE    Activity Tolerance:   Poor, SpO2 stable on RA, requires frequent rest breaks, and observed SOB with activity    After treatment patient left in no apparent distress:   Sitting in chair, Call bell within reach, and Bed / chair alarm activated    COMMUNICATION/EDUCATION:   The patients plan of care was discussed with: Occupational therapist, Registered nurse, and Case management. Fall prevention education was provided and the patient  indicated understanding and patient is unable to participate in goal setting and plan of care. Thank you for this referral.  Eduardo Liz, PT, DPT   Time Calculation: 43 mins       2021 1250 by Megan Brown, PT, DPT  Note: FUNCTIONAL STATUS PRIOR TO ADMISSION: Patient was modified independent using a single point cane for functional mobility. HOME SUPPORT PRIOR TO ADMISSION: The patient lived with spouse. Physical Therapy Goals  Initiated 2021  1. Patient will move from supine to sit and sit to supine , scoot up and down, and roll side to side in bed with minimal assistance/contact guard assist within 7 day(s). 2.  Patient will transfer from bed to chair and chair to bed with minimal assistance/contact guard assist using the least restrictive device within 7 day(s). 3.  Patient will perform sit to stand with minimal assistance/contact guard assist within 7 day(s). 4.  Patient will ambulate with minimal assistance/contact guard assist for 25 feet with the least restrictive device within 7 day(s). 5.  Patient will ascend/descend 4 stairs with 2 handrail(s) with minimal assistance/contact guard assist within 7 day(s).

## 2021-08-17 NOTE — PROGRESS NOTES
Pt only had tylenol ordered pain. Pt reported pain 7/10. RN requested additional pain medication. Order received to give one time dose of 0.5mg dilaudid IV. Will continue to monitor.

## 2021-08-17 NOTE — PROGRESS NOTES
Problem: Dysphagia (Adult)  Goal: *Acute Goals and Plan of Care (Insert Text)  Description: Swallowing goals initiated 8-16-21:  1) tolerate full liquids without s/s aspiration by 8-17-21. MET  2) tolerate regular diet, thin liquids without s/s aspiration or pain by 8-23-21. MET  Outcome: Resolved/Met     SPEECH LANGUAGE PATHOLOGY DYSPHAGIA TREATMENT/DISCHARGE  Patient: Lachelle Zabala (33 y.o. female)  Date: 8/17/2021  Diagnosis: Acute metabolic encephalopathy [Y57.89]  Seizure ([facility] [address] [R56.9] <principal problem not specified>  Procedure(s) (LRB):  LEFT REVERSE TOTAL SHOULDER ARTHROPLASTY WITH BICEP TENDONESIS AND AXILLARY NERVE DISSECTION (Left) 1 Day Post-Op  Precautions:       ASSESSMENT:  Patient with significantly improved tongue appearance this date compared to Friday. Patient agreeable to solids and tolerated well with mildly prolonged mastication. No overt s/s aspiration observed. Patient reported she eats softer solids at baseline. PLAN:  -Soft and bite sized/thin liquid diet, straws ok  Patient will be discharged from acute skilled speech therapy at this time. Rationale for discharge:  Goals achieved    Discharge Recommendations:  None     SUBJECTIVE:   Patient stated That's kind of hard re: kate mak. OBJECTIVE:   Cognitive and Communication Status:  Neurologic State: Alert  Orientation Level: Oriented to person  Cognition: Follows commands    Perception: Appears intact    Perseveration: No perseveration noted    Safety/Judgement: Decreased insight into deficits  Dysphagia Treatment:  Oral Assessment:  Oral Assessment  Lingual: Other (comment) (no longer black and swollen)  P.O. Trials:  Patient Position: upright in bed  Vocal quality prior to P.O.: No impairment  Consistency Presented: Thin liquid; Solid  How Presented: Self-fed/presented;Straw     Bolus Acceptance: No impairment  Bolus Formation/Control: Impaired  Type of Impairment: Delayed;Mastication  Propulsion: No impairment  Oral Residue: None  Initiation of Swallow: No impairment  Laryngeal Elevation: Functional  Aspiration Signs/Symptoms: None            NOMS:   The NOMS functional outcome measure was used to quantify this patient's level of swallowing impairment. Based on the NOMS, the patient was determined to be at level 5 for swallow function     NOMS Swallowing Levels:  Level 1 (CN): NPO  Level 2 (CM): NPO but takes consistency in therapy  Level 3 (CL): Takes less than 50% of nutrition p.o. and continues with nonoral feedings; and/or safe with mod cues; and/or max diet restriction  Level 4 (CK): Safe swallow but needs mod cues; and/or mod diet restriction; and/or still requires some nonoral feeding/supplements  Level 5 (CJ): Safe swallow with min diet restriction; and/or needs min cues  Level 6 (CI): Independent with p.o.; rare cues; usually self cues; may need to avoid some foods or needs extra time  Level 7 ([address] [place] [address]): Independent for all p.o.  RUTH. (2003). National Outcomes Measurement System (NOMS): Adult Speech-Language Pathology User's Guide. Pain:  Pain Scale 1: Visual  Pain Intensity 1: 7  Pain Location 1: Shoulder    After treatment:   Patient left in no apparent distress in bed, Call bell within reach, and Nursing notified    COMMUNICATION/EDUCATION:   Patient was educated regarding her deficit(s) of WFL swallow as this relates to her diagnosis of swollen tongue. She demonstrated Good understanding as evidenced by verbalizing understanding. The patient's plan of care including recommendations, planned interventions, and recommended diet changes were discussed with: Registered nurse.      Princess Soto SLP  Time Calculation: 10 mins

## 2021-08-17 NOTE — PROGRESS NOTES
[facility]    Medical Progress Note      NAME: Janel Mccollum   :  1955  MRM:  [id number]    Date/Time of service 2021  11:24 AM          Assessment and Plan:     Seizure - Resolved. Presumed due to benzo withdrawal and wellbutrin use. EEG without out e/o ictus. Stopped keppra. Negative head CT and MRI. Passed speech eval     Humeral head fracture / Debilitated patient - s/p repair on , appreciate ortho's assistance. PT/OT eval clearly recommend SNF and I agree, but family refuses and asked for [facility]. High risk of home failure and injury. Acute metabolic encephalopathy - POA, likely post ictal and cannot rule out dementia at baseline on top of bipolar disorder    Bipolar disorder - Resumed buspar, lamictal, abilify, would NOT resume Wellbutrin considering recent seizure    Fever / Leukocytosis - Mild, Resolved. Unremarkable UA, xray, RVP, cx and procalcitonin. No abx    Rhabdo - likely 2/2 seizure, CK improved with IVF    Hypertension - We started metoprolol      Hypokalemia - resoled after giving K     COPD - No symptoms. Not on meds. I reject Dx.           Subjective:     Chief Complaint:  Arm pain    ROS:  (bold if positive, if negative)    Tolerating some PT  Tolerating Diet        Objective:     Last 24hrs VS reviewed since prior progress note.  Most recent are:    Visit Vitals  BP (!) 190/76 (BP 1 Location: Right arm, BP Patient Position: At rest)   Pulse 74   Temp 97.9 °F (36.6 °C)   Resp 18   Ht 5' 5\" (1.651 m)   Wt 76.1 kg (167 lb 11.2 oz)   SpO2 96%   BMI 27.91 kg/m²     SpO2 Readings from Last 6 Encounters:   21 96%   19 95%   19 94%   18 93%   18 93%   17 94%    O2 Flow Rate (L/min): 2 l/min       Intake/Output Summary (Last 24 hours) at 2021 1124  Last data filed at 2021 0640  Gross per 24 hour   Intake 1100 ml   Output 800 ml   Net 300 ml        Physical Exam:    Gen:  Frail, in no acute distress  HEENT:  Pink conjunctivae, PERRL, hearing intact to voice, moist mucous membranes  Neck:  Supple, without masses, thyroid non-tender  Resp:  No accessory muscle use, clear breath sounds without wheezes rales or rhonchi  Card:  No murmurs, normal S1, S2 without thrills, bruits or peripheral edema  Abd:  Soft, non-tender, non-distended, normoactive bowel sounds are present, no mass  Lymph:  No cervical or inguinal adenopathy  Musc:  No cyanosis or clubbing, arm in sling  Skin:  No rashes or ulcers, skin turgor is good  Neuro:  Cranial nerves are grossly intact, arm pain and motor weakness, follows commands vaguely  Psych:  Poor insight, oriented to person, place     Telemetry reviewed:   normal sinus rhythm  __________________________________________________________________  Medications Reviewed: (see below)  Medications:     Current Facility-Administered Medications   Medication Dose Route Frequency    oxyCODONE-acetaminophen (PERCOCET) 5-325 mg per tablet 1 Tablet  1 Tablet Oral Q6H PRN    naloxone (NARCAN) injection 0.4 mg  0.4 mg IntraVENous PRN    diphenhydrAMINE (BENADRYL) 12.5 mg/5 mL oral elixir 12.5 mg  12.5 mg Oral Q6H PRN    famotidine (PEPCID) tablet 20 mg  20 mg Oral BID    senna-docusate (PERICOLACE) 8.6-50 mg per tablet 1 Tablet  1 Tablet Oral BID    polyethylene glycol (MIRALAX) packet 17 g  17 g Oral DAILY    magic mouthwash (FIRST-MOUTHWASH BLM) oral suspension 5 mL  5 mL Oral Q4H PRN    0.9% sodium chloride infusion 250 mL  250 mL IntraVENous PRN    ARIPiprazole (ABILIFY) tablet 5 mg  5 mg Oral DAILY    busPIRone (BUSPAR) tablet 10 mg  10 mg Oral BID    lamoTRIgine (LaMICtal) tablet 100 mg  100 mg Oral DAILY    metoprolol tartrate (LOPRESSOR) tablet 25 mg  25 mg Oral Q12H    levETIRAcetam (KEPPRA) tablet 500 mg  500 mg Oral BID    hydrALAZINE (APRESOLINE) 20 mg/mL injection 20 mg  20 mg IntraVENous Q6H PRN    albuterol-ipratropium (DUO-NEB) 2.5 MG-0.5 MG/3 ML  3 mL Nebulization Q4H PRN    sodium chloride (NS) flush 5-40 mL  5-40 mL IntraVENous Q8H    sodium chloride (NS) flush 5-40 mL  5-40 mL IntraVENous PRN    acetaminophen (TYLENOL) tablet 650 mg  650 mg Oral Q6H PRN    Or    acetaminophen (TYLENOL) suppository 650 mg  650 mg Rectal Q6H PRN    polyethylene glycol (MIRALAX) packet 17 g  17 g Oral DAILY PRN    ondansetron (ZOFRAN ODT) tablet 4 mg  4 mg Oral Q8H PRN    Or    ondansetron (ZOFRAN) injection 4 mg  4 mg IntraVENous Q6H PRN    lactated Ringers infusion  125 mL/hr IntraVENous CONTINUOUS        Lab Data Reviewed: (see below)  Lab Review:     Recent Labs     08/17/21  0605 08/16/21  0447 08/15/21  0035   WBC  --  10.8 14.0*   HGB 11.2* 12.0 12.3   HCT  --  36.7 37.6   PLT  --  297 312     Recent Labs     08/17/21  0125 08/16/21  0659 08/15/21  0035 08/14/21  1549 08/14/21  1549    138 140   < > 137   K 3.8 3.8 3.3*   < > 3.4*    108 107   < > 105   CO2 24 22 23   < > 24   * 112* 104*   < > 111*   BUN 22* 19 19   < > 16   CREA 0.81 0.51* 0.78   < > 0.68   CA 8.2* 8.5 8.4*   < > 8.6   MG  --   --   --   --  2.2    < > = values in this interval not displayed. Lab Results   Component Value Date/Time    Glucose (POC) 120 (H) 08/12/2021 08:26 AM    Glucose (POC) 115 (H) 10/31/2011 08:08 PM     No results for input(s): PH, PCO2, PO2, HCO3, FIO2 in the last 72 hours. No results for input(s): INR, INREXT in the last 72 hours.   All Micro Results     Procedure Component Value Units Date/Time    RESPIRATORY VIRUS PANEL W/COVID-19, PCR [[id number]] Collected: 08/15/21 0830    Order Status: Completed Specimen: Nasopharyngeal Updated: 08/15/21 1451     Adenovirus Not detected        Coronavirus 229E Not detected        Coronavirus HKU1 Not detected        Coronavirus CVNL63 Not detected        Coronavirus OC43 Not detected        SARS-CoV-2, PCR Not detected        Metapneumovirus Not detected        Rhinovirus and Enterovirus Not detected        Influenza A Not detected Influenza A, subtype H1 Not detected        Influenza A, subtype H3 Not detected        INFLUENZA A H1N1 PCR Not detected        Influenza B Not detected        Parainfluenza 1 Not detected        Parainfluenza 2 Not detected        Parainfluenza 3 Not detected        Parainfluenza virus 4 Not detected        RSV by PCR Not detected        B. parapertussis, PCR Not detected        Bordetella pertussis - PCR Not detected        Chlamydophila pneumoniae DNA, QL, PCR Not detected        Mycoplasma pneumoniae DNA, QL, PCR Not detected       RESPIRATORY VIRUS PANEL W/COVID-19, PCR [[id number]]     Order Status: Canceled Specimen: NASOPHARYNGEAL SWAB     CULTURE, BODY FLUID Robertha Masters STAIN [[id number]]     Order Status: Canceled Specimen: Body Fluid from Joint Fluid     CULTURE, ANAEROBIC [[id number]]     Order Status: Canceled Specimen: Hip           Other pertinent lab: none    Total time spent with patient: 30 Minutes I personally reviewed chart, notes, data and current medications in the medical record. I have personally examined and treated the patient at bedside during this period.                  Care Plan discussed with: Patient, Family, Care Manager, Nursing Staff and >50% of time spent in counseling and coordination of care    Discussed:  Care Plan and D/C Planning    Prophylaxis:  H2B/PPI    Disposition:   PT, OT, RN           ___________________________________________________    Attending Physician: Paulina Leigh MD

## 2021-08-17 NOTE — PROGRESS NOTES
Problem: Falls - Risk of  Goal: *Absence of Falls  Description: Document Lianna Marques Fall Risk and appropriate interventions in the flowsheet.   Outcome: Progressing Towards Goal  Note: Fall Risk Interventions:  Mobility Interventions: Bed/chair exit alarm    Mentation Interventions: Increase mobility    Medication Interventions: Patient to call before getting OOB    Elimination Interventions: Call light in reach    History of Falls Interventions: Vital signs minimum Q4HRs X 24 hrs (comment for end date)

## 2021-08-18 PROCEDURE — 74011250637 HC RX REV CODE- 250/637: Performed by: ORTHOPAEDIC SURGERY

## 2021-08-18 PROCEDURE — 74011250637 HC RX REV CODE- 250/637: Performed by: INTERNAL MEDICINE

## 2021-08-18 PROCEDURE — 74011250636 HC RX REV CODE- 250/636: Performed by: ORTHOPAEDIC SURGERY

## 2021-08-18 PROCEDURE — 94760 N-INVAS EAR/PLS OXIMETRY 1: CPT

## 2021-08-18 PROCEDURE — 97530 THERAPEUTIC ACTIVITIES: CPT

## 2021-08-18 PROCEDURE — 97535 SELF CARE MNGMENT TRAINING: CPT

## 2021-08-18 PROCEDURE — 74011250636 HC RX REV CODE- 250/636: Performed by: PHYSICIAN ASSISTANT

## 2021-08-18 PROCEDURE — 65270000029 HC RM PRIVATE

## 2021-08-18 PROCEDURE — 97116 GAIT TRAINING THERAPY: CPT

## 2021-08-18 RX ADMIN — LEVETIRACETAM 500 MG: 500 TABLET ORAL at 21:15

## 2021-08-18 RX ADMIN — LEVETIRACETAM 500 MG: 500 TABLET ORAL at 08:38

## 2021-08-18 RX ADMIN — DOCUSATE SODIUM 50MG AND SENNOSIDES 8.6MG 1 TABLET: 8.6; 5 TABLET, FILM COATED ORAL at 18:35

## 2021-08-18 RX ADMIN — FAMOTIDINE 20 MG: 20 TABLET ORAL at 08:38

## 2021-08-18 RX ADMIN — ENOXAPARIN SODIUM 40 MG: 40 INJECTION SUBCUTANEOUS at 12:41

## 2021-08-18 RX ADMIN — OXYCODONE HYDROCHLORIDE AND ACETAMINOPHEN 1 TABLET: 5; 325 TABLET ORAL at 18:35

## 2021-08-18 RX ADMIN — DOCUSATE SODIUM 50MG AND SENNOSIDES 8.6MG 1 TABLET: 8.6; 5 TABLET, FILM COATED ORAL at 08:38

## 2021-08-18 RX ADMIN — FAMOTIDINE 20 MG: 20 TABLET ORAL at 18:35

## 2021-08-18 RX ADMIN — METOPROLOL TARTRATE 25 MG: 25 TABLET, FILM COATED ORAL at 18:35

## 2021-08-18 RX ADMIN — LAMOTRIGINE 100 MG: 100 TABLET ORAL at 08:38

## 2021-08-18 RX ADMIN — POLYETHYLENE GLYCOL 3350 17 G: 17 POWDER, FOR SOLUTION ORAL at 08:39

## 2021-08-18 RX ADMIN — Medication 10 ML: at 14:11

## 2021-08-18 RX ADMIN — BUSPIRONE HYDROCHLORIDE 10 MG: 10 TABLET ORAL at 18:35

## 2021-08-18 RX ADMIN — METOPROLOL TARTRATE 25 MG: 25 TABLET, FILM COATED ORAL at 06:16

## 2021-08-18 RX ADMIN — OXYCODONE HYDROCHLORIDE AND ACETAMINOPHEN 1 TABLET: 5; 325 TABLET ORAL at 09:40

## 2021-08-18 RX ADMIN — Medication 10 ML: at 21:15

## 2021-08-18 RX ADMIN — HYDRALAZINE HYDROCHLORIDE 20 MG: 20 INJECTION INTRAMUSCULAR; INTRAVENOUS at 03:22

## 2021-08-18 RX ADMIN — Medication 10 ML: at 06:16

## 2021-08-18 RX ADMIN — ARIPIPRAZOLE 5 MG: 5 TABLET ORAL at 08:38

## 2021-08-18 RX ADMIN — HYDRALAZINE HYDROCHLORIDE 20 MG: 20 INJECTION INTRAMUSCULAR; INTRAVENOUS at 09:40

## 2021-08-18 RX ADMIN — BUSPIRONE HYDROCHLORIDE 10 MG: 10 TABLET ORAL at 08:39

## 2021-08-18 NOTE — PROGRESS NOTES
Problem: Self Care Deficits Care Plan (Adult)  Goal: *Acute Goals and Plan of Care (Insert Text)  Description: FUNCTIONAL STATUS PRIOR TO ADMISSION: Patient was independent and active without use of DME per chart. HOME SUPPORT: The patient lived with , son, and DIL. Occupational Therapy Goals  Initiated 8/17/2021  1. Patient will perform grooming with modified independence within 7 day(s). 2.  Patient will perform upper body dressing and bathing with minimum assistance within 7 day(s). 3.  Patient will perform lower body dressing and bathing with minimum assistance within 7 day(s). 4.  Patient will perform toilet transfers with minimal assistance using quad cane within 7 day(s). 5.  Patient will perform all aspects of toileting with minimal assistance within 7 day(s). 6.  Patient will don/doff arm sling at moderate assistance  level within 7 days. 7.  Patient will perform all shoulder exercises per physician order with supervision/set-up within 7 days. 8.  Patient will verbalize/demonstrate shoulder precautions during ADLs with 100% accuracy within 7 days. Outcome: Progressing Towards Goal  OCCUPATIONAL THERAPY TREATMENT  Patient: Lachelle Zabala (48 y.o. female)  Date: 8/18/2021  Diagnosis: Acute metabolic encephalopathy [B52.91]  [facility] ([facility] [R56.9] <principal problem not specified>  Procedure(s) (LRB):  LEFT REVERSE TOTAL SHOULDER ARTHROPLASTY WITH BICEP TENDONESIS AND AXILLARY NERVE DISSECTION (Left) 2 Days Post-Op  Precautions:    Chart, occupational therapy assessment, plan of care, and goals were reviewed. ASSESSMENT  Patient continues with skilled OT services and is progressing towards goals. Patient received in bed, agreeable to activity. Patient spouse present in room, but leaves with start of mobility. Patient requires mod/min assist x 2 to EOB. Once seated, OT adjusted sling for comfort. Patient stands with CGA x2 and able to transfer to commode with min x 1. Patient requires min assist to lower underwear, but manages hygiene in sitting with SBA. Patient to sink for hand hygiene with min assist for balance. Patient to chair for UE exercises. Max assist to doff sling. Patient tolerates UE exercises  for E,W, H as well as passive shoulder ROM. She reports pain with shoulder mobility. Patient with very few verbalizations throughout activity. Patient left in chair in NAD. Current Level of Function Impacting Discharge (ADLs): min assist for mobility, assist with ADLs, poor initiation of tasks    Other factors to consider for discharge:          PLAN :  Patient continues to benefit from skilled intervention to address the above impairments. Continue treatment per established plan of care to address goals. Recommend with staff: Kristopher Draft for meals, ,commode transfers    Recommend next OT session: progression of goals    Recommendation for discharge: (in order for the patient to meet his/her long term goals)  Therapy up to 5 days/week in SNF setting    This discharge recommendation:  Has been made in collaboration with the attending provider and/or case management    IF patient discharges home will need the following DME: TBD       SUBJECTIVE:   Patient stated It hurts.     OBJECTIVE DATA SUMMARY:   Cognitive/Behavioral Status:  Neurologic State: Alert  Orientation Level: Oriented X4  Cognition: Impaired decision making;Poor safety awareness             Functional Mobility and Transfers for ADLs:  Bed Mobility:  Supine to Sit: Moderate assistance (plus min A x 1)  Scooting: Contact guard assistance    Transfers:  Sit to Stand: Contact guard assistance;Assist x2  Functional Transfers  Bathroom Mobility: Minimum assistance  Toilet Transfer : Minimum assistance  Cues: Physical assistance;Verbal cues provided       Balance:  Sitting: Impaired  Sitting - Static: Good (unsupported)  Sitting - Dynamic: Fair (occasional)  Standing: Impaired  Standing - Static: Fair  Standing - Dynamic : Fair;Occasional    ADL Intervention:       Grooming  Grooming Assistance: Minimum assistance  Position Performed: Standing (at sink)  Washing Hands: Minimum assistance         Lower Body Bathing  Perineal  : Stand-by assistance  Position Performed: Seated on toilet  Cues: Verbal cues provided         Lower Body Dressing Assistance  Protective Undergarmet: Minimum assistance  Leg Crossed Method Used: No  Position Performed:  (seated on commode)  Cues: Verbal cues provided;Physical assistance    Toileting  Toileting Assistance: Minimum assistance  Bladder Hygiene: Stand-by assistance  Clothing Management: Minimum assistance  Cues: Physical assistance for pants down;Physical assistance for pants up;Verbal cues provided         Therapeutic Exercises:   See above    Pain:      Activity Tolerance:   Fair and requires rest breaks    After treatment patient left in no apparent distress:   Sitting in chair, Call bell within reach, and Bed / chair alarm activated    COMMUNICATION/COLLABORATION:   The patients plan of care was discussed with: Physical therapist and Registered nurse.      Geovanna Dawkins OTR/L  Time Calculation: 27 mins

## 2021-08-18 NOTE — PROGRESS NOTES
Problem: Mobility Impaired (Adult and Pediatric)  Goal: *Acute Goals and Plan of Care (Insert Text)  Description: FUNCTIONAL STATUS PRIOR TO ADMISSION: Patient was independent and active without use of DME.    HOME SUPPORT PRIOR TO ADMISSION: The patient lived with  but did not require assist.    Physical Therapy Goals  Initiated 8/18/2021  1. Patient will move from supine to sit and sit to supine  in bed with minimal assistance/contact guard assist within 7 day(s). 2.  Patient will transfer from bed to chair and chair to bed with supervision/set-up using the least restrictive device within 7 day(s). 3.  Patient will perform sit to stand with supervision/set-up within 7 day(s). 4.  Patient will ambulate with supervision/set-up for 150 feet with the least restrictive device within 7 day(s). 5.  Patient will ascend/descend 4 stairs with 1 handrail(s) with minimal assistance/contact guard assist within 7 day(s). Outcome: Progressing Towards Goal     PHYSICAL THERAPY TREATMENT  Patient: Stephanie Escudero (89 y.o. female)  Date: 8/18/2021  Diagnosis: Acute metabolic encephalopathy [I44.64]  [facility] [R56.[facility]] <principal problem not specified>  Procedure(s) (LRB):  LEFT REVERSE TOTAL SHOULDER ARTHROPLASTY WITH BICEP TENDONESIS AND AXILLARY NERVE DISSECTION (Left) 2 Days Post-Op  Precautions:    Chart, physical therapy assessment, plan of care and goals were reviewed. ASSESSMENT  Patient continues with skilled PT services and is progressing towards goals. Pt greeted lying supine in bed, agreeable to participate in therapy. Per , pt was up to restroom with RN assist x 3 overnight without incident. Today, pt required Min of 1 person and Mod A of a 2nd person to transfer from supine to sitting at EOB. Once sitting, pt demo'ed good static sitting balance then transferred to standing with CGA x 2.  She ambulated to and from restroom and within room with Min A for balance support but noted wavering pathway, frequent lateral LOB requiring stepping strategy to recover and poor activity tolerance. Attempted to question pt on her prior level of functioning to which she replied \"I don't remember any of that anymore. \" Though she has made significant improvements in overall mobility independence and tolerance compared to previous visit with PT, she remains at a high risk for falls d/t balance deficits and drowsiness. Continue to rec SNF placement for therapy vs home with 24/7 assistance. Current Level of Function Impacting Discharge (mobility/balance): 2 person assist for bed mobility, Min A for transfers and gait    Other factors to consider for discharge:          PLAN :  Patient continues to benefit from skilled intervention to address the above impairments. Continue treatment per established plan of care. to address goals. Recommendation for discharge: (in order for the patient to meet his/her long term goals)  Therapy up to 5 days/week in SNF setting or intensive home health therapy program with 24/7 assist available    This discharge recommendation:  Has been made in collaboration with the attending provider and/or case management    IF patient discharges home will need the following DME: none       SUBJECTIVE:   Patient stated I don't remember anything anymore.     OBJECTIVE DATA SUMMARY:   Critical Behavior:  Neurologic State: Alert  Orientation Level: Oriented X4  Cognition: Impaired decision making, Poor safety awareness  Safety/Judgement: Decreased awareness of environment  Functional Mobility Training:  Bed Mobility:     Supine to Sit: Moderate assistance (plus min A x 1)     Scooting: Contact guard assistance        Transfers:  Sit to Stand: Contact guard assistance;Assist x2  Stand to Sit: Contact guard assistance;Assist x2                             Balance:  Sitting: Impaired  Sitting - Static: Good (unsupported)  Sitting - Dynamic: Fair (occasional)  Standing: Impaired  Standing - Static: Fair  Standing - Dynamic : Fair;Occasional  Ambulation/Gait Training:  Distance (ft): 30 Feet (ft)  Assistive Device: Gait belt  Ambulation - Level of Assistance: Minimal assistance;Assist x1        Gait Abnormalities: Shuffling gait; Decreased step clearance; Path deviations        Base of Support: Widened     Speed/Jackeline: Shuffled;Fluctuations          Pain Rating:  Denied pain    Activity Tolerance:   Fair and requires rest breaks    After treatment patient left in no apparent distress:   Sitting in chair, Call bell within reach, and Bed / chair alarm activated    COMMUNICATION/COLLABORATION:   The patients plan of care was discussed with: Occupational therapist and Registered nurse.      Ganga Crzu PT, DPT   Time Calculation: 25 mins

## 2021-08-18 NOTE — PROGRESS NOTES
Verbal shift change report given to [address] M[address] (oncoming nurse) by Isaac Good (offgoing nurse). Report included the following information SBAR, Kardex and MAR.

## 2021-08-18 NOTE — PROGRESS NOTES
ORTHO PROGRESS NOTE      SUBJECTIVE:  Deneen Chan denies shoulder pain. OBJECTIVE:  Patient Vitals for the past 24 hrs:   Temp Pulse BP   08/18/21 0845 98.3 °F (36.8 °C) 97 (!) 193/76   08/18/21 0613  94 (!) 147/70   08/18/21 0253 98.4 °F (36.9 °C) 88 (!) 183/87   08/17/21 2227 98.3 °F (36.8 °C) 82 (!) 168/82   08/17/21 2009  85    08/17/21 1926 98.1 °F (36.7 °C) 82 (!) 156/80   08/17/21 1658 98.5 °F (36.9 °C) 90 (!) 166/79   08/17/21 1500  93    08/17/21 1236 98.3 °F (36.8 °C) 88 112/67       Alert, no distress. Lying in bed. No family present. Respirations unlabored. Dressing CDI. Mild upper arm edema but soft compartments. Some ecchymosis. No erythema. Moves hand/wrist OK. Hand/arm SILT. Palp radial pulse. Moves BLE OK. Calves non-tender. Recent Labs     08/17/21  0605 08/17/21  0125 08/16/21  0659 08/16/21  0447 08/16/21  0447   HGB 11.2*  --   --    < > 12.0   HCT  --   --   --   --  36.7   PLT  --   --   --   --  297   BUN  --  22*   < >  --   --    CREA  --  0.81   < >  --   --    GFRAA  --  >60   < >  --   --    GFRNA  --  >60   < >  --   --     < > = values in this interval not displayed. PT/OT:   Gait:  Gait  Gait Abnormalities: Antalgic, Decreased step clearance  Ambulation - Level of Assistance: Minimal assistance, Moderate assistance, Additional time, Assist x2  Distance (ft): 1 Feet (ft)  Assistive Device: Gait belt                 ASSESSMENT:  Procedure: Procedure(s):  LEFT REVERSE TOTAL SHOULDER ARTHROPLASTY WITH BICEP TENDONESIS AND AXILLARY NERVE DISSECTION  Post Op day: 2 Days Post-Op    PLAN:  PT/OT: sling. NWB Left shoulder. Passive ROM of left shoulder- ER to neutral.  Flexion to 90. No IR. Finger/elbow/wrist ROM as tolerated. Analgesics: Tylenol vs Percocet. DVT proph: Lovenox as inpatient. Kierra Valadez as outpatient from [facility]. Disp planning. F/U: Dr. Kimberley Sandoval 2 weeks. Please call again if questions.     BAMBI Siegel  Orthopedic Trauma Service  [facility]

## 2021-08-18 NOTE — PROGRESS NOTES
Comprehensive Nutrition Assessment    Type and Reason for Visit: Initial, RD nutrition re-screen/LOS    Nutrition Recommendations/Plan:   1. Continue current diet order. Nutrition Assessment:   Pt is a 60year old female admitted with Acute metabolic encephalopathy; Seizure ([facility] [R56.9]. She  has a past medical history of Common bile duct calculus, Gallstones, GERD, Hypercholesterolemia, Hypertension, and Psychiatric disorder. PO intake averages 50% of all meals. Lack of recent weights - has lost weight since 2019, but no recent updates. Denies recent weight changes, denies appetite changes, denies taste changes. No c/o N/V at time of RD visit. Patient Vitals for the past 168 hrs:   % Diet Eaten   08/17/21 1238 26 - 50%   08/17/21 0900 51 - 75%   08/15/21 1817 26 - 50%   08/15/21 1417 1 - 25%   08/15/21 0840 0%   08/14/21 1549 1 - 25%   08/14/21 1216 0%   08/14/21 0809 0%   08/13/21 1617 0%   08/13/21 1256 0%   08/13/21 0817 0%   08/12/21 1359 0%       Wt Readings from Last 10 Encounters:   08/12/21 76.1 kg (167 lb 11.2 oz)   11/04/19 80.9 kg (178 lb 6.4 oz)   08/12/19 81.9 kg (180 lb 9.6 oz)   03/25/19 83.7 kg (184 lb 9.6 oz)   03/20/19 83.2 kg (183 lb 5 oz)   02/06/19 78.7 kg (173 lb 9 oz)   10/11/18 70.3 kg (155 lb)   08/01/18 74.4 kg (164 lb)   06/20/18 78.8 kg (173 lb 11.2 oz)   03/28/18 88 kg (194 lb)       Malnutrition Assessment:  Malnutrition Status:  No malnutrition     Estimated Daily Nutrient Needs:  Energy (kcal): 1704 (MSJ X 1.3); Weight Used for Energy Requirements: Current  Protein (g): 76-91; Weight Used for Protein Requirements: Current  Fluid (ml/day): 0198; Method Used for Fluid Requirements: 1 ml/kcal    Nutrition Related Findings:       ABD:WNL noted 8/18 but also not passing flatus   Last BM: None documented 8/18   Edema: Non-pitting generalized; +1 LUE noted 8/18    Nutr.  Labs:  Hgb 11.2 (L)  Lab Results   Component Value Date/Time    Glucose 120 (H) 08/17/2021 01:25 AM    Glucose (POC) 120 (H) 08/12/2021 08:26 AM     Lab Results   Component Value Date/Time    Hemoglobin A1c 5.8 (H) 02/06/2019 11:16 AM       Nutr. Meds:  Buspar  Pepcid  Lactated Ringers  Keppra  Lopressor  Miralax  Pericolace    Wounds:    Incision on left shoulder    Current Nutrition Therapies:  ADULT DIET Dysphagia - Soft & Bite Sized    Anthropometric Measures:  · Height:  5' 5\" (165.1 cm)  · Current Body Wt:  76.1 kg (167 lb 12.3 oz)     · Ideal Body Wt:  125 lbs:  134.2 %   · BMI:  27.91   · BMI Category: Overweight (BMI 25.0-29. 9)       Nutrition Diagnosis:   · Predicted inadequate energy intake PTA related to biting/chewing (masticatory) difficulty as evidenced by poor dentition    Nutrition Interventions:   Food and/or Nutrient Delivery: Continue current diet  Nutrition Education and Counseling: No recommendations at this time  Coordination of Nutrition Care: Continue to monitor while inpatient    Goals:  PO intake continue to meet >/= 50% estimated needs within 3 - 5 days     Nutrition Monitoring and Evaluation:   Behavioral-Environmental Outcomes: None identified  Food/Nutrient Intake Outcomes: Food and nutrient intake  Physical Signs/Symptoms Outcomes: Chewing or swallowing, Biochemical data, Skin, Weight    Discharge Planning:    No discharge needs at this time     Electronically signed by Noy Hendricks, [address] on 6/52/5390  Contact: [phone]

## 2021-08-18 NOTE — DISCHARGE INSTRUCTIONS
Shoulder Surgery Discharge Instructions  Dr. Shahrzad High    Please take the time to review the following instructions before you leave the hospital and use them as guidelines during your recovery from surgery. If you have any questions, you may contact my office at [phone]. Wound Care / Dressing Change    Do NOT remove your dressing. Keep the clear, plastic dressing intact until your follow up in the office. Showering / Bathing    If the clear plastic dressing is intact and there is no drainage, you may shower. If the dressing is loose or water gets under it please notify our office. If there is drainage, please call the office immediately. Sling    Keep your arm in the immobilizer/sling at all times except when showering, changing your clothes, and doing the exercises shown to you by Dr. Norma Gonzalez or your physical/occupational therapist prior to your discharge from the hospital.  Keep your arm at your side when changing your clothes and showering. Do not move it away from your body. Ice and Elevation    Ice therapy should be used consistently for 48 hours after surgery. Subsequently, you should ice 3 times per day for 20 minutes at a time. After the first week, you may use ice as needed for pain and swelling. Please ensure there is protective barrier between your skin and the ice. Diet    You may advance your regular diet as tolerated. Increase your clear liquid intake for the next 2-3 days. Medications      1. Pain: Your medical doctor in the hosptial will address your pain and give any necessary prescriptions. 2. Blood Thinner:  You have been given a prescription for Aspirin 325mg. Please take one tablet twice daily for 14 days. Do not take anti-inflammatory medication (Advil, Aleve, Motrin) while taking the blood thinner. 3. Stool Softeners:  Pain medications can cause constipation.  Stool softeners, warm prune juice and increasing your water and fiber intake can help prevent constipation. Do not take laxatives. Physical Therapy:  IMPORTANT:  You may move your elbow, wrist, and hand. You should not move your shoulder away from your body unless performing the pendulum exercises taught to you by therapy. NWB Left shoulder. Passive ROM of left shoulder- external rotation to neutral.  Flexion to 90. No internal rotation. Finger/elbow/wrist A/PROM as tolerated. Follow-Up Appointment:  Please follow up at your scheduled appointment. If you do not already have an appointment, please call our office at [phone] for your follow up appointment. Your appointment will likely be with Anna Eason PA-C. Patterson assists Dr. Hernandes in surgery and will be able to review your operation and answer your questions. APPOINTMENT: Sept 1st at 9am      Important Signs and Symptoms:  If any of the following signs and symptoms occurs, you should contact Dr. Anu Harding office. Please be advised if a problem arises which you feel requires immediate medical attention or you are unable to contact Dr. Anu Harding office, you should seek immediate medical attention. Primary Diagnoses  Problem List as of 8/17/2021 Date Reviewed: 8/15/2021         Seizure ([facility] [address]   Acute metabolic encephalopathy   Anxiety   Cigarette nicotine dependence without complication   Anemia   ALEX (acute kidney injury) ([address]   Lithium toxicity   Hypercholesteremia   Prediabetes   Essential hypertension with goal blood pressure less than 130/80   Bipolar 1 disorder ([address]          Take Home Medications         Information obtained by :  I understand that if any problems occur once I am at home I am to contact my physician. I understand and acknowledge receipt of the instructions indicated above.                                                                                                                                            [facility] or R.N.'s Signature                                                                  Date/Time                                                                                                                                              Patient or Representative Signature                                                          Date/Time      Signs and symptoms to watch for include:    1. A sudden increase in swelling and/or redness or warmth at the area of your surgery which isnt relieved by rest, ice, and elevation. 2. Oral temperature greater than 101degrees for 12 hours or more which isnt relieved by an increase in fluid intake and taking two Tylenol every 4-6 hours. 3. Excessive drainage from your incisions, or drainage which hasnt stopped by 72 hours after your surgery. 4. Calf pain, ever, chills, shortness of breath, chest pain, nausea, vomiting or other signs and symptoms which are of concern to you. Unless you are having a true medical emergency,   you MUST call Dr. Wesley Allen' office   BEFORE proceeding to the Emergency Department. A provider is on call 24 hours/day.

## 2021-08-18 NOTE — PROGRESS NOTES
Spiritual Care Assessment/Progress Note  [address] [place] [address] Dr      NAME: Jess French      MRN: [id number]  AGE: 60 y.o.  SEX: female  Jainism Affiliation: Meghan   Language: English     8/18/2021     Total Time (in minutes): 25     Spiritual Assessment begun in [facility] POST SURG ORT 2 through conversation with:         [x]Patient        [] Family    [] Friend(s)        Reason for Consult: Initial/Spiritual assessment, patient floor     Spiritual beliefs: (Please include comment if needed)     [] Identifies with a doug tradition:    Anayandreas      [] Supported by a doug community:       None      [] Claims no spiritual orientation:           [] Seeking spiritual identity:                [] Adheres to an individual form of spirituality:           [] Not able to assess:                           Identified resources for coping:      [x] Prayer                               [] Music                  [] Guided Imagery     [x] Family/friends                 [] Pet visits     [] Devotional reading                         [] Unknown     [] Other:  Self/Doing                                             Interventions offered during this visit: (See comments for more details)    Patient Interventions: Affirmation of emotions/emotional suffering, Affirmation of doug, Catharsis/review of pertinent events in supportive environment, Coping skills reviewed/reinforced, Initial/Spiritual assessment, patient floor, Normalization of emotional/spiritual concerns, Prayer (assurance of)           Plan of Care:     [] Support spiritual and/or cultural needs    [] Support AMD and/or advance care planning process      [] Support grieving process   [] Coordinate Rites and/or Rituals    [] Coordination with community clergy   [] No spiritual needs identified at this time   [] Detailed Plan of Care below (See Comments)  [] Make referral to Music Therapy  [] Make referral to Pet Therapy     [] Make referral to Addiction services  [] Make referral to [facility]  [] Make referral to Spiritual Care Partner  [] No future visits requested        [x] Follow up upon further referrals     Comments:   Initial spiritual care assessment with patient Mrs. Aleksandr Croft in her room, 436. Mrs. Eugenio Titus was lying in bed flat, covered up and resting but welcomed the visit. She expressed that she is feeling ok, and she is supposed to leave this hospital and go to rehab. She does not know exactly when the discharged will be now. When asked about where she finds her strength in life and in hardships, she only expressed that you just do things. She shared that she has one child and a granddaughter that lives with her. Mrs. Eugenio Titus was quite and polite and expressed her gratitude for the visit and welcomed prayer for her recovery. Advised nurse to contact [facility] for any further referrals.     Chaplain Toribio Arora M.Div.  Maynor Means ([id number])

## 2021-08-18 NOTE — PROGRESS NOTES
8/18/2021 3:07 PM Spoke [facility]a was received but they cannot accept pt until 8/19 in the afternoon, due to bed availability. Met with pt and pt's , both agreeable to discharge to [facility] [address] on 8/19. Pt's  requested wheelchair transport be arranged at discharge and was agreeable to paying for cost of transport. Pt reported she would be agreeable to receiving COVID vaccine but does not want the J&J. CM called to [facility], confirmed only vaccination available is J&J. CM called to [facility], spoke with Gordan Snellen who confirmed they offer the [place] vaccine. CM relayed to pt and pt's . CM will arrange transport once bed is confirmed with [facility] on 8/19.      8/18/2021 1:59 PM [facility], [facility] Freda remains pending. 8/18/2021 8:34 AM Discharge order noted. CM called and spoke with Becky in [facility]. Auth that was started in afternoon on 8/17 remains pending. Becky confirmed she will follow up with CM once Irma Freda is received. CM will follow.  Manuel Wright, BSW

## 2021-08-18 NOTE — PROGRESS NOTES
[facility]    Medical Progress Note      NAME: Deneen Chan   :  1955  MRM:  [id number]    Date/Time of service 2021  11:24 AM          Assessment and Plan:     Seizure - Resolved. Presumed due to benzo withdrawal and wellbutrin use. EEG without out e/o ictus. Stopped old wellbutrin, Stopped new keppra. Negative head CT and MRI. Passed speech eval     Humeral head fracture / Debilitated patient - s/p repair on , appreciate ortho's assistance. PT/OT eval clearly recommend SNF and I agree, but family initially refused and asked for [facility]. High risk of home failure and injury. Family then agreed to SNF, and we are awaiting authorization. She was ready     Acute metabolic encephalopathy - POA, likely post ictal and cannot rule out dementia at baseline on top of her known bipolar disorder    Bipolar disorder - Resumed buspar, lamictal, abilify, would NOT resume Wellbutrin considering recent seizure    Fever / Leukocytosis - Mild, Resolved. Unremarkable UA, xray, RVP, cx and procalcitonin. No abx    Rhabdo - likely 2/2 seizure, CK improved with IVF    Hypertension - We started metoprolol      Hypokalemia - resoled after giving K     COPD - No symptoms. Not on meds. I reject Dx.           Subjective:     Chief Complaint:  Arm pain    ROS:  (bold if positive, if negative)    Tolerating some PT  Tolerating Diet        Objective:     Last 24hrs VS reviewed since prior progress note.  Most recent are:    Visit Vitals  BP (!) 147/70 (BP 1 Location: Right upper arm, BP Patient Position: At rest)   Pulse 94   Temp 98.4 °F (36.9 °C)   Resp 20   Ht 5' 5\" (1.651 m)   Wt 76.1 kg (167 lb 11.2 oz)   SpO2 96%   BMI 27.91 kg/m²     SpO2 Readings from Last 6 Encounters:   21 96%   19 95%   19 94%   18 93%   18 93%   17 94%    O2 Flow Rate (L/min): 2 l/min       Intake/Output Summary (Last 24 hours) at 2021 0804  Last data filed at 2021 1238  Gross per 24 hour   Intake 360 ml   Output    Net 360 ml        Physical Exam:    Gen:  Frail, in no acute distress  HEENT:  Pink conjunctivae, PERRL, hearing intact to voice, moist mucous membranes  Neck:  Supple, without masses, thyroid non-tender  Resp:  No accessory muscle use, clear breath sounds without wheezes rales or rhonchi  Card:  No murmurs, normal S1, S2 without thrills, bruits or peripheral edema  Abd:  Soft, non-tender, non-distended, normoactive bowel sounds are present, no mass  Lymph:  No cervical or inguinal adenopathy  Musc:  No cyanosis or clubbing, arm in sling  Skin:  No rashes or ulcers, skin turgor is good  Neuro:  Cranial nerves are grossly intact, arm pain and motor weakness, follows commands vaguely  Psych:  Poor insight, oriented to person, place     Telemetry reviewed:   normal sinus rhythm  __________________________________________________________________  Medications Reviewed: (see below)  Medications:     Current Facility-Administered Medications   Medication Dose Route Frequency    oxyCODONE-acetaminophen (PERCOCET) 5-325 mg per tablet 1 Tablet  1 Tablet Oral Q6H PRN    enoxaparin (LOVENOX) injection 40 mg  40 mg SubCUTAneous Q24H    naloxone (NARCAN) injection 0.4 mg  0.4 mg IntraVENous PRN    famotidine (PEPCID) tablet 20 mg  20 mg Oral BID    senna-docusate (PERICOLACE) 8.6-50 mg per tablet 1 Tablet  1 Tablet Oral BID    polyethylene glycol (MIRALAX) packet 17 g  17 g Oral DAILY    magic mouthwash (FIRST-MOUTHWASH BLM) oral suspension 5 mL  5 mL Oral Q4H PRN    0.9% sodium chloride infusion 250 mL  250 mL IntraVENous PRN    ARIPiprazole (ABILIFY) tablet 5 mg  5 mg Oral DAILY    busPIRone (BUSPAR) tablet 10 mg  10 mg Oral BID    lamoTRIgine (LaMICtal) tablet 100 mg  100 mg Oral DAILY    metoprolol tartrate (LOPRESSOR) tablet 25 mg  25 mg Oral Q12H    levETIRAcetam (KEPPRA) tablet 500 mg  500 mg Oral BID    hydrALAZINE (APRESOLINE) 20 mg/mL injection 20 mg  20 mg IntraVENous Q6H PRN    albuterol-ipratropium (DUO-NEB) 2.5 MG-0.5 MG/3 ML  3 mL Nebulization Q4H PRN    sodium chloride (NS) flush 5-40 mL  5-40 mL IntraVENous Q8H    sodium chloride (NS) flush 5-40 mL  5-40 mL IntraVENous PRN    acetaminophen (TYLENOL) tablet 650 mg  650 mg Oral Q6H PRN    Or    acetaminophen (TYLENOL) suppository 650 mg  650 mg Rectal Q6H PRN    polyethylene glycol (MIRALAX) packet 17 g  17 g Oral DAILY PRN    ondansetron (ZOFRAN ODT) tablet 4 mg  4 mg Oral Q8H PRN    Or    ondansetron (ZOFRAN) injection 4 mg  4 mg IntraVENous Q6H PRN    lactated Ringers infusion  125 mL/hr IntraVENous CONTINUOUS        Lab Data Reviewed: (see below)  Lab Review:     Recent Labs     08/17/21  0605 08/16/21  0447   WBC  --  10.8   HGB 11.2* 12.0   HCT  --  36.7   PLT  --  297     Recent Labs     08/17/21  0125 08/16/21  0659    138   K 3.8 3.8    108   CO2 24 22   * 112*   BUN 22* 19   CREA 0.81 0.51*   CA 8.2* 8.5     Lab Results   Component Value Date/Time    Glucose (POC) 120 (H) 08/12/2021 08:26 AM    Glucose (POC) 115 (H) 10/31/2011 08:08 PM     No results for input(s): PH, PCO2, PO2, HCO3, FIO2 in the last 72 hours. No results for input(s): INR, INREXT, INREXT in the last 72 hours.   All Micro Results     Procedure Component Value Units Date/Time    RESPIRATORY VIRUS PANEL W/COVID-19, PCR [[id number]] Collected: 08/15/21 0830    Order Status: Completed Specimen: Nasopharyngeal Updated: 08/15/21 1451     Adenovirus Not detected        Coronavirus 229E Not detected        Coronavirus HKU1 Not detected        Coronavirus CVNL63 Not detected        Coronavirus OC43 Not detected        SARS-CoV-2, PCR Not detected        Metapneumovirus Not detected        Rhinovirus and Enterovirus Not detected        Influenza A Not detected        Influenza A, subtype H1 Not detected        Influenza A, subtype H3 Not detected        INFLUENZA A H1N1 PCR Not detected        Influenza B Not detected Parainfluenza 1 Not detected        Parainfluenza 2 Not detected        Parainfluenza 3 Not detected        Parainfluenza virus 4 Not detected        RSV by PCR Not detected        B. parapertussis, PCR Not detected        Bordetella pertussis - PCR Not detected        Chlamydophila pneumoniae DNA, QL, PCR Not detected        Mycoplasma pneumoniae DNA, QL, PCR Not detected       RESPIRATORY VIRUS PANEL W/COVID-19, PCR [[id number]]     Order Status: Canceled Specimen: NASOPHARYNGEAL SWAB     CULTURE, BODY FLUID Francesca Combe STAIN [[id number]]     Order Status: Canceled Specimen: Body Fluid from Joint Fluid     CULTURE, ANAEROBIC [[id number]]     Order Status: Canceled Specimen: Hip           Other pertinent lab: none    Total time spent with patient: 30 Minutes I personally reviewed chart, notes, data and current medications in the medical record. I have personally examined and treated the patient at bedside during this period.                  Care Plan discussed with: Patient, Family, Care Manager, Nursing Staff and >50% of time spent in counseling and coordination of care    Discussed:  Care Plan and D/C Planning    Prophylaxis:  H2B/PPI    Disposition:   PT, OT, RN           ___________________________________________________    Attending Physician: Davin Murray MD

## 2021-08-18 NOTE — PROGRESS NOTES
Problem: Falls - Risk of  Goal: *Absence of Falls  Description: Document Shy Reyes Fall Risk and appropriate interventions in the flowsheet.   Outcome: Progressing Towards Goal  Note: Fall Risk Interventions:  Mobility Interventions: Patient to call before getting OOB    Mentation Interventions: Bed/chair exit alarm    Medication Interventions: Teach patient to arise slowly    Elimination Interventions: Call light in reach    History of Falls Interventions: Vital signs minimum Q4HRs X 24 hrs (comment for end date)

## 2021-08-19 VITALS
TEMPERATURE: 98.9 F | OXYGEN SATURATION: 95 % | RESPIRATION RATE: 20 BRPM | BODY MASS INDEX: 27.94 KG/M2 | HEART RATE: 76 BPM | WEIGHT: 167.7 LBS | DIASTOLIC BLOOD PRESSURE: 82 MMHG | SYSTOLIC BLOOD PRESSURE: 153 MMHG | HEIGHT: 65 IN

## 2021-08-19 PROCEDURE — 97116 GAIT TRAINING THERAPY: CPT

## 2021-08-19 PROCEDURE — 94760 N-INVAS EAR/PLS OXIMETRY 1: CPT

## 2021-08-19 PROCEDURE — 74011250637 HC RX REV CODE- 250/637: Performed by: INTERNAL MEDICINE

## 2021-08-19 PROCEDURE — 74011250636 HC RX REV CODE- 250/636: Performed by: ORTHOPAEDIC SURGERY

## 2021-08-19 PROCEDURE — 74011250636 HC RX REV CODE- 250/636: Performed by: PHYSICIAN ASSISTANT

## 2021-08-19 PROCEDURE — 97535 SELF CARE MNGMENT TRAINING: CPT

## 2021-08-19 PROCEDURE — 74011250637 HC RX REV CODE- 250/637: Performed by: ORTHOPAEDIC SURGERY

## 2021-08-19 RX ORDER — SENNOSIDES 8.6 MG/1
1 TABLET ORAL
Status: COMPLETED | OUTPATIENT
Start: 2021-08-19 | End: 2021-08-19

## 2021-08-19 RX ADMIN — LAMOTRIGINE 100 MG: 100 TABLET ORAL at 10:46

## 2021-08-19 RX ADMIN — FAMOTIDINE 20 MG: 20 TABLET ORAL at 10:46

## 2021-08-19 RX ADMIN — Medication 10 ML: at 05:32

## 2021-08-19 RX ADMIN — ENOXAPARIN SODIUM 40 MG: 40 INJECTION SUBCUTANEOUS at 12:13

## 2021-08-19 RX ADMIN — LEVETIRACETAM 500 MG: 500 TABLET ORAL at 10:47

## 2021-08-19 RX ADMIN — DOCUSATE SODIUM 50MG AND SENNOSIDES 8.6MG 1 TABLET: 8.6; 5 TABLET, FILM COATED ORAL at 10:47

## 2021-08-19 RX ADMIN — METOPROLOL TARTRATE 25 MG: 25 TABLET, FILM COATED ORAL at 07:56

## 2021-08-19 RX ADMIN — STANDARDIZED SENNA CONCENTRATE 8.6 MG: 8.6 TABLET ORAL at 13:12

## 2021-08-19 RX ADMIN — POLYETHYLENE GLYCOL 3350 17 G: 17 POWDER, FOR SOLUTION ORAL at 12:12

## 2021-08-19 RX ADMIN — POLYETHYLENE GLYCOL 3350 17 G: 17 POWDER, FOR SOLUTION ORAL at 10:46

## 2021-08-19 RX ADMIN — HYDRALAZINE HYDROCHLORIDE 20 MG: 20 INJECTION INTRAMUSCULAR; INTRAVENOUS at 11:13

## 2021-08-19 RX ADMIN — ARIPIPRAZOLE 5 MG: 5 TABLET ORAL at 10:46

## 2021-08-19 RX ADMIN — HYDRALAZINE HYDROCHLORIDE 20 MG: 20 INJECTION INTRAMUSCULAR; INTRAVENOUS at 01:07

## 2021-08-19 RX ADMIN — BUSPIRONE HYDROCHLORIDE 10 MG: 10 TABLET ORAL at 10:47

## 2021-08-19 RX ADMIN — OXYCODONE HYDROCHLORIDE AND ACETAMINOPHEN 1 TABLET: 5; 325 TABLET ORAL at 10:46

## 2021-08-19 NOTE — PROGRESS NOTES
Problem: Mobility Impaired (Adult and Pediatric)  Goal: *Acute Goals and Plan of Care (Insert Text)  Description: FUNCTIONAL STATUS PRIOR TO ADMISSION: Patient was independent and active without use of DME.    HOME SUPPORT PRIOR TO ADMISSION: The patient lived with  but did not require assist.    Physical Therapy Goals  Initiated 8/18/2021  1. Patient will move from supine to sit and sit to supine  in bed with minimal assistance/contact guard assist within 7 day(s). 2.  Patient will transfer from bed to chair and chair to bed with supervision/set-up using the least restrictive device within 7 day(s). 3.  Patient will perform sit to stand with supervision/set-up within 7 day(s). 4.  Patient will ambulate with supervision/set-up for 150 feet with the least restrictive device within 7 day(s). 5.  Patient will ascend/descend 4 stairs with 1 handrail(s) with minimal assistance/contact guard assist within 7 day(s). Outcome: Progressing Towards Goal  Note:   PHYSICAL THERAPY TREATMENT  Patient: Teddy Valdez (33 y.o. female)  Date: 8/19/2021  Diagnosis: Acute metabolic encephalopathy [S21.58]  [facility] [R56.9] <principal problem not specified>  Procedure(s) (LRB):  LEFT REVERSE TOTAL SHOULDER ARTHROPLASTY WITH BICEP TENDONESIS AND AXILLARY NERVE DISSECTION (Left) 3 Days Post-Op  Precautions:    Chart, physical therapy assessment, plan of care and goals were reviewed. ASSESSMENT  Patient continues with skilled PT services and progressing towards goals. Pt received sitting EOB with LATHAM. Spouse present and attentive throughout session. Max/total A to don sling with LATHAM assistance. Pt appears drowsy, slow to initiate tasks. CGA/Min A for steadying with functional tranfers and short distance gait training, pt declined additional gait training requesting to sit in chair. Pt is high risk for falls.     Current Level of Function Impacting Discharge (mobility/balance): Min A     Other factors to consider for discharge:          PLAN :  Patient continues to benefit from skilled intervention to address the above impairments. Continue treatment per established plan of care. to address goals. Recommendation for discharge: (in order for the patient to meet his/her long term goals)  Therapy up to 5 days/week in SNF setting    This discharge recommendation:  Has been made in collaboration with the attending provider and/or case management    IF patient discharges home will need the following DME: to be determined (TBD)       SUBJECTIVE:   Patient stated ok.     OBJECTIVE DATA SUMMARY:   Critical Behavior:  Neurologic State: Alert  Orientation Level: Oriented X4  Cognition: Follows commands  Safety/Judgement: Decreased awareness of environment  Functional Mobility Training:  Bed Mobility:                    Transfers:  Sit to Stand: Contact guard assistance  Stand to Sit: Contact guard assistance                             Balance:  Standing: Impaired  Standing - Static: Fair  Standing - Dynamic : Fair  Ambulation/Gait Training:  Distance (ft): 10 Feet (ft)  Assistive Device: Gait belt (sling on LUE)  Ambulation - Level of Assistance: Minimal assistance;Assist x1        Gait Abnormalities: Shuffling gait; Path deviations        Base of Support: Widened     Speed/Jackeline: Fluctuations                       Stairs: Therapeutic Exercises:     Pain Rating:  Pt unable to rate pain level, no facial grimace noted    Activity Tolerance:   Fair    After treatment patient left in no apparent distress:   Sitting in chair, Call bell within reach, and Bed / chair alarm activated    COMMUNICATION/COLLABORATION:   The patients plan of care was discussed with: Registered nurseRick Cheung   Time Calculation: 9 mins

## 2021-08-19 NOTE — PROGRESS NOTES
Problem: Self Care Deficits Care Plan (Adult)  Goal: *Acute Goals and Plan of Care (Insert Text)  Description: FUNCTIONAL STATUS PRIOR TO ADMISSION: Patient was independent and active without use of DME per chart. HOME SUPPORT: The patient lived with , son, and DIL. Occupational Therapy Goals  Initiated 8/17/2021  1. Patient will perform grooming with modified independence within 7 day(s). 2.  Patient will perform upper body dressing and bathing with minimum assistance within 7 day(s). 3.  Patient will perform lower body dressing and bathing with minimum assistance within 7 day(s). 4.  Patient will perform toilet transfers with minimal assistance using quad cane within 7 day(s). 5.  Patient will perform all aspects of toileting with minimal assistance within 7 day(s). 6.  Patient will don/doff arm sling at moderate assistance  level within 7 days. 7.  Patient will perform all shoulder exercises per physician order with supervision/set-up within 7 days. 8.  Patient will verbalize/demonstrate shoulder precautions during ADLs with 100% accuracy within 7 days. Outcome: Progressing Towards Goal   OCCUPATIONAL THERAPY TREATMENT  Patient: Mariaelena Worley (64 y.o. female)  Date: 8/19/2021  Diagnosis: Acute metabolic encephalopathy [L49.11]  [facility] [R56.9] <principal problem not specified>  Procedure(s) (LRB):  LEFT REVERSE TOTAL SHOULDER ARTHROPLASTY WITH BICEP TENDONESIS AND AXILLARY NERVE DISSECTION (Left) 3 Days Post-Op  Precautions:    Chart, occupational therapy assessment, plan of care, and goals were reviewed. ASSESSMENT  Patient continues with skilled OT services and is progressing towards goals. Pt being assisted with dressing LB with PCT. Pt min assist to use deodorant and for bringing pants over hips. Pt instructed to sit to don over head shirt. Educated spouse as to doff/don sling. Pt ambulated to sit in chair. Current Level of Function Impacting Discharge (ADLs):  Max assist UB dress and managing sling    Other factors to consider for discharge:         PLAN :  Patient continues to benefit from skilled intervention to address the above impairments. Continue treatment per established plan of care to address goals. Recommend with staff: out of bed to chair for ADl's, there ex, there act    Recommend next OT session: Cont towards goals    Recommendation for discharge: (in order for the patient to meet his/her long term goals)  Therapy up to 5 days/week in SNF setting    This discharge recommendation:  Has been made in collaboration with the attending provider and/or case management    IF patient discharges home will need the following DME:        SUBJECTIVE:   Patient stated .    OBJECTIVE DATA SUMMARY:   Cognitive/Behavioral Status:  Neurologic State: Alert  Orientation Level: Oriented X4  Cognition: Follows commands             Functional Mobility and Transfers for ADLs:  Bed Mobility:   Not tested as pt already up edge of bed    Transfers:  Sit to Stand: Contact guard assistance     Bed to Chair: Contact guard assistance    Balance:  Standing: Impaired  Standing - Static: Fair  Standing - Dynamic : Fair    ADL Intervention:          Upper Body Dressing Assistance  Dressing Assistance: Maximum assistance  Orthotics(Brace): Maximum assistance  Pullover Shirt: Maximum assistance    Lower Body Dressing Assistance  Underpants: Moderate assistance  Leg Crossed Method Used: No  Position Performed: Seated in chair;Standing  Cues: Don;Physical assistance         Activity Tolerance:   Fair    After treatment patient left in no apparent distress:   Sitting in chair    COMMUNICATION/COLLABORATION:   The patients plan of care was discussed with: Physical therapy assistant, Occupational therapist, and Registered nurse.      KALI Rizo  Time Calculation: 29 mins

## 2021-08-19 NOTE — PROGRESS NOTES
8/19/2021 3:29 PM [facility] called to [facility], they have transport on the way to pick pt up.     8/19/2021 8:52 AM   Transition of Care Plan to SNF/Rehab    SNF/Rehab Transition:  Patient has been accepted to [facility] and meets criteria for admission. [facility] will transported by [facility], wheelchair Venetta Low and expected to leave at 2:30PM.  Confirmed with Yuli in admissions at [facility] they can accept pt today but requested transport be after 2PM.     Communication to Patient/Family:  Met with patient and pt's  and they are agreeable to the transition plan. Communication to SNF/Rehab:  Bedside RN, Norma Anderson, has been notified to update the transition plan to the facility and call report [phone]. Discharge information has been updated on the AVS.     Discharge instructions to be fax'd to facility via [facility]. Nursing Please include all hard scripts for controlled substances, med rec and dc summary, and AVS in packet. SNF/Rehab Transition:  Patient to follow-up with Home Health: [address] [place] [address]  PCP/Specialist: Ortho    Reviewed and confirmed with [facility] they can manage the patient care needs for the following:     Flavio Young with (X) only those applicable:    Medication:  []  Medications will be available at the facility  []  IV Antibiotics   []  Controlled Substance - hard copy to be sent with patient   []  Weekly Labs   Documents:  [] Hard RX  [] MAR  [] Kardex  [] AVS  []Transfer Summary  []Discharge   Equipment:  []  CPAP/BiPAP  []  Wound Vacuum  []  Ricci or Urinary Device  []  PICC/Central Line  []  Nebulizer  []  Ventilator   Treatment:  []Isolation (for MRSA, VRE, etc.)  []Surgical Drain Management  []Tracheostomy Care  []Dressing Changes  []Dialysis with transportation and chair time.   []PEG Care  []Oxygen  []Daily Weights for Heart Failure   Dietary:  []Any diet limitations  []Tube Feedings   []Total Parenteral Management (TPN)   Eligible for Medicaid Long Term Services and Supports  Yes:  [] Eligible for medical assistance or will become eligible within 180 days and UAI completed. [] Provider/Patient and/or support system has requested screening. [] UAI copy provided to patient or responsible party. [] UAI unavailable at discharge will send once processed to SNF provider. [] UAI unavailable at discharged mailed to patient  No:   [] Private pay and is not financially eligible for Medicaid within the next 180 days. [] Reside out-of-state. [] A residents of a state owned/operated facility that is licensed  by [place] [address] [employer] or [facility]  [] Enrollment in [facility] hospice services  [] [address]  [] Patient /Family declines to have screening completed or provide financial information for screening     Financial Resources:  Medicaid    [] Initiated and application pending   [] Full coverage     Advanced Care Plan:  []Surrogate Decision Maker of Care  []POA  []Communicated Code Status (DDNR\", \"Full\")    Other       Care Management Interventions  PCP Verified by CM:  Yes Marianna Josue NP; last visit > 30 days)  Palliative Care Criteria Met (RRAT>21 & CHF Dx)?: No  Mode of Transport at Discharge: Self (Family)  Physical Therapy Consult: No  Occupational Therapy Consult: No  Speech Therapy Consult: No  Current Support Network: Lives with Spouse, Own Home (pt lives w/ spouse, son, DIL and granddaughter in pvt residence, at baseline pt is ambulatory, iADLs, relies on family for transprot)  Discharge Location  Discharge Placement: Skilled nursing facility

## 2021-08-19 NOTE — PROGRESS NOTES
Problem: Falls - Risk of  Goal: *Absence of Falls  Description: Document Kassie Bravo Fall Risk and appropriate interventions in the flowsheet. Outcome: Resolved/Met  Note: Fall Risk Interventions:  Mobility Interventions: PT Consult for mobility concerns    Mentation Interventions: Adequate sleep, hydration, pain control    Medication Interventions: Teach patient to arise slowly    Elimination Interventions: Call light in reach    History of Falls Interventions: Door open when patient unattended         Problem: Patient Education: Go to Patient Education Activity  Goal: Patient/Family Education  Outcome: Resolved/Met     Problem: Pressure Injury - Risk of  Goal: *Prevention of pressure injury  Description: Document Cade Scale and appropriate interventions in the flowsheet.   Outcome: Resolved/Met  Note: Pressure Injury Interventions:  Sensory Interventions: Assess changes in LOC    Moisture Interventions: Absorbent underpads    Activity Interventions: Increase time out of bed    Mobility Interventions: PT/OT evaluation    Nutrition Interventions: Offer support with meals,snacks and hydration    Friction and Shear Interventions: HOB 30 degrees or less                Problem: Patient Education: Go to Patient Education Activity  Goal: Patient/Family Education  Outcome: Resolved/Met     Problem: Patient Education: Go to Patient Education Activity  Goal: Patient/Family Education  Outcome: Resolved/Met     Problem: Patient Education: Go to Patient Education Activity  Goal: Patient/Family Education  Outcome: Resolved/Met     Problem: Patient Education: Go to Patient Education Activity  Goal: Patient/Family Education  Outcome: Resolved/Met     Problem: Nutrition Deficit  Goal: *Optimize nutritional status  Outcome: Resolved/Met       Adequate for transfer

## 2021-08-19 NOTE — PROGRESS NOTES
[facility]    Medical Progress Note      NAME: Teddy Valdez   :  1955  MRM:  [id number]    Date/Time of service 2021  2:23 PM          Assessment and Plan:     Seizure - Resolved. Presumed due to benzo withdrawal and wellbutrin use. EEG without out e/o ictus. Stopped old wellbutrin, Stopped new keppra. Negative head CT and MRI. Passed speech eval     Humeral head fracture / Debilitated patient - s/p repair on , appreciate ortho's assistance. PT/OT eval clearly recommend SNF and I agree, but family initially refused and asked for [place]. High risk of home failure and injury. Family then agreed to SNF, and we are awaiting authorization. She was ready     Acute metabolic encephalopathy - POA, likely post ictal and cannot rule out dementia at baseline on top of her known bipolar disorder    Bipolar disorder - Resumed buspar, lamictal, abilify, would NOT resume Wellbutrin considering recent seizure    Fever / Leukocytosis - Mild, Resolved. Unremarkable UA, xray, RVP, cx and procalcitonin. No abx    Rhabdo - likely 2/2 seizure, CK improved with IVF    Hypertension - We started metoprolol      Hypokalemia - resoled after giving K     COPD - No symptoms. Not on meds. I reject Dx.           Subjective:     Chief Complaint:  Arm pain stable, ready for rehab    ROS:  (bold if positive, if negative)    Tolerating some PT  Tolerating Diet        Objective:     Last 24hrs VS reviewed since prior progress note.  Most recent are:    Visit Vitals  BP (!) 153/82 (BP 1 Location: Right upper arm, BP Patient Position: Sitting)   Pulse 76   Temp 98.9 °F (37.2 °C)   Resp 20   Ht 5' 5\" (1.651 m)   Wt 76.1 kg (167 lb 11.2 oz)   SpO2 95%   BMI 27.91 kg/m²     SpO2 Readings from Last 6 Encounters:   21 95%   19 95%   19 94%   18 93%   18 93%   17 94%    O2 Flow Rate (L/min): 2 l/min     No intake or output data in the 24 hours ending 21 1423     Physical Exam:    Gen:  Frail, in no acute distress  HEENT:  Pink conjunctivae, PERRL, hearing intact to voice, moist mucous membranes  Neck:  Supple, without masses, thyroid non-tender  Resp:  No accessory muscle use, clear breath sounds without wheezes rales or rhonchi  Card:  No murmurs, normal S1, S2 without thrills, bruits or peripheral edema  Abd:  Soft, non-tender, non-distended, normoactive bowel sounds are present, no mass  Lymph:  No cervical or inguinal adenopathy  Musc:  No cyanosis or clubbing, arm in sling  Skin:  No rashes or ulcers, skin turgor is good  Neuro:  Cranial nerves are grossly intact, arm pain and motor weakness, follows commands vaguely  Psych:  Poor insight, oriented to person, place     Telemetry reviewed:   normal sinus rhythm  __________________________________________________________________  Medications Reviewed: (see below)  Medications:     Current Facility-Administered Medications   Medication Dose Route Frequency    oxyCODONE-acetaminophen (PERCOCET) 5-325 mg per tablet 1 Tablet  1 Tablet Oral Q6H PRN    enoxaparin (LOVENOX) injection 40 mg  40 mg SubCUTAneous Q24H    naloxone (NARCAN) injection 0.4 mg  0.4 mg IntraVENous PRN    famotidine (PEPCID) tablet 20 mg  20 mg Oral BID    senna-docusate (PERICOLACE) 8.6-50 mg per tablet 1 Tablet  1 Tablet Oral BID    polyethylene glycol (MIRALAX) packet 17 g  17 g Oral DAILY    magic mouthwash (FIRST-MOUTHWASH BLM) oral suspension 5 mL  5 mL Oral Q4H PRN    0.9% sodium chloride infusion 250 mL  250 mL IntraVENous PRN    ARIPiprazole (ABILIFY) tablet 5 mg  5 mg Oral DAILY    busPIRone (BUSPAR) tablet 10 mg  10 mg Oral BID    lamoTRIgine (LaMICtal) tablet 100 mg  100 mg Oral DAILY    metoprolol tartrate (LOPRESSOR) tablet 25 mg  25 mg Oral Q12H    levETIRAcetam (KEPPRA) tablet 500 mg  500 mg Oral BID    hydrALAZINE (APRESOLINE) 20 mg/mL injection 20 mg  20 mg IntraVENous Q6H PRN    albuterol-ipratropium (DUO-NEB) 2.5 MG-0.5 MG/3 ML 3 mL Nebulization Q4H PRN    sodium chloride (NS) flush 5-40 mL  5-40 mL IntraVENous Q8H    sodium chloride (NS) flush 5-40 mL  5-40 mL IntraVENous PRN    acetaminophen (TYLENOL) tablet 650 mg  650 mg Oral Q6H PRN    Or    acetaminophen (TYLENOL) suppository 650 mg  650 mg Rectal Q6H PRN    polyethylene glycol (MIRALAX) packet 17 g  17 g Oral DAILY PRN    ondansetron (ZOFRAN ODT) tablet 4 mg  4 mg Oral Q8H PRN    Or    ondansetron (ZOFRAN) injection 4 mg  4 mg IntraVENous Q6H PRN    lactated Ringers infusion  125 mL/hr IntraVENous CONTINUOUS        Lab Data Reviewed: (see below)  Lab Review:     Recent Labs     08/17/21  0605   HGB 11.2*     Recent Labs     08/17/21  0125      K 3.8      CO2 24   *   BUN 22*   CREA 0.81   CA 8.2*     Lab Results   Component Value Date/Time    Glucose (POC) 120 (H) 08/12/2021 08:26 AM    Glucose (POC) 115 (H) 10/31/2011 08:08 PM     No results for input(s): PH, PCO2, PO2, HCO3, FIO2 in the last 72 hours. No results for input(s): INR, INREXT, INREXT in the last 72 hours. All Micro Results     Procedure Component Value Units Date/Time    RESPIRATORY VIRUS PANEL W/COVID-19, PCR [[id number]] Collected: 08/15/21 0830    Order Status: Completed Specimen: Nasopharyngeal Updated: 08/15/21 1451     Adenovirus Not detected        Coronavirus 229E Not detected        Coronavirus HKU1 Not detected        Coronavirus CVNL63 Not detected        Coronavirus OC43 Not detected        SARS-CoV-2, PCR Not detected        Metapneumovirus Not detected        Rhinovirus and Enterovirus Not detected        Influenza A Not detected        Influenza A, subtype H1 Not detected        Influenza A, subtype H3 Not detected        INFLUENZA A H1N1 PCR Not detected        Influenza B Not detected        Parainfluenza 1 Not detected        Parainfluenza 2 Not detected        Parainfluenza 3 Not detected        Parainfluenza virus 4 Not detected        RSV by PCR Not detected        B. parapertussis, PCR Not detected        Bordetella pertussis - PCR Not detected        Chlamydophila pneumoniae DNA, QL, PCR Not detected        Mycoplasma pneumoniae DNA, QL, PCR Not detected       RESPIRATORY VIRUS PANEL W/COVID-19, PCR [[id number]]     Order Status: Canceled Specimen: NASOPHARYNGEAL SWAB     CULTURE, BODY FLUID Rishi Lopeztingham STAIN [[id number]]     Order Status: Canceled Specimen: Body Fluid from Joint Fluid     CULTURE, ANAEROBIC [[id number]]     Order Status: Canceled Specimen: Hip           Other pertinent lab: none    Total time spent with patient: 30 Minutes I personally reviewed chart, notes, data and current medications in the medical record. I have personally examined and treated the patient at bedside during this period.                  Care Plan discussed with: Patient, Family, Care Manager, Nursing Staff and >50% of time spent in counseling and coordination of care    Discussed:  Care Plan and D/C Planning    Prophylaxis:  H2B/PPI    Disposition:   PT, OT, RN           ___________________________________________________    Attending Physician: Roro Killian MD

## 2021-08-20 ENCOUNTER — PATIENT OUTREACH (OUTPATIENT)
Dept: CASE MANAGEMENT | Age: 66
End: 2021-08-20

## 2021-08-20 NOTE — PROGRESS NOTES
Transition of care outreach postponed for 14 days due to patient's discharge to SNF. Per EMR patient discharged to the [address] [facility]. Will continue to monitor patient progress.

## 2021-08-20 NOTE — OP NOTES
OPERATIVE NOTE    Date of Procedure: 8/16/2021   Preoperative Diagnosis: LEFT SHOULDER FRACTURE   Postoperative Diagnosis: LEFT SHOULDER FRACTURE, posterior dislocation of humeral head, biceps tendonitis  fracture dislocation     Procedure: Procedure(s):  LEFT REVERSE TOTAL SHOULDER ARTHROPLASTY WITH BICEP TENDONESIS AND AXILLARY NERVE DISSECTION    Surgeon: Radhika Velasquez MD  Assistant(s): Bulmaro Mcbride  Anesthesia: General   Estimated Blood Loss: minimal  Specimens: * No specimens in log *   Findings: 4 part fracture  Complications: None  Implants:   Implant Name Type Inv.  Item Serial No.  Lot No. LRB No. Used Action   GLENOSPHERE RVS STD 0D 36MM -- COMPREHENSIVE - SNA  GLENOSPHERE RVS STD 0D 36MM -- COMPREHENSIVE NA [employer]_ [id number] Left 1 Implanted   BASEPLT W/ADAPTER MARIA TERESA 25MM -- COMPREHENSIVE - SNA  BASEPLT W/ADAPTER MARIA TERESA 25MM -- COMPREHENSIVE NA [employer]_ [id number] Left 1 Implanted   STEM HUM COLIN MINI 12MM -- COMPREHENSIVE - SNA  STEM HUM COLIN MINI 12MM -- COMPREHENSIVE NA [employer]_ [id number] Left 1 Implanted   SCR CTRL RVS 6.5X25MM STRL/RST -- COMPREHENSIVE - SNA  SCR CTRL RVS 6.5X25MM STRL/RST -- COMPREHENSIVE NA [employer]_ [id number] Left 1 Implanted   SCR LCK 3.5 HEX 4.75X15 STRL -- COMPREHENSIVE - SNA  SCR LCK 3.5 HEX 4.75X15 STRL -- COMPREHENSIVE NA [employer]_ [id number] Left 1 Implanted   SCR LCK 3.5 HEX 4.75X15 STRL -- COMPREHENSIVE - SNA  SCR LCK 3.5 HEX 4.75X15 STRL -- COMPREHENSIVE NA [employer]_ [id number] Left 1 Implanted   SCR LCK 3.5 HEX 4.75X30 STRL -- COMPREHENSIVE - SNA  SCR LCK 3.5 HEX 4.75X30 STRL -- COMPREHENSIVE NA [employer]_ [id number] Left 1 Implanted   SCR LCK 3.5 HEX 4.75X30 STRL -- COMPREHENSIVE - SNA  SCR LCK 3.5 HEX 4.75X30 STRL -- COMPREHENSIVE NA [employer]_ [id number] Left 1 Implanted   TRAY HUM STD 40MM AZALEA -- COMPREHENSIVE - SNA  TRAY HUM STD 40MM AZALEA -- COMPREHENSIVE NA [employer]_ [id number] Left 1 Implanted   HMRL BEARING 36 MM STD BLANCA - SNA  HMRL BEARING 36 MM STD BLANCA NA [employer]_ [id number] Left 1 Implanted   SHOULDR S3 TOT ADV REVRS IMPL CAPPED S3 - [id number]  SHOULDR S3 TOT ADV REVRS IMPL CAPPED S3  [employer]_  Left 1 Implanted       INDICATIONS FOR OPERATION:  Aquilino Barajas is a 60 y.o. female who has been complaining of shoulder pain and loss of function due to advanced rotator cuff arthropathy. She has failed conservative treatment including anti-inflammatory medication, injections, and therapy and presents for definitive operative treatment. DESCRIPTION OF PROCEDURE:  After being informed of the risks and benefits, which  include but are not limited to bleeding, infection, neurovascular damage, wound  complications, pain and stiffness in the shoulder, the patient consented for the  procedure. After adequate general anesthesia, the patient was placed in the beach chair  position and all bony prominences were padded well. The shoulder was then  prepped and draped in sterile fashion. Standard deltopectoral incision was performed, preserving the cephalic vein. The subdeltoid and subcoracoid spaces  were retracted. The bicipital sheath was incised and the biceps tendon was  frayed and entrapped within the fracture. A biceps tenodesis was then performed  with #2 FiberWire to the pectoralis major tendon. The remainder of the biceps  was removed. At that point, the fracture fragments were mobilized, identified  and secured with stay sutures. We then evaluated the humeral head and felt that  there was no attachment to the capsular structures medially. The medial  capsular attachments had torn. The humeral head was easily removed unfortunately, necessitating placement of a reverse shoulder.       At that time, the glenoid retractors were placed and the guide pin was inserted into the glenoid. The glenoid was reamed and the baseplate was impacted into position. The  central screw and peripheral screws were placed using standard technique, and a 36 mm standard glenosphere  was impacted into position. We serially reamed the humerus and the appropriate broach  was placed. We then trialed the shoulder. We noted the appropriate height of  the broach. At that time, the drill holes were placed in the humerus just  distal to the greater tuberosity fracture. In addition, #5 FiberWire sutures  were tied to the stem. We used 3 of them. The stem was impacted into position,  and then the first suture around the stem was used to secure the lesser  tuberosity, and the second was used to secure the greater tuberosity. The third  was a cerclage suture that went around both tuberosities. The holes that were  placed in the shaft had a #5 FiberWire that was placed through them. This was  used for a tension band technique over top of the greater tuberosity. A second  suture was placed through the humeral baseplate and the sutures were passed out  through the rotator cuff just above the greater tuberosity. This suture was  tied to the shaft securing the greater tuberosity. Once this tension band was  performed, we had an outstanding purchase of the bone and no motion at the  fracture site. The shoulder had excellent motion and no instability of the implant. The  wound was irrigated copiously with antibiotic irrigation, and the deltopectoral  interval was then closed. The skin was closed in layers and sterile dressings  were applied. The patient was awakened and taken to recovery in stable  Condition.

## 2021-08-22 NOTE — ADT AUTH CERT NOTES
Seizure - Care Day 7 (8/18/2021) by Magnus Garcia       Review Status Review Entered   Completed 8/19/2021 13:10      Criteria Review      Care Day: 7 Care Date: 8/18/2021 Level of Care: Telemetry    Guideline Day 2    Level Of Care    ( ) Neurologic unit or floor to discharge    8/19/2021 13:08:41 EDT by Magnus Garcia      Remote telemetry    Clinical Status    (X) * Hemodynamic stability    8/19/2021 13:08:41 EDT by Magnus Garcia      T 98.6    94    193/76    18    94% RA    (X) * Mental status at baseline    8/19/2021 13:08:41 EDT by Magnus Garcia      Alert,  Arm pain. Some ecchymosis. Moves hand/wrist OK    ( ) * No evidence of CNS bleeding or infection    ( ) * No new neurologic deficits    ( ) * Seizures absent or managed    ( ) * No evidence of seizure etiology requiring inpatient care    8/19/2021 13:08:41 EDT by Beto Hinson Though she has made significant improvements in overall mobility independence and tolerance compared to previous visit, she remains at a high risk for falls d/t balance deficits and drowsiness.  Continue to rec SNF placement vs home w/ 24/7 assistance    ( ) * Discharge plans and education understood    Activity    (X) * Ambulatory or acceptable for next level of care    8/19/2021 13:08:41 EDT by Beto Hinson She ambulated to and from restroom and within room with Min A for balance support but noted wavering pathway, frequent lateral LOB requiring stepping strategy to recover and poor activity tolerance    Routes    (X) * Oral hydration    8/19/2021 13:08:41 EDT by Magnus Garcia      po intake    (X) * Oral medications or regimen acceptable for next level of care    8/19/2021 13:08:41 EDT by Magnus Garcia      Hydralazine 20mg IV prn x2,  keppra 500mg PO bid, metoprolol 25mg PO q12, percocet 1 tab PO prn x2  abilify 5mg PO daily, buspar 10mg PO bid, Lovenox 40mg SC q24, pepcid 20mg PO bid, lamictal 100mg PO dialy    (X) * Oral diet or acceptable for next level of care 8/19/2021 13:08:41 EDT by Ruma Bray      ADULT DIET Dysphagia - Soft & Bite Sized    Medications    (X) * Antiepileptic regimen suitable for next level of care in place, if indicated    8/19/2021 13:08:41 EDT by Ruma Bray      po keppra    * Milestone   Additional Notes   08/18/21 - Inpt      Progress note:   Assessment and Plan:       Seizure - Resolved. Presumed due to benzo withdrawal and wellbutrin use.  EEG without out e/o ictus. Stopped old wellbutrin, Stopped new keppra.  Negative head CT and MRI. Passed speech eval       Humeral head fracture / Debilitated patient - s/p repair on 8/16, appreciate ortho's assistance. PT/OT eval clearly recommend SNF and I agree, but family initially refused and asked for [place]. High risk of home failure and injury.  Family then agreed to SNF, and we are awaiting authorization. [facility] was ready 8/17       Acute metabolic encephalopathy - POA, likely post ictal and cannot rule out dementia at baseline on top of her known bipolar disorder       Bipolar disorder - Resumed buspar, lamictal, abilify, would NOT resume Wellbutrin considering recent seizure       Fever / Leukocytosis - Mild, Resolved. Unremarkable UA, xray, RVP, cx and procalcitonin.  No abx       Rhabdo - likely 2/2 seizure, CK improved with IVF       Hypertension - We started metoprolol        Hypokalemia - resoled after giving K       COPD - No symptoms. Not on meds.  I reject Dx.            ---Case management:   Spoke with Becky in admissions at [facility] who confirmed [facility] was received but they cannot accept pt until 8/19 in the afternoon, due to bed availability. Met with pt and pt's , both agreeable to discharge to [facility] on 8/19.          -----PT ASSESSMENT   Patient continues with skilled PT services and is progressing towards goals. Pt greeted lying supine in bed, agreeable to participate in therapy.  Per , pt was up to restroom with RN assist x 3 overnight without incident. Today, pt required Min of 1 person and Mod A of a 2nd person to transfer from supine to sitting at EOB. Once sitting, pt demo'ed good static sitting balance then transferred to standing with CGA x 2. She ambulated to and from restroom and within room with Min A for balance support but noted wavering pathway, frequent lateral LOB requiring stepping strategy to recover and poor activity tolerance. Attempted to question pt on her prior level of functioning to which she replied \"I don't remember any of that anymore. \" Though she has made significant improvements in overall mobility independence and tolerance compared to previous visit with PT, she remains at a high risk for falls d/t balance deficits and drowsiness.  Continue to rec SNF placement for therapy vs home with 24/7 assistance.      Seizure - Care Day 6 (8/17/2021) by Vin Fernandez       Review Status Review Entered   Completed 8/19/2021 11:27      Criteria Review      Care Day: 6 Care Date: 8/17/2021 Level of Care: Telemetry    Guideline Day 2    Level Of Care    ( ) Neurologic unit or floor to discharge    8/19/2021 11:27:18 EDT by Vin Fernandez      Remote telemetry    Clinical Status    (X) * Hemodynamic stability    8/19/2021 11:27:18 EDT by Vin Fernandez      T 98.5    93    190/76    20   94% RA    (X) * Mental status at baseline    8/19/2021 11:27:18 EDT by Quinn Hart Today, patient was oriented x4 but unable to express needs and unable to answer most questions   Pain Score: 5, 5 (shoulder)    ( ) * No evidence of CNS bleeding or infection    ( ) * No new neurologic deficits    ( ) * Seizures absent or managed    ( ) * No evidence of seizure etiology requiring inpatient care    8/19/2021 11:27:18 EDT by Vin Fernandez      Labs: HGB 11.2, Glucose 120, CK 4,682    ( ) * Discharge plans and education understood    Activity    ( ) * Ambulatory or acceptable for next level of care    8/19/2021 11:27:18 EDT by Vin Fernandez      such poor balance in standing, unable to take more than 6 steps over to the chair  Patient is not yet mobile enough to discharge to home and is not cleared by PT for d/c.   High risk for fall and further fracture or damage integrity of  Reverse TSA    Routes    ( ) * Oral hydration    ( ) * Oral medications or regimen acceptable for next level of care    8/19/2021 11:27:18 EDT by Ruma Bray      Hydralazine 20mg IV prn x1,  Ancef 2g IV q8, acetaminophen 650mg PO prn x3, keppra 500mg PO bid, metoprolol 25mg PO q12, percocet 1 tab PO prn x1    ( ) * Oral diet or acceptable for next level of care    Interventions    (X) Neurologic checks and seizure monitoring    8/19/2021 11:27:18 EDT by Ruma Bray      Neurochecks q4    Medications    ( ) * Antiepileptic regimen suitable for next level of care in place, if indicated    * Milestone   Additional Notes   08/17/21  - Inpt      Progress Note:   Chief Complaint:  Arm pain       Assessment and Plan:   Seizure - Resolved. Presumed due to benzo withdrawal and wellbutrin use.  EEG without out e/o ictus. Stopped keppra.  Negative head CT and MRI. Passed speech eval       Humeral head fracture / Debilitated patient - s/p repair on 8/16, appreciate ortho's assistance. PT/OT eval clearly recommend SNF and I agree, but family refuses and asked for [facility]. High risk of home failure and injury.       Acute metabolic encephalopathy - POA, likely post ictal and cannot rule out dementia at baseline on top of bipolar disorder       Bipolar disorder - Resumed buspar, lamictal, abilify, would NOT resume Wellbutrin considering recent seizure       Fever / Leukocytosis - Mild, Resolved. Unremarkable UA, xray, RVP, cx and procalcitonin.  No abx       Rhabdo - likely 2/2 seizure, CK improved with IVF       Hypertension - We started metoprolol        Hypokalemia - resoled after giving K       COPD - No symptoms.  Not on meds.  I reject Dx.               ---PT assessment:   ASSESSMENT   Based on the objective data described below, the patient presents with decreased mental processing, slowed responses, confusion despite oriented x 4, decreased sitting and standing balance, decreased strength throughout, and poor tolerance for activity. Patient with stable VSS and with such poor balance in standing, unable to take more than 6 steps over to the chair. Patient up to chair and LUE positioned with support. Patient on chair alarm. Patient's hygiene is poor and appears to be an issue that has developed over weeks, not since this hopsital admission. Recommend SNF or 24/7 care and PCA if she discharges to home. Patient is not yet mobile enough to discharge to home and is not cleared by PT for d/c. High risk for fall and further fracture or damage integrity of Left Reverse TSA.  states he does not want patient to go to SNF and he and his son will take turns providing 24/7 care and supervision. CM plans to arrange [facility] to do regular checks on home environment and patient's status if she discharge to home            ---Ortho Note:   ASSESSMENT / PLAN:   Active Problems:     Seizure ([facility] [address] (8/12/2021)     Acute metabolic encephalopathy (5/62/4584)      A: 1. S/P left reverse total shoulder arthroplasty for 4 part proximal humerus fracture POD 1       P: 1. PT/OT: sling.  NWB Left shoulder.  Passive ROM of left shoulder- ER to neutral.  Flexion to 90.  No IR.  Finger/elbow/wrist ROM as tolerated. 2. DVT ppx: Lovenox 40 mg SC daily   3. Analgesics: Percocet 5/325 q 4 hours. 4. DISPO:  refusing SNF.  Home with - CM working logistics with , but he may need home PT.     5.  Orthopedics to follow      Seizure - Care Day 5 (8/16/2021) by Lily Vela       Review Status Review Entered   Completed 8/18/2021 16:56      Criteria Review      Care Day: 5 Care Date: 8/16/2021 Level of Care: Telemetry    Guideline Day 2    Level Of Care    ( ) Neurologic unit or floor to discharge    8/18/2021 16:55:41 EDT by Magnus Garcia      Remote telemetry    Clinical Status    (X) * Hemodynamic stability    8/18/2021 16:55:41 EDT by Magnus Garcia      T 98.8      91     169/81     19    95% 2L nc    ( ) * Mental status at baseline    ( ) * No evidence of CNS bleeding or infection    8/18/2021 16:55:41 EDT by Magnus Garcia      XR: Recent placement of a left shoulder prosthesis  PVLs: Bilateral lower extremity venous duplex negative for deep venous thrombosis or thrombophlebitis. Incidental finding of prominent groin lymph node bilaterally.     ( ) * No new neurologic deficits    ( ) * Seizures absent or managed    ( ) * No evidence of seizure etiology requiring inpatient care    8/18/2021 16:55:41 EDT by Miguelangel Offer: WBC 10.8, HGB 12.0, [address]  Glucose 112, BUN 19/ Cr 0.51    ( ) * Discharge plans and education understood    Activity    ( ) * Ambulatory or acceptable for next level of care    Routes    ( ) * Oral hydration    8/18/2021 16:55:41 EDT by Magnus Garcia      LR 125mL/hr    ( ) * Oral medications or regimen acceptable for next level of care    8/18/2021 16:55:41 EDT by Magnus Garcia      acetaminophen 650mg Po x1, Buspar 10mg PO bid, Pepcid 20mg PO bid, Keppra 500mg PO bid, metoprolol 225mg PO q12    Ancef 2g IV q8, Dilaudid 0.5mg IV x1, Decadron 4mg IV q6    ( ) * Oral diet or acceptable for next level of care    8/18/2021 16:55:41 EDT by Magnus Garcia      ADULT DIET Full Liquid    Interventions    (X) Neurologic checks and seizure monitoring    8/18/2021 16:55:41 EDT by Magnus Garcia      NEURO/VASCULAR CHECKS q4    (X) Possible cardiac monitoring    8/18/2021 16:55:41 EDT by Magnus Garcia      RT--OXIMETRY, CONTINUOUS   Telemetry  SEIZURE PRECAUTIONS    Medications    ( ) * Antiepileptic regimen suitable for next level of care in place, if indicated    * Milestone   Additional Notes   08/16/21 - Inpt      Ortho:   Successful left shoulder reverse arthroplasty this am.     Defer pain medication and anticoagulation to medical team.  Pt had interscalene block pre op. Progress Note:   Pt seen and examined. Shoulder surgery completed. No complaints other than tongue pain from biting her tongue       Physical Exam:   Gen: Disheveled female. Tongue injury/laceration    HEENT:  Pink conjunctivae, PERRL, hearing intact to voice, moist mucous membranes   Neck:  Supple, without masses, thyroid non-tender   Resp:  No accessory muscle use, clear breath sounds without wheezes rales or rhonchi   Card:  RRR. No MRG    Abd:  Soft, non-tender, non-distended, normoactive bowel sounds are present   Musc:  No cyanosis or clubbing   Skin:  No rashes or ulcers, skin turgor is good   Neuro: Mild diffuse weakness    Psych: Poor insight    LUE in sling         Assessment / Plan:   Fever - ?etiology.  ? Atelectasis. Resolved    -UA/CXR not c/w UTI    -procalcitonin low    -VRP/COVID negative       Seizure ([address] (8/12/2021) - EEG without out e/o ictus    -head CT and MRI without acute process   -appreciate speech eval    -stop keppra at d/c as thought to be provoked by medications and/or benzo withdrawal         Acute metabolic encephalopathy (3/53/4178) - likely post ictal and ?dementia at baseline    -monitor        Leukocytosis - likely reactive.  UA not c/w UTI, CXR without PNA on 8/14   -monitor        Rhabdo - likely 2/2 seizure    -IVF's    -trend CK    -Cr WNL        Humeral head fracture - s/p repair on 8/16   -appreciate ortho's assistance   -sling    -PT/OT        Hypertension - not on meds PTA    -on metoprolol        Hypokalemia    -replete PRN        COPD - not on meds   -PRN duonebs        Bipolar disorder    -on buspar, lamictal, abilify     -would NOT resume Wellbutrin considering recent seizure        Cont Stay Review 08/15/21 by Betsy Rodriguez       Review Status Review Entered   In Primary 8/20/2021 10:46      Criteria Review   Cont Stay Review 08/15/21  Inpt  Remote Telemetry     99.5   , 115      191/89, 205/97     20    93% 2L nasal cannula  WBC 14.0, Neutrophils 76, Abs Neutrophils 10.6, D-dimer 4.08  K 3.3, glucose 104, Ca 8.4, CK 9,485     Meds: KCl 10mEq IV x4, Abilify 5mg PO daily, Buspar 10mg PO bid, Decadron 4mg IV q6, hydralazine 20mg IV prn x1, LR 125mL/hr, Keppra 500mg IV q12, Lopressor 1.25mg IV q6     Ortho:  Chart reviewed. Resp viral panel pending. Ordered for Temp 100.3 and leukocytosis.     Left prox humerus fracture/dislocation. Tentatively posted for reverse shoulder arthroplasty  8/16 pending clearance by hospitalist and neuro. Anesthesia will eval airway later today.     Will hold Lovenox after dose this am.    She's currently NPO due to tongue trauma but need to make NPO p mn if decision to advance diet today.     ---------Progress Note:  Pt seen and examined. No complaints. Tmax overnight. No source of infection isolated to date. VRP + COVID pending       Assessment / Plan:  Fever - ?etiology. ?viral  -UA/CXR not c/w UTI   -procalcitonin low   -check VRP + COVID      Seizure ([facility] [address] (8/12/2021) - EEG without out e/o ictus   -head CT and MRI without acute process  -bedside swallow passed; start diet   -can consider stopping keppra at d/c as thought to be provoked by medications and/or benzo withdrawal       Acute metabolic encephalopathy (2/37/5325) - likely post ictal and ?dementia at baseline   -monitor      Leukocytosis - likely reactive.  UA not c/w UTI, CXR without PNA on 8/14  -monitor      Rhabdo - likely 2/2 seizure   -IVF's   -trend CK   -Cr WNL      Humeral head fracture  -appreciate ortho's assistance  -sling   -NWB LUE   -plan for surgery repair on Monday; NPO at midnight    -start steroids for now considering tongue swelling and no clear bacterial source of infection      Hypertension - not on meds PTA   -start PO metoprolol      Hypokalemia   -repleted      COPD - not on meds  -PRN duonebs      Bipolar disorder   -resume buspar, lamictal, abilify when able to tolerate PO -would NOT resume Wellbutrin considering recent seizure      Cont Stay Review 08/14/21 by Betsy Rodriguez       Review Status Review Entered   In Primary 8/20/2021 10:45      Criteria Review   Cont Stay Review 08/14/21  Inpt  Remote Telemetry     100.3   , 121,127       156/82      22     95% 2L nasal cannula     Labs: WBC 12.9, Neutrophils 80, Abs Neutrophils 10.4  K 3.4, Glucose 111, CK 10,057  CXR: No acute process.     Meds: LR 125mL/hr, Keppra 500mg IV q12, Lopressor 1.25mg IV q6     ORTHO PROGRESS NOTE  John Montes denies pain. Confused. Wants out of bed to lay in floor. She denies problems breathing. No distress. No family present. Respirations unlabored. Tongue edema and dried blood. Sling LUE disheveled. Left shoulder and upper arm ecchymosis but skin intact. SILT LUE. Good AROM wrist.  Palp radial pulse.     ASSESSMENT:  Left proximal humerus fracture  Per neuro notes, suspected provoked seizure  Tongue injury     PLAN:  Recommend reverse shoulder arthroplasty for this fracture pattern . Tentatively posted for 8/16 if patient is cleared by medicine and neuro. I also notified anesthesia so they can eval tongue swelling and airway pre-op. NPO for now per medicine. OK lovenox now. Hold Monday if cleared for surgery. I adjusted sling LUE. NWB LUE. OK hand/wrist/elbow motion as jamie     -----Progress Note:  Pt seen and examined. No complaints. Tongue appears less swollen. Wants to try some liquids. Tmax noted today of 100.3     Physical Exam:  Gen: Disheveled female. Tongue injury/laceration   HEENT:  Pink conjunctivae, PERRL, hearing intact to voice, moist mucous membranes  Neck:  Supple, without masses, thyroid non-tender  Resp:  No accessory muscle use, clear breath sounds without wheezes rales or rhonchi  Card: Tachycardic.  Regular  Abd:  Soft, non-tender, non-distended, normoactive bowel sounds are present  Musc:  No cyanosis or clubbing  Skin:  No rashes or ulcers, skin turgor is good  Neuro: Mild diffuse weakness   Psych: Poor insight   LUE in sling      Assessment / Plan:  Fever - ?etiology. Recent UA without UTI. ? aspiration/PNA since pt has been mostly in bed/seizure precautions   -check CXR   -may need VRP + COVID if CXR unrevealing or has a COVID distribution   -UA not c/w UTI   -check procalcitonin, WBC count etc      Seizure ([address] (2021) - EEG without out e/o ictus   -head CT and MRI without acute process  -bedside swallow nursing eval   -can consider stopping keppra at d/c as thought to be provoked by medications and/or benzo withdrawal       Acute metabolic encephalopathy () - likely post ictal and ?dementia at baseline   -monitor      Leukocytosis - likely reactive. UA not c/w UTI, CXR without PNA on . Will repeat CXR today and repeat labs considering fever      Elevated CK - trend CK      Humeral head fracture  -appreciate ortho's assistance  -sling   -NWB LUE   -plan for surgery repair on Monday; NPO at midnight    -tongue does appear to be less swollen; discussed with anesthesia.  Hold on steroids as pt with a fever and unclear if source of infection process      Hypertension   -on amlodipine PTA as well as metoprolol but currently NPO   -on scheduled IV metoprolol for now; if passes bedside swallow will resume PO meds     Hypokalemia   -replete PRN      COPD - not on meds  -PRN duonebs      Bipolar disorder   -ideally need to resume buspar, lamictal, abilify when able to tolerate PO   -would NOT resume Wellbutrin considering recent seizure                2021 PT DISCHARGED     DISCHARGE SUMMARY       Patient Demographics    Patient Name   aGil Tavarez   [id number] Legal Sex   Female    1955 Address   [address] [place] [address], [address] [place] [address] Phone   [phone] (Home) *Preferred*   [phone] (Mobile)   Discharge Information    Discharge Provider Date/Time Disposition Destination   Miguelito Alvarez MD / [phone] 21  Rubén Raphael (none)   Comments   (none)   Discharge Summary Notes    Discharge Summary by Andrew Collins MD at 08/17/21 1134  Version 3 of 3  Author: Andrew Collins MD Service: Hospitalist Author Type: Physician   Filed: 08/19/21 1425 Date of Service: 08/17/21 1134 Status: Addendum   : Andrew Collins MD (Physician)   Related Notes: Original Note by Andrew Collins MD (Physician) filed at 08/18/21 1341         Physician Discharge Summary      Patient ID:  Crystal Sutherland  [id number]  60 y.o.  1955     Admit date: 8/12/2021     Discharge date of service and time: 8/19/2021     Admission Diagnoses: Acute metabolic encephalopathy [Y69.65]  Seizure ([facility] [address] [R56.9]     Discharge Diagnoses:    Principal Diagnosis     Seizure                                              Hospital Course and other diagnoses  Seizure - Resolved. Presumed due to benzo withdrawal and wellbutrin use.  EEG without out e/o ictus. Stopped old wellbutrin, Stopped new keppra.  Negative head CT and MRI. Passed speech eval   Humeral head fracture / Debilitated patient - s/p repair on 8/16, appreciate ortho's assistance. PT/OT eval clearly recommend SNF and I agree, but family initially refused and [facility]. High risk of home failure and injury.  Family then agreed to SNF, and we are awaiting authorization. [facility] was ready 8/17     Acute metabolic encephalopathy - POA, likely post ictal and cannot rule out dementia at baseline on top of her known bipolar disorder     Bipolar disorder - Resumed buspar, lamictal, abilify, would NOT resume Wellbutrin considering recent seizure     Fever / Leukocytosis - Mild, Resolved. Unremarkable UA, xray, RVP, cx and procalcitonin.  No abx     Rhabdo - likely 2/2 seizure, CK improved with IVF     Hypertension - We started metoprolol      Hypokalemia - resoled after giving K     COPD - No symptoms.  Not on meds.  I reject Dx.      PCP: Jemima Yap NP     Consults: Neurology and Orthopedic Surgery     Significant Diagnostic Studies: See Hospital Course     Discharged home in improved condition.     Discharge Exam:  BP (!) 153/82 (BP 1 Location: Right upper arm, BP Patient Position: Sitting)   Pulse 76   Temp 98.9 °F (37.2 °C)   Resp 20   Ht 5' 5\" (1.651 m)   Wt 76.1 kg (167 lb 11.2 oz)   SpO2 95%   BMI 27.91 kg/m²      Gen:  Frail, in no acute distress  HEENT:  Pink conjunctivae, PERRL, hearing intact to voice, moist mucous membranes  Neck:  Supple, without masses, thyroid non-tender  Resp:  No accessory muscle use, clear breath sounds without wheezes rales or rhonchi  Card:  No murmurs, normal S1, S2 without thrills, bruits or peripheral edema  Abd:  Soft, non-tender, non-distended, normoactive bowel sounds are present, no mass  Lymph:  No cervical or inguinal adenopathy  Musc:  No cyanosis or clubbing, arm in sling  Skin:  No rashes or ulcers, skin turgor is good  Neuro:  Cranial nerves are grossly intact, arm pain and motor weakness, follows commands vaguely  Psych:  Poor insight, oriented to person, place      Patient Instructions:         Current Discharge Medication List             START taking these medications     Details   metoprolol tartrate (LOPRESSOR) 25 mg tablet Take 1 Tablet by mouth every twelve (12) hours for 30 days. Qty: 60 Tablet, Refills: 0       oxyCODONE-acetaminophen (PERCOCET) 5-325 mg per tablet Take 1 Tablet by mouth every six (6) hours as needed for Pain for up to 3 days. Max Daily Amount: 4 Tablets. Qty: 10 Tablet, Refills: 0     Associated Diagnoses: Injury of head, initial encounter       senna-docusate (PERICOLACE) 8.6-50 mg per tablet Take 1 Tablet by mouth two (2) times a day for 10 days.   Qty: 20 Tablet, Refills: 0                 CONTINUE these medications which have NOT CHANGED     Details   busPIRone (BUSPAR) 10 mg tablet Take 10 mg by mouth two (2) times a day.       lamoTRIgine (LaMICtal) 100 mg tablet Take 100 mg by mouth daily.       ARIPiprazole (Abilify) 5 mg tablet Take 5 mg by mouth daily.                STOP taking these medications         buPROPion XL (WELLBUTRIN XL) 300 mg XL tablet Comments:   Reason for Stopping:                 Activity: Activity as tolerated and PT/OT per Home Health  Diet: Cardiac Diet and Low fat, Low cholesterol  Wound Care: Keep wound clean and dry, As directed and None needed     Follow-up with your PCP and orthopedics in a few weeks. Follow-up tests/labs - none     Signed:  Jessica Connor MD  8/19/2021  11:34 AM      Discharge Summary by Ling Fernandez MD at 08/17/21 1132  Version 2 of 3  Author: Ling Fernandez MD Service: Hospitalist Author Type: Physician   Filed: 08/18/21 1001 Date of Service: 08/17/21 5132 Status: Addendum   : Ling Fernandez MD (Physician)   Related Notes: Addendum by Ling Fernandez MD (Physician) filed at 08/19/21 1421   Original Note by Ling Fernandez MD (Physician) filed at 08/17/21 1131         Physician Discharge Summary      Patient ID:  Henry Maya  [id number]  60 y.o.  1955     Admit date: 8/12/2021     Discharge date of service and time: 8/18/2021     Admission Diagnoses: Acute metabolic encephalopathy [K48.83]  Seizure ([facility] [address] [R56.9]     Discharge Diagnoses:    Principal Diagnosis     Seizure                                              Hospital Course and other diagnoses  Seizure - Resolved. Presumed due to benzo withdrawal and wellbutrin use.  EEG without out e/o ictus. Stopped old wellbutrin, Stopped new keppra.  Negative head CT and MRI. Passed speech eval   Humeral head fracture / Debilitated patient - s/p repair on 8/16, appreciate ortho's assistance. PT/OT eval clearly recommend SNF and I agree, but family initially refused and asked [facility].  High risk of home failure and injury.  Family then agreed to SNF, and we are awaiting authorization. [facility] was ready 8/17     Acute metabolic encephalopathy - POA, likely post ictal and cannot rule out dementia at baseline on top of her known bipolar disorder     Bipolar disorder - Resumed buspar, lamictal, abilify, would NOT resume Wellbutrin considering recent seizure     Fever / Leukocytosis - Mild, Resolved. Unremarkable UA, xray, RVP, cx and procalcitonin.  No abx     Rhabdo - likely 2/2 seizure, CK improved with IVF     Hypertension - We started metoprolol      Hypokalemia - resoled after giving K     COPD - No symptoms. Not on meds.  I reject Dx.      PCP: Fabian Fabry, NP     Consults: Neurology and Orthopedic Surgery     Significant Diagnostic Studies: See Hospital Course     Discharged home in improved condition.     Discharge Exam:  BP (!) 147/70 (BP 1 Location: Right upper arm, BP Patient Position: At rest)   Pulse 94   Temp 98.4 °F (36.9 °C)   Resp 20   Ht 5' 5\" (1.651 m)   Wt 76.1 kg (167 lb 11.2 oz)   SpO2 96%   BMI 27.91 kg/m²      Gen:  Frail, in no acute distress  HEENT:  Pink conjunctivae, PERRL, hearing intact to voice, moist mucous membranes  Neck:  Supple, without masses, thyroid non-tender  Resp:  No accessory muscle use, clear breath sounds without wheezes rales or rhonchi  Card:  No murmurs, normal S1, S2 without thrills, bruits or peripheral edema  Abd:  Soft, non-tender, non-distended, normoactive bowel sounds are present, no mass  Lymph:  No cervical or inguinal adenopathy  Musc:  No cyanosis or clubbing, arm in sling  Skin:  No rashes or ulcers, skin turgor is good  Neuro:  Cranial nerves are grossly intact, arm pain and motor weakness, follows commands vaguely  Psych:  Poor insight, oriented to person, place      Patient Instructions:         Current Discharge Medication List             START taking these medications     Details   metoprolol tartrate (LOPRESSOR) 25 mg tablet Take 1 Tablet by mouth every twelve (12) hours for 30 days.   Qty: 60 Tablet, Refills: 0       oxyCODONE-acetaminophen (PERCOCET) 5-325 mg per tablet Take 1 Tablet by mouth every six (6) hours as needed for Pain for up to 3 days. Max Daily Amount: 4 Tablets. Qty: 10 Tablet, Refills: 0     Associated Diagnoses: Injury of head, initial encounter       senna-docusate (PERICOLACE) 8.6-50 mg per tablet Take 1 Tablet by mouth two (2) times a day for 10 days. Qty: 20 Tablet, Refills: 0                 CONTINUE these medications which have NOT CHANGED     Details   busPIRone (BUSPAR) 10 mg tablet Take 10 mg by mouth two (2) times a day.       lamoTRIgine (LaMICtal) 100 mg tablet Take 100 mg by mouth daily.       ARIPiprazole (Abilify) 5 mg tablet Take 5 mg by mouth daily.                STOP taking these medications         buPROPion XL (WELLBUTRIN XL) 300 mg XL tablet Comments:   Reason for Stopping:                 Activity: Activity as tolerated and PT/OT per Home Health  Diet: Cardiac Diet and Low fat, Low cholesterol  Wound Care: Keep wound clean and dry, As directed and None needed     Follow-up with your PCP and orthopedics in a few weeks. Follow-up tests/labs - none     Signed:  Ling Bledsoe MD  8/18/2021  11:34 AM      Discharge Summary by Yong Cramer MD at 08/17/21 1134  Version 1 of 3  Author: Yong Cramer MD Service: Hospitalist Author Type: Physician   Filed: 08/17/21 1138 Date of Service: 08/17/21 1134 Status: Signed   : Yong Cramer MD (Physician)   Related Notes: Addendum by Yong Cramer MD (Physician) filed at 08/18/21 1341         Physician Discharge Summary      Patient ID:  Teddy Valdez  [id number]  60 y.o.  1955     Admit date: 8/12/2021     Discharge date of service and time: 8/17/2021     Admission Diagnoses: Acute metabolic encephalopathy [Y41.82]  Seizure ([facility] [address] [R56.9]     Discharge Diagnoses:    Principal Diagnosis     Seizure                                              Hospital Course and other diagnoses  Seizure - Resolved. Presumed due to benzo withdrawal and wellbutrin use.  EEG without out e/o ictus. Stopped keppra.  Negative head CT and MRI.  Passed speech eval   Humeral head fracture / Debilitated patient - s/p repair on 8/16, appreciate ortho's assistance. PT/OT eval clearly recommend SNF and I agree, but family refuses and asked for [facility]. High risk of home failure and injury.     Acute metabolic encephalopathy - POA, likely post ictal and cannot rule out dementia at baseline on top of bipolar disorder     Bipolar disorder - Resumed buspar, lamictal, abilify, would NOT resume Wellbutrin considering recent seizure     Fever / Leukocytosis - Mild, Resolved. Unremarkable UA, xray, RVP, cx and procalcitonin.  No abx     Rhabdo - likely 2/2 seizure, CK improved with IVF     Hypertension - We started metoprolol      Hypokalemia - resoled after giving K     COPD - No symptoms.  Not on meds.  I reject Dx.      PCP: Tyree Mcbride NP     Consults: Neurology and Orthopedic Surgery     Significant Diagnostic Studies: See Hospital Course     Discharged home in improved condition.     Discharge Exam:  BP (!) 190/76 (BP 1 Location: Right arm, BP Patient Position: At rest)   Pulse 74   Temp 97.9 °F (36.6 °C)   Resp 18   Ht 5' 5\" (1.651 m)   Wt 76.1 kg (167 lb 11.2 oz)   SpO2 96%   BMI 27.91 kg/m²      Gen:  Frail, in no acute distress  HEENT:  Pink conjunctivae, PERRL, hearing intact to voice, moist mucous membranes  Neck:  Supple, without masses, thyroid non-tender  Resp:  No accessory muscle use, clear breath sounds without wheezes rales or rhonchi  Card:  No murmurs, normal S1, S2 without thrills, bruits or peripheral edema  Abd:  Soft, non-tender, non-distended, normoactive bowel sounds are present, no mass  Lymph:  No cervical or inguinal adenopathy  Musc:  No cyanosis or clubbing, arm in sling  Skin:  No rashes or ulcers, skin turgor is good  Neuro:  Cranial nerves are grossly intact, arm pain and motor weakness, follows commands vaguely  Psych:  Poor insight, oriented to person, place      Patient Instructions:         Current Discharge Medication List             START taking these medications     Details   metoprolol tartrate (LOPRESSOR) 25 mg tablet Take 1 Tablet by mouth every twelve (12) hours for 30 days. Qty: 60 Tablet, Refills: 0       oxyCODONE-acetaminophen (PERCOCET) 5-325 mg per tablet Take 1 Tablet by mouth every six (6) hours as needed for Pain for up to 3 days. Max Daily Amount: 4 Tablets. Qty: 10 Tablet, Refills: 0     Associated Diagnoses: Injury of head, initial encounter       senna-docusate (PERICOLACE) 8.6-50 mg per tablet Take 1 Tablet by mouth two (2) times a day for 10 days. Qty: 20 Tablet, Refills: 0                 CONTINUE these medications which have NOT CHANGED     Details   busPIRone (BUSPAR) 10 mg tablet Take 10 mg by mouth two (2) times a day.       lamoTRIgine (LaMICtal) 100 mg tablet Take 100 mg by mouth daily.       ARIPiprazole (Abilify) 5 mg tablet Take 5 mg by mouth daily.                STOP taking these medications         buPROPion XL (WELLBUTRIN XL) 300 mg XL tablet Comments:   Reason for Stopping:                 Activity: Activity as tolerated and PT/OT per Home Health  Diet: Cardiac Diet and Low fat, Low cholesterol  Wound Care: Keep wound clean and dry, As directed and None needed     Follow-up with your PCP and orthopedics in a few weeks.   Follow-up tests/labs - none     Signed:  Zabrina Kapoor MD  8/17/2021  11:34 AM

## 2021-09-01 ENCOUNTER — TELEPHONE (OUTPATIENT)
Dept: FAMILY MEDICINE CLINIC | Age: 66
End: 2021-09-01

## 2021-09-01 NOTE — TELEPHONE ENCOUNTER
Patients  Mika Balderasely called and states that patient fell and broke her shoulder and he needs a virtual appt on Tuesday, Sept 7. Advised none available. He states that he needs to let where she is being discharged from know that she has an appt. so they can send her home. He states that he is only off on Mondays and Tuesdays.  Please call him back at [phone]

## 2021-09-07 ENCOUNTER — VIRTUAL VISIT (OUTPATIENT)
Dept: FAMILY MEDICINE CLINIC | Age: 66
End: 2021-09-07
Payer: COMMERCIAL

## 2021-09-07 DIAGNOSIS — R73.03 PREDIABETES: ICD-10-CM

## 2021-09-07 DIAGNOSIS — F31.4 BIPOLAR DISORDER WITH SEVERE DEPRESSION (HCC): ICD-10-CM

## 2021-09-07 DIAGNOSIS — R56.9 SEIZURE (HCC): ICD-10-CM

## 2021-09-07 DIAGNOSIS — I10 ESSENTIAL HYPERTENSION WITH GOAL BLOOD PRESSURE LESS THAN 130/80: Primary | ICD-10-CM

## 2021-09-07 DIAGNOSIS — Z09 HOSPITAL DISCHARGE FOLLOW-UP: ICD-10-CM

## 2021-09-07 DIAGNOSIS — E78.00 HYPERCHOLESTEREMIA: ICD-10-CM

## 2021-09-07 PROCEDURE — 1111F DSCHRG MED/CURRENT MED MERGE: CPT | Performed by: NURSE PRACTITIONER

## 2021-09-07 PROCEDURE — 99214 OFFICE O/P EST MOD 30 MIN: CPT | Performed by: NURSE PRACTITIONER

## 2021-09-07 RX ORDER — METOPROLOL TARTRATE 25 MG/1
25 TABLET, FILM COATED ORAL EVERY 12 HOURS
Qty: 180 TABLET | Refills: 1 | Status: SHIPPED | OUTPATIENT
Start: 2021-09-07 | End: 2021-09-30

## 2021-09-07 RX ORDER — ACETAMINOPHEN 325 MG/1
650 TABLET ORAL
COMMUNITY

## 2021-09-07 NOTE — PROGRESS NOTES
Transitional Care Management Progress Note    Mariaelena Worley, was evaluated through a synchronous (real-time) audio-video encounter. The patient (or guardian if applicable) is aware that this is a billable service. Verbal consent to proceed has been obtained within the past 12 months. The visit was conducted pursuant to the emergency declaration under the [facility], [address] [place] [address] and the [employer] Act. Patient identification was verified, and a caregiver was present when appropriate. The patient was located in a state where the provider was credentialed to provide care. Patient: Mariaelena Worley  : 1955  PCP: Ermias Billingsley NP    Date of office visit: 2021   Date of admission: 21  Date of discharge: 21  Hospital: [facility]    Call initiated w/i 2 business dates of discharge: *No response documented in the last 14 days   Date of the most recent call to the patient: *No documented post hospital discharge outreach found in the last 14 days      Assessment/Plan:   Diagnoses and all orders for this visit:    1. Essential hypertension with goal blood pressure less than 130/80  Elevated during her hospital stay-  reports was controlled at rehab  Continue metoprolol  F/u in office for BP assessment and labs in 4 weeks  -     metoprolol tartrate (LOPRESSOR) 25 mg tablet; Take 1 Tablet by mouth every twelve (12) hours. 2. Bipolar disorder with severe depression ([facility] [address]  Continue current meds  F/u with psychiatrist as planned  (sees provider at [facility])    3. Prediabetes  Counseled on therapeutic lifestyle changes  Check a1c at f/u    4.  Hypercholesteremia  Noncompliant with previously prescribed statin therapy  Encouraged heart healthy diet  Check fasting lipids at f/u    5. Seizure ([address]  Felt to be r/t benzo withdrawal  Neuro did not recommend anticonvulsants    6. Hospital discharge follow-up  -     [place] DISCHARGE MEDS RECONCILED W/ CURRENT OUTPATIENT MED LIST    Follow-up and Dispositions    · Return in 1 month (on 10/7/2021), or if symptoms worsen or fail to improve. I have discussed the diagnosis with the patient/caregiver and the intended plan as seen in the above orders. Patient/caregiver conveyed understanding of the plan at the time of the visit. Subjective:   Kyra Martin is a 60 y.o. female presenting today for follow-up after hospital discharge. Accompanied by her  today. This encounter and supporting documentation was reviewed if available. Medication reconciliation was performed today. The main problem requiring admission was seizure r/t benzodiazipene withdrawal, depression, left humerus fx. Complications during admission: none      Interval history/Current status: Also underwent L reverse total shoulder arthorplasty on 8/16. patient had a brief stay at SNF for rehab,  reports she was discharged home about 5 days ago. Eating and drinking okay. Tolerating light activity around the house. Home Health and Home PT have made several visits to the home. She is following her home exercise program for her shoulder. Ortho f/u is planned next week. Psychiatry f/u is planned later this week. .     Admitting symptoms have: improved      Medications marked \"taking\" at this time:  Prior to Admission medications    Medication Sig Start Date End Date Taking? Authorizing Provider   acetaminophen (TYLENOL) 325 mg tablet Take 650 mg by mouth every six (6) hours as needed. Yes Provider, Historical   metoprolol tartrate (LOPRESSOR) 25 mg tablet Take 1 Tablet by mouth every twelve (12) hours. 9/7/21  Yes Amaury Mccabe Q, NP   busPIRone (BUSPAR) 10 mg tablet Take 10 mg by mouth two (2) times a day. Yes Provider, Historical   lamoTRIgine (LaMICtal) 100 mg tablet Take 100 mg by mouth daily.    Yes Provider, Historical ARIPiprazole (Abilify) 5 mg tablet Take 5 mg by mouth daily. Yes Provider, Historical   metoprolol tartrate (LOPRESSOR) 25 mg tablet Take 1 Tablet by mouth every twelve (12) hours for 30 days.  8/17/21 9/7/21  Miguelito Alvarez MD        ROS:  Constitutional: negative for fevers, chills, fatigue and weight loss  Eyes: negative for visual disturbance and irritation  Ears, nose, mouth, throat, and face: negative for tinnitus, nasal congestion and hoarseness  Respiratory: negative for cough, wheezing or dyspnea on exertion  Cardiovascular: negative for chest pressure/discomfort, dyspnea, palpitations, claudication, lower extremity edema  Gastrointestinal: negative for dysphagia, reflux symptoms and change in bowel habits  Genitourinary:negative for frequency, nocturia and hematuria  Integument/breast: negative for rash and skin lesion(s)  Hematologic/lymphatic: negative for easy bruising, bleeding and blood in stool or urine  Musculoskeletal:negative for myalgias and muscle weakness  Neurological: negative for headaches, dizziness, gait problems and weakness  Behavioral/Psych: negative for depression and mood swings  Endocrine: negative for diabetic symptoms including polyuria, polydipsia and polyphagia  Allergic/Immunologic: negative for urticaria         Patient Active Problem List   Diagnosis Code    Bipolar 1 disorder ([facility] F31.9    Essential hypertension with goal blood pressure less than 130/80 I10    Prediabetes R73.03    Hypercholesteremia E78.00    ALEX (acute kidney injury) ([facility] N17.9    Lithium toxicity T56.891A    Anemia D64.9    Cigarette nicotine dependence without complication R35.090    Anxiety F41.9    Seizure ([facility] R56.9    Acute metabolic encephalopathy M80.46     No Known Allergies  Past Medical History:   Diagnosis Date    Abscess of buttock 7/23/2012    Bronchitis     Common bile duct calculus 7/23/2012    Gallstones 7/12/2012    GERD (gastroesophageal reflux disease)     High cholesterol     Hypercholesterolemia     Hypertension     Ill-defined condition     missing upper front tooth    Ill-defined condition     lower leg bilateral reddened rash.  Insomnia     Other ill-defined conditions(799.89)     Large boil under right arm-pit.     Paranoia ([facility] [address]     Psychiatric disorder     bipolar     Past Surgical History:   Procedure Laterality Date    [place] ABDOMEN SURGERY PROC UNLISTED      cholecystectomy 7/23/2012     Family History   Problem Relation Age of Onset    Cancer Father         lung    Diabetes Brother     Heart Disease Brother     Malignant Hyperthermia Neg Hx     Pseudocholinesterase Deficiency Neg Hx     Delayed Awakening Neg Hx     Post-op Nausea/Vomiting Neg Hx     Emergence Delirium Neg Hx     Post-op Cognitive Dysfunction Neg Hx     Other Neg Hx      Social History     Tobacco Use    Smoking status: Current Every Day Smoker     Packs/day: 0.50    Smokeless tobacco: Never Used   Substance Use Topics    Alcohol use: No          Objective:     Visit Vitals  LMP  (LMP Unknown)        Objective:   Vital Signs: (As obtained by patient/caregiver at home)  Visit Vitals  LMP  (LMP Unknown)        [INSTRUCTIONS:  \"[x]\" Indicates a positive item  \"[]\" Indicates a negative item  -- DELETE ALL ITEMS NOT EXAMINED]    Constitutional: [x] Appears well-developed and well-nourished [x] No apparent distress      [] Abnormal -     Mental status: [x] Alert and awake  [x] Oriented to person/place/time [x] Able to follow commands    [] Abnormal -     Eyes:   EOM    [x]  Normal    [] Abnormal -   Sclera  [x]  Normal    [] Abnormal -          Discharge [x]  None visible   [] Abnormal -     HENT: [x] Normocephalic, atraumatic  [] Abnormal -   [x] Mouth/Throat: Mucous membranes are moist    External Ears [x] Normal  [] Abnormal -    Neck: [x] No visualized mass [] Abnormal -     Pulmonary/Chest: [x] Respiratory effort normal   [x] No visualized signs of difficulty breathing or respiratory distress        [] Abnormal -      Musculoskeletal:   [x] Normal gait with no signs of ataxia         [x] Normal range of motion of neck        [] Abnormal -     Neurological:        [x] No Facial Asymmetry (Cranial nerve 7 motor function) (limited exam due to video visit)          [x] No gaze palsy        [] Abnormal -          Skin:        [x] No significant exanthematous lesions or discoloration noted on facial skin         [] Abnormal -            Psychiatric:       [] Normal Affect [] Abnormal -        [x] No Hallucinations    Other pertinent observable physical exam findings:-   Flat affect    We discussed the expected course, resolution and complications of the diagnosis(es) in detail. Medication risks, benefits, costs, interactions, and alternatives were discussed as indicated. I advised her to contact the office if her condition worsens, changes or fails to improve as anticipated. She expressed understanding with the diagnosis(es) and plan.      David Guzman NP

## 2021-09-10 ENCOUNTER — PATIENT OUTREACH (OUTPATIENT)
Dept: CASE MANAGEMENT | Age: 66
End: 2021-09-10

## 2021-09-10 NOTE — PROGRESS NOTES
[address] [place] [address] Dr Discharge Follow-Up      Date/Time:  9/10/2021 2:59 PM    Patient was admitted to [facility] on 2021 and discharged to [place] [address] on 2021. The patients discharge diagnosis was Lt reverse total shoulder arthrolasty . Patient was discharge on 2021 from [facility]. The discharge summary from the post acute facilty was not available at the time of outreach. Patient was contacted within 6 business days of discharge from the post acute facility. LPN Care Coordinator contacted the patient's spouse Eladio Marin HIPAA verified by telephone to perform post hospital discharge follow up. Verified name and  with family as identifiers. Provided introduction to self, and explanation of the LPN Care Coordinator role. Family received post acute facility discharge instructions. LPN Care Coordinator reviewed discharge general instructions and red flags with family who verbalized understanding. Family given an opportunity to ask questions and does not have any further questions or concerns at this time. The family agrees to contact the PCP office for questions related to their healthcare. LPN Care Coordinator provided contact information for future reference. Advance Care Planning:   Does patient have an Advance Directive:  not on file     [address] orders at discharge:  [facility] Dr: family does not remember  Date of initial visit: 9/10/2020    Durable Medical Equipment ordered at discharge: none   Patient has sling  [address]: N/A  Durable Medical Equipment received: N/A    Medication(s):   The post acute facility medication discharge list was not available for this call. Medication review was performed with patient and spouse by PCP, who verbalizes understanding of administration of home medications. There were no barriers to obtaining medications identified at this time.     [facility] follow up appointment(s): No future appointments.    Non-[facility] follow up appointment(s): patient spouse to schedule follow up appointment with Dr. Emanuel Chanel:  information provided as a resource

## 2021-09-10 NOTE — Clinical Note
Good afternoon Rodney Valdovinos,   I spoke with Mrs Kathleen Rosario  today he is HIPAA verified he had some question regarding enrolling into Medicare, patient will be 73 in November. He stated he is not sure what was done when patient was at [address] [facility]. He is agreeable to have a SW to contact him I will follow up with Mr. Kathleen Rosario next will the end of her BRITTNEY is 9/19/2021 I will see ask him about ACM follow up then.    Thank you,   [place]

## 2021-09-16 ENCOUNTER — PATIENT OUTREACH (OUTPATIENT)
Dept: CASE MANAGEMENT | Age: 66
End: 2021-09-16

## 2021-09-16 NOTE — PROGRESS NOTES
Receive call from patient's spouse regarding update of patient progress. Mr. Juana Suarez states patient is doing better and will return to [facility] [address] to received 2nd dose of COVID-19 vaccine 9/22/2021. Will follow up with patient's spouse next.

## 2021-09-16 NOTE — Clinical Note
Mohan Razo,   I spoke with patient's spouse regarding patient follow up. I did give contact information to [employer]  [phone] to help with Medicare information. I will contact him next week.   [place]

## 2021-09-21 ENCOUNTER — PATIENT OUTREACH (OUTPATIENT)
Dept: CASE MANAGEMENT | Age: 66
End: 2021-09-21

## 2021-09-22 ENCOUNTER — TELEPHONE (OUTPATIENT)
Dept: FAMILY MEDICINE CLINIC | Age: 66
End: 2021-09-22

## 2021-09-22 NOTE — TELEPHONE ENCOUNTER
At [facility] Hafsa Ventura [phone] is calling about pt's Lopressor 25 mg .you can call  about the med, He says the RX was 50 mg 2X a day now it is 25 mg 2X a day please advise

## 2021-09-22 NOTE — TELEPHONE ENCOUNTER
Spoke to Ulises. She stated that pts' blood pressure was 158/84 9/21 at 130pm. She stated that's the highest she noticed but sometimes in runs in 122ish. Spoke to pt's  and he stated that pt has been taking 50mg twice a day and her BP still runs high. He is unsure who prescribed that and BP readings. He stated that he's at work right now and will call back on Friday with information.

## 2021-09-22 NOTE — TELEPHONE ENCOUNTER
Spoke to Agata. Patient x2 id verified. She stated that Pt's  told her that pt is taking metoropol 50mg 2times a day. In pt's chart it stated to take 25mg 2times a day. Looked back to medicine list and it stated 25mg 2 times a day. Can you please confirm what mg pt should be taking?     Pt's  Sher Hernandez [phone]

## 2021-09-22 NOTE — TELEPHONE ENCOUNTER
25 mg BID is what was prescribed on discharge from the hospital. What has the blood pressure been when home health has assessed it?

## 2021-09-24 NOTE — TELEPHONE ENCOUNTER
Spoke to pt's . Patient x2 id verified. Appointment schedule with Dr. Dylan Ravi on 9/30 as he's only off that day. Pt will call back with BP Readings.

## 2021-09-29 ENCOUNTER — PATIENT OUTREACH (OUTPATIENT)
Dept: CASE MANAGEMENT | Age: 66
End: 2021-09-29

## 2021-09-29 NOTE — PROGRESS NOTES
Outgoing calls placed to patient's spouse regarding update patient status. No response from messages, patient has been discharged from hospital greater than 30 days episode of care closed.

## 2021-09-30 ENCOUNTER — OFFICE VISIT (OUTPATIENT)
Dept: FAMILY MEDICINE CLINIC | Age: 66
End: 2021-09-30
Payer: COMMERCIAL

## 2021-09-30 VITALS
TEMPERATURE: 97.9 F | SYSTOLIC BLOOD PRESSURE: 173 MMHG | HEART RATE: 113 BPM | DIASTOLIC BLOOD PRESSURE: 83 MMHG | HEIGHT: 65 IN | WEIGHT: 155 LBS | OXYGEN SATURATION: 98 % | RESPIRATION RATE: 16 BRPM | BODY MASS INDEX: 25.83 KG/M2

## 2021-09-30 DIAGNOSIS — Z12.11 COLON CANCER SCREENING: ICD-10-CM

## 2021-09-30 DIAGNOSIS — I10 ESSENTIAL HYPERTENSION WITH GOAL BLOOD PRESSURE LESS THAN 130/80: ICD-10-CM

## 2021-09-30 DIAGNOSIS — R73.03 PREDIABETES: Primary | ICD-10-CM

## 2021-09-30 DIAGNOSIS — E78.00 HYPERCHOLESTEREMIA: ICD-10-CM

## 2021-09-30 DIAGNOSIS — F31.9 BIPOLAR 1 DISORDER (HCC): ICD-10-CM

## 2021-09-30 DIAGNOSIS — Z12.31 ENCOUNTER FOR SCREENING MAMMOGRAM FOR MALIGNANT NEOPLASM OF BREAST: ICD-10-CM

## 2021-09-30 PROCEDURE — 99214 OFFICE O/P EST MOD 30 MIN: CPT | Performed by: FAMILY MEDICINE

## 2021-09-30 RX ORDER — SERTRALINE HYDROCHLORIDE 50 MG/1
TABLET, FILM COATED ORAL DAILY
COMMUNITY

## 2021-09-30 RX ORDER — METOPROLOL TARTRATE 50 MG/1
50 TABLET ORAL EVERY 12 HOURS
Qty: 60 TABLET | Refills: 1 | Status: SHIPPED | OUTPATIENT
Start: 2021-09-30 | End: 2022-01-18

## 2021-09-30 NOTE — PATIENT INSTRUCTIONS
A Healthy Lifestyle: Care Instructions  Your Care Instructions     A healthy lifestyle can help you feel good, stay at a healthy weight, and have plenty of energy for both work and play. A healthy lifestyle is something you can share with your whole family. A healthy lifestyle also can lower your risk for serious health problems, such as high blood pressure, heart disease, and diabetes. You can follow a few steps listed below to improve your health and the health of your family. Follow-up care is a key part of your treatment and safety. Be sure to make and go to all appointments, and call your doctor if you are having problems. It's also a good idea to know your test results and keep a list of the medicines you take. How can you care for yourself at home? · Do not eat too much sugar, fat, or fast foods. You can still have dessert and treats now and then. The goal is moderation. · Start small to improve your eating habits. Pay attention to portion sizes, drink less juice and soda pop, and eat more fruits and vegetables. ? Eat a healthy amount of food. A 3-ounce serving of meat, for example, is about the size of a deck of cards. Fill the rest of your plate with vegetables and whole grains. ? Limit the amount of soda and sports drinks you have every day. Drink more water when you are thirsty. ? Eat plenty of fruits and vegetables every day. Have an apple or some carrot sticks as an afternoon snack instead of a candy bar. Try to have fruits and/or vegetables at every meal.  · Make exercise part of your daily routine. You may want to start with simple activities, such as walking, bicycling, or slow swimming. Try to be active 30 to 60 minutes every day. You do not need to do all 30 to 60 minutes all at once. For example, you can exercise 3 times a day for 10 or 20 minutes.  Moderate exercise is safe for most people, but it is always a good idea to talk to your doctor before starting an exercise program.  · Keep moving. Aneta Shaver the lawn, work in the garden, or [employer]. Take the stairs instead of the elevator at work. · If you smoke, quit. People who smoke have an increased risk for heart attack, stroke, cancer, and other lung illnesses. Quitting is hard, but there are ways to boost your chance of quitting tobacco for good. ? Use nicotine gum, patches, or lozenges. ? Ask your doctor about stop-smoking programs and medicines. ? Keep trying. In addition to reducing your risk of diseases in the future, you will notice some benefits soon after you stop using tobacco. If you have shortness of breath or asthma symptoms, they will likely get better within a few weeks after you quit. · Limit how much alcohol you drink. Moderate amounts of alcohol (up to 2 drinks a day for men, 1 drink a day for women) are okay. But drinking too much can lead to liver problems, high blood pressure, and other health problems. Family health  If you have a family, there are many things you can do together to improve your health. · Eat meals together as a family as often as possible. · Eat healthy foods. This includes fruits, vegetables, lean meats and dairy, and whole grains. · Include your family in your fitness plan. Most people think of activities such as jogging or tennis as the way to fitness, but there are many ways you and your family can be more active. Anything that makes you breathe hard and gets your heart pumping is exercise. Here are some tips:  ? Walk to do errands or to take your child to school or the bus.  ? Go for a family bike ride after dinner instead of watching TV. Where can you learn more? Go to http://www.gray.com/  Enter U155 in the search box to learn more about \"A Healthy Lifestyle: Care Instructions. \"  Current as of: June 16, 2021               Content Version: 13.0  © 8552-5945 Healthwise, Incorporated.    Care instructions adapted under license by [employer] (which disclaims liability or warranty for this information). If you have questions about a medical condition or this instruction, always ask your healthcare professional. [address] any warranty or liability for your use of this information.

## 2021-09-30 NOTE — PROGRESS NOTES
Chief Complaint   Patient presents with   [address]     Patient presents in office today for f/u and BP. Would also like to get fasting labs done. No other concerns. 3 most recent PHQ Screens 9/30/2021   PHQ Not Done -   Little interest or pleasure in doing things Nearly every day   Feeling down, depressed, irritable, or hopeless Nearly every day   Total Score PHQ 2 6   Trouble falling or staying asleep, or sleeping too much Nearly every day   Feeling tired or having little energy Nearly every day   Poor appetite, weight loss, or overeating Not at all   Feeling bad about yourself - or that you are a failure or have let yourself or your family down Nearly every day   Trouble concentrating on things such as school, work, reading, or watching TV Nearly every day   Moving or speaking so slowly that other people could have noticed; or the opposite being so fidgety that others notice Nearly every day   Thoughts of being better off dead, or hurting yourself in some way Nearly every day   PHQ 9 Score 24   How difficult have these problems made it for you to do your work, take care of your home and get along with others Somewhat difficult       1. Have you been to the ER, urgent care clinic since your last visit? Hospitalized since your last visit? No    2. Have you seen or consulted any other health care providers outside of the [address] [place] [address] since your last visit? Include any pap smears or colon screening.  No    Learning Assessment 8/12/2019   PRIMARY LEARNER Patient   HIGHEST LEVEL OF EDUCATION - PRIMARY LEARNER  GRADUATED HIGH SCHOOL OR GED   BARRIERS PRIMARY LEARNER -   CO-LEARNER CAREGIVER -   PRIMARY LANGUAGE ENGLISH   LEARNER PREFERENCE PRIMARY VIDEOS     -   ANSWERED BY [address]

## 2021-09-30 NOTE — PROGRESS NOTES
Progress Note    she is a 60y.o. year old female who presents for evalution. Subjective:     Here with a history of hyperlipidemia and prediabetes due for recheck. Per prior note she is been noncompliant with medication for cholesterol. Therefore has been advised to follow a heart healthy diet. Last hemoglobin A1c on file was 5.8 from 2019. BP up currently on toprol 35 bid will increase. Pt anxious in office today. Pt has thoughts of being better off dead. States this has gone on for decades. She sees psychiatry. Saw them last week and meds were adjusted. Lives with   Reviewed PmHx, RxHx, FmHx, SocHx, AllgHx and updated and dated in the chart. Review of Systems - negative except as listed above in the HPI    Objective:     Vitals:    09/30/21 1404   BP: (!) 173/83   Pulse: (!) 113   Resp: 16   Temp: 97.9 °F (36.6 °C)   TempSrc: Oral   SpO2: 98%   Weight: 155 lb (70.3 kg)   Height: 5' 5\" (1.651 m)       Current Outpatient Medications   Medication Sig    sertraline (ZOLOFT) 50 mg tablet Take  by mouth daily.  metoprolol tartrate (LOPRESSOR) 50 mg tablet Take 1 Tablet by mouth every twelve (12) hours.  acetaminophen (TYLENOL) 325 mg tablet Take 650 mg by mouth every six (6) hours as needed.  busPIRone (BUSPAR) 10 mg tablet Take 10 mg by mouth two (2) times a day.  ARIPiprazole (Abilify) 5 mg tablet Take 7 mg by mouth daily.  lamoTRIgine (LaMICtal) 100 mg tablet Take 100 mg by mouth daily. (Patient not taking: Reported on 9/30/2021)     No current facility-administered medications for this visit.        Physical Examination: General appearance - alert, well appearing, and in no distress  Mental status - alert, oriented to person, place, and time, anxious  Chest - clear to auscultation, no wheezes, rales or rhonchi, symmetric air entry  Heart - normal rate, regular rhythm, normal S1, S2, no murmurs, rubs, clicks or gallops      Assessment/ Plan:   Diagnoses and all orders for this visit:    1. Prediabetes  -     HEMOGLOBIN A1C WITH EAG; Future    2. Hypercholesteremia  -     LIPID PANEL; Future    3. Bipolar 1 disorder [facility])  Safety plan discussed with   4. Encounter for screening mammogram for malignant neoplasm of breast  -     [facility] 3D DANIELLE W MAMMO BI SCREENING INCL CAD; Future    5. Colon cancer screening  -     COLOGUARD TEST (FECAL DNA COLORECTAL CANCER SCREENING)    6. Essential hypertension with goal blood pressure less than 130/80  -     metoprolol tartrate (LOPRESSOR) 50 mg tablet; Take 1 Tablet by mouth every twelve (12) hours. Follow-up and Dispositions    · Return in about 2 weeks (around 10/14/2021), or if symptoms worsen or fail to improve. I have discussed the diagnosis with the patient and the intended plan as seen in the above orders. The patient has received an after-visit summary and questions were answered concerning future plans. Pt conveyed understanding of plan.     Medication Side Effects and Warnings were discussed with patient    An electronic signature was used to authenticate this note  Ambar Kovacs DO

## 2021-10-01 LAB
CHOLEST SERPL-MCNC: 249 MG/DL (ref 100–199)
EST. AVERAGE GLUCOSE BLD GHB EST-MCNC: 105 MG/DL
HBA1C MFR BLD: 5.3 % (ref 4.8–5.6)
HDLC SERPL-MCNC: 46 MG/DL
IMP & REVIEW OF LAB RESULTS: NORMAL
LDLC SERPL CALC-MCNC: 156 MG/DL (ref 0–99)
TRIGL SERPL-MCNC: 252 MG/DL (ref 0–149)
VLDLC SERPL CALC-MCNC: 47 MG/DL (ref 5–40)

## 2021-10-01 NOTE — PROGRESS NOTES
Your diabetes marker is now normal.  Good job but your cholesterol has gone up. Continue to work on diet and we can recheck in 6 months.

## 2022-01-17 DIAGNOSIS — I10 ESSENTIAL HYPERTENSION WITH GOAL BLOOD PRESSURE LESS THAN 130/80: ICD-10-CM

## 2022-01-18 RX ORDER — METOPROLOL TARTRATE 50 MG/1
TABLET ORAL
Qty: 60 TABLET | Refills: 0 | Status: SHIPPED | OUTPATIENT
Start: 2022-01-18 | End: 2022-02-18

## 2022-02-17 DIAGNOSIS — I10 ESSENTIAL HYPERTENSION WITH GOAL BLOOD PRESSURE LESS THAN 130/80: ICD-10-CM

## 2022-02-18 RX ORDER — METOPROLOL TARTRATE 50 MG/1
TABLET ORAL
Qty: 180 TABLET | Refills: 3 | Status: SHIPPED | OUTPATIENT
Start: 2022-02-18

## 2022-03-18 PROBLEM — N17.9 AKI (ACUTE KIDNEY INJURY) (HCC): Status: ACTIVE | Noted: 2017-05-21

## 2022-03-19 PROBLEM — F17.210 CIGARETTE NICOTINE DEPENDENCE WITHOUT COMPLICATION: Status: ACTIVE | Noted: 2017-08-02

## 2022-03-19 PROBLEM — D64.9 ANEMIA: Status: ACTIVE | Noted: 2017-05-23

## 2022-03-19 PROBLEM — T56.891A LITHIUM TOXICITY: Status: ACTIVE | Noted: 2017-05-21

## 2022-03-19 PROBLEM — E78.00 HYPERCHOLESTEREMIA: Status: ACTIVE | Noted: 2017-03-15

## 2022-03-19 PROBLEM — G93.41 ACUTE METABOLIC ENCEPHALOPATHY: Status: ACTIVE | Noted: 2021-08-12

## 2022-03-20 PROBLEM — F41.9 ANXIETY: Status: ACTIVE | Noted: 2018-05-01

## 2022-03-20 PROBLEM — R56.9 SEIZURE (HCC): Status: ACTIVE | Noted: 2021-08-12

## 2022-07-27 ENCOUNTER — TELEPHONE (OUTPATIENT)
Dept: FAMILY MEDICINE CLINIC | Age: 67
End: 2022-07-27

## 2022-07-27 NOTE — TELEPHONE ENCOUNTER
Left message for patient to schedule mammogram ordered by Maya Fuchs NP on 09/30/21. Requested patient to return call to panel manager at [phone] to schedule mammogram or notify that mammogram   has been completed at an outside facility.

## 2022-08-08 NOTE — PROGRESS NOTES
August 8, 2022     Patient: Katelynn Zamarripa   YOB: 2007   Date of Visit: 8/8/2022       To Whom it May Concern:    Katelynn Zamarripa was seen in my clinic on 8/8/2022 at 2:50 pm.    Please excuse Katelynn 's father, Андрей Zamarripa, for his absence from work on the date listed above to be able to make her appointment.    Sincerely,         Janeth Yuan MD    Medical information is confidential and cannot be disclosed without the written consent of the patient or her representative.       Physician Progress Note      PATIENT:               Jose Manuel Gudino  CSN #:                  [id number]  :                       1955  ADMIT DATE:       2021 7:23 AM  DISCH DATE:  RESPONDING  PROVIDER #:        Karen Isabel MD          QUERY TEXT:    Pt admitted with altered mental status, seizures. Pt noted to have documented rhabdomyolysis, elevated CK. If possible, please document in progress notes and discharge summary if you are evaluating and/or treating any of the following: The medical record reflects the following:  Risk Factors: 59 y/o female found down on floor by , last known well was the evening before when pt. went to bed on couch. CK 10,057  humeral head fracture, concern for seizure,  Clinical Indicators: 21 H&P Elevated CK: again, c/f sz. Low at present, low c/f true rhabdo  21 DC summary: Rhabdo - likely 2/2 seizure, CK improved with IVF      2021 15:49 CK: 10,057 (HH)  8/15/2021 00:35 CK: 9,485 (H)  2021 01:25 CK: 4,682 (H)      Treatment: Admit, hospitalist consult, ortho consult, humeral head repair, labs-CK, IVF's, Keppra, seizure precautions,  PT/OT consult, vitals per unit routine, dysphagia diet    Per [email or web address]  Traumatic rhabdomyolysis cause examples: crush syndrome, prolonged immobilization  Nontraumatic rhabdomyolysis cause examples:  marked exertion, hyperthermia, metabolic myopathy, drugs or toxins, infections, electrolyte disorders. Options provided:  -- Traumatic rhabdomyolysis  -- Nontraumatic rhabdomyolysis  -- Other - I will add my own diagnosis  -- Disagree - Not applicable / Not valid  -- Disagree - Clinically unable to determine / Unknown  -- Refer to Clinical Documentation Reviewer    PROVIDER RESPONSE TEXT:    This patient has traumatic rhabdomyolysis.     Query created by: Patti Ventura on 2021 7:57 AM      Electronically signed by:  Karen Isabel MD 2021 3:18 PM

## 2022-11-22 ENCOUNTER — APPOINTMENT (OUTPATIENT)
Dept: CT IMAGING | Age: 67
End: 2022-11-22
Attending: EMERGENCY MEDICINE
Payer: MEDICARE

## 2022-11-22 ENCOUNTER — HOSPITAL ENCOUNTER (EMERGENCY)
Age: 67
Discharge: HOME | End: 2022-11-22
Attending: EMERGENCY MEDICINE
Payer: MEDICARE

## 2022-11-22 VITALS
SYSTOLIC BLOOD PRESSURE: 190 MMHG | HEIGHT: 65 IN | BODY MASS INDEX: 25.79 KG/M2 | TEMPERATURE: 98 F | HEART RATE: 82 BPM | OXYGEN SATURATION: 97 % | RESPIRATION RATE: 16 BRPM | DIASTOLIC BLOOD PRESSURE: 83 MMHG

## 2022-11-22 DIAGNOSIS — R42 DIZZINESS: Primary | ICD-10-CM

## 2022-11-22 LAB
ALBUMIN SERPL-MCNC: 3.2 G/DL (ref 3.5–5)
ALBUMIN/GLOB SERPL: 0.7 {RATIO} (ref 1.1–2.2)
ALP SERPL-CCNC: 103 U/L (ref 45–117)
ALT SERPL-CCNC: 17 U/L (ref 12–78)
ANION GAP SERPL CALC-SCNC: 8 MMOL/L (ref 5–15)
AST SERPL-CCNC: 10 U/L (ref 15–37)
BASOPHILS # BLD: 0.1 K/UL (ref 0–0.1)
BASOPHILS NFR BLD: 1 % (ref 0–1)
BILIRUB SERPL-MCNC: 0.4 MG/DL (ref 0.2–1)
BUN SERPL-MCNC: 8 MG/DL (ref 6–20)
BUN/CREAT SERPL: 13 (ref 12–20)
CALCIUM SERPL-MCNC: 9.8 MG/DL (ref 8.5–10.1)
CHLORIDE SERPL-SCNC: 99 MMOL/L (ref 97–108)
CO2 SERPL-SCNC: 28 MMOL/L (ref 21–32)
COMMENT, HOLDF: NORMAL
CREAT SERPL-MCNC: 0.64 MG/DL (ref 0.55–1.02)
DIFFERENTIAL METHOD BLD: ABNORMAL
EOSINOPHIL # BLD: 0.1 K/UL (ref 0–0.4)
EOSINOPHIL NFR BLD: 1 % (ref 0–7)
ERYTHROCYTE [DISTWIDTH] IN BLOOD BY AUTOMATED COUNT: 13.7 % (ref 11.5–14.5)
GLOBULIN SER CALC-MCNC: 4.6 G/DL (ref 2–4)
GLUCOSE BLD STRIP.AUTO-MCNC: 112 MG/DL (ref 65–117)
GLUCOSE SERPL-MCNC: 101 MG/DL (ref 65–100)
HCT VFR BLD AUTO: 45.2 % (ref 35–47)
HGB BLD-MCNC: 15.1 G/DL (ref 11.5–16)
IMM GRANULOCYTES # BLD AUTO: 0 K/UL (ref 0–0.04)
IMM GRANULOCYTES NFR BLD AUTO: 0 % (ref 0–0.5)
LYMPHOCYTES # BLD: 3.2 K/UL (ref 0.8–3.5)
LYMPHOCYTES NFR BLD: 32 % (ref 12–49)
MCH RBC QN AUTO: 27.8 PG (ref 26–34)
MCHC RBC AUTO-ENTMCNC: 33.4 G/DL (ref 30–36.5)
MCV RBC AUTO: 83.2 FL (ref 80–99)
MONOCYTES # BLD: 0.9 K/UL (ref 0–1)
MONOCYTES NFR BLD: 9 % (ref 5–13)
NEUTS SEG # BLD: 5.7 K/UL (ref 1.8–8)
NEUTS SEG NFR BLD: 57 % (ref 32–75)
NRBC # BLD: 0 K/UL (ref 0–0.01)
NRBC BLD-RTO: 0 PER 100 WBC
PLATELET # BLD AUTO: 358 K/UL (ref 150–400)
PMV BLD AUTO: 9.4 FL (ref 8.9–12.9)
POTASSIUM SERPL-SCNC: 3.3 MMOL/L (ref 3.5–5.1)
PROT SERPL-MCNC: 7.8 G/DL (ref 6.4–8.2)
RBC # BLD AUTO: 5.43 M/UL (ref 3.8–5.2)
SAMPLES BEING HELD,HOLD: NORMAL
SERVICE CMNT-IMP: NORMAL
SODIUM SERPL-SCNC: 135 MMOL/L (ref 136–145)
TSH SERPL DL<=0.05 MIU/L-ACNC: 3.22 UIU/ML (ref 0.36–3.74)
WBC # BLD AUTO: 10 K/UL (ref 3.6–11)

## 2022-11-22 PROCEDURE — 99284 EMERGENCY DEPT VISIT MOD MDM: CPT

## 2022-11-22 PROCEDURE — 36415 COLL VENOUS BLD VENIPUNCTURE: CPT

## 2022-11-22 PROCEDURE — 80053 COMPREHEN METABOLIC PANEL: CPT

## 2022-11-22 PROCEDURE — 82962 GLUCOSE BLOOD TEST: CPT

## 2022-11-22 PROCEDURE — 84443 ASSAY THYROID STIM HORMONE: CPT

## 2022-11-22 PROCEDURE — 74011250637 HC RX REV CODE- 250/637: Performed by: EMERGENCY MEDICINE

## 2022-11-22 PROCEDURE — 70450 CT HEAD/BRAIN W/O DYE: CPT

## 2022-11-22 PROCEDURE — 85025 COMPLETE CBC W/AUTO DIFF WBC: CPT

## 2022-11-22 RX ORDER — LORAZEPAM 0.5 MG/1
0.5 TABLET ORAL
Status: COMPLETED | OUTPATIENT
Start: 2022-11-22 | End: 2022-11-22

## 2022-11-22 RX ADMIN — LORAZEPAM 0.5 MG: 0.5 TABLET ORAL at 22:16

## 2022-11-23 NOTE — ED TRIAGE NOTES
Pt reports she was off her anxiety meds for 45 days. She has been back on low doses for a week now. Pt is not eating or sleeping, not able to walk due to generalized weakness.      Pt is moving all her extremities, has strength is extremities

## 2022-11-23 NOTE — ED PROVIDER NOTES
----- Message from Yoselyn Marx sent at 11/2/2018  4:22 PM EDT -----  Contact: PATIENT  SHE IS WANTING TO KNOW IF THE FOLLOWING MEDICATION NEEDS TO BE INCREASED OR CHANGED BECAUSE IT IS NOT WORKING FOR HER:    oxybutynin XL (DITROPAN XL) 15 MG 24 hr tablet 30 tablet 11 9/13/2018    Sig - Route: Take 1 tablet by mouth Daily. - Oral     E-Prescribing Status: Receipt confirmed by pharmacy (9/13/2018  8:40 AM EDT)   Pharmacy     [facility] [address] [place] [address] - [address] AT [facility] [address] - [phone]  - [phone] FX       History of hypertension, hyperlipidemia, GERD, insomnia, paranoia, bipolar disorder. She presents accompanied by her  with complaints of unsteadiness. He states that she has been unsteady since last night. He states that she \"fell or sat down\" on the porch while he was out in the yard last evening. No injuries. Tonight she had another similar episode. She was re-started on her psychiatric medicines (lamotrigine and buspirone) 1 week ago. She had been off of them for 45 days. No chest pain, dyspnea, nausea, vomiting, diarrhea. She has a chronically poor appetite per . Past Medical History:   Diagnosis Date    Abscess of buttock 7/23/2012    Bronchitis     Common bile duct calculus 7/23/2012    Gallstones 7/12/2012    GERD (gastroesophageal reflux disease)     High cholesterol     Hypercholesterolemia     Hypertension     Ill-defined condition     missing upper front tooth    Ill-defined condition     lower leg bilateral reddened rash. Insomnia     Other ill-defined conditions(799.89)     Large boil under right arm-pit.     Paranoia ([address]     Psychiatric disorder     bipolar       Past Surgical History:   Procedure Laterality Date    AK ABDOMEN SURGERY PROC UNLISTED      cholecystectomy 7/23/2012         Family History:   Problem Relation Age of Onset    Cancer Father         lung    Diabetes Brother     Heart Disease Brother     Malignant Hyperthermia Neg Hx     Pseudocholinesterase Deficiency Neg Hx     Delayed Awakening Neg Hx     Post-op Nausea/Vomiting Neg Hx     Emergence Delirium Neg Hx     Post-op Cognitive Dysfunction Neg Hx     Other Neg Hx        Social History     Socioeconomic History    Marital status:      Spouse name: Not on file    Number of children: Not on file    Years of education: Not on file    Highest education level: Not on file   Occupational History    Not on file   Tobacco Use    Smoking status: Every Day     Packs/day: 0.50     Types: Cigarettes Smokeless tobacco: Never   Vaping Use    Vaping Use: Never used   Substance and Sexual Activity    Alcohol use: No    Drug use: No    Sexual activity: Not on file   Other Topics Concern    Not on file   Social History Narrative    Not on file     Social Determinants of Health     Financial Resource Strain: Not on file   Food Insecurity: Not on file   Transportation Needs: Not on file   Physical Activity: Not on file   Stress: Not on file   Social Connections: Not on file   Intimate Partner Violence: Not on file   Housing Stability: Not on file         ALLERGIES: Patient has no known allergies. Review of Systems   All other systems reviewed and are negative. Vitals:    11/22/22 2030   BP: (!) 190/83   Pulse: 82   Resp: 16   Temp: 98 °F (36.7 °C)   SpO2: 97%   Height: 5' 5\" (1.651 m)            Physical Exam  Vitals and nursing note reviewed. Constitutional:       Appearance: She is well-developed. HENT:      Head: Normocephalic and atraumatic. Eyes:      Conjunctiva/sclera: Conjunctivae normal.   Neck:      Trachea: No tracheal deviation. Cardiovascular:      Rate and Rhythm: Normal rate and regular rhythm. Heart sounds: Normal heart sounds. No murmur heard. No friction rub. No gallop. Pulmonary:      Effort: Pulmonary effort is normal.      Breath sounds: Normal breath sounds. Abdominal:      Palpations: Abdomen is soft. Tenderness: There is no abdominal tenderness. Musculoskeletal:         General: No deformity. Cervical back: Neck supple. Skin:     General: Skin is warm and dry. Neurological:      Mental Status: She is alert. Comments: oriented   Psychiatric:      Comments: Flat affect        MDM         Procedures    Progress Note:  Results, treatment, and follow up plan have been discussed with patient/. Questions were answered. Maile Garcia MD  10:13 PM    Assessment/plan: Unsteadiness. Unclear etiology.   CT shows subacute versus chronic lacunar infarct. Reassuring appearance/exam with stable vital signs. CBC, CMP okay. Home with PCP/neuro follow-up. Return precautions.   Katina Braun MD  10:14 PM

## 2022-11-23 NOTE — ED NOTES
Patient discharged by provider. D/C instructions given. Patient educated to take all medications as instructed for management at home. Patien verbalized understanding, verbalized no questions. PIV removed, pressure dressing applied. Patient ambulated out of ER without difficulty, NAD.   Patient Vitals for the past 4 hrs:   Temp Pulse Resp BP SpO2   11/22/22 2030 98 °F (36.7 °C) 82 16 (!) 190/83 97 %

## 2022-12-05 NOTE — PROGRESS NOTES
Outgoing call placed to patient's spouse no answer message left with this writer's contact information no response. Will attempt contact next week. done

## 2022-12-08 ENCOUNTER — OFFICE VISIT (OUTPATIENT)
Dept: FAMILY MEDICINE CLINIC | Age: 67
End: 2022-12-08
Payer: MEDICARE

## 2022-12-08 VITALS
BODY MASS INDEX: 24.99 KG/M2 | DIASTOLIC BLOOD PRESSURE: 72 MMHG | WEIGHT: 150 LBS | SYSTOLIC BLOOD PRESSURE: 159 MMHG | HEART RATE: 75 BPM | RESPIRATION RATE: 18 BRPM | TEMPERATURE: 97.5 F | OXYGEN SATURATION: 96 % | HEIGHT: 65 IN

## 2022-12-08 DIAGNOSIS — R41.3 MEMORY LOSS: ICD-10-CM

## 2022-12-08 DIAGNOSIS — Z12.31 ENCOUNTER FOR SCREENING MAMMOGRAM FOR MALIGNANT NEOPLASM OF BREAST: ICD-10-CM

## 2022-12-08 DIAGNOSIS — Z78.0 POSTMENOPAUSAL STATE: ICD-10-CM

## 2022-12-08 DIAGNOSIS — R73.03 PREDIABETES: Primary | ICD-10-CM

## 2022-12-08 DIAGNOSIS — Z12.11 COLON CANCER SCREENING: ICD-10-CM

## 2022-12-08 DIAGNOSIS — E78.00 HYPERCHOLESTEREMIA: ICD-10-CM

## 2022-12-08 DIAGNOSIS — Z00.00 WELCOME TO MEDICARE PREVENTIVE VISIT: ICD-10-CM

## 2022-12-08 DIAGNOSIS — I10 ESSENTIAL HYPERTENSION WITH GOAL BLOOD PRESSURE LESS THAN 130/80: ICD-10-CM

## 2022-12-08 LAB
ALBUMIN SERPL-MCNC: 3.4 G/DL (ref 3.5–5)
ALBUMIN/GLOB SERPL: 0.9 {RATIO} (ref 1.1–2.2)
ALP SERPL-CCNC: 105 U/L (ref 45–117)
ALT SERPL-CCNC: 20 U/L (ref 12–78)
ANION GAP SERPL CALC-SCNC: 4 MMOL/L (ref 5–15)
AST SERPL-CCNC: 9 U/L (ref 15–37)
BILIRUB SERPL-MCNC: 0.3 MG/DL (ref 0.2–1)
BUN SERPL-MCNC: 12 MG/DL (ref 6–20)
BUN/CREAT SERPL: 16 (ref 12–20)
CALCIUM SERPL-MCNC: 9.6 MG/DL (ref 8.5–10.1)
CHLORIDE SERPL-SCNC: 104 MMOL/L (ref 97–108)
CHOLEST SERPL-MCNC: 245 MG/DL
CO2 SERPL-SCNC: 28 MMOL/L (ref 21–32)
CREAT SERPL-MCNC: 0.76 MG/DL (ref 0.55–1.02)
EST. AVERAGE GLUCOSE BLD GHB EST-MCNC: 126 MG/DL
GLOBULIN SER CALC-MCNC: 4 G/DL (ref 2–4)
GLUCOSE SERPL-MCNC: 92 MG/DL (ref 65–100)
HBA1C MFR BLD: 6 % (ref 4–5.6)
HDLC SERPL-MCNC: 48 MG/DL
HDLC SERPL: 5.1 {RATIO} (ref 0–5)
LDLC SERPL CALC-MCNC: 154.4 MG/DL (ref 0–100)
POTASSIUM SERPL-SCNC: 4.8 MMOL/L (ref 3.5–5.1)
PROT SERPL-MCNC: 7.4 G/DL (ref 6.4–8.2)
SODIUM SERPL-SCNC: 136 MMOL/L (ref 136–145)
TRIGL SERPL-MCNC: 213 MG/DL (ref ?–150)
VLDLC SERPL CALC-MCNC: 42.6 MG/DL

## 2022-12-08 RX ORDER — LOSARTAN POTASSIUM 50 MG/1
50 TABLET ORAL DAILY
Qty: 30 TABLET | Refills: 1 | Status: SHIPPED | OUTPATIENT
Start: 2022-12-08

## 2022-12-08 NOTE — PATIENT INSTRUCTIONS
Medicare Wellness Visit, Female     The best way to live healthy is to have a lifestyle where you eat a well-balanced diet, exercise regularly, limit alcohol use, and quit all forms of tobacco/nicotine, if applicable. Regular preventive services are another way to keep healthy. Preventive services (vaccines, screening tests, monitoring & exams) can help personalize your care plan, which helps you manage your own care. Screening tests can find health problems at the earliest stages, when they are easiest to treat. [facility] follows the current, evidence-based guidelines published by the [facility] ([facility]) when recommending preventive services for our patients. Because we follow these guidelines, sometimes recommendations change over time as research supports it. (For example, mammograms used to be recommended annually. Even though Medicare will still pay for an annual mammogram, the newer guidelines recommend a mammogram every two years for women of average risk). Of course, you and your doctor may decide to screen more often for some diseases, based on your risk and your co-morbidities (chronic disease you are already diagnosed with). Preventive services for you include:  - Medicare offers their members a free annual wellness visit, which is time for you and your primary care provider to discuss and plan for your preventive service needs.  Take advantage of this benefit every year!    -Over the age of 72 should receive the recommended pneumonia vaccines.    -All adults should have a flu vaccine yearly.  -All adults should have a tetanus vaccine every 10 years.   -Over the age 48 should receive the shingles vaccines.        -All adults should be screened once for Hepatitis C.  -All adults age 38-68 who are overweight should have a diabetes screening test once every three years.   -Other screening tests and preventive services for persons with diabetes include: an eye exam to screen for diabetic retinopathy, a kidney function test, a foot exam, and stricter control over your cholesterol.   -Cardiovascular screening for adults with routine risk involves an electrocardiogram (ECG) at intervals determined by your doctor.     -Colorectal cancer screenings should be done for adults age 39-70 with no increased risk factors for colorectal cancer. There are a number of acceptable methods of screening for this type of cancer. Each test has its own benefits and drawbacks. Discuss with your doctor what is most appropriate for you during your annual wellness visit. The different tests include: colonoscopy (considered the best screening method), a fecal occult blood test, a fecal DNA test, and sigmoidoscopy.    -Lung cancer screening is recommended annually with a low dose CT scan for adults between age 54 and 68, who have smoked at least 30 pack years (equivalent of 1 pack per day for 30 days), and who is a current smoker or quit less than 15 years ago.    -A bone mass density test is recommended when a woman turns 65 to screen for osteoporosis. This test is only recommended one time, as a screening. Some providers will use this same test as a disease monitoring tool if you already have osteoporosis. -Breast cancer screenings are recommended every other year for women of normal risk, age 54-69.    -Cervical cancer screenings for women over age 72 are only recommended with certain risk factors.      Here is a list of your current Health Maintenance items (your personalized list of preventive services) with a due date:  Health Maintenance Due   Topic Date Due    Hepatitis C Test  Never done    COVID-19 Vaccine (1) Never done    Pneumococcal Vaccine (1 - PCV) Never done    Colorectal Screening  Never done    Shingles Vaccine (1 of 2) Never done    Mammogram  Never done    Bone Mineral Density   Never done    Yearly Flu Vaccine (1) Never done    Depression Monitoring 09/30/2022    Hemoglobin A1C    09/30/2022         Medicare Wellness Visit, Female     The best way to live healthy is to have a lifestyle where you eat a well-balanced diet, exercise regularly, limit alcohol use, and quit all forms of tobacco/nicotine, if applicable. Regular preventive services are another way to keep healthy. Preventive services (vaccines, screening tests, monitoring & exams) can help personalize your care plan, which helps you manage your own care. Screening tests can find health problems at the earliest stages, when they are easiest to treat. [facility] follows the current, evidence-based guidelines published by the [facility] ([facility]) when recommending preventive services for our patients. Because we follow these guidelines, sometimes recommendations change over time as research supports it. (For example, mammograms used to be recommended annually. Even though Medicare will still pay for an annual mammogram, the newer guidelines recommend a mammogram every two years for women of average risk). Of course, you and your doctor may decide to screen more often for some diseases, based on your risk and your co-morbidities (chronic disease you are already diagnosed with). Preventive services for you include:  - Medicare offers their members a free annual wellness visit, which is time for you and your primary care provider to discuss and plan for your preventive service needs.  Take advantage of this benefit every year!    -Over the age of 72 should receive the recommended pneumonia vaccines.    -All adults should have a flu vaccine yearly.  -All adults should have a tetanus vaccine every 10 years.   -Over the age 48 should receive the shingles vaccines.        -All adults should be screened once for Hepatitis C.  -All adults age 38-68 who are overweight should have a diabetes screening test once every three years.   -Other screening tests and preventive services for persons with diabetes include: an eye exam to screen for diabetic retinopathy, a kidney function test, a foot exam, and stricter control over your cholesterol.   -Cardiovascular screening for adults with routine risk involves an electrocardiogram (ECG) at intervals determined by your doctor.     -Colorectal cancer screenings should be done for adults age 39-70 with no increased risk factors for colorectal cancer. There are a number of acceptable methods of screening for this type of cancer. Each test has its own benefits and drawbacks. Discuss with your doctor what is most appropriate for you during your annual wellness visit. The different tests include: colonoscopy (considered the best screening method), a fecal occult blood test, a fecal DNA test, and sigmoidoscopy.    -Lung cancer screening is recommended annually with a low dose CT scan for adults between age 54 and 68, who have smoked at least 30 pack years (equivalent of 1 pack per day for 30 days), and who is a current smoker or quit less than 15 years ago.    -A bone mass density test is recommended when a woman turns 65 to screen for osteoporosis. This test is only recommended one time, as a screening. Some providers will use this same test as a disease monitoring tool if you already have osteoporosis. -Breast cancer screenings are recommended every other year for women of normal risk, age 54-69.    -Cervical cancer screenings for women over age 72 are only recommended with certain risk factors.      Here is a list of your current Health Maintenance items (your personalized list of preventive services) with a due date:  Health Maintenance Due   Topic Date Due    Hepatitis C Test  Never done    COVID-19 Vaccine (1) Never done    Pneumococcal Vaccine (1 - PCV) Never done    Colorectal Screening  Never done    Shingles Vaccine (1 of 2) Never done    Mammogram  Never done    Bone Mineral Density   Never done   Lois Lobe Yearly Flu Vaccine (1) Never done    Depression Monitoring  09/30/2022    Hemoglobin A1C    09/30/2022

## 2022-12-08 NOTE — PROGRESS NOTES
Chief Complaint   Patient presents with    Annual Wellness Visit    Labs     Patient presents in office today for AMW visit and fasting labs. No concerns. 1. Have you been to the ER, urgent care clinic since your last visit? Hospitalized since your last visit? No    2. Have you seen or consulted any other health care providers outside of the [address] [place] [address] since your last visit? Include any pap smears or colon screening.  No    Learning Assessment 8/12/2019   PRIMARY LEARNER Patient   HIGHEST LEVEL OF EDUCATION - PRIMARY LEARNER  GRADUATED HIGH SCHOOL OR GED   BARRIERS PRIMARY LEARNER -   CO-LEARNER CAREGIVER -   PRIMARY LANGUAGE ENGLISH   LEARNER PREFERENCE PRIMARY VIDEOS     -   ANSWERED BY Nadeem [address]

## 2022-12-08 NOTE — PROGRESS NOTES
Progress Note    she is a 78y.o. year old female who presents for evalution. Subjective:     Pt ehre for BP check up and labs. States she can feel her pulse in her neck and feels like it is high. Does not check at home. She is taking toprol currently. Never did cologuard will reorder. Concerned about memory loss states she gets lost in her house, forgetful. Will lose her glasses. She does have bipolar with paranoia states she never feels safe becaase ppl die and what goes on on TV. She is seeing psychiatry per ot and a psychologist, states she has an appt Tuesday but does not know names, her  manages her appts but he is not here today. Reviewed PmHx, RxHx, FmHx, SocHx, AllgHx and updated and dated in the chart. Review of Systems - negative except as listed above in the HPI    Objective:     Vitals:    12/08/22 0900 12/08/22 0946   BP: (!) 168/71 (!) 159/72   Pulse: 75    Resp: 18    Temp: 97.5 °F (36.4 °C)    TempSrc: Oral    SpO2: 96%    Weight: 150 lb (68 kg)    Height: 5' 5\" (1.651 m)        Current Outpatient Medications   Medication Sig    losartan (COZAAR) 50 mg tablet Take 1 Tablet by mouth daily. metoprolol tartrate (LOPRESSOR) 50 mg tablet TAKE 1 TABLET BY MOUTH EVERY 12 HOURS    sertraline (ZOLOFT) 50 mg tablet Take  by mouth daily. busPIRone (BUSPAR) 10 mg tablet Take 10 mg by mouth two (2) times a day. acetaminophen (TYLENOL) 325 mg tablet Take 650 mg by mouth every six (6) hours as needed. (Patient not taking: Reported on 12/8/2022)    lamoTRIgine (LaMICtal) 100 mg tablet Take 100 mg by mouth daily. (Patient not taking: No sig reported)    ARIPiprazole (ABILIFY) 5 mg tablet Take 7 mg by mouth daily. (Patient not taking: Reported on 12/8/2022)     No current facility-administered medications for this visit.        Physical Examination: General appearance - alert, well appearing, and in no distress  Chest - clear to auscultation, no wheezes, rales or rhonchi, symmetric air entry  Heart - normal rate, regular rhythm, normal S1, S2, no murmurs, rubs, clicks or gallops      Assessment/ Plan:   Diagnoses and all orders for this visit:    1. Prediabetes  -     HEMOGLOBIN A1C WITH EAG; Future  -     LIPID PANEL; Future  -     METABOLIC PANEL, COMPREHENSIVE; Future    2. Hypercholesteremia  -     LIPID PANEL; Future  -     METABOLIC PANEL, COMPREHENSIVE; Future    3. Essential hypertension with goal blood pressure less than 130/80  -     losartan (COZAAR) 50 mg tablet; Take 1 Tablet by mouth daily. 4. Welcome to Medicare preventive visit    5. Encounter for screening mammogram for malignant neoplasm of breast  -     [facility] 3D DANIELLE W MAMMO BI SCREENING INCL CAD; Future    6. Postmenopausal state  -     DEXA BONE DENSITY STUDY AXIAL; Future    7. Colon cancer screening  -     COLOGUARD TEST (FECAL DNA COLORECTAL CANCER SCREENING)    8. Memory loss  -     TSH 3RD GENERATION; Future  -     VITAMIN B12; Future  This may be her bipolar disorder masquerading as memory loss uncertain. We will perform work-up  Follow-up and Dispositions    Return in about 4 weeks (around 1/5/2023), or if symptoms worsen or fail to improve, for bp check. I have discussed the diagnosis with the patient and the intended plan as seen in the above orders. The patient has received an after-visit summary and questions were answered concerning future plans. Pt conveyed understanding of plan. Medication Side Effects and Warnings were discussed with patient    An electronic signature was used to authenticate this note  Sandra Koehler, DO      This is a \"Welcome to [employer]"  Initial Preventive Physical Examination (IPPE) providing Personalized Prevention Plan Services (Performed in the first 12 months of enrollment)    I have reviewed the patient's medical history in detail and updated the computerized patient record.        Assessment/Plan   Education and counseling provided:  Are appropriate based on today's review and evaluation  Prostate cancer screening tests (PSA, covered annually)  Bone mass measurement (DEXA)    1. Prediabetes  -     HEMOGLOBIN A1C WITH EAG; Future  -     LIPID PANEL; Future  -     METABOLIC PANEL, COMPREHENSIVE; Future  2. Hypercholesteremia  -     LIPID PANEL; Future  -     METABOLIC PANEL, COMPREHENSIVE; Future  3. Essential hypertension with goal blood pressure less than 130/80  -     losartan (COZAAR) 50 mg tablet; Take 1 Tablet by mouth daily. , Normal, Disp-30 Tablet, R-1  4. Welcome to Medicare preventive visit  5. Encounter for screening mammogram for malignant neoplasm of breast  -     [facility] 3D DANIELLE W MAMMO BI SCREENING INCL CAD; Future  6. Postmenopausal state  -     DEXA BONE DENSITY STUDY AXIAL; Future  7. Colon cancer screening  -     COLOGUARD TEST (FECAL DNA COLORECTAL CANCER SCREENING)  8. Memory loss  -     TSH 3RD GENERATION;  Future  -     VITAMIN B12; Future       Depression Risk Screen     3 most recent PHQ Screens 12/8/2022   PHQ Not Done -   Little interest or pleasure in doing things Several days   Feeling down, depressed, irritable, or hopeless Several days   Total Score PHQ 2 2   Trouble falling or staying asleep, or sleeping too much -   Feeling tired or having little energy -   Poor appetite, weight loss, or overeating -   Feeling bad about yourself - or that you are a failure or have let yourself or your family down -   Trouble concentrating on things such as school, work, reading, or watching TV -   Moving or speaking so slowly that other people could have noticed; or the opposite being so fidgety that others notice -   Thoughts of being better off dead, or hurting yourself in some way -   PHQ 9 Score -   How difficult have these problems made it for you to do your work, take care of your home and get along with others -       Alcohol & Drug Abuse Risk Screen    Do you average more than 1 drink per night or more than 7 drinks a week:  No    On any one occasion in the past three months have you have had more than 3 drinks containing alcohol:  No          Functional Ability and Level of Safety    Diet: No special diet      Hearing: Hearing is good. Vision Screening:  Vision is good. Wears glasses  No results found. Activities of Daily Living: The home contains: grab bars  Patient needs help with:  managing medications and managing money      Ambulation: with no difficulty      Exercise level: sedentary     Fall Risk Screen:  Fall Risk Assessment, last 12 mths 12/8/2022   Able to walk? Yes   Fall in past 12 months? 0   Do you feel unsteady? 0   Are you worried about falling 0      Abuse Screen:  Patient is not abused       Screening EKG   EKG order placed: No    End of Life Planning   Advanced care planning directives were discussed with the patient and /or family/caregiver. Health Maintenance Due     Health Maintenance Due   Topic Date Due    Hepatitis C Screening  Never done    COVID-19 Vaccine (1) Never done    Pneumococcal 65+ years (1 - PCV) Never done    Colorectal Cancer Screening Combo  Never done    Shingrix Vaccine Age 50> (1 of 2) Never done    Breast Cancer Screen Mammogram  Never done    Bone Densitometry (Dexa) Screening  Never done    Flu Vaccine (1) Never done    Depression Monitoring  09/30/2022    A1C test (Diabetic or Prediabetic)  09/30/2022       Patient Care Team   Patient Care Team:  Ashley Colon DO as PCP - General (Family Medicine)  Ashley Colon DO as PCP - [facility] Empaneled Provider    History     Past Medical History:   Diagnosis Date    Abscess of buttock 7/23/2012    Bronchitis     Common bile duct calculus 7/23/2012    Gallstones 7/12/2012    GERD (gastroesophageal reflux disease)     High cholesterol     Hypercholesterolemia     Hypertension     Ill-defined condition     missing upper front tooth    Ill-defined condition     lower leg bilateral reddened rash.     Insomnia     Other ill-defined conditions(799.89)     Large boil under right arm-pit. Paranoia ([facility] [address]     Psychiatric disorder     bipolar      Past Surgical History:   Procedure Laterality Date    MO ABDOMEN SURGERY PROC UNLISTED      cholecystectomy 7/23/2012     Current Outpatient Medications   Medication Sig Dispense Refill    losartan (COZAAR) 50 mg tablet Take 1 Tablet by mouth daily. 30 Tablet 1    metoprolol tartrate (LOPRESSOR) 50 mg tablet TAKE 1 TABLET BY MOUTH EVERY 12 HOURS 180 Tablet 3    sertraline (ZOLOFT) 50 mg tablet Take  by mouth daily. busPIRone (BUSPAR) 10 mg tablet Take 10 mg by mouth two (2) times a day. acetaminophen (TYLENOL) 325 mg tablet Take 650 mg by mouth every six (6) hours as needed. (Patient not taking: Reported on 12/8/2022)      lamoTRIgine (LaMICtal) 100 mg tablet Take 100 mg by mouth daily. (Patient not taking: No sig reported)      ARIPiprazole (ABILIFY) 5 mg tablet Take 7 mg by mouth daily.  (Patient not taking: Reported on 12/8/2022)       No Known Allergies    Family History   Problem Relation Age of Onset    Cancer Father         lung    Diabetes Brother     Heart Disease Brother     Malignant Hyperthermia Neg Hx     Pseudocholinesterase Deficiency Neg Hx     Delayed Awakening Neg Hx     Post-op Nausea/Vomiting Neg Hx     Emergence Delirium Neg Hx     Post-op Cognitive Dysfunction Neg Hx     Other Neg Hx      Social History     Tobacco Use    Smoking status: Every Day     Packs/day: 0.50     Years: 30.00     Pack years: 15.00     Types: Cigarettes    Smokeless tobacco: Never   Substance Use Topics    Alcohol use: No       Saud Mckeon DO

## 2022-12-09 ENCOUNTER — TELEPHONE (OUTPATIENT)
Dept: FAMILY MEDICINE CLINIC | Age: 67
End: 2022-12-09

## 2022-12-09 DIAGNOSIS — E78.00 HYPERCHOLESTEREMIA: Primary | ICD-10-CM

## 2022-12-09 RX ORDER — ROSUVASTATIN CALCIUM 5 MG/1
5 TABLET, COATED ORAL
Qty: 90 TABLET | Refills: 1 | Status: SHIPPED | OUTPATIENT
Start: 2022-12-09

## 2022-12-09 NOTE — PROGRESS NOTES
Diabetes marker is back in the prediabetes range at 6.0. Need to cut back on the carbs and the sugars. Cholesterol is elevated I am starting rosuvastatin 5 mg nightly. We should recheck the cholesterol in 2 to 3 months fasting.     Am still waiting on other labs to come back

## 2022-12-09 NOTE — TELEPHONE ENCOUNTER
Francisco Mena on pts hippa called in and is asking if you could call him when you get a chance please. He states he didn't come back with pt for her apt yesterday and needs to go over her med list. Call back number for him is [phone]. Thanks.

## 2022-12-09 NOTE — PROGRESS NOTES
Called and spoke with patient's  Beto Macedo in reference to labs. Beto Macedo verbalized understanding.

## 2023-01-03 ENCOUNTER — DOCUMENTATION ONLY (OUTPATIENT)
Dept: FAMILY MEDICINE CLINIC | Age: 68
End: 2023-01-03

## 2023-01-04 ENCOUNTER — DOCUMENTATION ONLY (OUTPATIENT)
Dept: FAMILY MEDICINE CLINIC | Age: 68
End: 2023-01-04

## 2023-01-04 NOTE — PROGRESS NOTES
Called and spoke with patient's  Toribio Carreras (on HIPAA). Advised of positive cologuard results and that she needs to get a colonoscopy done.  Toribio Escobar verbalized understanding and states that he will let her know

## 2023-01-12 ENCOUNTER — OFFICE VISIT (OUTPATIENT)
Dept: FAMILY MEDICINE CLINIC | Age: 68
End: 2023-01-12
Payer: MEDICARE

## 2023-01-12 VITALS
HEART RATE: 69 BPM | TEMPERATURE: 97.5 F | HEIGHT: 65 IN | WEIGHT: 151 LBS | SYSTOLIC BLOOD PRESSURE: 139 MMHG | RESPIRATION RATE: 16 BRPM | DIASTOLIC BLOOD PRESSURE: 78 MMHG | BODY MASS INDEX: 25.16 KG/M2 | OXYGEN SATURATION: 96 %

## 2023-01-12 DIAGNOSIS — R41.3 MEMORY LOSS: ICD-10-CM

## 2023-01-12 DIAGNOSIS — R19.5 POSITIVE COLORECTAL CANCER SCREENING USING COLOGUARD TEST: ICD-10-CM

## 2023-01-12 DIAGNOSIS — R56.9 SEIZURE (HCC): ICD-10-CM

## 2023-01-12 DIAGNOSIS — F31.9 BIPOLAR 1 DISORDER (HCC): ICD-10-CM

## 2023-01-12 DIAGNOSIS — I10 ESSENTIAL HYPERTENSION WITH GOAL BLOOD PRESSURE LESS THAN 130/80: Primary | ICD-10-CM

## 2023-01-12 RX ORDER — RISPERIDONE 1 MG/1
1 TABLET, FILM COATED ORAL 2 TIMES DAILY
COMMUNITY

## 2023-01-12 RX ORDER — LAMOTRIGINE 100 MG/1
100 TABLET ORAL 2 TIMES DAILY
COMMUNITY
Start: 2023-01-12

## 2023-01-12 RX ORDER — LOSARTAN POTASSIUM 50 MG/1
50 TABLET ORAL DAILY
Qty: 90 TABLET | Refills: 1 | Status: SHIPPED | OUTPATIENT
Start: 2023-01-12

## 2023-01-12 NOTE — PATIENT INSTRUCTIONS

## 2023-01-12 NOTE — PROGRESS NOTES
Progress Note    she is a 78y.o. year old female who presents for evalution. Subjective:     Pt here for follow up with her  and sees  Tejinder Lui NP with [facility] [phone].  who is here today manages her meds if he is not available then their son does. Pt is here for a BP check and she is now taking the losartan and seems to be tolerating this. She had a positive Cologuard does not want a colonoscopy. Discussed and she will make appt. Reviewed PmHx, RxHx, FmHx, SocHx, AllgHx and updated and dated in the chart. Review of Systems - negative except as listed above in the HPI    Objective:     Vitals:    01/12/23 1000   BP: 139/78   Pulse: 69   Resp: 16   Temp: 97.5 °F (36.4 °C)   TempSrc: Oral   SpO2: 96%   Weight: 151 lb (68.5 kg)   Height: 5' 5\" (1.651 m)       Current Outpatient Medications   Medication Sig    risperiDONE (RisperDAL) 1 mg tablet Take 1 mg by mouth two (2) times a day. lamoTRIgine (LaMICtal) 100 mg tablet Take 1 Tablet by mouth two (2) times a day. losartan (COZAAR) 50 mg tablet Take 1 Tablet by mouth daily. rosuvastatin (CRESTOR) 5 mg tablet Take 1 Tablet by mouth nightly. metoprolol tartrate (LOPRESSOR) 50 mg tablet TAKE 1 TABLET BY MOUTH EVERY 12 HOURS    acetaminophen (TYLENOL) 325 mg tablet Take 650 mg by mouth every six (6) hours as needed. busPIRone (BUSPAR) 10 mg tablet Take 10 mg by mouth two (2) times a day. sertraline (ZOLOFT) 50 mg tablet Take  by mouth daily. (Patient not taking: Reported on 1/12/2023)     No current facility-administered medications for this visit. Physical Examination: General appearance - alert, well appearing, and in no distress  Chest - clear to auscultation, no wheezes, rales or rhonchi, symmetric air entry  Heart - normal rate, regular rhythm, normal S1, S2, no murmurs, rubs, clicks or gallops      Assessment/ Plan:   Diagnoses and all orders for this visit:    1.  Essential hypertension with goal blood pressure less than 130/80  -     losartan (COZAAR) 50 mg tablet; Take 1 Tablet by mouth daily. Blood pressure better today. 2. Bipolar 1 disorder ([address]  Managed by [facility]  3. Seizure ([facility] [address]  2nd to benzo withdrawal no longer on benzo  4. Positive colorectal cancer screening using Cologuard test  -     REFERRAL TO GASTROENTEROLOGY    5. Memory loss  -     TSH 3RD GENERATION; Future  -     VITAMIN B12; Future   states her memory has been better recently with psych med adjustments. Follow-up in 2 months for recheck of cholesterol on statin. Follow-up and Dispositions    Return in about 2 months (around 3/12/2023), or if symptoms worsen or fail to improve. I have discussed the diagnosis with the patient and the intended plan as seen in the above orders. The patient has received an after-visit summary and questions were answered concerning future plans. Pt conveyed understanding of plan.     Medication Side Effects and Warnings were discussed with patient    An electronic signature was used to authenticate this note  Justice Landa DO

## 2023-01-12 NOTE — PROGRESS NOTES
Chief Complaint   Patient presents with    Blood Pressure Check     Patient presents in office today for BP check. No concerns. 1. Have you been to the ER, urgent care clinic since your last visit? Hospitalized since your last visit? No    2. Have you seen or consulted any other health care providers outside of the [address] [place] [address] since your last visit? Include any pap smears or colon screening.  No    Learning Assessment 8/12/2019   PRIMARY LEARNER Patient   HIGHEST LEVEL OF EDUCATION - PRIMARY LEARNER  GRADUATED HIGH SCHOOL OR GED   BARRIERS PRIMARY LEARNER -   CO-LEARNER CAREGIVER -   PRIMARY LANGUAGE ENGLISH   LEARNER PREFERENCE PRIMARY VIDEOS     -   ANSWERED BY Nadeem [address]

## 2023-01-13 LAB
COMMENT, HOLDF: NORMAL
SAMPLES BEING HELD,HOLD: NORMAL
TSH SERPL DL<=0.05 MIU/L-ACNC: 2.53 UIU/ML (ref 0.36–3.74)
VIT B12 SERPL-MCNC: 297 PG/ML (ref 193–986)

## 2023-01-13 NOTE — PROGRESS NOTES
Thyroid level is normal.  B12 is normal but on the lower end. Recommend a multivitamin with B12 in it every day with food.

## 2023-01-26 DIAGNOSIS — I10 ESSENTIAL HYPERTENSION WITH GOAL BLOOD PRESSURE LESS THAN 130/80: ICD-10-CM

## 2023-01-26 RX ORDER — METOPROLOL TARTRATE 50 MG/1
TABLET ORAL
Qty: 180 TABLET | Refills: 0 | Status: SHIPPED | OUTPATIENT
Start: 2023-01-26

## 2023-01-31 ENCOUNTER — HOSPITAL ENCOUNTER (OUTPATIENT)
Dept: MAMMOGRAPHY | Age: 68
Discharge: HOME | End: 2023-01-31
Attending: FAMILY MEDICINE
Payer: MEDICARE

## 2023-01-31 DIAGNOSIS — Z12.31 ENCOUNTER FOR SCREENING MAMMOGRAM FOR MALIGNANT NEOPLASM OF BREAST: ICD-10-CM

## 2023-01-31 DIAGNOSIS — Z78.0 POSTMENOPAUSAL STATE: ICD-10-CM

## 2023-01-31 PROCEDURE — 77063 BREAST TOMOSYNTHESIS BI: CPT

## 2023-01-31 PROCEDURE — 77080 DXA BONE DENSITY AXIAL: CPT

## 2023-02-01 DIAGNOSIS — R92.8 ABNORMAL MAMMOGRAM OF LEFT BREAST: Primary | ICD-10-CM

## 2023-02-01 NOTE — PROGRESS NOTES
She needs a diagnostic mammogram and possibly an ultrasound of the left breast.  I have already ordered this and they can call central scheduling at [phone] to schedule.

## 2023-02-01 NOTE — PROGRESS NOTES
Called and spoke with patient's  Hu Miles in reference to results. Hu Miles verbalized understanding and was provided with the number to central scheduling.

## 2023-02-02 NOTE — PROGRESS NOTES
Your bone density showing osteoporosis specifically in the right hip. Osteopenia and all the other locations tested. Recommend starting Fosamax once weekly. Let me know your thoughts on this.

## 2023-02-21 DIAGNOSIS — M81.0 AGE-RELATED OSTEOPOROSIS WITHOUT CURRENT PATHOLOGICAL FRACTURE: Primary | ICD-10-CM

## 2023-02-21 RX ORDER — ALENDRONATE SODIUM 70 MG/1
70 TABLET ORAL
Qty: 12 TABLET | Refills: 3 | Status: SHIPPED | OUTPATIENT
Start: 2023-02-21

## 2023-03-15 ENCOUNTER — OFFICE VISIT (OUTPATIENT)
Dept: FAMILY MEDICINE CLINIC | Age: 68
End: 2023-03-15
Payer: MEDICARE

## 2023-03-15 VITALS
HEIGHT: 65 IN | TEMPERATURE: 97.5 F | RESPIRATION RATE: 14 BRPM | SYSTOLIC BLOOD PRESSURE: 148 MMHG | WEIGHT: 150 LBS | DIASTOLIC BLOOD PRESSURE: 94 MMHG | BODY MASS INDEX: 24.99 KG/M2 | OXYGEN SATURATION: 97 % | HEART RATE: 66 BPM

## 2023-03-15 DIAGNOSIS — Z11.59 SCREENING FOR VIRAL DISEASE: ICD-10-CM

## 2023-03-15 DIAGNOSIS — E78.00 HYPERCHOLESTEREMIA: ICD-10-CM

## 2023-03-15 DIAGNOSIS — I10 ESSENTIAL HYPERTENSION WITH GOAL BLOOD PRESSURE LESS THAN 130/80: Primary | ICD-10-CM

## 2023-03-15 DIAGNOSIS — R73.03 PREDIABETES: ICD-10-CM

## 2023-03-15 PROCEDURE — G8399 PT W/DXA RESULTS DOCUMENT: HCPCS | Performed by: FAMILY MEDICINE

## 2023-03-15 PROCEDURE — 1090F PRES/ABSN URINE INCON ASSESS: CPT | Performed by: FAMILY MEDICINE

## 2023-03-15 PROCEDURE — 1123F ACP DISCUSS/DSCN MKR DOCD: CPT | Performed by: FAMILY MEDICINE

## 2023-03-15 PROCEDURE — G9899 SCRN MAM PERF RSLTS DOC: HCPCS | Performed by: FAMILY MEDICINE

## 2023-03-15 PROCEDURE — G8427 DOCREV CUR MEDS BY ELIG CLIN: HCPCS | Performed by: FAMILY MEDICINE

## 2023-03-15 PROCEDURE — 3077F SYST BP >= 140 MM HG: CPT | Performed by: FAMILY MEDICINE

## 2023-03-15 PROCEDURE — 3078F DIAST BP <80 MM HG: CPT | Performed by: FAMILY MEDICINE

## 2023-03-15 PROCEDURE — 3017F COLORECTAL CA SCREEN DOC REV: CPT | Performed by: FAMILY MEDICINE

## 2023-03-15 PROCEDURE — G9717 DOC PT DX DEP/BP F/U NT REQ: HCPCS | Performed by: FAMILY MEDICINE

## 2023-03-15 PROCEDURE — G8536 NO DOC ELDER MAL SCRN: HCPCS | Performed by: FAMILY MEDICINE

## 2023-03-15 PROCEDURE — G8420 CALC BMI NORM PARAMETERS: HCPCS | Performed by: FAMILY MEDICINE

## 2023-03-15 PROCEDURE — 1101F PT FALLS ASSESS-DOCD LE1/YR: CPT | Performed by: FAMILY MEDICINE

## 2023-03-15 PROCEDURE — 99214 OFFICE O/P EST MOD 30 MIN: CPT | Performed by: FAMILY MEDICINE

## 2023-03-15 RX ORDER — LOSARTAN POTASSIUM 100 MG/1
100 TABLET ORAL DAILY
Qty: 90 TABLET | Refills: 0 | Status: SHIPPED | OUTPATIENT
Start: 2023-03-15

## 2023-03-15 NOTE — PROGRESS NOTES
Chief Complaint   Patient presents with    Follow-up    Labs      Being seen for fuv  -labs    1. Have you been to the ER, urgent care clinic since your last visit? Hospitalized since your last visit? No    2. Have you seen or consulted any other health care providers outside of the [address] [place] [address] since your last visit? Include any pap smears or colon screening.  No    Pt has no other concerns

## 2023-03-15 NOTE — PATIENT INSTRUCTIONS

## 2023-03-15 NOTE — PROGRESS NOTES
Progress Note    she is a 78y.o. year old female who presents for evalution. Subjective:     Pt with pos colo-guard and se refuses to have a colonoscopy she is aware of risks. Pt refuses. Pt for follow up on crestor 5mg nightly and she is tolerating fine. Lab Results   Component Value Date/Time    Cholesterol, total 245 (H) 12/08/2022 09:50 AM    HDL Cholesterol 48 12/08/2022 09:50 AM    LDL, calculated 154.4 (H) 12/08/2022 09:50 AM    VLDL, calculated 42.6 12/08/2022 09:50 AM    Triglyceride 213 (H) 12/08/2022 09:50 AM    CHOL/HDL Ratio 5.1 (H) 12/08/2022 09:50 AM   Prediabetes with A1C at 6. Not on any medication for this. Lab Results   Component Value Date/Time    Hemoglobin A1c 6.0 (H) 12/08/2022 09:50 AM         Reviewed PmHx, RxHx, FmHx, SocHx, AllgHx and updated and dated in the chart. Review of Systems - negative except as listed above in the HPI    Objective:     Vitals:    03/15/23 0909 03/15/23 0914 03/15/23 0948   BP: (!) 154/82 (!) 143/77 (!) 148/94   Pulse: 66     Resp: 14     Temp: 97.5 °F (36.4 °C)     TempSrc: Oral     SpO2: 97%     Weight: 150 lb (68 kg)     Height: 5' 5\" (1.651 m)         Current Outpatient Medications   Medication Sig    losartan (COZAAR) 100 mg tablet Take 1 Tablet by mouth daily. alendronate (FOSAMAX) 70 mg tablet Take 1 Tablet by mouth every seven (7) days. metoprolol tartrate (LOPRESSOR) 50 mg tablet TAKE 1 TABLET BY MOUTH EVERY 12 HOURS    risperiDONE (RisperDAL) 1 mg tablet Take 1 mg by mouth two (2) times a day. lamoTRIgine (LaMICtal) 100 mg tablet Take 1 Tablet by mouth two (2) times a day. rosuvastatin (CRESTOR) 5 mg tablet Take 1 Tablet by mouth nightly. acetaminophen (TYLENOL) 325 mg tablet Take 650 mg by mouth every six (6) hours as needed. busPIRone (BUSPAR) 10 mg tablet Take 10 mg by mouth two (2) times a day. sertraline (ZOLOFT) 50 mg tablet Take  by mouth daily.  (Patient not taking: No sig reported)     No current facility-administered medications for this visit. Physical Examination: General appearance - alert, well appearing, and in no distress  Chest - clear to auscultation, no wheezes, rales or rhonchi, symmetric air entry  Heart - normal rate, regular rhythm, normal S1, S2, no murmurs, rubs, clicks or gallops      Assessment/ Plan:   Diagnoses and all orders for this visit:    1. Essential hypertension with goal blood pressure less than 225/20  -     METABOLIC PANEL, COMPREHENSIVE; Future  -     losartan (COZAAR) 100 mg tablet; Take 1 Tablet by mouth daily. 2. Prediabetes  -     HEMOGLOBIN A1C WITH EAG; Future    3. Hypercholesteremia  -     LIPID PANEL; Future  -     METABOLIC PANEL, COMPREHENSIVE; Future    4. Screening for viral disease  -     HEPATITIS C AB; Future     Follow-up and Dispositions    Return in about 4 weeks (around 4/12/2023), or if symptoms worsen or fail to improve. I have discussed the diagnosis with the patient and the intended plan as seen in the above orders. The patient has received an after-visit summary and questions were answered concerning future plans. Pt conveyed understanding of plan.     Medication Side Effects and Warnings were discussed with patient    An electronic signature was used to authenticate this note  Char Saint, DO

## 2023-03-16 LAB
ALBUMIN SERPL-MCNC: 3.5 G/DL (ref 3.5–5)
ALBUMIN/GLOB SERPL: 0.8 (ref 1.1–2.2)
ALP SERPL-CCNC: 127 U/L (ref 45–117)
ALT SERPL-CCNC: 14 U/L (ref 12–78)
ANION GAP SERPL CALC-SCNC: 4 MMOL/L (ref 5–15)
AST SERPL-CCNC: 9 U/L (ref 15–37)
BILIRUB SERPL-MCNC: 0.2 MG/DL (ref 0.2–1)
BUN SERPL-MCNC: 12 MG/DL (ref 6–20)
BUN/CREAT SERPL: 15 (ref 12–20)
CALCIUM SERPL-MCNC: 9.9 MG/DL (ref 8.5–10.1)
CHLORIDE SERPL-SCNC: 103 MMOL/L (ref 97–108)
CHOLEST SERPL-MCNC: 156 MG/DL
CO2 SERPL-SCNC: 28 MMOL/L (ref 21–32)
CREAT SERPL-MCNC: 0.81 MG/DL (ref 0.55–1.02)
EST. AVERAGE GLUCOSE BLD GHB EST-MCNC: 126 MG/DL
GLOBULIN SER CALC-MCNC: 4.5 G/DL (ref 2–4)
GLUCOSE SERPL-MCNC: 113 MG/DL (ref 65–100)
HBA1C MFR BLD: 6 % (ref 4–5.6)
HCV AB SERPL QL IA: NONREACTIVE
HDLC SERPL-MCNC: 50 MG/DL
HDLC SERPL: 3.1 (ref 0–5)
LDLC SERPL CALC-MCNC: 71.2 MG/DL (ref 0–100)
POTASSIUM SERPL-SCNC: 4.8 MMOL/L (ref 3.5–5.1)
PROT SERPL-MCNC: 8 G/DL (ref 6.4–8.2)
SODIUM SERPL-SCNC: 135 MMOL/L (ref 136–145)
TRIGL SERPL-MCNC: 174 MG/DL (ref ?–150)
VLDLC SERPL CALC-MCNC: 34.8 MG/DL

## 2023-03-17 NOTE — PROGRESS NOTES
Her A1c is stable. Cholesterol is much much better. Continue with the rosuvastatin 5 mg nightly. We can recheck labs in 6 months. I will see you at the end of April for a blood pressure check.

## 2023-04-18 ENCOUNTER — DOCUMENTATION ONLY (OUTPATIENT)
Dept: FAMILY MEDICINE CLINIC | Age: 68
End: 2023-04-18

## 2023-04-18 NOTE — PROGRESS NOTES
[facility] letter Re: mammogram was put on ROQUE Mccabe's desk (covering paperwork for Reena Chang)

## 2023-04-26 ENCOUNTER — OFFICE VISIT (OUTPATIENT)
Dept: FAMILY MEDICINE CLINIC | Age: 68
End: 2023-04-26
Payer: MEDICARE

## 2023-04-26 VITALS
TEMPERATURE: 97.6 F | DIASTOLIC BLOOD PRESSURE: 76 MMHG | HEART RATE: 63 BPM | BODY MASS INDEX: 25.33 KG/M2 | WEIGHT: 152 LBS | HEIGHT: 65 IN | RESPIRATION RATE: 16 BRPM | OXYGEN SATURATION: 96 % | SYSTOLIC BLOOD PRESSURE: 138 MMHG

## 2023-04-26 DIAGNOSIS — I10 ESSENTIAL HYPERTENSION WITH GOAL BLOOD PRESSURE LESS THAN 130/80: ICD-10-CM

## 2023-04-26 PROCEDURE — G8536 NO DOC ELDER MAL SCRN: HCPCS | Performed by: FAMILY MEDICINE

## 2023-04-26 PROCEDURE — 99213 OFFICE O/P EST LOW 20 MIN: CPT | Performed by: FAMILY MEDICINE

## 2023-04-26 PROCEDURE — G8399 PT W/DXA RESULTS DOCUMENT: HCPCS | Performed by: FAMILY MEDICINE

## 2023-04-26 PROCEDURE — 1123F ACP DISCUSS/DSCN MKR DOCD: CPT | Performed by: FAMILY MEDICINE

## 2023-04-26 PROCEDURE — 3017F COLORECTAL CA SCREEN DOC REV: CPT | Performed by: FAMILY MEDICINE

## 2023-04-26 PROCEDURE — G9717 DOC PT DX DEP/BP F/U NT REQ: HCPCS | Performed by: FAMILY MEDICINE

## 2023-04-26 PROCEDURE — G9899 SCRN MAM PERF RSLTS DOC: HCPCS | Performed by: FAMILY MEDICINE

## 2023-04-26 PROCEDURE — 3075F SYST BP GE 130 - 139MM HG: CPT | Performed by: FAMILY MEDICINE

## 2023-04-26 PROCEDURE — 3078F DIAST BP <80 MM HG: CPT | Performed by: FAMILY MEDICINE

## 2023-04-26 PROCEDURE — 1090F PRES/ABSN URINE INCON ASSESS: CPT | Performed by: FAMILY MEDICINE

## 2023-04-26 PROCEDURE — 1101F PT FALLS ASSESS-DOCD LE1/YR: CPT | Performed by: FAMILY MEDICINE

## 2023-04-26 PROCEDURE — G8417 CALC BMI ABV UP PARAM F/U: HCPCS | Performed by: FAMILY MEDICINE

## 2023-04-26 PROCEDURE — G8427 DOCREV CUR MEDS BY ELIG CLIN: HCPCS | Performed by: FAMILY MEDICINE

## 2023-04-26 RX ORDER — LOSARTAN POTASSIUM 100 MG/1
100 TABLET ORAL DAILY
Qty: 90 TABLET | Refills: 1 | Status: SHIPPED | OUTPATIENT
Start: 2023-04-26

## 2023-04-26 NOTE — PATIENT INSTRUCTIONS
A Healthy Lifestyle: Care Instructions  A healthy lifestyle can help you feel good, have more energy, and stay at a weight that's healthy for you. You can share a healthy lifestyle with your friends and family. And you can do it on your own. Eat meals with your friends or family. You could try cooking together. Plan activities with other people. Go for a walk with a friend, try a free online fitness class, or join a sports league. Eat a variety of healthy foods. These include fruits, vegetables, whole grains, low-fat dairy, and lean protein. Choose healthy portions of food. You can use the Nutrition Facts label on food packages as a guide. Eat more fruits and vegetables. You could add vegetables to sandwiches or add fruit to cereal.   Drink water when you are thirsty. Limit soda, juice, and sports drinks. Try to exercise most days. Aim for at least 2½ hours of exercise each week. Keep moving. Work in the garden or take your dog on a walk. Use the stairs instead of the elevator. If you use tobacco or nicotine, try to quit. Ask your doctor about programs and medicines to help you quit. Limit alcohol. Men should have no more than 2 drinks a day. Women should have no more than 1. For some people, no alcohol is the best choice. Follow-up care is a key part of your treatment and safety. Be sure to make and go to all appointments, and call your doctor if you are having problems. It's also a good idea to know your test results and keep a list of the medicines you take. Where can you learn more? Go to http://www.gray.com/  Enter N249 in the search box to learn more about \"A Healthy Lifestyle: Care Instructions. \"  Current as of: November 14, 2022               Content Version: 13.6  © 3969-3328 [employer]. Care instructions adapted under license by [employer] (which disclaims liability or warranty for this information).  If you have questions about a medical condition or this instruction, always ask your healthcare professional. [place] [address] any warranty or liability for your use of this information.

## 2023-04-26 NOTE — PROGRESS NOTES
Progress Note    she is a 78y.o. year old female who presents for evalution. Subjective:     Pt here BP check and not having any side effects. BP is doing much better today. Reviewed PmHx, RxHx, FmHx, SocHx, AllgHx and updated and dated in the chart. Review of Systems - negative except as listed above in the HPI    Objective:     Vitals:    04/26/23 0820 04/26/23 0845   BP: 139/83 138/76   Pulse: 63    Resp: 16    Temp: 97.6 °F (36.4 °C)    TempSrc: Oral    SpO2: 96%    Weight: 152 lb (68.9 kg)    Height: 5' 5\" (1.651 m)        Current Outpatient Medications   Medication Sig    losartan (COZAAR) 100 mg tablet Take 1 Tablet by mouth daily. alendronate (FOSAMAX) 70 mg tablet Take 1 Tablet by mouth every seven (7) days. metoprolol tartrate (LOPRESSOR) 50 mg tablet TAKE 1 TABLET BY MOUTH EVERY 12 HOURS    risperiDONE (RisperDAL) 1 mg tablet Take 1 Tablet by mouth two (2) times a day. lamoTRIgine (LaMICtal) 100 mg tablet Take 1 Tablet by mouth two (2) times a day. rosuvastatin (CRESTOR) 5 mg tablet Take 1 Tablet by mouth nightly. acetaminophen (TYLENOL) 325 mg tablet Take 2 Tablets by mouth every six (6) hours as needed. busPIRone (BUSPAR) 10 mg tablet Take 1 Tablet by mouth two (2) times a day. sertraline (ZOLOFT) 50 mg tablet Take  by mouth daily. (Patient not taking: Reported on 1/12/2023)     No current facility-administered medications for this visit. Physical Examination: General appearance - alert, well appearing, and in no distress  Chest - clear to auscultation, no wheezes, rales or rhonchi, symmetric air entry  Heart - normal rate, regular rhythm, normal S1, S2, no murmurs, rubs, clicks or gallops      Assessment/ Plan:   Diagnoses and all orders for this visit:    1. Essential hypertension with goal blood pressure less than 130/80  -     losartan (COZAAR) 100 mg tablet; Take 1 Tablet by mouth daily.        Follow-up and Dispositions    Return in about 5 months (around 9/26/2023), or if symptoms worsen or fail to improve. I have discussed the diagnosis with the patient and the intended plan as seen in the above orders. The patient has received an after-visit summary and questions were answered concerning future plans. Pt conveyed understanding of plan.     Medication Side Effects and Warnings were discussed with patient    An electronic signature was used to authenticate this note  Kaz Lewis,

## 2023-04-26 NOTE — PROGRESS NOTES
Chief Complaint   Patient presents with    Blood Pressure Check     Patient presents in office today for BP check. No concerns. 1. Have you been to the ER, urgent care clinic since your last visit? Hospitalized since your last visit? No    2. Have you seen or consulted any other health care providers outside of the [address] [place] [address] since your last visit? Include any pap smears or colon screening.  No    Learning Assessment 8/12/2019   PRIMARY LEARNER Patient   HIGHEST LEVEL OF EDUCATION - PRIMARY LEARNER  GRADUATED HIGH SCHOOL OR GED   BARRIERS PRIMARY LEARNER -   CO-LEARNER CAREGIVER -   PRIMARY LANGUAGE ENGLISH   LEARNER PREFERENCE PRIMARY VIDEOS     -   ANSWERED BY [address]

## 2023-04-29 DIAGNOSIS — I10 ESSENTIAL HYPERTENSION WITH GOAL BLOOD PRESSURE LESS THAN 130/80: ICD-10-CM

## 2023-05-02 RX ORDER — METOPROLOL TARTRATE 50 MG/1
TABLET ORAL
Qty: 180 TABLET | Refills: 0 | Status: SHIPPED | OUTPATIENT
Start: 2023-05-02

## 2023-06-06 RX ORDER — ROSUVASTATIN CALCIUM 5 MG/1
TABLET, COATED ORAL NIGHTLY
Qty: 90 TABLET | Refills: 0 | Status: SHIPPED | OUTPATIENT
Start: 2023-06-06

## 2023-07-21 RX ORDER — METOPROLOL TARTRATE 50 MG/1
TABLET, FILM COATED ORAL
Qty: 180 TABLET | Refills: 0 | Status: SHIPPED | OUTPATIENT
Start: 2023-07-21

## 2023-08-29 ENCOUNTER — TELEMEDICINE (OUTPATIENT)
Age: 68
End: 2023-08-29
Payer: MEDICARE

## 2023-08-29 DIAGNOSIS — K59.04 CHRONIC IDIOPATHIC CONSTIPATION: Primary | ICD-10-CM

## 2023-08-29 PROCEDURE — 3017F COLORECTAL CA SCREEN DOC REV: CPT | Performed by: FAMILY MEDICINE

## 2023-08-29 PROCEDURE — 99213 OFFICE O/P EST LOW 20 MIN: CPT | Performed by: FAMILY MEDICINE

## 2023-08-29 PROCEDURE — G8419 CALC BMI OUT NRM PARAM NOF/U: HCPCS | Performed by: FAMILY MEDICINE

## 2023-08-29 PROCEDURE — 1123F ACP DISCUSS/DSCN MKR DOCD: CPT | Performed by: FAMILY MEDICINE

## 2023-08-29 PROCEDURE — G8428 CUR MEDS NOT DOCUMENT: HCPCS | Performed by: FAMILY MEDICINE

## 2023-08-29 PROCEDURE — 4004F PT TOBACCO SCREEN RCVD TLK: CPT | Performed by: FAMILY MEDICINE

## 2023-08-29 PROCEDURE — G8399 PT W/DXA RESULTS DOCUMENT: HCPCS | Performed by: FAMILY MEDICINE

## 2023-08-29 PROCEDURE — 1090F PRES/ABSN URINE INCON ASSESS: CPT | Performed by: FAMILY MEDICINE

## 2023-08-29 RX ORDER — LACTULOSE 10 G/15ML
10 SOLUTION ORAL 2 TIMES DAILY
Qty: 946 ML | Refills: 5 | Status: SHIPPED | OUTPATIENT
Start: 2023-08-29

## 2023-08-29 SDOH — ECONOMIC STABILITY: FOOD INSECURITY: WITHIN THE PAST 12 MONTHS, THE FOOD YOU BOUGHT JUST DIDN'T LAST AND YOU DIDN'T HAVE MONEY TO GET MORE.: NEVER TRUE

## 2023-08-29 SDOH — ECONOMIC STABILITY: INCOME INSECURITY: HOW HARD IS IT FOR YOU TO PAY FOR THE VERY BASICS LIKE FOOD, HOUSING, MEDICAL CARE, AND HEATING?: NOT HARD AT ALL

## 2023-08-29 SDOH — ECONOMIC STABILITY: FOOD INSECURITY: WITHIN THE PAST 12 MONTHS, YOU WORRIED THAT YOUR FOOD WOULD RUN OUT BEFORE YOU GOT MONEY TO BUY MORE.: NEVER TRUE

## 2023-08-29 SDOH — ECONOMIC STABILITY: HOUSING INSECURITY
IN THE LAST 12 MONTHS, WAS THERE A TIME WHEN YOU DID NOT HAVE A STEADY PLACE TO SLEEP OR SLEPT IN A SHELTER (INCLUDING NOW)?: NO

## 2023-08-29 NOTE — PROGRESS NOTES
Progress Note    she is a 78y.o. year old female who presents for evaluation. Subjective:     Pt having issues with constipation. She has taken 2 chocolate squares of ex lax and states \"just making a little noise but no BM yet\", took them last night. Has not tried miralax or colace. Reviewed PmHx, RxHx, FmHx, SocHx, AllgHx and updated and dated in the chart. Review of Systems - negative except as listed above in the HPI    Objective: There were no vitals filed for this visit. Current Outpatient Medications   Medication Sig    lactulose (CHRONULAC) 10 GM/15ML solution Take 15 mLs by mouth 2 times daily    metoprolol tartrate (LOPRESSOR) 50 MG tablet TAKE 1 TABLET BY MOUTH EVERY 12 HOURS    rosuvastatin (CRESTOR) 5 MG tablet Take 1 tablet by mouth nightly    acetaminophen (TYLENOL) 325 MG tablet Take 2 tablets by mouth every 6 hours as needed    alendronate (FOSAMAX) 70 MG tablet Take 1 tablet by mouth every 7 days    busPIRone (BUSPAR) 10 MG tablet Take 1 tablet by mouth 2 times daily    lamoTRIgine (LAMICTAL) 100 MG tablet Take 1 tablet by mouth 2 times daily    losartan (COZAAR) 100 MG tablet Take 1 tablet by mouth daily    risperiDONE (RISPERDAL) 1 MG tablet Take 1 tablet by mouth 2 times daily    sertraline (ZOLOFT) 50 MG tablet Take by mouth daily (Patient not taking: Reported on 8/29/2023)     No current facility-administered medications for this visit. Physical Examination: General appearance - alert, well appearing, and in no distress  Mental status - alert, oriented to person, place, and time      Assessment/ Plan:   Lizeth Mancilla was seen today for constipation. Diagnoses and all orders for this visit:    Chronic idiopathic constipation  -     lactulose (CHRONULAC) 10 GM/15ML solution; Take 15 mLs by mouth 2 times daily    Ensure good hydration and fiber intake.   Discussed when she starts going to the bathroom more regularly she could back off to once daily for the lactulose if

## 2023-08-30 RX ORDER — ROSUVASTATIN CALCIUM 5 MG/1
TABLET, COATED ORAL NIGHTLY
Qty: 90 TABLET | Refills: 3 | Status: SHIPPED | OUTPATIENT
Start: 2023-08-30

## 2023-09-02 NOTE — TELEPHONE ENCOUNTER
LOV 8/02/17    Patient's medication updated by Gabe Sheppard NP to take 2 0.5mg per day.  But needs to be filled by original provider    Patient has 3 doses left
132

## 2023-09-27 ENCOUNTER — OFFICE VISIT (OUTPATIENT)
Age: 68
End: 2023-09-27
Payer: MEDICARE

## 2023-09-27 ENCOUNTER — HOSPITAL ENCOUNTER (OUTPATIENT)
Facility: HOSPITAL | Age: 68
Discharge: HOME | End: 2023-09-30
Attending: FAMILY MEDICINE
Payer: MEDICARE

## 2023-09-27 VITALS
TEMPERATURE: 97.6 F | HEIGHT: 65 IN | OXYGEN SATURATION: 94 % | DIASTOLIC BLOOD PRESSURE: 88 MMHG | SYSTOLIC BLOOD PRESSURE: 138 MMHG | HEART RATE: 69 BPM | WEIGHT: 147 LBS | RESPIRATION RATE: 16 BRPM | BODY MASS INDEX: 24.49 KG/M2

## 2023-09-27 DIAGNOSIS — K59.04 CHRONIC IDIOPATHIC CONSTIPATION: ICD-10-CM

## 2023-09-27 DIAGNOSIS — R10.9 ABDOMINAL DISCOMFORT: Primary | ICD-10-CM

## 2023-09-27 DIAGNOSIS — R10.9 ABDOMINAL DISCOMFORT: ICD-10-CM

## 2023-09-27 PROCEDURE — G8399 PT W/DXA RESULTS DOCUMENT: HCPCS | Performed by: FAMILY MEDICINE

## 2023-09-27 PROCEDURE — G8427 DOCREV CUR MEDS BY ELIG CLIN: HCPCS | Performed by: FAMILY MEDICINE

## 2023-09-27 PROCEDURE — 3075F SYST BP GE 130 - 139MM HG: CPT | Performed by: FAMILY MEDICINE

## 2023-09-27 PROCEDURE — 99213 OFFICE O/P EST LOW 20 MIN: CPT | Performed by: FAMILY MEDICINE

## 2023-09-27 PROCEDURE — 3079F DIAST BP 80-89 MM HG: CPT | Performed by: FAMILY MEDICINE

## 2023-09-27 PROCEDURE — 74019 RADEX ABDOMEN 2 VIEWS: CPT

## 2023-09-27 PROCEDURE — 1123F ACP DISCUSS/DSCN MKR DOCD: CPT | Performed by: FAMILY MEDICINE

## 2023-09-27 PROCEDURE — 4004F PT TOBACCO SCREEN RCVD TLK: CPT | Performed by: FAMILY MEDICINE

## 2023-09-27 PROCEDURE — G8420 CALC BMI NORM PARAMETERS: HCPCS | Performed by: FAMILY MEDICINE

## 2023-09-27 PROCEDURE — 3017F COLORECTAL CA SCREEN DOC REV: CPT | Performed by: FAMILY MEDICINE

## 2023-09-27 PROCEDURE — 1090F PRES/ABSN URINE INCON ASSESS: CPT | Performed by: FAMILY MEDICINE

## 2023-09-27 NOTE — PROGRESS NOTES
Chief Complaint   Patient presents with    Follow-up     Patient presents in office today for f/u and fasting labs. Has c/o sleeping all the time. Also has c/o not eating much. No other concerns. 1. \"Have you been to the ER, urgent care clinic since your last visit? Hospitalized since your last visit? \" No    2. \"Have you seen or consulted any other health care providers outside of the [address] [place] [address] since your last visit? \" No     3. For patients aged 43-73: Has the patient had a colonoscopy / FIT/ Cologuard? Yes - no Care Gap present      If the patient is female:    4. For patients aged 43-66: Has the patient had a mammogram within the past 2 years? Yes - no Care Gap present      5. For patients aged 21-65: Has the patient had a pap smear?  NA - based on age or sex

## 2023-10-24 ENCOUNTER — APPOINTMENT (OUTPATIENT)
Facility: HOSPITAL | Age: 68
DRG: 435 | End: 2023-10-24
Payer: MEDICARE

## 2023-10-24 ENCOUNTER — HOSPITAL ENCOUNTER (INPATIENT)
Facility: HOSPITAL | Age: 68
LOS: 3 days | Discharge: HOME HEALTH | DRG: 435 | End: 2023-10-27
Attending: STUDENT IN AN ORGANIZED HEALTH CARE EDUCATION/TRAINING PROGRAM | Admitting: STUDENT IN AN ORGANIZED HEALTH CARE EDUCATION/TRAINING PROGRAM
Payer: MEDICARE

## 2023-10-24 ENCOUNTER — TELEPHONE (OUTPATIENT)
Age: 68
End: 2023-10-24

## 2023-10-24 PROBLEM — R63.0 ANOREXIA: Status: ACTIVE | Noted: 2023-10-24

## 2023-10-24 LAB
ALBUMIN SERPL-MCNC: 2.4 G/DL (ref 3.5–5)
ALBUMIN/GLOB SERPL: 0.5 (ref 1.1–2.2)
ALP SERPL-CCNC: 552 U/L (ref 45–117)
ALT SERPL-CCNC: 105 U/L (ref 12–78)
AMMONIA PLAS-SCNC: 14 UMOL/L
ANION GAP SERPL CALC-SCNC: 10 MMOL/L (ref 5–15)
APPEARANCE UR: CLEAR
AST SERPL-CCNC: 109 U/L (ref 15–37)
BACTERIA URNS QL MICRO: NEGATIVE /HPF
BASOPHILS # BLD: 0.1 K/UL (ref 0–0.1)
BASOPHILS NFR BLD: 1 % (ref 0–1)
BILIRUB SERPL-MCNC: 2.6 MG/DL (ref 0.2–1)
BILIRUB UR QL CFM: POSITIVE
BUN SERPL-MCNC: 9 MG/DL (ref 6–20)
BUN/CREAT SERPL: 12 (ref 12–20)
CALCIUM SERPL-MCNC: 8.9 MG/DL (ref 8.5–10.1)
CHLORIDE SERPL-SCNC: 102 MMOL/L (ref 97–108)
CO2 SERPL-SCNC: 24 MMOL/L (ref 21–32)
COLOR UR: ABNORMAL
COMMENT:: NORMAL
CREAT SERPL-MCNC: 0.77 MG/DL (ref 0.55–1.02)
DIFFERENTIAL METHOD BLD: ABNORMAL
EOSINOPHIL # BLD: 0.1 K/UL (ref 0–0.4)
EOSINOPHIL NFR BLD: 1 % (ref 0–7)
EPITH CASTS URNS QL MICRO: ABNORMAL /LPF
ERYTHROCYTE [DISTWIDTH] IN BLOOD BY AUTOMATED COUNT: 17.3 % (ref 11.5–14.5)
GLOBULIN SER CALC-MCNC: 5.1 G/DL (ref 2–4)
GLUCOSE SERPL-MCNC: 134 MG/DL (ref 65–100)
GLUCOSE UR STRIP.AUTO-MCNC: NEGATIVE MG/DL
HCT VFR BLD AUTO: 37.7 % (ref 35–47)
HGB BLD-MCNC: 12.6 G/DL (ref 11.5–16)
HGB UR QL STRIP: NEGATIVE
IMM GRANULOCYTES # BLD AUTO: 0.1 K/UL (ref 0–0.04)
IMM GRANULOCYTES NFR BLD AUTO: 1 % (ref 0–0.5)
KETONES UR QL STRIP.AUTO: 80 MG/DL
LACTATE SERPL-SCNC: 1 MMOL/L (ref 0.4–2)
LEUKOCYTE ESTERASE UR QL STRIP.AUTO: ABNORMAL
LYMPHOCYTES # BLD: 2.6 K/UL (ref 0.8–3.5)
LYMPHOCYTES NFR BLD: 23 % (ref 12–49)
MCH RBC QN AUTO: 27.8 PG (ref 26–34)
MCHC RBC AUTO-ENTMCNC: 33.4 G/DL (ref 30–36.5)
MCV RBC AUTO: 83 FL (ref 80–99)
MONOCYTES # BLD: 1 K/UL (ref 0–1)
MONOCYTES NFR BLD: 9 % (ref 5–13)
NEUTS SEG # BLD: 7.3 K/UL (ref 1.8–8)
NEUTS SEG NFR BLD: 65 % (ref 32–75)
NITRITE UR QL STRIP.AUTO: POSITIVE
NRBC # BLD: 0 K/UL (ref 0–0.01)
NRBC BLD-RTO: 0 PER 100 WBC
PH UR STRIP: 6 (ref 5–8)
PLATELET # BLD AUTO: 449 K/UL (ref 150–400)
PMV BLD AUTO: 10.6 FL (ref 8.9–12.9)
POTASSIUM SERPL-SCNC: 3 MMOL/L (ref 3.5–5.1)
PROT SERPL-MCNC: 7.5 G/DL (ref 6.4–8.2)
PROT UR STRIP-MCNC: 100 MG/DL
RBC # BLD AUTO: 4.54 M/UL (ref 3.8–5.2)
RBC #/AREA URNS HPF: ABNORMAL /HPF (ref 0–5)
SODIUM SERPL-SCNC: 136 MMOL/L (ref 136–145)
SP GR UR REFRACTOMETRY: 1.02 (ref 1–1.03)
SPECIMEN HOLD: NORMAL
TROPONIN I SERPL HS-MCNC: 15 NG/L (ref 0–51)
TROPONIN I SERPL HS-MCNC: 16 NG/L (ref 0–51)
URINE CULTURE IF INDICATED: ABNORMAL
UROBILINOGEN UR QL STRIP.AUTO: 4 EU/DL (ref 0.2–1)
WBC # BLD AUTO: 11 K/UL (ref 3.6–11)
WBC URNS QL MICRO: ABNORMAL /HPF (ref 0–4)

## 2023-10-24 PROCEDURE — 93005 ELECTROCARDIOGRAM TRACING: CPT

## 2023-10-24 PROCEDURE — 82140 ASSAY OF AMMONIA: CPT

## 2023-10-24 PROCEDURE — 6360000004 HC RX CONTRAST MEDICATION

## 2023-10-24 PROCEDURE — 87086 URINE CULTURE/COLONY COUNT: CPT

## 2023-10-24 PROCEDURE — 87040 BLOOD CULTURE FOR BACTERIA: CPT

## 2023-10-24 PROCEDURE — 1100000000 HC RM PRIVATE

## 2023-10-24 PROCEDURE — 84484 ASSAY OF TROPONIN QUANT: CPT

## 2023-10-24 PROCEDURE — 81001 URINALYSIS AUTO W/SCOPE: CPT

## 2023-10-24 PROCEDURE — 99285 EMERGENCY DEPT VISIT HI MDM: CPT

## 2023-10-24 PROCEDURE — 80053 COMPREHEN METABOLIC PANEL: CPT

## 2023-10-24 PROCEDURE — 71045 X-RAY EXAM CHEST 1 VIEW: CPT

## 2023-10-24 PROCEDURE — 85025 COMPLETE CBC W/AUTO DIFF WBC: CPT

## 2023-10-24 PROCEDURE — 74177 CT ABD & PELVIS W/CONTRAST: CPT

## 2023-10-24 PROCEDURE — 83605 ASSAY OF LACTIC ACID: CPT

## 2023-10-24 PROCEDURE — 80175 DRUG SCREEN QUAN LAMOTRIGINE: CPT

## 2023-10-24 PROCEDURE — 2580000003 HC RX 258

## 2023-10-24 PROCEDURE — 36415 COLL VENOUS BLD VENIPUNCTURE: CPT

## 2023-10-24 PROCEDURE — 6360000002 HC RX W HCPCS

## 2023-10-24 RX ORDER — POLYETHYLENE GLYCOL 3350 17 G/17G
17 POWDER, FOR SOLUTION ORAL DAILY PRN
Status: DISCONTINUED | OUTPATIENT
Start: 2023-10-24 | End: 2023-10-27 | Stop reason: HOSPADM

## 2023-10-24 RX ORDER — RISPERIDONE 1 MG/1
1 TABLET ORAL 2 TIMES DAILY
Status: DISCONTINUED | OUTPATIENT
Start: 2023-10-24 | End: 2023-10-27 | Stop reason: HOSPADM

## 2023-10-24 RX ORDER — ACETAMINOPHEN 325 MG/1
650 TABLET ORAL EVERY 6 HOURS PRN
Status: CANCELLED | OUTPATIENT
Start: 2023-10-24

## 2023-10-24 RX ORDER — BUSPIRONE HYDROCHLORIDE 5 MG/1
10 TABLET ORAL 2 TIMES DAILY
Status: DISCONTINUED | OUTPATIENT
Start: 2023-10-24 | End: 2023-10-27 | Stop reason: HOSPADM

## 2023-10-24 RX ORDER — ROSUVASTATIN CALCIUM 10 MG/1
5 TABLET, COATED ORAL NIGHTLY
Status: DISCONTINUED | OUTPATIENT
Start: 2023-10-24 | End: 2023-10-27 | Stop reason: HOSPADM

## 2023-10-24 RX ORDER — 0.9 % SODIUM CHLORIDE 0.9 %
500 INTRAVENOUS SOLUTION INTRAVENOUS ONCE
Status: COMPLETED | OUTPATIENT
Start: 2023-10-24 | End: 2023-10-24

## 2023-10-24 RX ORDER — SODIUM CHLORIDE 9 MG/ML
INJECTION, SOLUTION INTRAVENOUS PRN
Status: DISCONTINUED | OUTPATIENT
Start: 2023-10-24 | End: 2023-10-27 | Stop reason: HOSPADM

## 2023-10-24 RX ORDER — ENOXAPARIN SODIUM 100 MG/ML
40 INJECTION SUBCUTANEOUS EVERY EVENING
Status: DISCONTINUED | OUTPATIENT
Start: 2023-10-25 | End: 2023-10-27 | Stop reason: HOSPADM

## 2023-10-24 RX ORDER — POTASSIUM CHLORIDE 7.45 MG/ML
10 INJECTION INTRAVENOUS
Status: COMPLETED | OUTPATIENT
Start: 2023-10-24 | End: 2023-10-25

## 2023-10-24 RX ORDER — HYDRALAZINE HYDROCHLORIDE 20 MG/ML
5 INJECTION INTRAMUSCULAR; INTRAVENOUS EVERY 6 HOURS PRN
Status: DISCONTINUED | OUTPATIENT
Start: 2023-10-24 | End: 2023-10-27 | Stop reason: HOSPADM

## 2023-10-24 RX ORDER — SODIUM CHLORIDE 9 MG/ML
INJECTION, SOLUTION INTRAVENOUS CONTINUOUS
Status: DISCONTINUED | OUTPATIENT
Start: 2023-10-24 | End: 2023-10-27

## 2023-10-24 RX ORDER — HYDROXYZINE HYDROCHLORIDE 10 MG/1
25 TABLET, FILM COATED ORAL DAILY
Status: DISCONTINUED | OUTPATIENT
Start: 2023-10-25 | End: 2023-10-27 | Stop reason: HOSPADM

## 2023-10-24 RX ORDER — LOSARTAN POTASSIUM 50 MG/1
100 TABLET ORAL DAILY
Status: DISCONTINUED | OUTPATIENT
Start: 2023-10-25 | End: 2023-10-27 | Stop reason: HOSPADM

## 2023-10-24 RX ORDER — LAMOTRIGINE 100 MG/1
100 TABLET ORAL DAILY
Status: DISCONTINUED | OUTPATIENT
Start: 2023-10-25 | End: 2023-10-27 | Stop reason: HOSPADM

## 2023-10-24 RX ORDER — METOPROLOL TARTRATE 50 MG/1
50 TABLET, FILM COATED ORAL EVERY 12 HOURS
Status: DISCONTINUED | OUTPATIENT
Start: 2023-10-24 | End: 2023-10-27 | Stop reason: HOSPADM

## 2023-10-24 RX ORDER — ONDANSETRON 2 MG/ML
4 INJECTION INTRAMUSCULAR; INTRAVENOUS EVERY 6 HOURS PRN
Status: DISCONTINUED | OUTPATIENT
Start: 2023-10-24 | End: 2023-10-27 | Stop reason: HOSPADM

## 2023-10-24 RX ORDER — SODIUM CHLORIDE 0.9 % (FLUSH) 0.9 %
5-40 SYRINGE (ML) INJECTION EVERY 12 HOURS SCHEDULED
Status: DISCONTINUED | OUTPATIENT
Start: 2023-10-24 | End: 2023-10-27 | Stop reason: HOSPADM

## 2023-10-24 RX ORDER — SODIUM CHLORIDE 0.9 % (FLUSH) 0.9 %
5-40 SYRINGE (ML) INJECTION PRN
Status: DISCONTINUED | OUTPATIENT
Start: 2023-10-24 | End: 2023-10-27 | Stop reason: HOSPADM

## 2023-10-24 RX ADMIN — POTASSIUM CHLORIDE 10 MEQ: 7.45 INJECTION INTRAVENOUS at 21:06

## 2023-10-24 RX ADMIN — IOPAMIDOL 100 ML: 755 INJECTION, SOLUTION INTRAVENOUS at 18:47

## 2023-10-24 RX ADMIN — SODIUM CHLORIDE 500 ML: 900 INJECTION, SOLUTION INTRAVENOUS at 18:00

## 2023-10-24 ASSESSMENT — ENCOUNTER SYMPTOMS
SHORTNESS OF BREATH: 0
DIARRHEA: 0
CONSTIPATION: 1
COUGH: 0
CHEST TIGHTNESS: 0
NAUSEA: 1
BLOOD IN STOOL: 0
VOMITING: 0
NAUSEA: 0
COLOR CHANGE: 1
ABDOMINAL PAIN: 0
ABDOMINAL PAIN: 1

## 2023-10-24 ASSESSMENT — PAIN - FUNCTIONAL ASSESSMENT: PAIN_FUNCTIONAL_ASSESSMENT: NONE - DENIES PAIN

## 2023-10-24 NOTE — TELEPHONE ENCOUNTER
Patients  calling in as the patient   was only able to do 1/3 of one bottle of the enema that was prescribed. Patient is still impacted and refusing to eat. Patients  would like   to know if an enema can be done in the office or if she can be sent to [facility] to do an enema. Patient is getting weak and he is unsure what else   to do. She is not using the restroom, he stated she has only gone like 3   times since the enema. Please call to discuss.

## 2023-10-24 NOTE — TELEPHONE ENCOUNTER
Spoke with patient's  Se anton and instructed per Dr. Rhina Mendoza to take patient to ER. Patient will be going to [facility].

## 2023-10-24 NOTE — ED PROVIDER NOTES
All other components within normal limits   CULTURE, BLOOD 1   CULTURE, BLOOD 2   AMMONIA   LACTIC ACID   TROPONIN   EXTRA TUBES HOLD   LAMOTRIGINE LEVEL   LACTIC ACID       All other labs were within normal range or not returned as of this dictation. EMERGENCY DEPARTMENT COURSE and DIFFERENTIAL DIAGNOSIS/MDM:   Vitals:    Vitals:    10/24/23 1740 10/24/23 1756 10/24/23 1815 10/24/23 1825   BP: 116/68  (!) 151/76 99/71   Pulse: 78  72 64   Resp: 16  18 21   Temp:       TempSrc:       SpO2: 99%  96% 97%   Weight:  59 kg (130 lb 1.6 oz)             Medical Decision Making  Amount and/or Complexity of Data Reviewed  Labs: ordered. Decision-making details documented in ED Course. Radiology: ordered. ECG/medicine tests: ordered. Risk  Prescription drug management. Decision regarding hospitalization. Pt is a 78 y.o. F with PMHx who presents to the ED for CC of constipation and weakness. She has also had 25lb unintentional weight loss over the last 5 weeks. On exam pt is frail appearing, oriented to person and place only, and jaundiced. Labs are significant for mild hypokalemia, elevated transaminases, and elevated bilirubin. Pt will require admission for further workup.        REASSESSMENT     ED Course as of 10/24/23 1916   Tue Oct 24, 2023   1827 CBC with Auto Differential(!):    WBC 11.0   RBC 4.54   Hemoglobin Quant 12.6   Hematocrit 37.7   MCV 83.0   MCH 27.8   MCHC 33.4   RDW 17.3(!)   Platelet Count 024(!)   MPV 10.6   Nucleated Red Blood Cells 0.0   Nucleated Red Blood Cells 0.00   Neutrophils % 65   Lymphocyte % 23   Monocytes % 9   Eosinophils % 1   Basophils % 1   Immature Granulocytes 1(!)   Neutrophils Absolute 7.3   Lymphocytes Absolute 2.6   Monocytes Absolute 1.0   Eosinophils Absolute 0.1   Basophils Absolute 0.1   Absolute Immature Granulocyte 0.1(!)   Differential Type AUTOMATED [SL]   1827 Troponin:    Troponin, High Sensitivity 15 [SL]   1827 Ammonia:    AMMONIA, PLASMA, [id number] [SL]

## 2023-10-24 NOTE — ED TRIAGE NOTES
Patient arrived ambulatory with family via [place]. Per , patient has 25lb weight loss over 5 weeks, constipation, poor appetite, lethargic. Per , patient given enema but only able to complete 1/3 bottle. Denies abd pain, nausea currently.

## 2023-10-24 NOTE — TELEPHONE ENCOUNTER
----- Message from Kobe Carmen sent at 10/24/2023  8:54 AM EDT -----  Subject: Referral Request    Reason for referral request? Patients  calling in as the patient   was only able to do 1/3 of one bottle of the enema that was prescribed. Patient is still impacted and refusing to eat. Patients  would like   to know if an enema can be done in the office or if she can be sent to [facility] to do an enema. Patient is getting weak and he is unsure what else   to do. She is not using the restroom, he stated she has only gone like 3   times since the enema. Please call to discuss. Provider patient wants to be referred to(if known):     Provider Phone Number(if known):     Additional Information for Provider?   ---------------------------------------------------------------------------  --------------  [address]    [phone]; [place] to leave message on voicemail  ---------------------------------------------------------------------------  --------------

## 2023-10-25 ENCOUNTER — APPOINTMENT (OUTPATIENT)
Facility: HOSPITAL | Age: 68
DRG: 435 | End: 2023-10-25
Payer: MEDICARE

## 2023-10-25 PROBLEM — K86.89 PANCREATIC MASS: Status: ACTIVE | Noted: 2023-10-25

## 2023-10-25 PROBLEM — N30.00 ACUTE CYSTITIS WITHOUT HEMATURIA: Status: ACTIVE | Noted: 2023-10-25

## 2023-10-25 PROBLEM — R63.4 ABNORMAL WEIGHT LOSS: Status: ACTIVE | Noted: 2023-10-25

## 2023-10-25 PROBLEM — K83.1 BILIARY OBSTRUCTION: Status: ACTIVE | Noted: 2023-10-25

## 2023-10-25 PROBLEM — Z72.0 TOBACCO USE: Status: ACTIVE | Noted: 2023-10-25

## 2023-10-25 PROBLEM — I71.40 ABDOMINAL AORTIC ANEURYSM (AAA) WITHOUT RUPTURE (HCC): Status: ACTIVE | Noted: 2023-10-25

## 2023-10-25 PROBLEM — E78.2 MIXED HYPERLIPIDEMIA: Status: ACTIVE | Noted: 2023-10-25

## 2023-10-25 LAB
ALBUMIN SERPL-MCNC: 1.8 G/DL (ref 3.5–5)
ALBUMIN/GLOB SERPL: 0.5 (ref 1.1–2.2)
ALP SERPL-CCNC: >2330 U/L (ref 45–117)
ALT SERPL-CCNC: 87 U/L (ref 12–78)
ANION GAP SERPL CALC-SCNC: 6 MMOL/L (ref 5–15)
AST SERPL-CCNC: 104 U/L (ref 15–37)
BILIRUB SERPL-MCNC: 3.2 MG/DL (ref 0.2–1)
BUN SERPL-MCNC: 8 MG/DL (ref 6–20)
BUN/CREAT SERPL: 14 (ref 12–20)
CALCIUM SERPL-MCNC: 7.7 MG/DL (ref 8.5–10.1)
CHLORIDE SERPL-SCNC: 108 MMOL/L (ref 97–108)
CO2 SERPL-SCNC: 21 MMOL/L (ref 21–32)
CREAT SERPL-MCNC: 0.56 MG/DL (ref 0.55–1.02)
EKG ATRIAL RATE: 76 BPM
EKG DIAGNOSIS: NORMAL
EKG P AXIS: 90 DEGREES
EKG P-R INTERVAL: 204 MS
EKG Q-T INTERVAL: 402 MS
EKG QRS DURATION: 110 MS
EKG QTC CALCULATION (BAZETT): 452 MS
EKG R AXIS: 38 DEGREES
EKG T AXIS: 126 DEGREES
EKG VENTRICULAR RATE: 76 BPM
ERYTHROCYTE [DISTWIDTH] IN BLOOD BY AUTOMATED COUNT: 17 % (ref 11.5–14.5)
GLOBULIN SER CALC-MCNC: 4 G/DL (ref 2–4)
GLUCOSE SERPL-MCNC: 88 MG/DL (ref 65–100)
HCT VFR BLD AUTO: 32 % (ref 35–47)
HGB BLD-MCNC: 10.6 G/DL (ref 11.5–16)
MAGNESIUM SERPL-MCNC: 1.7 MG/DL (ref 1.6–2.4)
MCH RBC QN AUTO: 27.9 PG (ref 26–34)
MCHC RBC AUTO-ENTMCNC: 33.1 G/DL (ref 30–36.5)
MCV RBC AUTO: 84.2 FL (ref 80–99)
NRBC # BLD: 0 K/UL (ref 0–0.01)
NRBC BLD-RTO: 0 PER 100 WBC
PLATELET # BLD AUTO: 293 K/UL (ref 150–400)
PMV BLD AUTO: 10.6 FL (ref 8.9–12.9)
POTASSIUM SERPL-SCNC: 3.3 MMOL/L (ref 3.5–5.1)
PROT SERPL-MCNC: 5.8 G/DL (ref 6.4–8.2)
RBC # BLD AUTO: 3.8 M/UL (ref 3.8–5.2)
SODIUM SERPL-SCNC: 135 MMOL/L (ref 136–145)
WBC # BLD AUTO: 9.8 K/UL (ref 3.6–11)

## 2023-10-25 PROCEDURE — 6370000000 HC RX 637 (ALT 250 FOR IP): Performed by: STUDENT IN AN ORGANIZED HEALTH CARE EDUCATION/TRAINING PROGRAM

## 2023-10-25 PROCEDURE — 83735 ASSAY OF MAGNESIUM: CPT

## 2023-10-25 PROCEDURE — 6360000002 HC RX W HCPCS

## 2023-10-25 PROCEDURE — 2580000003 HC RX 258

## 2023-10-25 PROCEDURE — 94761 N-INVAS EAR/PLS OXIMETRY MLT: CPT

## 2023-10-25 PROCEDURE — 85027 COMPLETE CBC AUTOMATED: CPT

## 2023-10-25 PROCEDURE — 97161 PT EVAL LOW COMPLEX 20 MIN: CPT

## 2023-10-25 PROCEDURE — 1100000003 HC PRIVATE W/ TELEMETRY

## 2023-10-25 PROCEDURE — 6360000002 HC RX W HCPCS: Performed by: STUDENT IN AN ORGANIZED HEALTH CARE EDUCATION/TRAINING PROGRAM

## 2023-10-25 PROCEDURE — 93010 ELECTROCARDIOGRAM REPORT: CPT | Performed by: INTERNAL MEDICINE

## 2023-10-25 PROCEDURE — 99223 1ST HOSP IP/OBS HIGH 75: CPT | Performed by: FAMILY MEDICINE

## 2023-10-25 PROCEDURE — 2580000003 HC RX 258: Performed by: STUDENT IN AN ORGANIZED HEALTH CARE EDUCATION/TRAINING PROGRAM

## 2023-10-25 PROCEDURE — 97165 OT EVAL LOW COMPLEX 30 MIN: CPT

## 2023-10-25 PROCEDURE — 36415 COLL VENOUS BLD VENIPUNCTURE: CPT

## 2023-10-25 PROCEDURE — 80053 COMPREHEN METABOLIC PANEL: CPT

## 2023-10-25 PROCEDURE — 71260 CT THORAX DX C+: CPT

## 2023-10-25 PROCEDURE — 97116 GAIT TRAINING THERAPY: CPT

## 2023-10-25 PROCEDURE — 6360000004 HC RX CONTRAST MEDICATION: Performed by: STUDENT IN AN ORGANIZED HEALTH CARE EDUCATION/TRAINING PROGRAM

## 2023-10-25 PROCEDURE — 97535 SELF CARE MNGMENT TRAINING: CPT

## 2023-10-25 RX ORDER — POTASSIUM CHLORIDE 7.45 MG/ML
INJECTION INTRAVENOUS
Status: COMPLETED
Start: 2023-10-25 | End: 2023-10-25

## 2023-10-25 RX ADMIN — POTASSIUM CHLORIDE 10 MEQ: 7.46 INJECTION, SOLUTION INTRAVENOUS at 04:33

## 2023-10-25 RX ADMIN — BUSPIRONE HYDROCHLORIDE 10 MG: 10 TABLET ORAL at 20:44

## 2023-10-25 RX ADMIN — SODIUM CHLORIDE, PRESERVATIVE FREE 10 ML: 5 INJECTION INTRAVENOUS at 09:57

## 2023-10-25 RX ADMIN — IOPAMIDOL 70 ML: 755 INJECTION, SOLUTION INTRAVENOUS at 21:07

## 2023-10-25 RX ADMIN — ROSUVASTATIN CALCIUM 5 MG: 10 TABLET, COATED ORAL at 00:21

## 2023-10-25 RX ADMIN — POTASSIUM CHLORIDE 10 MEQ: 7.45 INJECTION INTRAVENOUS at 04:33

## 2023-10-25 RX ADMIN — LAMOTRIGINE 100 MG: 100 TABLET ORAL at 09:56

## 2023-10-25 RX ADMIN — METOPROLOL TARTRATE 50 MG: 50 TABLET, FILM COATED ORAL at 00:20

## 2023-10-25 RX ADMIN — WATER 1000 MG: 1 INJECTION INTRAMUSCULAR; INTRAVENOUS; SUBCUTANEOUS at 20:49

## 2023-10-25 RX ADMIN — ROSUVASTATIN CALCIUM 5 MG: 10 TABLET, COATED ORAL at 20:43

## 2023-10-25 RX ADMIN — HYDROXYZINE HYDROCHLORIDE 25 MG: 10 TABLET ORAL at 09:57

## 2023-10-25 RX ADMIN — METOPROLOL TARTRATE 50 MG: 50 TABLET, FILM COATED ORAL at 09:56

## 2023-10-25 RX ADMIN — RISPERIDONE 1 MG: 1 TABLET ORAL at 20:44

## 2023-10-25 RX ADMIN — ENOXAPARIN SODIUM 40 MG: 100 INJECTION SUBCUTANEOUS at 18:36

## 2023-10-25 RX ADMIN — BUSPIRONE HYDROCHLORIDE 10 MG: 10 TABLET ORAL at 09:56

## 2023-10-25 RX ADMIN — POTASSIUM CHLORIDE 10 MEQ: 7.45 INJECTION INTRAVENOUS at 03:15

## 2023-10-25 RX ADMIN — SODIUM CHLORIDE, PRESERVATIVE FREE 10 ML: 5 INJECTION INTRAVENOUS at 00:46

## 2023-10-25 RX ADMIN — POTASSIUM CHLORIDE 10 MEQ: 7.45 INJECTION INTRAVENOUS at 00:22

## 2023-10-25 RX ADMIN — WATER 1000 MG: 1 INJECTION INTRAMUSCULAR; INTRAVENOUS; SUBCUTANEOUS at 00:21

## 2023-10-25 RX ADMIN — RISPERIDONE 1 MG: 1 TABLET ORAL at 00:47

## 2023-10-25 RX ADMIN — SODIUM CHLORIDE: 9 INJECTION, SOLUTION INTRAVENOUS at 12:03

## 2023-10-25 RX ADMIN — SODIUM CHLORIDE: 9 INJECTION, SOLUTION INTRAVENOUS at 00:19

## 2023-10-25 RX ADMIN — LOSARTAN POTASSIUM 100 MG: 50 TABLET, FILM COATED ORAL at 09:56

## 2023-10-25 RX ADMIN — METOPROLOL TARTRATE 50 MG: 50 TABLET, FILM COATED ORAL at 22:27

## 2023-10-25 RX ADMIN — POLYETHYLENE GLYCOL 3350 17 G: 17 POWDER, FOR SOLUTION ORAL at 00:21

## 2023-10-25 RX ADMIN — SODIUM CHLORIDE, PRESERVATIVE FREE 10 ML: 5 INJECTION INTRAVENOUS at 20:54

## 2023-10-25 RX ADMIN — RISPERIDONE 1 MG: 1 TABLET ORAL at 09:56

## 2023-10-25 RX ADMIN — POTASSIUM CHLORIDE 10 MEQ: 7.45 INJECTION INTRAVENOUS at 01:50

## 2023-10-25 RX ADMIN — BUSPIRONE HYDROCHLORIDE 10 MG: 10 TABLET ORAL at 00:20

## 2023-10-25 NOTE — CONSULTS
[address] NOTE              NAME:  Shaheen Kim   :   1955   MRN:   [id number]       Referring Physician:    Dr. Jerome Mullen Date:   10/25/2023 2:52 PM    Chief Complaint:    Constipation      History of Present Illness:    Patient is a 78 y.o. with hx of htn, bipolar 1 disorder, anemia, hld, osteoporosis who presented to ED yesterday for weakness, weight loss, lost appetite, constipation.  is caregiver and provides history. Reports has seen pcp twice over last month for constipation, tried lactulose then enema with few bowel movements and was advised to come to ED. They note 25lb weight loss over last few months   Lack of appetite. Patient has not c/o significant abdominal pain.  notes skin has looked a little yellow in the last few days     Notes has never had a colonoscopy but had a positive cologuard about a year ago and has refused colonoscopy. Upon admission labs notable for alk phos >2330, alt 87, ast 104, tbili 3.2. normal wbc at 9.8, hgb 10.6, normocytic. CT abd/pelv notable for IMPRESSION:  1. 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 There is biliary obstruction. 2. 3.5 cm right hepatic mass most likely reflects hepatic metastatic disease. 3. Left adrenal mass and left lower lobe nodule are suspicious for metastatic  disease. 4. 5.4 cm abdominal aortic aneurysm. 5. Age-indeterminate T12 compression deformity. If there is clinical suspicion  for acute spine fracture recommend dedicate thoracic spine MRI for further  evaluation. They deny anticoagulant use.  Has been npo today       PMH:  Past Medical History:   Diagnosis Date    Abscess of buttock 2012    Bronchitis     Common bile duct calculus 2012    Gallstones 2012    GERD (gastroesophageal reflux disease)     High cholesterol     Hypercholesterolemia

## 2023-10-25 NOTE — CARE COORDINATION
10/25/23 1533   Service Assessment   Patient Orientation Alert and Oriented   Cognition Alert   History Provided By Patient   Primary Caregiver Self   Accompanied By/Relationship spouse at [address] is: Legal Next of [facility]   PCP Verified by CM Yes   Last Visit to PCP Within last 3 months   Prior Functional Level Independent in ADLs/IADLs   Current Functional Level Assistance with the following:;Mobility   Can patient return to prior living arrangement Yes   Ability to make needs known: Good   Family able to assist with home care needs: Yes   Would you like for me to discuss the discharge plan with any other family members/significant others, and if so, who? Yes  (spouse Ladarius Espana at bedside)   Financial Resources Medicare   Social/Functional History   Lives With Spouse   Type of [address] [place] [address] Two level; Able to Live on Main level with bedroom/bathroom   Home Access Stairs to enter with rails   Entrance Stairs - Number of Steps 3   Entrance Stairs - Rails Right   Bathroom Shower/Tub Walk-in shower   Bathroom Toilet Standard   Bathroom Equipment Grab bars in shower;Built-in shower seat   [facility] Help From Other (comment)  (spouse helps as needed)   ADL Assistance Independent   Homemaking Assistance Independent   Ambulation Assistance Independent   Transfer Assistance Independent   Active  No   Patient's  Info spouse   Occupation Retired   Discharge Planning   Type of [address] [place] [address] Prior To Admission None   Potential Assistance Purchasing Medications No   Patient expects to be discharged to: [facility]   One/Two Story Residence Two story, live on first floor   Lift Chair Available No   [address] Discharge   Mode of Transport at Discharge Other (see comment)  (spouse)     Care Management

## 2023-10-25 NOTE — H&P
Clinical history: ams, hypotension  INDICATION:   ams, hypotension  COMPARISON: 2021    FINDINGS:  AP portable upright view of the chest demonstrates a stable  cardiopericardial  silhouette. There is no pleural effusion. .There is no focal consolidation. .There  is no pneumothorax. . Patient is on a cardiac monitor. Impression: No acute intrathoracic process is identified. EKG: nSR, regular, rhythmic, narrow QRS, no ST changes. Non ischemic. Home Medications   Prior to Admission medications    Medication Sig Start Date End Date Taking?  Authorizing Provider   rosuvastatin (CRESTOR) 5 MG tablet Take 1 tablet by mouth nightly 8/30/23   Molina Lynch,    lactulose (CHRONULAC) 10 GM/15ML solution Take 15 mLs by mouth 2 times daily 8/29/23   Molina Lynch,    metoprolol tartrate (LOPRESSOR) 50 MG tablet TAKE 1 TABLET BY MOUTH EVERY 12 HOURS 7/21/23   Molina Lynch,    acetaminophen (TYLENOL) 325 MG tablet Take 2 tablets by mouth every 6 hours as needed    Automatic Reconciliation, Ar   alendronate (FOSAMAX) 70 MG tablet Take 1 tablet by mouth every 7 days 2/21/23   Automatic Reconciliation, Ar   busPIRone (BUSPAR) 10 MG tablet Take 1 tablet by mouth 2 times daily    Automatic Reconciliation, Ar   lamoTRIgine (LAMICTAL) 100 MG tablet Take 1 tablet by mouth 2 times daily 1/12/23   Automatic Reconciliation, Ar   losartan (COZAAR) 100 MG tablet Take 1 tablet by mouth daily 3/15/23   Automatic Reconciliation, Ar   risperiDONE (RISPERDAL) 1 MG tablet Take 1 tablet by mouth 2 times daily    Automatic Reconciliation, Ar   sertraline (ZOLOFT) 50 MG tablet Take by mouth daily  Patient not taking: Reported on 8/29/2023    Automatic Reconciliation, Ar       Allergies  No Known Allergies    Past Medical History:   Diagnosis Date    Abscess of buttock 7/23/2012    Bronchitis     Common bile duct calculus 7/23/2012    Gallstones 7/12/2012    GERD (gastroesophageal reflux disease)     High cholesterol

## 2023-10-25 NOTE — ED NOTES
Bedside report given to LUCÍA Mclean. All questions answered and care transferred at this time.      Brittaney Hopkins RN  10/24/23 0589

## 2023-10-26 ENCOUNTER — ANESTHESIA (OUTPATIENT)
Facility: HOSPITAL | Age: 68
DRG: 435 | End: 2023-10-26
Payer: MEDICARE

## 2023-10-26 ENCOUNTER — ANESTHESIA EVENT (OUTPATIENT)
Facility: HOSPITAL | Age: 68
DRG: 435 | End: 2023-10-26
Payer: MEDICARE

## 2023-10-26 ENCOUNTER — APPOINTMENT (OUTPATIENT)
Facility: HOSPITAL | Age: 68
DRG: 435 | End: 2023-10-26
Payer: MEDICARE

## 2023-10-26 LAB
ALBUMIN SERPL-MCNC: 1.9 G/DL (ref 3.5–5)
ALBUMIN/GLOB SERPL: 0.6 (ref 1.1–2.2)
ALP SERPL-CCNC: >2300 U/L (ref 45–117)
ALT SERPL-CCNC: 98 U/L (ref 12–78)
ANION GAP SERPL CALC-SCNC: 6 MMOL/L (ref 5–15)
AST SERPL-CCNC: 116 U/L (ref 15–37)
BACTERIA SPEC CULT: NORMAL
BILIRUB SERPL-MCNC: 3.7 MG/DL (ref 0.2–1)
BUN SERPL-MCNC: 6 MG/DL (ref 6–20)
BUN/CREAT SERPL: 17 (ref 12–20)
CALCIUM SERPL-MCNC: 7.4 MG/DL (ref 8.5–10.1)
CHLORIDE SERPL-SCNC: 109 MMOL/L (ref 97–108)
CO2 SERPL-SCNC: 23 MMOL/L (ref 21–32)
CREAT SERPL-MCNC: 0.35 MG/DL (ref 0.55–1.02)
ERYTHROCYTE [DISTWIDTH] IN BLOOD BY AUTOMATED COUNT: 17.2 % (ref 11.5–14.5)
GLOBULIN SER CALC-MCNC: 3.2 G/DL (ref 2–4)
GLUCOSE SERPL-MCNC: 90 MG/DL (ref 65–100)
HCT VFR BLD AUTO: 30.8 % (ref 35–47)
HGB BLD-MCNC: 10.2 G/DL (ref 11.5–16)
LAMOTRIGINE SERPL-MCNC: 5.8 UG/ML (ref 2–20)
MAGNESIUM SERPL-MCNC: 1.6 MG/DL (ref 1.6–2.4)
MCH RBC QN AUTO: 27.9 PG (ref 26–34)
MCHC RBC AUTO-ENTMCNC: 33.1 G/DL (ref 30–36.5)
MCV RBC AUTO: 84.2 FL (ref 80–99)
NRBC # BLD: 0 K/UL (ref 0–0.01)
NRBC BLD-RTO: 0 PER 100 WBC
PLATELET # BLD AUTO: 259 K/UL (ref 150–400)
PMV BLD AUTO: 11.2 FL (ref 8.9–12.9)
POTASSIUM SERPL-SCNC: 3.4 MMOL/L (ref 3.5–5.1)
PROT SERPL-MCNC: 5.1 G/DL (ref 6.4–8.2)
RBC # BLD AUTO: 3.66 M/UL (ref 3.8–5.2)
SERVICE CMNT-IMP: NORMAL
SODIUM SERPL-SCNC: 138 MMOL/L (ref 136–145)
WBC # BLD AUTO: 6.7 K/UL (ref 3.6–11)

## 2023-10-26 PROCEDURE — 88333 PATH CONSLTJ SURG CYTO XM 1: CPT

## 2023-10-26 PROCEDURE — 99223 1ST HOSP IP/OBS HIGH 75: CPT | Performed by: INTERNAL MEDICINE

## 2023-10-26 PROCEDURE — 2580000003 HC RX 258: Performed by: STUDENT IN AN ORGANIZED HEALTH CARE EDUCATION/TRAINING PROGRAM

## 2023-10-26 PROCEDURE — 83735 ASSAY OF MAGNESIUM: CPT

## 2023-10-26 PROCEDURE — 2580000003 HC RX 258: Performed by: NURSE ANESTHETIST, CERTIFIED REGISTERED

## 2023-10-26 PROCEDURE — 80053 COMPREHEN METABOLIC PANEL: CPT

## 2023-10-26 PROCEDURE — 3600007513: Performed by: INTERNAL MEDICINE

## 2023-10-26 PROCEDURE — 7100000010 HC PHASE II RECOVERY - FIRST 15 MIN: Performed by: INTERNAL MEDICINE

## 2023-10-26 PROCEDURE — C1874 STENT, COATED/COV W/DEL SYS: HCPCS | Performed by: INTERNAL MEDICINE

## 2023-10-26 PROCEDURE — 1100000003 HC PRIVATE W/ TELEMETRY

## 2023-10-26 PROCEDURE — 36415 COLL VENOUS BLD VENIPUNCTURE: CPT

## 2023-10-26 PROCEDURE — 6360000002 HC RX W HCPCS: Performed by: NURSE ANESTHETIST, CERTIFIED REGISTERED

## 2023-10-26 PROCEDURE — 99232 SBSQ HOSP IP/OBS MODERATE 35: CPT

## 2023-10-26 PROCEDURE — 82378 CARCINOEMBRYONIC ANTIGEN: CPT

## 2023-10-26 PROCEDURE — 3700000000 HC ANESTHESIA ATTENDED CARE: Performed by: INTERNAL MEDICINE

## 2023-10-26 PROCEDURE — 74328 X-RAY BILE DUCT ENDOSCOPY: CPT

## 2023-10-26 PROCEDURE — 3700000001 HC ADD 15 MINUTES (ANESTHESIA): Performed by: INTERNAL MEDICINE

## 2023-10-26 PROCEDURE — 0FBG8ZX EXCISION OF PANCREAS, VIA NATURAL OR ARTIFICIAL OPENING ENDOSCOPIC, DIAGNOSTIC: ICD-10-PCS | Performed by: INTERNAL MEDICINE

## 2023-10-26 PROCEDURE — 85027 COMPLETE CBC AUTOMATED: CPT

## 2023-10-26 PROCEDURE — 6360000004 HC RX CONTRAST MEDICATION

## 2023-10-26 PROCEDURE — 6360000002 HC RX W HCPCS: Performed by: STUDENT IN AN ORGANIZED HEALTH CARE EDUCATION/TRAINING PROGRAM

## 2023-10-26 PROCEDURE — 2720000010 HC SURG SUPPLY STERILE: Performed by: INTERNAL MEDICINE

## 2023-10-26 PROCEDURE — C1769 GUIDE WIRE: HCPCS | Performed by: INTERNAL MEDICINE

## 2023-10-26 PROCEDURE — 2709999900 HC NON-CHARGEABLE SUPPLY: Performed by: INTERNAL MEDICINE

## 2023-10-26 PROCEDURE — 7100000011 HC PHASE II RECOVERY - ADDTL 15 MIN: Performed by: INTERNAL MEDICINE

## 2023-10-26 PROCEDURE — 3600007503: Performed by: INTERNAL MEDICINE

## 2023-10-26 PROCEDURE — 88305 TISSUE EXAM BY PATHOLOGIST: CPT

## 2023-10-26 PROCEDURE — 0F798DZ DILATION OF COMMON BILE DUCT WITH INTRALUMINAL DEVICE, VIA NATURAL OR ARTIFICIAL OPENING ENDOSCOPIC: ICD-10-PCS | Performed by: INTERNAL MEDICINE

## 2023-10-26 PROCEDURE — 6370000000 HC RX 637 (ALT 250 FOR IP): Performed by: STUDENT IN AN ORGANIZED HEALTH CARE EDUCATION/TRAINING PROGRAM

## 2023-10-26 PROCEDURE — 2500000003 HC RX 250 WO HCPCS: Performed by: NURSE ANESTHETIST, CERTIFIED REGISTERED

## 2023-10-26 PROCEDURE — C1726 CATH, BAL DIL, NON-VASCULAR: HCPCS | Performed by: INTERNAL MEDICINE

## 2023-10-26 PROCEDURE — 86301 IMMUNOASSAY TUMOR CA 19-9: CPT

## 2023-10-26 DEVICE — STENT SYSTEM RMV
Type: IMPLANTABLE DEVICE | Site: BILE DUCT | Status: FUNCTIONAL
Brand: WALLFLEX BILIARY

## 2023-10-26 RX ORDER — PROPOFOL 10 MG/ML
INJECTION, EMULSION INTRAVENOUS PRN
Status: DISCONTINUED | OUTPATIENT
Start: 2023-10-26 | End: 2023-10-26 | Stop reason: SDUPTHER

## 2023-10-26 RX ORDER — SUCCINYLCHOLINE/SOD CL,ISO/PF 100 MG/5ML
SYRINGE (ML) INTRAVENOUS PRN
Status: DISCONTINUED | OUTPATIENT
Start: 2023-10-26 | End: 2023-10-26 | Stop reason: SDUPTHER

## 2023-10-26 RX ORDER — PHENYLEPHRINE HCL IN 0.9% NACL 0.4MG/10ML
SYRINGE (ML) INTRAVENOUS PRN
Status: DISCONTINUED | OUTPATIENT
Start: 2023-10-26 | End: 2023-10-26 | Stop reason: SDUPTHER

## 2023-10-26 RX ORDER — INDOMETHACIN 50 MG/1
100 SUPPOSITORY RECTAL ONCE
Status: DISCONTINUED | OUTPATIENT
Start: 2023-10-26 | End: 2023-10-27 | Stop reason: HOSPADM

## 2023-10-26 RX ORDER — SODIUM CHLORIDE 9 MG/ML
INJECTION, SOLUTION INTRAVENOUS CONTINUOUS
Status: DISCONTINUED | OUTPATIENT
Start: 2023-10-26 | End: 2023-10-27

## 2023-10-26 RX ORDER — SODIUM CHLORIDE, SODIUM LACTATE, POTASSIUM CHLORIDE, CALCIUM CHLORIDE 600; 310; 30; 20 MG/100ML; MG/100ML; MG/100ML; MG/100ML
INJECTION, SOLUTION INTRAVENOUS CONTINUOUS PRN
Status: DISCONTINUED | OUTPATIENT
Start: 2023-10-26 | End: 2023-10-26 | Stop reason: SDUPTHER

## 2023-10-26 RX ORDER — ONDANSETRON 2 MG/ML
INJECTION INTRAMUSCULAR; INTRAVENOUS PRN
Status: DISCONTINUED | OUTPATIENT
Start: 2023-10-26 | End: 2023-10-26 | Stop reason: SDUPTHER

## 2023-10-26 RX ORDER — MIDAZOLAM HYDROCHLORIDE 1 MG/ML
INJECTION INTRAMUSCULAR; INTRAVENOUS PRN
Status: DISCONTINUED | OUTPATIENT
Start: 2023-10-26 | End: 2023-10-26 | Stop reason: SDUPTHER

## 2023-10-26 RX ORDER — ROCURONIUM BROMIDE 10 MG/ML
INJECTION, SOLUTION INTRAVENOUS PRN
Status: DISCONTINUED | OUTPATIENT
Start: 2023-10-26 | End: 2023-10-26 | Stop reason: SDUPTHER

## 2023-10-26 RX ORDER — FENTANYL CITRATE 50 UG/ML
INJECTION, SOLUTION INTRAMUSCULAR; INTRAVENOUS PRN
Status: DISCONTINUED | OUTPATIENT
Start: 2023-10-26 | End: 2023-10-26 | Stop reason: SDUPTHER

## 2023-10-26 RX ORDER — LIDOCAINE HCL/PF 100 MG/5ML
SYRINGE (ML) INJECTION PRN
Status: DISCONTINUED | OUTPATIENT
Start: 2023-10-26 | End: 2023-10-26 | Stop reason: SDUPTHER

## 2023-10-26 RX ADMIN — METOPROLOL TARTRATE 50 MG: 50 TABLET, FILM COATED ORAL at 23:47

## 2023-10-26 RX ADMIN — IOPAMIDOL 7 ML: 612 INJECTION, SOLUTION INTRAVENOUS at 09:50

## 2023-10-26 RX ADMIN — Medication 80 MCG: at 10:40

## 2023-10-26 RX ADMIN — FENTANYL CITRATE 50 MCG: 50 INJECTION, SOLUTION INTRAMUSCULAR; INTRAVENOUS at 09:39

## 2023-10-26 RX ADMIN — RISPERIDONE 1 MG: 1 TABLET ORAL at 12:18

## 2023-10-26 RX ADMIN — LIDOCAINE HYDROCHLORIDE 80 MG: 20 INJECTION INTRAVENOUS at 09:21

## 2023-10-26 RX ADMIN — RISPERIDONE 1 MG: 1 TABLET ORAL at 20:56

## 2023-10-26 RX ADMIN — BUSPIRONE HYDROCHLORIDE 10 MG: 10 TABLET ORAL at 20:56

## 2023-10-26 RX ADMIN — FENTANYL CITRATE 50 MCG: 50 INJECTION, SOLUTION INTRAMUSCULAR; INTRAVENOUS at 09:19

## 2023-10-26 RX ADMIN — LOSARTAN POTASSIUM 100 MG: 50 TABLET, FILM COATED ORAL at 12:15

## 2023-10-26 RX ADMIN — PROPOFOL 120 MG: 10 INJECTION, EMULSION INTRAVENOUS at 09:21

## 2023-10-26 RX ADMIN — Medication 80 MG: at 09:22

## 2023-10-26 RX ADMIN — ENOXAPARIN SODIUM 40 MG: 100 INJECTION SUBCUTANEOUS at 17:40

## 2023-10-26 RX ADMIN — METOPROLOL TARTRATE 50 MG: 50 TABLET, FILM COATED ORAL at 12:15

## 2023-10-26 RX ADMIN — HYDROXYZINE HYDROCHLORIDE 25 MG: 10 TABLET ORAL at 12:18

## 2023-10-26 RX ADMIN — BUSPIRONE HYDROCHLORIDE 10 MG: 10 TABLET ORAL at 12:15

## 2023-10-26 RX ADMIN — ROSUVASTATIN CALCIUM 5 MG: 10 TABLET, COATED ORAL at 20:55

## 2023-10-26 RX ADMIN — Medication 80 MCG: at 09:50

## 2023-10-26 RX ADMIN — MIDAZOLAM HYDROCHLORIDE 2 MG: 1 INJECTION, SOLUTION INTRAMUSCULAR; INTRAVENOUS at 09:16

## 2023-10-26 RX ADMIN — SODIUM CHLORIDE: 9 INJECTION, SOLUTION INTRAVENOUS at 23:51

## 2023-10-26 RX ADMIN — SODIUM CHLORIDE, POTASSIUM CHLORIDE, SODIUM LACTATE AND CALCIUM CHLORIDE: 600; 310; 30; 20 INJECTION, SOLUTION INTRAVENOUS at 09:12

## 2023-10-26 RX ADMIN — LAMOTRIGINE 100 MG: 100 TABLET ORAL at 12:15

## 2023-10-26 RX ADMIN — ONDANSETRON 4 MG: 2 INJECTION INTRAMUSCULAR; INTRAVENOUS at 10:08

## 2023-10-26 RX ADMIN — SODIUM CHLORIDE: 9 INJECTION, SOLUTION INTRAVENOUS at 12:15

## 2023-10-26 RX ADMIN — ROCURONIUM BROMIDE 10 MG: 10 INJECTION INTRAVENOUS at 09:20

## 2023-10-26 RX ADMIN — SODIUM CHLORIDE, PRESERVATIVE FREE 10 ML: 5 INJECTION INTRAVENOUS at 12:16

## 2023-10-26 RX ADMIN — ROCURONIUM BROMIDE 20 MG: 10 INJECTION INTRAVENOUS at 09:33

## 2023-10-26 ASSESSMENT — PAIN SCALES - GENERAL
PAINLEVEL_OUTOF10: 0

## 2023-10-26 ASSESSMENT — PAIN - FUNCTIONAL ASSESSMENT: PAIN_FUNCTIONAL_ASSESSMENT: 0-10

## 2023-10-26 NOTE — CARE COORDINATION
Care Management Progress Note:   Patient for ERCP today. Dispo pending procedure outcome/intervention. CM following for d/c needs.   ______________________  Dolores URIBE, LUCÍA  Care Management  10/26/2023  2:49 PM

## 2023-10-26 NOTE — ANESTHESIA POSTPROCEDURE EVALUATION
Department of Anesthesiology  Postprocedure Note    Patient: Corinne Thompson  MRN: [id number]  [facility] 1955  Date of evaluation: 10/26/2023      Procedure Summary     Date: 10/26/23 Room / Location: [facility] ENDO 03 / [facility] ENDOSCOPY    Anesthesia Start: 0912 Anesthesia Stop: 1103    Procedures:       ENDOSCOPIC ULTRASOUND (Upper GI Region)      ERCP ENDOSCOPIC RETROGRADE CHOLANGIOPANCREATOGRAPHY (Upper GI Region)      ERCP STENT INSERTION (Upper GI Region)      EGD W/EUS FNA (Upper GI Region) Diagnosis:       Abnormal abdominal CT scan      (Abnormal abdominal CT scan [R93.5])    Surgeons: Curly Ramos MD Responsible Provider: Raza Ritchie MD    Anesthesia Type: general ASA Status: 3          Anesthesia Type: No value filed.     Sandra Phase I: Sandra Score: 10    Sandra Phase II:        Anesthesia Post Evaluation

## 2023-10-26 NOTE — OP NOTE
Tin Nava M.D.  [phone]       10/26/2023                EUS Operative Report  Ramon Méndez  :  1955  [employer] Medical Record Number:  [id number]      Procedure Type: Linear EUS with FNB      Indications: Abnormal CT scan showing pancreas head mass    Pre-operative Diagnosis: see indication above    Post-operative Diagnosis:  See findings below    : Alejandro Conner MD    Referring Provider: Page Monroe DO    Procedure Details:    Exam:  Airway: clear, no airway problems anticipated  Heart: RRR, without gallops or rubs  Lungs: clear bilaterally without wheezes, crackles, or rhonchi  Abdomen: soft, nontender, nondistended, bowel sounds present  Mental Status: awake, alert and oriented to person, place and time     Anethesia/Sedation:  General anesthesia      Procedure Details   After infom consent was obtained for the procedure, with all risks and benefits of procedure explained the patient was taken to the endoscopy suite and placed in the left lateral decubitus position. Following sequential administration of sedation as per above, the linear echoendoscope was inserted into the mouth and advanced under direct vision to second portion of the duodenum. A careful inspection was made as the gastroscope was withdrawn, including a retroflexed view of the proximal stomach; findings and interventions are described below. Findings:     Endoscopic:   Esophagus:normal   Stomach: normal    Duodenum/jejunum: protruding metal stent and congested mucosa seen just distal to the mass    Ultrasound:   Esophagus: normal findings   Stomach: normal findings   Pancreas:     Areas examined: the entire gland    Parenchyma: -Evidence of a hypoechoic mass seen in the head of the pancreas, mixed echogenicity noted, mass measured about 42 mm on one view. It appeared to encase the SMA and SMV, abutting the portal vein.      Pancreatic Duct:  Dilated in the head of the pancreas to 7
noted to about 12 mm. Intrahepatic ducts not injected or visualized. Pancreatogram: Not performed    Specimen Removed:  None    Complications: None. EBL:  None. Interventions:      Pancreatic: none  Biliary: The Jagtome was used to cannulate the CBD through the previous sphincterotomy site. Contrast dye was injected and cholangiogram obtained as reported above. At this point, the 0.025 Fr guidewire was advanced to the intrahepatic ducts and the jagtome was exchanged with a 10 mm wide, 60 mm long fully covered metal stent. The stent was deployed under endoscopic and fluoroscopic guidance. Optimal stent position seen. Dark bile flow could be seen coming out immediately upon stent deployment. Impression:      Tight distal CBD stricture, successful placement of fully covered metal stent.     Recommendations:     Repeat liver enzymes in am  Will proceed with EUS and FNB next          Michael Fisher MD  10/26/2023  11:09 AM

## 2023-10-27 VITALS
WEIGHT: 130 LBS | SYSTOLIC BLOOD PRESSURE: 157 MMHG | BODY MASS INDEX: 21.66 KG/M2 | HEART RATE: 57 BPM | DIASTOLIC BLOOD PRESSURE: 53 MMHG | HEIGHT: 65 IN | OXYGEN SATURATION: 96 % | TEMPERATURE: 97.7 F | RESPIRATION RATE: 19 BRPM

## 2023-10-27 LAB
ALBUMIN SERPL-MCNC: 1.7 G/DL (ref 3.5–5)
ALBUMIN/GLOB SERPL: 0.5 (ref 1.1–2.2)
ALP SERPL-CCNC: >2330 U/L (ref 45–117)
ALT SERPL-CCNC: 74 U/L (ref 12–78)
ANION GAP SERPL CALC-SCNC: 6 MMOL/L (ref 5–15)
ANION GAP SERPL CALC-SCNC: 7 MMOL/L (ref 5–15)
AST SERPL-CCNC: 62 U/L (ref 15–37)
BILIRUB SERPL-MCNC: 1.8 MG/DL (ref 0.2–1)
BUN SERPL-MCNC: 5 MG/DL (ref 6–20)
BUN SERPL-MCNC: 6 MG/DL (ref 6–20)
BUN/CREAT SERPL: 13 (ref 12–20)
BUN/CREAT SERPL: 20 (ref 12–20)
CALCIUM SERPL-MCNC: 7 MG/DL (ref 8.5–10.1)
CALCIUM SERPL-MCNC: 7.1 MG/DL (ref 8.5–10.1)
CHLORIDE SERPL-SCNC: 110 MMOL/L (ref 97–108)
CHLORIDE SERPL-SCNC: 112 MMOL/L (ref 97–108)
CO2 SERPL-SCNC: 22 MMOL/L (ref 21–32)
CO2 SERPL-SCNC: 24 MMOL/L (ref 21–32)
CREAT SERPL-MCNC: 0.3 MG/DL (ref 0.55–1.02)
CREAT SERPL-MCNC: 0.4 MG/DL (ref 0.55–1.02)
ERYTHROCYTE [DISTWIDTH] IN BLOOD BY AUTOMATED COUNT: 17.5 % (ref 11.5–14.5)
FERRITIN SERPL-MCNC: 451 NG/ML (ref 8–252)
FOLATE SERPL-MCNC: 10.4 NG/ML (ref 5–21)
GLOBULIN SER CALC-MCNC: 3.7 G/DL (ref 2–4)
GLUCOSE SERPL-MCNC: 106 MG/DL (ref 65–100)
GLUCOSE SERPL-MCNC: 95 MG/DL (ref 65–100)
HCT VFR BLD AUTO: 31.2 % (ref 35–47)
HGB BLD-MCNC: 10.4 G/DL (ref 11.5–16)
IRON SATN MFR SERPL: 33 % (ref 20–50)
IRON SERPL-MCNC: 27 UG/DL (ref 35–150)
MAGNESIUM SERPL-MCNC: 1.3 MG/DL (ref 1.6–2.4)
MCH RBC QN AUTO: 28.3 PG (ref 26–34)
MCHC RBC AUTO-ENTMCNC: 33.3 G/DL (ref 30–36.5)
MCV RBC AUTO: 85 FL (ref 80–99)
NRBC # BLD: 0 K/UL (ref 0–0.01)
NRBC BLD-RTO: 0 PER 100 WBC
PLATELET # BLD AUTO: 277 K/UL (ref 150–400)
PMV BLD AUTO: 11 FL (ref 8.9–12.9)
POTASSIUM SERPL-SCNC: 2.9 MMOL/L (ref 3.5–5.1)
POTASSIUM SERPL-SCNC: 3.1 MMOL/L (ref 3.5–5.1)
PROT SERPL-MCNC: 5.4 G/DL (ref 6.4–8.2)
RBC # BLD AUTO: 3.67 M/UL (ref 3.8–5.2)
RETICS # AUTO: 0.08 M/UL (ref 0.02–0.08)
RETICS/RBC NFR AUTO: 2.2 % (ref 0.7–2.1)
SODIUM SERPL-SCNC: 139 MMOL/L (ref 136–145)
SODIUM SERPL-SCNC: 142 MMOL/L (ref 136–145)
TIBC SERPL-MCNC: 81 UG/DL (ref 250–450)
VIT B12 SERPL-MCNC: 803 PG/ML (ref 193–986)
WBC # BLD AUTO: 6.8 K/UL (ref 3.6–11)

## 2023-10-27 PROCEDURE — 6370000000 HC RX 637 (ALT 250 FOR IP): Performed by: STUDENT IN AN ORGANIZED HEALTH CARE EDUCATION/TRAINING PROGRAM

## 2023-10-27 PROCEDURE — 2580000003 HC RX 258: Performed by: STUDENT IN AN ORGANIZED HEALTH CARE EDUCATION/TRAINING PROGRAM

## 2023-10-27 PROCEDURE — 83550 IRON BINDING TEST: CPT

## 2023-10-27 PROCEDURE — 82607 VITAMIN B-12: CPT

## 2023-10-27 PROCEDURE — 94761 N-INVAS EAR/PLS OXIMETRY MLT: CPT

## 2023-10-27 PROCEDURE — 6360000002 HC RX W HCPCS

## 2023-10-27 PROCEDURE — 85027 COMPLETE CBC AUTOMATED: CPT

## 2023-10-27 PROCEDURE — 97116 GAIT TRAINING THERAPY: CPT

## 2023-10-27 PROCEDURE — 99232 SBSQ HOSP IP/OBS MODERATE 35: CPT | Performed by: INTERNAL MEDICINE

## 2023-10-27 PROCEDURE — 82746 ASSAY OF FOLIC ACID SERUM: CPT

## 2023-10-27 PROCEDURE — 36415 COLL VENOUS BLD VENIPUNCTURE: CPT

## 2023-10-27 PROCEDURE — 82728 ASSAY OF FERRITIN: CPT

## 2023-10-27 PROCEDURE — 83540 ASSAY OF IRON: CPT

## 2023-10-27 PROCEDURE — 80053 COMPREHEN METABOLIC PANEL: CPT

## 2023-10-27 PROCEDURE — 83735 ASSAY OF MAGNESIUM: CPT

## 2023-10-27 PROCEDURE — 6370000000 HC RX 637 (ALT 250 FOR IP)

## 2023-10-27 PROCEDURE — 6370000000 HC RX 637 (ALT 250 FOR IP): Performed by: FAMILY MEDICINE

## 2023-10-27 PROCEDURE — 85045 AUTOMATED RETICULOCYTE COUNT: CPT

## 2023-10-27 PROCEDURE — 97530 THERAPEUTIC ACTIVITIES: CPT

## 2023-10-27 RX ORDER — POTASSIUM CHLORIDE 750 MG/1
10 TABLET, EXTENDED RELEASE ORAL DAILY
Qty: 30 TABLET | Refills: 1 | Status: SHIPPED | OUTPATIENT
Start: 2023-10-27

## 2023-10-27 RX ORDER — NICOTINE 21 MG/24HR
1 PATCH, TRANSDERMAL 24 HOURS TRANSDERMAL DAILY
Status: DISCONTINUED | OUTPATIENT
Start: 2023-10-27 | End: 2023-10-27 | Stop reason: HOSPADM

## 2023-10-27 RX ORDER — MAGNESIUM SULFATE IN WATER 40 MG/ML
2000 INJECTION, SOLUTION INTRAVENOUS ONCE
Status: COMPLETED | OUTPATIENT
Start: 2023-10-27 | End: 2023-10-27

## 2023-10-27 RX ORDER — LAMOTRIGINE 100 MG/1
100 TABLET ORAL DAILY
Qty: 30 TABLET | Refills: 0 | Status: SHIPPED | OUTPATIENT
Start: 2023-10-27

## 2023-10-27 RX ORDER — POTASSIUM CHLORIDE 750 MG/1
40 TABLET, FILM COATED, EXTENDED RELEASE ORAL
Status: DISCONTINUED | OUTPATIENT
Start: 2023-10-27 | End: 2023-10-27

## 2023-10-27 RX ADMIN — LOSARTAN POTASSIUM 100 MG: 50 TABLET, FILM COATED ORAL at 08:44

## 2023-10-27 RX ADMIN — LAMOTRIGINE 100 MG: 100 TABLET ORAL at 08:44

## 2023-10-27 RX ADMIN — HYDROXYZINE HYDROCHLORIDE 25 MG: 10 TABLET ORAL at 08:44

## 2023-10-27 RX ADMIN — POTASSIUM BICARBONATE 40 MEQ: 782 TABLET, EFFERVESCENT ORAL at 12:17

## 2023-10-27 RX ADMIN — POTASSIUM BICARBONATE 40 MEQ: 782 TABLET, EFFERVESCENT ORAL at 08:45

## 2023-10-27 RX ADMIN — METOPROLOL TARTRATE 50 MG: 50 TABLET, FILM COATED ORAL at 12:18

## 2023-10-27 RX ADMIN — SODIUM CHLORIDE, PRESERVATIVE FREE 10 ML: 5 INJECTION INTRAVENOUS at 08:45

## 2023-10-27 RX ADMIN — BUSPIRONE HYDROCHLORIDE 10 MG: 10 TABLET ORAL at 08:44

## 2023-10-27 RX ADMIN — RISPERIDONE 1 MG: 1 TABLET ORAL at 08:45

## 2023-10-27 RX ADMIN — MAGNESIUM SULFATE HEPTAHYDRATE 2000 MG: 40 INJECTION, SOLUTION INTRAVENOUS at 06:39

## 2023-10-27 ASSESSMENT — PAIN SCALES - GENERAL
PAINLEVEL_OUTOF10: 0
PAINLEVEL_OUTOF10: 0

## 2023-10-27 NOTE — CARE COORDINATION
10/27/23 1305   Condition of Participation: Discharge Planning   The Patient and/or Patient Representative was provided with a Choice of Provider? Patient;Patient Representative   Name of the Patient Representative who was provided with the Choice of Provider and agrees with the Discharge Plan?  patient spouse at bedside   The Patient and/Or Patient Representative agree with the Discharge Plan? Yes   Freedom of Choice list was provided with basic dialogue that supports the patient's individualized plan of care/goals, treatment preferences, and shares the quality data associated with the providers? Yes     Care Management Progress Note:  CM met with patient and spouse at bedside. Patient sleeping, CM spoke with spouse who declined SNF at d/c but open for [facility]. No preference as to provider, patient has a [employer] product so spouse open to sending to [facility] ([address] [facility] ) for [facility] services. CM sent referral as per family request. Alyssa Mehta is home with [facility], spouse to transport, once medically stable to do so. CM following for needs.   ______________________  Willie URIBE, RN  Care Management  10/27/2023  1:08 PM

## 2023-10-27 NOTE — DISCHARGE INSTRUCTIONS
HOME DISCHARGE INSTRUCTIONS    Willie Hoover / [id number] : 1955    Admission date: 10/24/2023 Discharge date: 10/27/2023 11:07 AM     Please bring this form with you to show your care provider at your follow-up appointment. Primary care provider:  Dr. Johnathon Borden    Discharging provider:  Vi Montelongo MD  - Family Medicine Resident  Dr. Jessica Mejia Attending      You have been admitted to the hospital with the following diagnoses:    ACUTE DIAGNOSES:  Anorexia [R63.0]    . . . . . . . . . . . . . . . . . . . . . . . . . . . . . . . . . . . . . . . . . . . . . . . . . . . . . . . . . . . . . . . . . . . . . . . Dennis Hazel FOLLOW-UP CARE RECOMMENDATIONS:    SPECIALISTS TO FOLLOW UP WITH:   - Primary care provider: To discuss hospitalization, repeat labs, and discuss your next steps in care. Please also discuss how long to stay off your medicine for osteoporosis. - Oncology: Please follow up to discuss next steps in your care  - Gastroenterology: For any further procedures or interventions after the stent was placed to relieve an obstruction in your biliary (bile) ducts  - Vascular surgery: To determine next steps in care in regards to your aneurysm found on CT     Follow-up tests needed: Repeat labs to check your electrolytes (potassium) and liver function    Pending test results: At the time of your discharge the following test results are still pending: CEA, , and final biopsy results. Please make sure you review these results with your outpatient follow-up provider(s). DIET/what to eat:  low fat, low cholesterol diet, please add a nutritional supplement (like Ensure or Boost) to your diet    ACTIVITY:  activity as tolerated    Wound care: none    Equipment needed:  none    Specific symptoms to watch for: chest pain, shortness of breath, fever, chills, nausea, vomiting, diarrhea, change in mentation, falling, weakness, bleeding. What to do if new or unexpected symptoms occur?

## 2023-10-28 LAB — CEA SERPL-MCNC: 1.9 NG/ML

## 2023-10-29 LAB
BACTERIA SPEC CULT: NORMAL
BACTERIA SPEC CULT: NORMAL
CANCER AG19-9 SERPL-ACNC: 4617 U/ML (ref 0–35)
SERVICE CMNT-IMP: NORMAL
SERVICE CMNT-IMP: NORMAL

## 2023-10-30 ENCOUNTER — TELEPHONE (OUTPATIENT)
Age: 68
End: 2023-10-30

## 2023-10-30 LAB
BACTERIA SPEC CULT: NORMAL
BACTERIA SPEC CULT: NORMAL
SERVICE CMNT-IMP: NORMAL
SERVICE CMNT-IMP: NORMAL

## 2023-10-30 NOTE — TELEPHONE ENCOUNTER
Luis Lemon from [address] wants to see pt for  PT 2 X week for 6 weeks and get an order for nursing and an order for a bedside commoode

## 2023-10-31 ENCOUNTER — TELEMEDICINE (OUTPATIENT)
Age: 68
End: 2023-10-31
Payer: MEDICARE

## 2023-10-31 ENCOUNTER — HOSPITAL ENCOUNTER (OUTPATIENT)
Facility: HOSPITAL | Age: 68
Discharge: HOME | End: 2023-11-03
Attending: FAMILY MEDICINE
Payer: MEDICARE

## 2023-10-31 DIAGNOSIS — C25.9 PANCREATIC ADENOCARCINOMA (HCC): ICD-10-CM

## 2023-10-31 DIAGNOSIS — Z09 HOSPITAL DISCHARGE FOLLOW-UP: Primary | ICD-10-CM

## 2023-10-31 DIAGNOSIS — R07.1 CHEST PAIN ON BREATHING: ICD-10-CM

## 2023-10-31 DIAGNOSIS — C78.7 PANCREATIC CANCER METASTASIZED TO LIVER (HCC): ICD-10-CM

## 2023-10-31 DIAGNOSIS — K59.04 CHRONIC IDIOPATHIC CONSTIPATION: ICD-10-CM

## 2023-10-31 DIAGNOSIS — R26.81 GAIT INSTABILITY: ICD-10-CM

## 2023-10-31 DIAGNOSIS — R06.02 SOB (SHORTNESS OF BREATH): ICD-10-CM

## 2023-10-31 DIAGNOSIS — C25.9 PANCREATIC CANCER METASTASIZED TO LIVER (HCC): ICD-10-CM

## 2023-10-31 PROCEDURE — 71275 CT ANGIOGRAPHY CHEST: CPT

## 2023-10-31 PROCEDURE — 6360000004 HC RX CONTRAST MEDICATION: Performed by: FAMILY MEDICINE

## 2023-10-31 RX ORDER — TRAMADOL HYDROCHLORIDE 50 MG/1
50 TABLET ORAL EVERY 6 HOURS PRN
Qty: 12 TABLET | Refills: 0 | Status: SHIPPED | OUTPATIENT
Start: 2023-10-31 | End: 2023-11-03

## 2023-10-31 RX ADMIN — IOPAMIDOL 100 ML: 755 INJECTION, SOLUTION INTRAVENOUS at 17:17

## 2023-10-31 NOTE — PROGRESS NOTES
Progress Note    she is a 78y.o. year old female who presents for evaluation. Subjective:     Pt with newly diagnosed pancreatic adenocarecinoma with mets to liver, lung and adrenal gland. She is now having pain with deep inspiration and feels mildly SOB. She is having no abd pain. Constip[ation continues, no BM in 3 weeks she is passing gas. I given him a sample of Linzess she never took it. We will now prescribe this. Hospital discontinue lactulose and alendronate. She states she is coping with her diagnosis okay at this time    She is aware of pancreatic adenocarcinoma and has appt with heme onc Thursday with them. Pt is requesting bedside commode due to diff with walking and getting off toilet. We will also okay home health PT. Reviewed PmHx, RxHx, FmHx, SocHx, AllgHx and updated and dated in the chart. Review of Systems - negative except as listed above in the HPI    Objective: There were no vitals filed for this visit. Current Outpatient Medications   Medication Sig    linaclotide (LINZESS) 145 MCG capsule Take 1 capsule by mouth every morning (before breakfast)    traMADol (ULTRAM) 50 MG tablet Take 1 tablet by mouth every 6 hours as needed for Pain for up to 3 days. Intended supply: 3 days. Take lowest dose possible to manage pain Max Daily Amount: 200 mg    UNABLE TO FIND Bedside Commode    lamoTRIgine (LAMICTAL) 100 MG tablet Take 1 tablet by mouth daily    potassium chloride (KLOR-CON M) 10 MEQ extended release tablet Take 1 tablet by mouth daily    rosuvastatin (CRESTOR) 5 MG tablet Take 1 tablet by mouth nightly    metoprolol tartrate (LOPRESSOR) 50 MG tablet TAKE 1 TABLET BY MOUTH EVERY 12 HOURS    busPIRone (BUSPAR) 10 MG tablet Take 1 tablet by mouth 2 times daily    losartan (COZAAR) 100 MG tablet Take 1 tablet by mouth daily    risperiDONE (RISPERDAL) 1 MG tablet Take 1 tablet by mouth 2 times daily     No current facility-administered medications for this visit. activity

## 2023-10-31 NOTE — TELEPHONE ENCOUNTER
Marylee gave the verbal order yesterday. They need the script for bedside commode faxed to [address] and PT will take care it it.

## 2023-11-01 RX ORDER — METOPROLOL TARTRATE 50 MG/1
TABLET, FILM COATED ORAL
Qty: 180 TABLET | Refills: 2 | Status: SHIPPED | OUTPATIENT
Start: 2023-11-01

## 2023-11-01 NOTE — TELEPHONE ENCOUNTER
Script faxed to [facility]. Fax number [phone]. Confirmation number [id number].

## 2023-11-02 ENCOUNTER — OFFICE VISIT (OUTPATIENT)
Age: 68
End: 2023-11-02
Payer: MEDICARE

## 2023-11-02 VITALS
BODY MASS INDEX: 20.19 KG/M2 | WEIGHT: 121.2 LBS | TEMPERATURE: 97.2 F | HEART RATE: 75 BPM | DIASTOLIC BLOOD PRESSURE: 82 MMHG | SYSTOLIC BLOOD PRESSURE: 120 MMHG | OXYGEN SATURATION: 98 % | HEIGHT: 65 IN

## 2023-11-02 DIAGNOSIS — Z11.59 ENCOUNTER FOR SCREENING FOR OTHER VIRAL DISEASES: Primary | ICD-10-CM

## 2023-11-02 DIAGNOSIS — E87.6 HYPOKALEMIA: ICD-10-CM

## 2023-11-02 DIAGNOSIS — D63.8 ANEMIA OF CHRONIC DISEASE: ICD-10-CM

## 2023-11-02 DIAGNOSIS — C25.9 PANCREATIC ADENOCARCINOMA (HCC): Primary | ICD-10-CM

## 2023-11-02 DIAGNOSIS — K83.1 BILIARY OBSTRUCTION: ICD-10-CM

## 2023-11-02 DIAGNOSIS — C78.7 PANCREATIC CANCER METASTASIZED TO LIVER (HCC): ICD-10-CM

## 2023-11-02 DIAGNOSIS — C78.7 METASTASES TO THE LIVER (HCC): ICD-10-CM

## 2023-11-02 DIAGNOSIS — C25.9 PANCREATIC CANCER METASTASIZED TO LIVER (HCC): ICD-10-CM

## 2023-11-02 PROCEDURE — G8420 CALC BMI NORM PARAMETERS: HCPCS | Performed by: INTERNAL MEDICINE

## 2023-11-02 PROCEDURE — G8484 FLU IMMUNIZE NO ADMIN: HCPCS | Performed by: INTERNAL MEDICINE

## 2023-11-02 PROCEDURE — 1111F DSCHRG MED/CURRENT MED MERGE: CPT | Performed by: INTERNAL MEDICINE

## 2023-11-02 PROCEDURE — 99214 OFFICE O/P EST MOD 30 MIN: CPT | Performed by: INTERNAL MEDICINE

## 2023-11-02 PROCEDURE — 3017F COLORECTAL CA SCREEN DOC REV: CPT | Performed by: INTERNAL MEDICINE

## 2023-11-02 PROCEDURE — G8399 PT W/DXA RESULTS DOCUMENT: HCPCS | Performed by: INTERNAL MEDICINE

## 2023-11-02 PROCEDURE — 3079F DIAST BP 80-89 MM HG: CPT | Performed by: INTERNAL MEDICINE

## 2023-11-02 PROCEDURE — G8427 DOCREV CUR MEDS BY ELIG CLIN: HCPCS | Performed by: INTERNAL MEDICINE

## 2023-11-02 PROCEDURE — 1090F PRES/ABSN URINE INCON ASSESS: CPT | Performed by: INTERNAL MEDICINE

## 2023-11-02 PROCEDURE — 4004F PT TOBACCO SCREEN RCVD TLK: CPT | Performed by: INTERNAL MEDICINE

## 2023-11-02 PROCEDURE — 1123F ACP DISCUSS/DSCN MKR DOCD: CPT | Performed by: INTERNAL MEDICINE

## 2023-11-02 PROCEDURE — 3074F SYST BP LT 130 MM HG: CPT | Performed by: INTERNAL MEDICINE

## 2023-11-02 RX ORDER — LIDOCAINE AND PRILOCAINE 25; 25 MG/G; MG/G
CREAM TOPICAL
Qty: 1 EACH | Refills: 3 | Status: SHIPPED | OUTPATIENT
Start: 2023-11-02

## 2023-11-02 RX ORDER — DEXAMETHASONE 4 MG/1
8 TABLET ORAL
Qty: 30 TABLET | Refills: 2 | Status: SHIPPED | OUTPATIENT
Start: 2023-11-02

## 2023-11-02 RX ORDER — PROCHLORPERAZINE MALEATE 10 MG
10 TABLET ORAL EVERY 6 HOURS PRN
Qty: 120 TABLET | Refills: 3 | Status: SHIPPED | OUTPATIENT
Start: 2023-11-02

## 2023-11-02 RX ORDER — ONDANSETRON 4 MG/1
8 TABLET, ORALLY DISINTEGRATING ORAL 3 TIMES DAILY PRN
Qty: 21 TABLET | Refills: 5 | Status: SHIPPED | OUTPATIENT
Start: 2023-11-02

## 2023-11-02 NOTE — PROGRESS NOTES
FOLFIRINOX with 10% reduction indefinitely based on patient tolerance. Supportive meds sent.     Karla Steele, PharmD, BCPS

## 2023-11-02 NOTE — PROGRESS NOTES
Dwight Nicolas is a 78 y.o. female here for follow up of Pancreatic adenocarcinoma. Patient with no complaints of pain at this time.
despite IVF, supportive medications, could switch to full dose FOLFOX. -- [facility] for germline genetic testing and somatic testing on first date of treatment. -- Return in 1.5 weeks on 11/13/23 for C1 mFOLFIRINOX    Biliary obstruction:  s/p metal stent placement in CBD and improved to bilirubin 1.8. Anemia of chronic disease:  Likely 2/2 underlying cancer. No vitamin deficiencies noted. T12 compression deformity  Age indeterminate: radiology recommending MRI thoracic spine. AAA: 5.4 cm  No comparison scans available at time time to comment on change. Hypokalemia:  Repleted during recent hospital stay.      Signed By: Pedro Rick MD

## 2023-11-03 DIAGNOSIS — Z11.59 ENCOUNTER FOR SCREENING FOR OTHER VIRAL DISEASES: ICD-10-CM

## 2023-11-03 DIAGNOSIS — C78.7 PANCREATIC CANCER METASTASIZED TO LIVER (HCC): Primary | ICD-10-CM

## 2023-11-03 DIAGNOSIS — C25.9 PANCREATIC CANCER METASTASIZED TO LIVER (HCC): Primary | ICD-10-CM

## 2023-11-03 PROBLEM — C80.1 MALIGNANCY (HCC): Status: ACTIVE | Noted: 2023-11-03

## 2023-11-03 RX ORDER — FAMOTIDINE 10 MG/ML
20 INJECTION, SOLUTION INTRAVENOUS
OUTPATIENT
Start: 2023-11-27

## 2023-11-03 RX ORDER — FAMOTIDINE 10 MG/ML
20 INJECTION, SOLUTION INTRAVENOUS
OUTPATIENT
Start: 2023-11-13

## 2023-11-03 RX ORDER — DIPHENHYDRAMINE HYDROCHLORIDE 50 MG/ML
50 INJECTION INTRAMUSCULAR; INTRAVENOUS
OUTPATIENT
Start: 2023-11-13

## 2023-11-03 RX ORDER — ONDANSETRON 2 MG/ML
8 INJECTION INTRAMUSCULAR; INTRAVENOUS
OUTPATIENT
Start: 2023-11-27

## 2023-11-03 RX ORDER — HEPARIN SODIUM (PORCINE) LOCK FLUSH IV SOLN 100 UNIT/ML 100 UNIT/ML
500 SOLUTION INTRAVENOUS PRN
OUTPATIENT
Start: 2023-11-13

## 2023-11-03 RX ORDER — ACETAMINOPHEN 325 MG/1
650 TABLET ORAL
OUTPATIENT
Start: 2023-11-13

## 2023-11-03 RX ORDER — DEXTROSE MONOHYDRATE 50 MG/ML
5-250 INJECTION, SOLUTION INTRAVENOUS PRN
OUTPATIENT
Start: 2023-11-13

## 2023-11-03 RX ORDER — HEPARIN SODIUM (PORCINE) LOCK FLUSH IV SOLN 100 UNIT/ML 100 UNIT/ML
500 SOLUTION INTRAVENOUS PRN
OUTPATIENT
Start: 2023-11-15

## 2023-11-03 RX ORDER — SODIUM CHLORIDE 9 MG/ML
5-250 INJECTION, SOLUTION INTRAVENOUS PRN
OUTPATIENT
Start: 2023-11-27

## 2023-11-03 RX ORDER — ALBUTEROL SULFATE 90 UG/1
4 AEROSOL, METERED RESPIRATORY (INHALATION) PRN
OUTPATIENT
Start: 2023-11-13

## 2023-11-03 RX ORDER — SODIUM CHLORIDE 0.9 % (FLUSH) 0.9 %
5-40 SYRINGE (ML) INJECTION PRN
OUTPATIENT
Start: 2023-11-15

## 2023-11-03 RX ORDER — SODIUM CHLORIDE 9 MG/ML
5-250 INJECTION, SOLUTION INTRAVENOUS PRN
OUTPATIENT
Start: 2023-11-13

## 2023-11-03 RX ORDER — SODIUM CHLORIDE 0.9 % (FLUSH) 0.9 %
5-40 SYRINGE (ML) INJECTION PRN
OUTPATIENT
Start: 2023-11-27

## 2023-11-03 RX ORDER — EPINEPHRINE 1 MG/ML
0.3 INJECTION, SOLUTION, CONCENTRATE INTRAVENOUS PRN
OUTPATIENT
Start: 2023-11-13

## 2023-11-03 RX ORDER — ALBUTEROL SULFATE 90 UG/1
4 AEROSOL, METERED RESPIRATORY (INHALATION) PRN
OUTPATIENT
Start: 2023-11-27

## 2023-11-03 RX ORDER — HEPARIN SODIUM (PORCINE) LOCK FLUSH IV SOLN 100 UNIT/ML 100 UNIT/ML
500 SOLUTION INTRAVENOUS PRN
OUTPATIENT
Start: 2023-11-27

## 2023-11-03 RX ORDER — DIPHENHYDRAMINE HYDROCHLORIDE 50 MG/ML
50 INJECTION INTRAMUSCULAR; INTRAVENOUS
OUTPATIENT
Start: 2023-11-27

## 2023-11-03 RX ORDER — DEXTROSE MONOHYDRATE 50 MG/ML
5-250 INJECTION, SOLUTION INTRAVENOUS PRN
OUTPATIENT
Start: 2023-11-27

## 2023-11-03 RX ORDER — ONDANSETRON 2 MG/ML
8 INJECTION INTRAMUSCULAR; INTRAVENOUS
OUTPATIENT
Start: 2023-11-13

## 2023-11-03 RX ORDER — MEPERIDINE HYDROCHLORIDE 50 MG/ML
12.5 INJECTION INTRAMUSCULAR; INTRAVENOUS; SUBCUTANEOUS PRN
OUTPATIENT
Start: 2023-11-13

## 2023-11-03 RX ORDER — ACETAMINOPHEN 325 MG/1
650 TABLET ORAL
OUTPATIENT
Start: 2023-11-27

## 2023-11-03 RX ORDER — PALONOSETRON 0.05 MG/ML
0.25 INJECTION, SOLUTION INTRAVENOUS ONCE
OUTPATIENT
Start: 2023-11-13 | End: 2023-11-13

## 2023-11-03 RX ORDER — MEPERIDINE HYDROCHLORIDE 50 MG/ML
12.5 INJECTION INTRAMUSCULAR; INTRAVENOUS; SUBCUTANEOUS PRN
OUTPATIENT
Start: 2023-11-27

## 2023-11-03 RX ORDER — SODIUM CHLORIDE 9 MG/ML
5-250 INJECTION, SOLUTION INTRAVENOUS PRN
OUTPATIENT
Start: 2023-11-15

## 2023-11-03 RX ORDER — SODIUM CHLORIDE 9 MG/ML
5-250 INJECTION, SOLUTION INTRAVENOUS PRN
OUTPATIENT
Start: 2023-11-29

## 2023-11-03 RX ORDER — SODIUM CHLORIDE 9 MG/ML
INJECTION, SOLUTION INTRAVENOUS CONTINUOUS
OUTPATIENT
Start: 2023-11-13

## 2023-11-03 RX ORDER — SODIUM CHLORIDE 9 MG/ML
INJECTION, SOLUTION INTRAVENOUS CONTINUOUS
OUTPATIENT
Start: 2023-11-27

## 2023-11-03 RX ORDER — SODIUM CHLORIDE 0.9 % (FLUSH) 0.9 %
5-40 SYRINGE (ML) INJECTION PRN
OUTPATIENT
Start: 2023-11-13

## 2023-11-03 RX ORDER — PALONOSETRON 0.05 MG/ML
0.25 INJECTION, SOLUTION INTRAVENOUS ONCE
OUTPATIENT
Start: 2023-11-27 | End: 2023-11-27

## 2023-11-03 RX ORDER — EPINEPHRINE 1 MG/ML
0.3 INJECTION, SOLUTION, CONCENTRATE INTRAVENOUS PRN
OUTPATIENT
Start: 2023-11-27

## 2023-11-03 RX ORDER — HEPARIN SODIUM (PORCINE) LOCK FLUSH IV SOLN 100 UNIT/ML 100 UNIT/ML
500 SOLUTION INTRAVENOUS PRN
OUTPATIENT
Start: 2023-11-29

## 2023-11-03 RX ORDER — SODIUM CHLORIDE 0.9 % (FLUSH) 0.9 %
5-40 SYRINGE (ML) INJECTION PRN
OUTPATIENT
Start: 2023-11-29

## 2023-11-09 ENCOUNTER — TELEPHONE (OUTPATIENT)
Age: 68
End: 2023-11-09

## 2023-11-09 ENCOUNTER — HOSPITAL ENCOUNTER (OUTPATIENT)
Facility: HOSPITAL | Age: 68
Discharge: HOME | End: 2023-11-09
Attending: INTERNAL MEDICINE | Admitting: INTERNAL MEDICINE
Payer: MEDICARE

## 2023-11-09 VITALS
TEMPERATURE: 97.7 F | BODY MASS INDEX: 20.66 KG/M2 | WEIGHT: 121 LBS | DIASTOLIC BLOOD PRESSURE: 51 MMHG | HEART RATE: 89 BPM | OXYGEN SATURATION: 95 % | HEIGHT: 64 IN | SYSTOLIC BLOOD PRESSURE: 107 MMHG | RESPIRATION RATE: 19 BRPM

## 2023-11-09 DIAGNOSIS — C78.7 METASTASES TO THE LIVER (HCC): ICD-10-CM

## 2023-11-09 DIAGNOSIS — C25.9 PANCREATIC ADENOCARCINOMA (HCC): ICD-10-CM

## 2023-11-09 LAB — ECHO BSA: 1.57 M2

## 2023-11-09 PROCEDURE — 2500000003 HC RX 250 WO HCPCS

## 2023-11-09 PROCEDURE — 36561 INSERT TUNNELED CV CATH: CPT

## 2023-11-09 PROCEDURE — 76937 US GUIDE VASCULAR ACCESS: CPT

## 2023-11-09 PROCEDURE — C1788 PORT, INDWELLING, IMP: HCPCS

## 2023-11-09 PROCEDURE — C1894 INTRO/SHEATH, NON-LASER: HCPCS

## 2023-11-09 PROCEDURE — 2709999900 HC NON-CHARGEABLE SUPPLY

## 2023-11-09 PROCEDURE — 6360000002 HC RX W HCPCS

## 2023-11-09 PROCEDURE — 2580000003 HC RX 258

## 2023-11-09 RX ORDER — MIDAZOLAM HYDROCHLORIDE 2 MG/2ML
INJECTION, SOLUTION INTRAMUSCULAR; INTRAVENOUS PRN
Status: COMPLETED | OUTPATIENT
Start: 2023-11-09 | End: 2023-11-09

## 2023-11-09 RX ORDER — FENTANYL CITRATE 50 UG/ML
INJECTION, SOLUTION INTRAMUSCULAR; INTRAVENOUS PRN
Status: COMPLETED | OUTPATIENT
Start: 2023-11-09 | End: 2023-11-09

## 2023-11-09 RX ORDER — LIDOCAINE HYDROCHLORIDE 10 MG/ML
INJECTION, SOLUTION EPIDURAL; INFILTRATION; INTRACAUDAL; PERINEURAL PRN
Status: COMPLETED | OUTPATIENT
Start: 2023-11-09 | End: 2023-11-09

## 2023-11-09 RX ORDER — HEPARIN 100 UNIT/ML
SYRINGE INTRAVENOUS PRN
Status: COMPLETED | OUTPATIENT
Start: 2023-11-09 | End: 2023-11-09

## 2023-11-09 RX ORDER — LIDOCAINE HYDROCHLORIDE AND EPINEPHRINE 10; 10 MG/ML; UG/ML
INJECTION, SOLUTION INFILTRATION; PERINEURAL PRN
Status: COMPLETED | OUTPATIENT
Start: 2023-11-09 | End: 2023-11-09

## 2023-11-09 RX ADMIN — FENTANYL CITRATE 50 MCG: 50 INJECTION, SOLUTION INTRAMUSCULAR; INTRAVENOUS at 14:08

## 2023-11-09 RX ADMIN — HEPARIN 500 UNITS: 100 SYRINGE at 14:19

## 2023-11-09 RX ADMIN — MIDAZOLAM HYDROCHLORIDE 1 MG: 1 INJECTION, SOLUTION INTRAMUSCULAR; INTRAVENOUS at 14:08

## 2023-11-09 RX ADMIN — LIDOCAINE HYDROCHLORIDE AND EPINEPHRINE 10 ML: 10; 10 INJECTION, SOLUTION INFILTRATION; PERINEURAL at 14:16

## 2023-11-09 RX ADMIN — FENTANYL CITRATE 25 MCG: 50 INJECTION, SOLUTION INTRAMUSCULAR; INTRAVENOUS at 14:16

## 2023-11-09 RX ADMIN — MIDAZOLAM HYDROCHLORIDE 0.5 MG: 1 INJECTION, SOLUTION INTRAMUSCULAR; INTRAVENOUS at 14:21

## 2023-11-09 RX ADMIN — FENTANYL CITRATE 25 MCG: 50 INJECTION, SOLUTION INTRAMUSCULAR; INTRAVENOUS at 14:21

## 2023-11-09 RX ADMIN — CEFAZOLIN SODIUM 2000 MG: 1 POWDER, FOR SOLUTION INTRAMUSCULAR; INTRAVENOUS at 14:00

## 2023-11-09 RX ADMIN — MIDAZOLAM HYDROCHLORIDE 0.5 MG: 1 INJECTION, SOLUTION INTRAMUSCULAR; INTRAVENOUS at 14:16

## 2023-11-09 RX ADMIN — LIDOCAINE HYDROCHLORIDE 5 ML: 10 INJECTION, SOLUTION EPIDURAL; INFILTRATION; INTRACAUDAL; PERINEURAL at 14:11

## 2023-11-09 ASSESSMENT — PAIN DESCRIPTION - LOCATION: LOCATION: ABDOMEN

## 2023-11-09 ASSESSMENT — PAIN DESCRIPTION - ORIENTATION: ORIENTATION: MID

## 2023-11-09 ASSESSMENT — PAIN SCALES - GENERAL: PAINLEVEL_OUTOF10: 5

## 2023-11-09 ASSESSMENT — PAIN DESCRIPTION - PAIN TYPE: TYPE: ACUTE PAIN

## 2023-11-09 NOTE — DISCHARGE INSTRUCTIONS
[address] [facility] DRAIN/PORT/CATHETER REMOVAL  DISCHARGE INSTRUCTIONS    General Information:     Your doctor has ordered for us to remove your drain, port, or catheter. This could be that you do not need it anymore, it is not doing its job, your physician has decided on another plan for your treatment and/or it may need replacing. Home Care Instructions: You can resume your regular diet and medication regimen. Do not drink alcohol, drive, or make any important legal decisions in the next 24 hours. Do not lift anything heavier than a gallon of milk, or do anything strenuous for the next 24 hours. You will notice skin glue over the site of the removal. This skin glue should stay in place until the site is healed. It will fall off on its own. The nurse who discharges you to home should review with you any wound care instructions. Resume your normal level of activity slowly. You may shower after 24 hours, but do not take a bath, swim or immerse yourself in water until the site has healed. No ointments, creams, or alcohol along incision line. The site may ooze for a couple of days. This drainage should lessen with each passing day. If you would like, you can cover the site with a dry dressing. If you do, change daily. Call If:     You should call your Physician if you have any bleeding other than a small spot on your bandage. Call if you have any signs of infection, fever, or increased pain at the site of the tube. Call if the oozing increases, if it changes color, or begins to have an odor. Follow-Up Instructions: Please see your ordering doctor as he/she has requested.

## 2023-11-09 NOTE — TELEPHONE ENCOUNTER
Yaz with [facility] called in for a verbal order to start OT with pt.

## 2023-11-10 ENCOUNTER — TELEPHONE (OUTPATIENT)
Age: 68
End: 2023-11-10

## 2023-11-10 NOTE — TELEPHONE ENCOUNTER
Lisa[facility]- would like to provider to follow orders skilled nurses one time a week for seven weeks.  Lisa's phone: [phone]

## 2023-11-10 NOTE — TELEPHONE ENCOUNTER
Called and spoke with Avis Trammell. Gave VO for killed nursing once a week for 7 weeks. Avis Trammell verbalized understanding.

## 2023-11-13 ENCOUNTER — TELEPHONE (OUTPATIENT)
Age: 68
End: 2023-11-13

## 2023-11-13 ENCOUNTER — CLINICAL DOCUMENTATION (OUTPATIENT)
Age: 68
End: 2023-11-13

## 2023-11-13 ENCOUNTER — OFFICE VISIT (OUTPATIENT)
Age: 68
End: 2023-11-13
Payer: MEDICARE

## 2023-11-13 ENCOUNTER — HOSPITAL ENCOUNTER (OUTPATIENT)
Facility: HOSPITAL | Age: 68
Setting detail: INFUSION SERIES
Discharge: HOME | End: 2023-11-13
Payer: MEDICARE

## 2023-11-13 VITALS
OXYGEN SATURATION: 100 % | WEIGHT: 118.9 LBS | DIASTOLIC BLOOD PRESSURE: 62 MMHG | HEART RATE: 68 BPM | SYSTOLIC BLOOD PRESSURE: 118 MMHG | BODY MASS INDEX: 19.81 KG/M2 | HEIGHT: 65 IN | TEMPERATURE: 98.4 F | RESPIRATION RATE: 18 BRPM

## 2023-11-13 VITALS
DIASTOLIC BLOOD PRESSURE: 88 MMHG | BODY MASS INDEX: 19.81 KG/M2 | RESPIRATION RATE: 18 BRPM | TEMPERATURE: 97.6 F | SYSTOLIC BLOOD PRESSURE: 134 MMHG | HEIGHT: 65 IN | WEIGHT: 118.9 LBS | OXYGEN SATURATION: 100 % | HEART RATE: 75 BPM

## 2023-11-13 DIAGNOSIS — F39 MOOD DISORDER (HCC): ICD-10-CM

## 2023-11-13 DIAGNOSIS — R19.7 DIARRHEA, UNSPECIFIED TYPE: ICD-10-CM

## 2023-11-13 DIAGNOSIS — C25.9 PANCREATIC CANCER METASTASIZED TO LIVER (HCC): Primary | ICD-10-CM

## 2023-11-13 DIAGNOSIS — Z51.11 CHEMOTHERAPY MANAGEMENT, ENCOUNTER FOR: ICD-10-CM

## 2023-11-13 DIAGNOSIS — Z11.59 ENCOUNTER FOR SCREENING FOR OTHER VIRAL DISEASES: ICD-10-CM

## 2023-11-13 DIAGNOSIS — C78.7 PANCREATIC CANCER METASTASIZED TO LIVER (HCC): Primary | ICD-10-CM

## 2023-11-13 DIAGNOSIS — R10.13 EPIGASTRIC PAIN: ICD-10-CM

## 2023-11-13 DIAGNOSIS — C78.7 METASTASES TO THE LIVER (HCC): ICD-10-CM

## 2023-11-13 DIAGNOSIS — C25.9 PANCREATIC ADENOCARCINOMA (HCC): Primary | ICD-10-CM

## 2023-11-13 LAB
ALBUMIN SERPL-MCNC: 2 G/DL (ref 3.5–5)
ALBUMIN/GLOB SERPL: 0.5 (ref 1.1–2.2)
ALP SERPL-CCNC: 582 U/L (ref 45–117)
ALT SERPL-CCNC: 23 U/L (ref 12–78)
ANION GAP SERPL CALC-SCNC: 5 MMOL/L (ref 5–15)
AST SERPL-CCNC: 27 U/L (ref 15–37)
BASOPHILS # BLD: 0 K/UL (ref 0–0.1)
BASOPHILS NFR BLD: 1 % (ref 0–1)
BILIRUB SERPL-MCNC: 1 MG/DL (ref 0.2–1)
BUN SERPL-MCNC: 7 MG/DL (ref 6–20)
BUN/CREAT SERPL: 16 (ref 12–20)
CALCIUM SERPL-MCNC: 8 MG/DL (ref 8.5–10.1)
CHLORIDE SERPL-SCNC: 103 MMOL/L (ref 97–108)
CO2 SERPL-SCNC: 27 MMOL/L (ref 21–32)
CREAT SERPL-MCNC: 0.44 MG/DL (ref 0.55–1.02)
DIFFERENTIAL METHOD BLD: ABNORMAL
EOSINOPHIL # BLD: 0.1 K/UL (ref 0–0.4)
EOSINOPHIL NFR BLD: 1 % (ref 0–7)
ERYTHROCYTE [DISTWIDTH] IN BLOOD BY AUTOMATED COUNT: 16.1 % (ref 11.5–14.5)
GLOBULIN SER CALC-MCNC: 4.1 G/DL (ref 2–4)
GLUCOSE SERPL-MCNC: 117 MG/DL (ref 65–100)
HBV SURFACE AB SER QL: NONREACTIVE
HBV SURFACE AB SER-ACNC: <3.1 MIU/ML
HBV SURFACE AG SER QL: <0.1 INDEX
HBV SURFACE AG SER QL: NEGATIVE
HCT VFR BLD AUTO: 32.8 % (ref 35–47)
HGB BLD-MCNC: 10.6 G/DL (ref 11.5–16)
IMM GRANULOCYTES # BLD AUTO: 0.1 K/UL (ref 0–0.04)
IMM GRANULOCYTES NFR BLD AUTO: 1 % (ref 0–0.5)
LYMPHOCYTES # BLD: 2.1 K/UL (ref 0.8–3.5)
LYMPHOCYTES NFR BLD: 27 % (ref 12–49)
MCH RBC QN AUTO: 28.3 PG (ref 26–34)
MCHC RBC AUTO-ENTMCNC: 32.3 G/DL (ref 30–36.5)
MCV RBC AUTO: 87.5 FL (ref 80–99)
MONOCYTES # BLD: 0.6 K/UL (ref 0–1)
MONOCYTES NFR BLD: 8 % (ref 5–13)
NEUTS SEG # BLD: 5 K/UL (ref 1.8–8)
NEUTS SEG NFR BLD: 62 % (ref 32–75)
NRBC # BLD: 0 K/UL (ref 0–0.01)
NRBC BLD-RTO: 0 PER 100 WBC
PLATELET # BLD AUTO: 318 K/UL (ref 150–400)
PMV BLD AUTO: 11 FL (ref 8.9–12.9)
POTASSIUM SERPL-SCNC: 3.6 MMOL/L (ref 3.5–5.1)
PROT SERPL-MCNC: 6.1 G/DL (ref 6.4–8.2)
RBC # BLD AUTO: 3.75 M/UL (ref 3.8–5.2)
SODIUM SERPL-SCNC: 135 MMOL/L (ref 136–145)
WBC # BLD AUTO: 7.9 K/UL (ref 3.6–11)

## 2023-11-13 PROCEDURE — 2580000003 HC RX 258: Performed by: INTERNAL MEDICINE

## 2023-11-13 PROCEDURE — 1123F ACP DISCUSS/DSCN MKR DOCD: CPT | Performed by: INTERNAL MEDICINE

## 2023-11-13 PROCEDURE — 80053 COMPREHEN METABOLIC PANEL: CPT

## 2023-11-13 PROCEDURE — G8399 PT W/DXA RESULTS DOCUMENT: HCPCS | Performed by: INTERNAL MEDICINE

## 2023-11-13 PROCEDURE — 96416 CHEMO PROLONG INFUSE W/PUMP: CPT

## 2023-11-13 PROCEDURE — 4004F PT TOBACCO SCREEN RCVD TLK: CPT | Performed by: INTERNAL MEDICINE

## 2023-11-13 PROCEDURE — G8420 CALC BMI NORM PARAMETERS: HCPCS | Performed by: INTERNAL MEDICINE

## 2023-11-13 PROCEDURE — 86704 HEP B CORE ANTIBODY TOTAL: CPT

## 2023-11-13 PROCEDURE — 3074F SYST BP LT 130 MM HG: CPT | Performed by: INTERNAL MEDICINE

## 2023-11-13 PROCEDURE — 96413 CHEMO IV INFUSION 1 HR: CPT

## 2023-11-13 PROCEDURE — 85025 COMPLETE CBC W/AUTO DIFF WBC: CPT

## 2023-11-13 PROCEDURE — 1111F DSCHRG MED/CURRENT MED MERGE: CPT | Performed by: INTERNAL MEDICINE

## 2023-11-13 PROCEDURE — 86706 HEP B SURFACE ANTIBODY: CPT

## 2023-11-13 PROCEDURE — 3078F DIAST BP <80 MM HG: CPT | Performed by: INTERNAL MEDICINE

## 2023-11-13 PROCEDURE — 1090F PRES/ABSN URINE INCON ASSESS: CPT | Performed by: INTERNAL MEDICINE

## 2023-11-13 PROCEDURE — 3017F COLORECTAL CA SCREEN DOC REV: CPT | Performed by: INTERNAL MEDICINE

## 2023-11-13 PROCEDURE — G8427 DOCREV CUR MEDS BY ELIG CLIN: HCPCS | Performed by: INTERNAL MEDICINE

## 2023-11-13 PROCEDURE — 87340 HEPATITIS B SURFACE AG IA: CPT

## 2023-11-13 PROCEDURE — 99215 OFFICE O/P EST HI 40 MIN: CPT | Performed by: INTERNAL MEDICINE

## 2023-11-13 PROCEDURE — 96367 TX/PROPH/DG ADDL SEQ IV INF: CPT

## 2023-11-13 PROCEDURE — 96375 TX/PRO/DX INJ NEW DRUG ADDON: CPT

## 2023-11-13 PROCEDURE — 96368 THER/DIAG CONCURRENT INF: CPT

## 2023-11-13 PROCEDURE — 96415 CHEMO IV INFUSION ADDL HR: CPT

## 2023-11-13 PROCEDURE — 36415 COLL VENOUS BLD VENIPUNCTURE: CPT

## 2023-11-13 PROCEDURE — 6360000002 HC RX W HCPCS: Performed by: INTERNAL MEDICINE

## 2023-11-13 PROCEDURE — G8484 FLU IMMUNIZE NO ADMIN: HCPCS | Performed by: INTERNAL MEDICINE

## 2023-11-13 PROCEDURE — 96417 CHEMO IV INFUS EACH ADDL SEQ: CPT

## 2023-11-13 RX ORDER — 0.9 % SODIUM CHLORIDE 0.9 %
500 INTRAVENOUS SOLUTION INTRAVENOUS ONCE
Status: CANCELLED
Start: 2023-11-15

## 2023-11-13 RX ORDER — ATROPINE SULFATE 0.4 MG/ML
0.4 INJECTION, SOLUTION INTRAVENOUS
Status: DISCONTINUED | OUTPATIENT
Start: 2023-11-13 | End: 2023-11-14 | Stop reason: HOSPADM

## 2023-11-13 RX ORDER — SODIUM CHLORIDE 9 MG/ML
5-250 INJECTION, SOLUTION INTRAVENOUS PRN
Status: DISCONTINUED | OUTPATIENT
Start: 2023-11-13 | End: 2023-11-14 | Stop reason: HOSPADM

## 2023-11-13 RX ORDER — ACETAMINOPHEN 325 MG/1
650 TABLET ORAL
Status: DISCONTINUED | OUTPATIENT
Start: 2023-11-13 | End: 2023-11-14 | Stop reason: HOSPADM

## 2023-11-13 RX ORDER — HYDROXYZINE HYDROCHLORIDE 25 MG/1
25 TABLET, FILM COATED ORAL 3 TIMES DAILY PRN
COMMUNITY

## 2023-11-13 RX ORDER — ALBUTEROL SULFATE 90 UG/1
4 AEROSOL, METERED RESPIRATORY (INHALATION) PRN
Status: DISCONTINUED | OUTPATIENT
Start: 2023-11-13 | End: 2023-11-14 | Stop reason: HOSPADM

## 2023-11-13 RX ORDER — ONDANSETRON 2 MG/ML
8 INJECTION INTRAMUSCULAR; INTRAVENOUS
Status: DISCONTINUED | OUTPATIENT
Start: 2023-11-13 | End: 2023-11-14 | Stop reason: HOSPADM

## 2023-11-13 RX ORDER — DEXTROSE MONOHYDRATE 50 MG/ML
5-250 INJECTION, SOLUTION INTRAVENOUS PRN
Status: DISCONTINUED | OUTPATIENT
Start: 2023-11-13 | End: 2023-11-14 | Stop reason: HOSPADM

## 2023-11-13 RX ORDER — PALONOSETRON 0.05 MG/ML
0.25 INJECTION, SOLUTION INTRAVENOUS ONCE
Status: COMPLETED | OUTPATIENT
Start: 2023-11-13 | End: 2023-11-13

## 2023-11-13 RX ORDER — MEPERIDINE HYDROCHLORIDE 25 MG/ML
12.5 INJECTION INTRAMUSCULAR; INTRAVENOUS; SUBCUTANEOUS PRN
Status: DISCONTINUED | OUTPATIENT
Start: 2023-11-13 | End: 2023-11-14 | Stop reason: HOSPADM

## 2023-11-13 RX ORDER — EPINEPHRINE 1 MG/ML
0.3 INJECTION, SOLUTION, CONCENTRATE INTRAVENOUS PRN
Status: DISCONTINUED | OUTPATIENT
Start: 2023-11-13 | End: 2023-11-14 | Stop reason: HOSPADM

## 2023-11-13 RX ORDER — DIPHENHYDRAMINE HYDROCHLORIDE 50 MG/ML
50 INJECTION INTRAMUSCULAR; INTRAVENOUS
Status: DISCONTINUED | OUTPATIENT
Start: 2023-11-13 | End: 2023-11-14 | Stop reason: HOSPADM

## 2023-11-13 RX ORDER — TRAMADOL HYDROCHLORIDE 50 MG/1
50 TABLET ORAL EVERY 6 HOURS PRN
COMMUNITY

## 2023-11-13 RX ORDER — SODIUM CHLORIDE 0.9 % (FLUSH) 0.9 %
5-40 SYRINGE (ML) INJECTION PRN
Status: DISCONTINUED | OUTPATIENT
Start: 2023-11-13 | End: 2023-11-14 | Stop reason: HOSPADM

## 2023-11-13 RX ORDER — 0.9 % SODIUM CHLORIDE 0.9 %
500 INTRAVENOUS SOLUTION INTRAVENOUS ONCE
Start: 2023-11-29 | End: 2023-11-29

## 2023-11-13 RX ORDER — SODIUM CHLORIDE 9 MG/ML
INJECTION, SOLUTION INTRAVENOUS CONTINUOUS
Status: DISCONTINUED | OUTPATIENT
Start: 2023-11-13 | End: 2023-11-14 | Stop reason: HOSPADM

## 2023-11-13 RX ADMIN — PALONOSETRON HYDROCHLORIDE 0.25 MG: 0.25 INJECTION INTRAVENOUS at 10:23

## 2023-11-13 RX ADMIN — OXALIPLATIN 120 MG: 5 INJECTION, SOLUTION INTRAVENOUS at 12:07

## 2023-11-13 RX ADMIN — LEUCOVORIN CALCIUM 200 MG: 200 INJECTION, POWDER, LYOPHILIZED, FOR SOLUTION INTRAMUSCULAR; INTRAVENOUS at 14:33

## 2023-11-13 RX ADMIN — SODIUM CHLORIDE, PRESERVATIVE FREE 10 ML: 5 INJECTION INTRAVENOUS at 15:00

## 2023-11-13 RX ADMIN — DEXTROSE MONOHYDRATE 25 ML/HR: 50 INJECTION, SOLUTION INTRAVENOUS at 11:58

## 2023-11-13 RX ADMIN — SODIUM CHLORIDE 25 ML/HR: 9 INJECTION, SOLUTION INTRAVENOUS at 10:21

## 2023-11-13 RX ADMIN — ATROPINE SULFATE 0.4 MG: 0.4 INJECTION, SOLUTION INTRAVENOUS at 14:59

## 2023-11-13 RX ADMIN — IRINOTECAN HYDROCHLORIDE 210 MG: 20 INJECTION, SOLUTION INTRAVENOUS at 14:33

## 2023-11-13 RX ADMIN — FOSAPREPITANT 150 MG: 150 INJECTION, POWDER, LYOPHILIZED, FOR SOLUTION INTRAVENOUS at 10:59

## 2023-11-13 RX ADMIN — SODIUM CHLORIDE, PRESERVATIVE FREE 10 ML: 5 INJECTION INTRAVENOUS at 14:58

## 2023-11-13 RX ADMIN — DEXAMETHASONE SODIUM PHOSPHATE 12 MG: 4 INJECTION, SOLUTION INTRA-ARTICULAR; INTRALESIONAL; INTRAMUSCULAR; INTRAVENOUS; SOFT TISSUE at 10:28

## 2023-11-13 RX ADMIN — FLUOROURACIL 3400 MG: 50 INJECTION, SOLUTION INTRAVENOUS at 16:28

## 2023-11-13 ASSESSMENT — PATIENT HEALTH QUESTIONNAIRE - PHQ9
SUM OF ALL RESPONSES TO PHQ QUESTIONS 1-9: 1
1. LITTLE INTEREST OR PLEASURE IN DOING THINGS: 0
SUM OF ALL RESPONSES TO PHQ9 QUESTIONS 1 & 2: 1
SUM OF ALL RESPONSES TO PHQ QUESTIONS 1-9: 1
2. FEELING DOWN, DEPRESSED OR HOPELESS: 1

## 2023-11-13 ASSESSMENT — PAIN SCALES - GENERAL: PAINLEVEL_OUTOF10: 0

## 2023-11-13 NOTE — PROGRESS NOTES
Drug interactions checked between chemotherapy, supportive medications and the rest of the patient's listed home medications. There is an increased risk of CNS depression with the various antipsychotics and prochlorperazine but okay to continue with monitoring. There is an anticholinergic risk with potassium, prochlorperazine, hydroxyzine and risperidone. There is a QT-prolonging potential with ondansetron, fluorouracil and risperidone. Relayed to NP. Starting FOLFIRINOX 11/13 with 10% dose reduction.     Jessica Art, PharmD, BCPS    For Pharmacy Admin Tracking Only    Program: Medical Group  CPA in place:  Yes  Recommendation Provided To: Provider: 1 via Note to Provider  Intervention Detail: New Rx: 3, reason: Interaction  Intervention Accepted By: Provider: 1  Time Spent (min): 10

## 2023-11-13 NOTE — TELEPHONE ENCOUNTER
11/13/23 1:52 PM Blood specimen collected in [facility] for [facility] testing. Requisition completed, copy to be scanned to patient's chart. Requisition, financial assistance form, patient consent, patient demographics, and pathology report to be sent with specimen via [employer]. Scheduled [employer] -- # [id number]. Will continue to monitor.

## 2023-11-14 LAB — HBV CORE AB SERPL QL IA: NEGATIVE

## 2023-11-15 ENCOUNTER — HOSPITAL ENCOUNTER (OUTPATIENT)
Facility: HOSPITAL | Age: 68
Setting detail: INFUSION SERIES
Discharge: HOME | End: 2023-11-15
Payer: MEDICARE

## 2023-11-15 VITALS
OXYGEN SATURATION: 100 % | HEART RATE: 71 BPM | RESPIRATION RATE: 18 BRPM | SYSTOLIC BLOOD PRESSURE: 125 MMHG | DIASTOLIC BLOOD PRESSURE: 49 MMHG | TEMPERATURE: 97.3 F

## 2023-11-15 DIAGNOSIS — C25.9 PANCREATIC CANCER METASTASIZED TO LIVER (HCC): Primary | ICD-10-CM

## 2023-11-15 DIAGNOSIS — Z11.59 ENCOUNTER FOR SCREENING FOR OTHER VIRAL DISEASES: ICD-10-CM

## 2023-11-15 DIAGNOSIS — C78.7 PANCREATIC CANCER METASTASIZED TO LIVER (HCC): Primary | ICD-10-CM

## 2023-11-15 PROCEDURE — 2580000003 HC RX 258: Performed by: INTERNAL MEDICINE

## 2023-11-15 PROCEDURE — 96360 HYDRATION IV INFUSION INIT: CPT

## 2023-11-15 PROCEDURE — 2580000003 HC RX 258: Performed by: NURSE PRACTITIONER

## 2023-11-15 RX ORDER — SODIUM CHLORIDE 0.9 % (FLUSH) 0.9 %
5-40 SYRINGE (ML) INJECTION PRN
Status: DISCONTINUED | OUTPATIENT
Start: 2023-11-15 | End: 2023-11-16 | Stop reason: HOSPADM

## 2023-11-15 RX ORDER — 0.9 % SODIUM CHLORIDE 0.9 %
500 INTRAVENOUS SOLUTION INTRAVENOUS ONCE
Status: COMPLETED | OUTPATIENT
Start: 2023-11-15 | End: 2023-11-15

## 2023-11-15 RX ADMIN — SODIUM CHLORIDE 500 ML: 9 INJECTION, SOLUTION INTRAVENOUS at 14:30

## 2023-11-15 RX ADMIN — SODIUM CHLORIDE, PRESERVATIVE FREE 20 ML: 5 INJECTION INTRAVENOUS at 15:35

## 2023-11-15 ASSESSMENT — PAIN SCALES - GENERAL: PAINLEVEL_OUTOF10: 0

## 2023-11-15 NOTE — PROGRESS NOTES
[facility] Progress Note    Date: November 15, 2023    Name: Alexei Mackey    MRN: [id number]         : 1955:            Ms. [facility] arrived in a wheelchair and in no distress for Pump Removal.  Assessment was completed, no acute issues at this time, no new complaints voiced. CADD completed in infusion center- 100 ml infused per order. PAC flushed with NS with positive blood return. Ms. Soni's vitals were reviewed. Vitals:    11/15/23 1541   BP: (!) 125/49   Pulse:    Resp:    Temp:    SpO2:      Medications Administered         sodium chloride 0.9 % bolus 500 mL Admin Date  11/15/2023 Action  New Bag Dose  500 mL Rate  491.8 mL/hr Route  IntraVENous Administered By  Makenzie David RN        sodium chloride flush 0.9 % injection 5-40 mL Admin Date  11/15/2023 Action  Given Dose  20 mL Rate   Route  IntraVENous Administered By  Makenzie David RN           Ms. [facility] tolerated treatment well and was discharged from [address] [place] [address] in stable condition. Port flushed & de-accessed per protocol. She is to return on 2023 for her next appointment.     Makenzie David RN  November 15, 2023

## 2023-11-17 ENCOUNTER — CLINICAL DOCUMENTATION (OUTPATIENT)
Age: 68
End: 2023-11-17

## 2023-11-17 NOTE — PROGRESS NOTES
Order faxed to [facility]. Fax number [phone]. Confirmation number [id number].

## 2023-11-21 ENCOUNTER — APPOINTMENT (OUTPATIENT)
Facility: HOSPITAL | Age: 68
DRG: 314 | End: 2023-11-21
Payer: MEDICARE

## 2023-11-21 ENCOUNTER — TELEPHONE (OUTPATIENT)
Age: 68
End: 2023-11-21

## 2023-11-21 ENCOUNTER — HOSPITAL ENCOUNTER (INPATIENT)
Facility: HOSPITAL | Age: 68
LOS: 4 days | Discharge: HOME | DRG: 314 | End: 2023-11-25
Attending: EMERGENCY MEDICINE | Admitting: FAMILY MEDICINE
Payer: MEDICARE

## 2023-11-21 DIAGNOSIS — I95.9 HYPOTENSION, UNSPECIFIED HYPOTENSION TYPE: Primary | ICD-10-CM

## 2023-11-21 LAB
ALBUMIN SERPL-MCNC: 2.2 G/DL (ref 3.5–5)
ALBUMIN/GLOB SERPL: 0.6 (ref 1.1–2.2)
ALP SERPL-CCNC: 300 U/L (ref 45–117)
ALT SERPL-CCNC: 26 U/L (ref 12–78)
ANION GAP BLD CALC-SCNC: 7 (ref 10–20)
ANION GAP SERPL CALC-SCNC: 6 MMOL/L (ref 5–15)
APPEARANCE UR: CLEAR
AST SERPL-CCNC: 25 U/L (ref 15–37)
BACTERIA URNS QL MICRO: NEGATIVE /HPF
BASE DEFICIT BLD-SCNC: 0.1 MMOL/L
BASOPHILS # BLD: 0 K/UL (ref 0–0.1)
BASOPHILS NFR BLD: 0 % (ref 0–1)
BILIRUB SERPL-MCNC: 0.6 MG/DL (ref 0.2–1)
BILIRUB UR QL: NEGATIVE
BUN SERPL-MCNC: 9 MG/DL (ref 6–20)
BUN/CREAT SERPL: 16 (ref 12–20)
CA-I BLD-MCNC: 1.09 MMOL/L (ref 1.12–1.32)
CALCIUM SERPL-MCNC: 7.6 MG/DL (ref 8.5–10.1)
CHLORIDE BLD-SCNC: 100 MMOL/L (ref 100–108)
CHLORIDE SERPL-SCNC: 102 MMOL/L (ref 97–108)
CO2 BLD-SCNC: 24 MMOL/L (ref 19–24)
CO2 SERPL-SCNC: 25 MMOL/L (ref 21–32)
COLOR UR: YELLOW
COMMENT:: NORMAL
CREAT SERPL-MCNC: 0.57 MG/DL (ref 0.55–1.02)
CREAT UR-MCNC: 0.5 MG/DL (ref 0.6–1.3)
DIFFERENTIAL METHOD BLD: ABNORMAL
EOSINOPHIL # BLD: 0.1 K/UL (ref 0–0.4)
EOSINOPHIL NFR BLD: 1 % (ref 0–7)
EPITH CASTS URNS QL MICRO: ABNORMAL /LPF
ERYTHROCYTE [DISTWIDTH] IN BLOOD BY AUTOMATED COUNT: 15.4 % (ref 11.5–14.5)
GLOBULIN SER CALC-MCNC: 3.6 G/DL (ref 2–4)
GLUCOSE BLD STRIP.AUTO-MCNC: 112 MG/DL (ref 74–106)
GLUCOSE SERPL-MCNC: 116 MG/DL (ref 65–100)
GLUCOSE UR STRIP.AUTO-MCNC: NEGATIVE MG/DL
HCO3 BLDA-SCNC: 25 MMOL/L
HCT VFR BLD AUTO: 29.1 % (ref 35–47)
HGB BLD-MCNC: 9.8 G/DL (ref 11.5–16)
HGB UR QL STRIP: NEGATIVE
IMM GRANULOCYTES # BLD AUTO: 0 K/UL (ref 0–0.04)
IMM GRANULOCYTES NFR BLD AUTO: 0 % (ref 0–0.5)
KETONES UR QL STRIP.AUTO: 15 MG/DL
LACTATE BLD-SCNC: 0.57 MMOL/L (ref 0.4–2)
LEUKOCYTE ESTERASE UR QL STRIP.AUTO: NEGATIVE
LIPASE SERPL-CCNC: <10 U/L (ref 13–75)
LYMPHOCYTES # BLD: 2.2 K/UL (ref 0.8–3.5)
LYMPHOCYTES NFR BLD: 38 % (ref 12–49)
MAGNESIUM SERPL-MCNC: 1.8 MG/DL (ref 1.6–2.4)
MCH RBC QN AUTO: 28.7 PG (ref 26–34)
MCHC RBC AUTO-ENTMCNC: 33.7 G/DL (ref 30–36.5)
MCV RBC AUTO: 85.3 FL (ref 80–99)
MONOCYTES # BLD: 0.1 K/UL (ref 0–1)
MONOCYTES NFR BLD: 2 % (ref 5–13)
NEUTS SEG # BLD: 3.3 K/UL (ref 1.8–8)
NEUTS SEG NFR BLD: 59 % (ref 32–75)
NITRITE UR QL STRIP.AUTO: NEGATIVE
NRBC # BLD: 0 K/UL (ref 0–0.01)
NRBC BLD-RTO: 0 PER 100 WBC
PCO2 BLDV: 39.5 MMHG (ref 41–51)
PH BLDV: 7.4 (ref 7.32–7.42)
PH UR STRIP: 7 (ref 5–8)
PLATELET # BLD AUTO: 196 K/UL (ref 150–400)
PMV BLD AUTO: 10.6 FL (ref 8.9–12.9)
PO2 BLDV: 31 MMHG (ref 25–40)
POTASSIUM BLD-SCNC: 4 MMOL/L (ref 3.5–5.5)
POTASSIUM SERPL-SCNC: 3.7 MMOL/L (ref 3.5–5.1)
PROCALCITONIN SERPL-MCNC: 0.08 NG/ML
PROT SERPL-MCNC: 5.8 G/DL (ref 6.4–8.2)
PROT UR STRIP-MCNC: ABNORMAL MG/DL
RBC # BLD AUTO: 3.41 M/UL (ref 3.8–5.2)
RBC #/AREA URNS HPF: ABNORMAL /HPF (ref 0–5)
SAO2 % BLD: 60 %
SERVICE CMNT-IMP: ABNORMAL
SODIUM BLD-SCNC: 131 MMOL/L (ref 136–145)
SODIUM SERPL-SCNC: 133 MMOL/L (ref 136–145)
SP GR UR REFRACTOMETRY: 1.01 (ref 1–1.03)
SPECIMEN HOLD: NORMAL
SPECIMEN HOLD: NORMAL
SPECIMEN SITE: ABNORMAL
TSH SERPL DL<=0.05 MIU/L-ACNC: 6.64 UIU/ML (ref 0.36–3.74)
UROBILINOGEN UR QL STRIP.AUTO: 0.2 EU/DL (ref 0.2–1)
WBC # BLD AUTO: 5.7 K/UL (ref 3.6–11)
WBC URNS QL MICRO: ABNORMAL /HPF (ref 0–4)

## 2023-11-21 PROCEDURE — 84295 ASSAY OF SERUM SODIUM: CPT

## 2023-11-21 PROCEDURE — 96375 TX/PRO/DX INJ NEW DRUG ADDON: CPT

## 2023-11-21 PROCEDURE — 2060000000 HC ICU INTERMEDIATE R&B

## 2023-11-21 PROCEDURE — 81001 URINALYSIS AUTO W/SCOPE: CPT

## 2023-11-21 PROCEDURE — 83690 ASSAY OF LIPASE: CPT

## 2023-11-21 PROCEDURE — 6360000002 HC RX W HCPCS: Performed by: EMERGENCY MEDICINE

## 2023-11-21 PROCEDURE — 2580000003 HC RX 258: Performed by: EMERGENCY MEDICINE

## 2023-11-21 PROCEDURE — 83735 ASSAY OF MAGNESIUM: CPT

## 2023-11-21 PROCEDURE — 80053 COMPREHEN METABOLIC PANEL: CPT

## 2023-11-21 PROCEDURE — 85025 COMPLETE CBC W/AUTO DIFF WBC: CPT

## 2023-11-21 PROCEDURE — 82947 ASSAY GLUCOSE BLOOD QUANT: CPT

## 2023-11-21 PROCEDURE — 84443 ASSAY THYROID STIM HORMONE: CPT

## 2023-11-21 PROCEDURE — 84145 PROCALCITONIN (PCT): CPT

## 2023-11-21 PROCEDURE — 6360000002 HC RX W HCPCS: Performed by: STUDENT IN AN ORGANIZED HEALTH CARE EDUCATION/TRAINING PROGRAM

## 2023-11-21 PROCEDURE — 94761 N-INVAS EAR/PLS OXIMETRY MLT: CPT

## 2023-11-21 PROCEDURE — 82330 ASSAY OF CALCIUM: CPT

## 2023-11-21 PROCEDURE — 87324 CLOSTRIDIUM AG IA: CPT

## 2023-11-21 PROCEDURE — 99285 EMERGENCY DEPT VISIT HI MDM: CPT

## 2023-11-21 PROCEDURE — 36415 COLL VENOUS BLD VENIPUNCTURE: CPT

## 2023-11-21 PROCEDURE — 6370000000 HC RX 637 (ALT 250 FOR IP): Performed by: STUDENT IN AN ORGANIZED HEALTH CARE EDUCATION/TRAINING PROGRAM

## 2023-11-21 PROCEDURE — 82803 BLOOD GASES ANY COMBINATION: CPT

## 2023-11-21 PROCEDURE — 87449 NOS EACH ORGANISM AG IA: CPT

## 2023-11-21 PROCEDURE — 96365 THER/PROPH/DIAG IV INF INIT: CPT

## 2023-11-21 PROCEDURE — 84132 ASSAY OF SERUM POTASSIUM: CPT

## 2023-11-21 PROCEDURE — 96361 HYDRATE IV INFUSION ADD-ON: CPT

## 2023-11-21 PROCEDURE — 93005 ELECTROCARDIOGRAM TRACING: CPT | Performed by: EMERGENCY MEDICINE

## 2023-11-21 PROCEDURE — 87040 BLOOD CULTURE FOR BACTERIA: CPT

## 2023-11-21 PROCEDURE — 96367 TX/PROPH/DG ADDL SEQ IV INF: CPT

## 2023-11-21 PROCEDURE — 71045 X-RAY EXAM CHEST 1 VIEW: CPT

## 2023-11-21 PROCEDURE — 87506 IADNA-DNA/RNA PROBE TQ 6-11: CPT

## 2023-11-21 RX ORDER — ONDANSETRON 2 MG/ML
4 INJECTION INTRAMUSCULAR; INTRAVENOUS EVERY 6 HOURS PRN
Status: DISCONTINUED | OUTPATIENT
Start: 2023-11-21 | End: 2023-11-25 | Stop reason: HOSPADM

## 2023-11-21 RX ORDER — TRAMADOL HYDROCHLORIDE 50 MG/1
50 TABLET ORAL EVERY 6 HOURS PRN
Status: DISCONTINUED | OUTPATIENT
Start: 2023-11-21 | End: 2023-11-25 | Stop reason: HOSPADM

## 2023-11-21 RX ORDER — ONDANSETRON 2 MG/ML
4 INJECTION INTRAMUSCULAR; INTRAVENOUS ONCE
Status: COMPLETED | OUTPATIENT
Start: 2023-11-21 | End: 2023-11-21

## 2023-11-21 RX ORDER — POTASSIUM CHLORIDE 7.45 MG/ML
10 INJECTION INTRAVENOUS PRN
Status: DISCONTINUED | OUTPATIENT
Start: 2023-11-21 | End: 2023-11-22

## 2023-11-21 RX ORDER — SODIUM CHLORIDE 0.9 % (FLUSH) 0.9 %
5-40 SYRINGE (ML) INJECTION EVERY 12 HOURS SCHEDULED
Status: DISCONTINUED | OUTPATIENT
Start: 2023-11-21 | End: 2023-11-25 | Stop reason: HOSPADM

## 2023-11-21 RX ORDER — POTASSIUM CHLORIDE 750 MG/1
40 TABLET, FILM COATED, EXTENDED RELEASE ORAL PRN
Status: DISCONTINUED | OUTPATIENT
Start: 2023-11-21 | End: 2023-11-22

## 2023-11-21 RX ORDER — LAMOTRIGINE 100 MG/1
100 TABLET ORAL DAILY
Status: DISCONTINUED | OUTPATIENT
Start: 2023-11-22 | End: 2023-11-25 | Stop reason: HOSPADM

## 2023-11-21 RX ORDER — SODIUM CHLORIDE, SODIUM LACTATE, POTASSIUM CHLORIDE, CALCIUM CHLORIDE 600; 310; 30; 20 MG/100ML; MG/100ML; MG/100ML; MG/100ML
INJECTION, SOLUTION INTRAVENOUS CONTINUOUS
Status: DISCONTINUED | OUTPATIENT
Start: 2023-11-21 | End: 2023-11-23

## 2023-11-21 RX ORDER — 0.9 % SODIUM CHLORIDE 0.9 %
1000 INTRAVENOUS SOLUTION INTRAVENOUS ONCE
Status: COMPLETED | OUTPATIENT
Start: 2023-11-21 | End: 2023-11-21

## 2023-11-21 RX ORDER — SODIUM CHLORIDE 0.9 % (FLUSH) 0.9 %
5-40 SYRINGE (ML) INJECTION PRN
Status: DISCONTINUED | OUTPATIENT
Start: 2023-11-21 | End: 2023-11-25 | Stop reason: HOSPADM

## 2023-11-21 RX ORDER — LOSARTAN POTASSIUM 50 MG/1
100 TABLET ORAL DAILY
Status: DISCONTINUED | OUTPATIENT
Start: 2023-11-22 | End: 2023-11-25 | Stop reason: HOSPADM

## 2023-11-21 RX ORDER — BUSPIRONE HYDROCHLORIDE 5 MG/1
10 TABLET ORAL 2 TIMES DAILY
Status: DISCONTINUED | OUTPATIENT
Start: 2023-11-21 | End: 2023-11-25 | Stop reason: HOSPADM

## 2023-11-21 RX ORDER — SODIUM CHLORIDE 9 MG/ML
INJECTION, SOLUTION INTRAVENOUS PRN
Status: DISCONTINUED | OUTPATIENT
Start: 2023-11-21 | End: 2023-11-25 | Stop reason: HOSPADM

## 2023-11-21 RX ORDER — ACETAMINOPHEN 325 MG/1
650 TABLET ORAL EVERY 6 HOURS PRN
Status: DISCONTINUED | OUTPATIENT
Start: 2023-11-21 | End: 2023-11-25 | Stop reason: HOSPADM

## 2023-11-21 RX ORDER — METOPROLOL TARTRATE 50 MG/1
50 TABLET, FILM COATED ORAL EVERY 12 HOURS
Status: DISCONTINUED | OUTPATIENT
Start: 2023-11-21 | End: 2023-11-25 | Stop reason: HOSPADM

## 2023-11-21 RX ORDER — ACETAMINOPHEN 650 MG/1
650 SUPPOSITORY RECTAL EVERY 6 HOURS PRN
Status: DISCONTINUED | OUTPATIENT
Start: 2023-11-21 | End: 2023-11-25 | Stop reason: HOSPADM

## 2023-11-21 RX ORDER — MAGNESIUM SULFATE IN WATER 40 MG/ML
2000 INJECTION, SOLUTION INTRAVENOUS PRN
Status: DISCONTINUED | OUTPATIENT
Start: 2023-11-21 | End: 2023-11-22

## 2023-11-21 RX ORDER — ONDANSETRON 4 MG/1
4 TABLET, ORALLY DISINTEGRATING ORAL EVERY 8 HOURS PRN
Status: DISCONTINUED | OUTPATIENT
Start: 2023-11-21 | End: 2023-11-25 | Stop reason: HOSPADM

## 2023-11-21 RX ORDER — RISPERIDONE 1 MG/1
1 TABLET ORAL 2 TIMES DAILY
Status: DISCONTINUED | OUTPATIENT
Start: 2023-11-21 | End: 2023-11-25 | Stop reason: HOSPADM

## 2023-11-21 RX ORDER — ENOXAPARIN SODIUM 100 MG/ML
40 INJECTION SUBCUTANEOUS DAILY
Status: DISCONTINUED | OUTPATIENT
Start: 2023-11-21 | End: 2023-11-25 | Stop reason: HOSPADM

## 2023-11-21 RX ADMIN — SODIUM CHLORIDE 1000 ML: 9 INJECTION, SOLUTION INTRAVENOUS at 16:15

## 2023-11-21 RX ADMIN — ONDANSETRON 4 MG: 2 INJECTION INTRAMUSCULAR; INTRAVENOUS at 17:28

## 2023-11-21 RX ADMIN — VANCOMYCIN HYDROCHLORIDE 1250 MG: 1.25 INJECTION, POWDER, LYOPHILIZED, FOR SOLUTION INTRAVENOUS at 18:21

## 2023-11-21 RX ADMIN — CEFEPIME 2000 MG: 2 INJECTION, POWDER, FOR SOLUTION INTRAVENOUS at 17:29

## 2023-11-21 RX ADMIN — SODIUM CHLORIDE 1000 ML: 9 INJECTION, SOLUTION INTRAVENOUS at 17:00

## 2023-11-21 RX ADMIN — BUSPIRONE HYDROCHLORIDE 10 MG: 5 TABLET ORAL at 22:39

## 2023-11-21 RX ADMIN — ENOXAPARIN SODIUM 40 MG: 100 INJECTION SUBCUTANEOUS at 22:39

## 2023-11-21 ASSESSMENT — PAIN SCALES - GENERAL: PAINLEVEL_OUTOF10: 0

## 2023-11-21 ASSESSMENT — ENCOUNTER SYMPTOMS
VOMITING: 1
BLOOD IN STOOL: 0
COUGH: 0
ABDOMINAL PAIN: 1
NAUSEA: 1
SHORTNESS OF BREATH: 0
DIARRHEA: 1
RHINORRHEA: 1

## 2023-11-21 ASSESSMENT — PAIN - FUNCTIONAL ASSESSMENT: PAIN_FUNCTIONAL_ASSESSMENT: 0-10

## 2023-11-21 NOTE — TELEPHONE ENCOUNTER
Diamante Pardeep is calling about pt has been vomiting and having diarrhea for a couple of days today BP is 83/58 please advise

## 2023-11-21 NOTE — TELEPHONE ENCOUNTER
Attempt to reach  Armin Ghoshsophyabby unsuccessful. LVM for Armin Hernández to [facility] to advise to take pt to ED.

## 2023-11-21 NOTE — ED TRIAGE NOTES
Patient arrives to triage, patient reports having diarrhea for about a week, states a home health nurse came to her house today and told her she had low blood pressure. Patient has hx of cancer but can't remember what kind, reports she's been feeling weak.

## 2023-11-21 NOTE — PROGRESS NOTES
[facility].   Senior Resident Admission Note    Chart reviewed. Patient seen, examined, and discussed with Dr. Ras Henao (PGY-1). See H&P note for more details. CC: Diarrhea, Hypotension, and Fatigue      HPI:  Theresa Nolan is a 80 y.o. female w/ a known history of pancreatic cancer who presents for hypotension. Has had nausea, vomiting, and diarrhea for about 3 days. She was seen by home health today, who measured a low BP and referred her to the ER. She has been taking very little PO over the last few days. Patient also endorses abdominal pain, but no fevers, or blood in stool/vomit. Pertinent ED Findings:  VS: T98.4 HR 69 BP 95/53 RR 16 at 97% room air  Labs: Hgb 9.8, Bili 0.8, Lipase <10, Procal 0.08, TSH 6.64  Imaging: CXR NAP  EKG: not completed  Treatment: 2L NS, Vanc, Cefepime    PE:  General appearance - alert and in no distress  Chest - clear to auscultation, no wheezes, rales or rhonchi, symmetric air entry  Heart - normal rate, regular rhythm, normal S1, S2, no murmurs, rubs, clicks or gallops,   Abdomen - soft, nontender, nondistended, no masses or organomegaly  Neurological - A&Ox2, normal speech, no focal findings  Extremities - peripheral pulses normal, no pedal edema, no clubbing or cyanosis    A/P: 80 y.o. female admitted for hypotension. Hypotension: Likely related to poor PO, combined with vomiting and diarrhea, leading to volume depletion. Diarrhea and vomiting likely 2/2/ chemotherapy. She responded well to fluids in ED.  - obtain LA, consider CT if elevated  - clear liquid diet  - Zofran prn  - stool studies  - nutrition consult    Stage IV Pancreatic Cancer: Pt of Dr. Wendi Steen. - Heme/Onc consult    I agree with remaining assessment and plan as documented in Dr. Ras Henao (PGY-1) note.     Pt discussed with Dr. Katharina Echols (on-call attending physician)    Tabatha Worley MD  Family Medicine Resident (PGY-3)

## 2023-11-21 NOTE — ED PROVIDER NOTES
[facility] EMERGENCY DEPT  EMERGENCY DEPARTMENT ENCOUNTER      Pt Name: Radha Luong  MRN: [id number]  [address] 1955  Date of evaluation: 11/21/2023  Provider: Oral Fitzpatrick MD    [address]       Chief Complaint   Patient presents with    Diarrhea    Hypotension    Fatigue         HISTORY OF PRESENT ILLNESS   (Location/Symptom, Timing/Onset, Context/Setting, Quality, Duration, Modifying Factors, Severity)  Note limiting factors. 67-year-old with a history of hypertension, hyperlipidemia, pancreatic cancer, GERD, paranoia, bipolar disorder. She presents with complaints of hypotension. She was referred to the ED by her PCP after a home health nurse determined her blood pressure was 83/58. She states that she feels \"weak and tired. \"  She has had diarrhea for about a week. She estimates 2-3 episodes per day. She describes it as watery. She has also had some vomiting. She is a limited historian. She denies any chest or abdominal pain. Review of External Medical Records:     Nursing Notes were reviewed. REVIEW OF SYSTEMS    (2-9 systems for level 4, 10 or more for level 5)     Review of Systems    Except as noted above the remainder of the review of systems was reviewed and negative. PAST MEDICAL HISTORY     Past Medical History:   Diagnosis Date    Abscess of buttock 7/23/2012    Bronchitis     Common bile duct calculus 7/23/2012    Gallstones 7/12/2012    GERD (gastroesophageal reflux disease)     High cholesterol     Hypercholesterolemia     Hypertension     Ill-defined condition     lower leg bilateral reddened rash. Ill-defined condition     missing upper front tooth    Insomnia     Other ill-defined conditions(799.89)     Large boil under right arm-pit.     Paranoia [facility])     Psychiatric disorder     bipolar         SURGICAL HISTORY       Past Surgical History:   Procedure Laterality Date    CHOLECYSTECTOMY      ERCP N/A 10/26/2023    ERCP ENDOSCOPIC RETROGRADE

## 2023-11-22 ENCOUNTER — TELEPHONE (OUTPATIENT)
Age: 68
End: 2023-11-22

## 2023-11-22 ENCOUNTER — APPOINTMENT (OUTPATIENT)
Facility: HOSPITAL | Age: 68
DRG: 314 | End: 2023-11-22
Payer: MEDICARE

## 2023-11-22 LAB
ALBUMIN SERPL-MCNC: 1.7 G/DL (ref 3.5–5)
ALBUMIN/GLOB SERPL: 0.5 (ref 1.1–2.2)
ALP SERPL-CCNC: 229 U/L (ref 45–117)
ALT SERPL-CCNC: 20 U/L (ref 12–78)
ANION GAP SERPL CALC-SCNC: 7 MMOL/L (ref 5–15)
AST SERPL-CCNC: 21 U/L (ref 15–37)
BASOPHILS # BLD: 0 K/UL (ref 0–0.1)
BASOPHILS NFR BLD: 0 % (ref 0–1)
BILIRUB SERPL-MCNC: 0.6 MG/DL (ref 0.2–1)
BUN SERPL-MCNC: 10 MG/DL (ref 6–20)
BUN/CREAT SERPL: 31 (ref 12–20)
CALCIUM SERPL-MCNC: 6.7 MG/DL (ref 8.5–10.1)
CHLORIDE SERPL-SCNC: 111 MMOL/L (ref 97–108)
CO2 SERPL-SCNC: 18 MMOL/L (ref 21–32)
CREAT SERPL-MCNC: 0.32 MG/DL (ref 0.55–1.02)
DIFFERENTIAL METHOD BLD: ABNORMAL
EKG ATRIAL RATE: 85 BPM
EKG DIAGNOSIS: NORMAL
EKG P-R INTERVAL: 178 MS
EKG Q-T INTERVAL: 386 MS
EKG QRS DURATION: 100 MS
EKG QTC CALCULATION (BAZETT): 459 MS
EKG R AXIS: -28 DEGREES
EKG T AXIS: 93 DEGREES
EKG VENTRICULAR RATE: 85 BPM
EOSINOPHIL # BLD: 0.1 K/UL (ref 0–0.4)
EOSINOPHIL NFR BLD: 2 % (ref 0–7)
ERYTHROCYTE [DISTWIDTH] IN BLOOD BY AUTOMATED COUNT: 15.5 % (ref 11.5–14.5)
GLOBULIN SER CALC-MCNC: 3.1 G/DL (ref 2–4)
GLUCOSE BLD STRIP.AUTO-MCNC: 103 MG/DL (ref 65–117)
GLUCOSE SERPL-MCNC: 86 MG/DL (ref 65–100)
HCT VFR BLD AUTO: 26.3 % (ref 35–47)
HGB BLD-MCNC: 8.7 G/DL (ref 11.5–16)
IMM GRANULOCYTES # BLD AUTO: 0 K/UL (ref 0–0.04)
IMM GRANULOCYTES NFR BLD AUTO: 0 % (ref 0–0.5)
LYMPHOCYTES # BLD: 1.7 K/UL (ref 0.8–3.5)
LYMPHOCYTES NFR BLD: 45 % (ref 12–49)
MAGNESIUM SERPL-MCNC: 1.5 MG/DL (ref 1.6–2.4)
MCH RBC QN AUTO: 28.8 PG (ref 26–34)
MCHC RBC AUTO-ENTMCNC: 33.1 G/DL (ref 30–36.5)
MCV RBC AUTO: 87.1 FL (ref 80–99)
MONOCYTES # BLD: 0.1 K/UL (ref 0–1)
MONOCYTES NFR BLD: 4 % (ref 5–13)
NEUTS SEG # BLD: 1.8 K/UL (ref 1.8–8)
NEUTS SEG NFR BLD: 49 % (ref 32–75)
NRBC # BLD: 0 K/UL (ref 0–0.01)
NRBC BLD-RTO: 0 PER 100 WBC
PLATELET # BLD AUTO: 141 K/UL (ref 150–400)
PMV BLD AUTO: 10.8 FL (ref 8.9–12.9)
POTASSIUM SERPL-SCNC: 3.4 MMOL/L (ref 3.5–5.1)
PROT SERPL-MCNC: 4.8 G/DL (ref 6.4–8.2)
RBC # BLD AUTO: 3.02 M/UL (ref 3.8–5.2)
SERVICE CMNT-IMP: NORMAL
SODIUM SERPL-SCNC: 136 MMOL/L (ref 136–145)
WBC # BLD AUTO: 3.7 K/UL (ref 3.6–11)

## 2023-11-22 PROCEDURE — 97165 OT EVAL LOW COMPLEX 30 MIN: CPT

## 2023-11-22 PROCEDURE — 2500000003 HC RX 250 WO HCPCS

## 2023-11-22 PROCEDURE — 80053 COMPREHEN METABOLIC PANEL: CPT

## 2023-11-22 PROCEDURE — 97530 THERAPEUTIC ACTIVITIES: CPT

## 2023-11-22 PROCEDURE — 99223 1ST HOSP IP/OBS HIGH 75: CPT | Performed by: INTERNAL MEDICINE

## 2023-11-22 PROCEDURE — 2060000000 HC ICU INTERMEDIATE R&B

## 2023-11-22 PROCEDURE — 36415 COLL VENOUS BLD VENIPUNCTURE: CPT

## 2023-11-22 PROCEDURE — 2580000003 HC RX 258: Performed by: STUDENT IN AN ORGANIZED HEALTH CARE EDUCATION/TRAINING PROGRAM

## 2023-11-22 PROCEDURE — 99222 1ST HOSP IP/OBS MODERATE 55: CPT | Performed by: FAMILY MEDICINE

## 2023-11-22 PROCEDURE — 74177 CT ABD & PELVIS W/CONTRAST: CPT

## 2023-11-22 PROCEDURE — 6360000002 HC RX W HCPCS: Performed by: STUDENT IN AN ORGANIZED HEALTH CARE EDUCATION/TRAINING PROGRAM

## 2023-11-22 PROCEDURE — 87186 SC STD MICRODIL/AGAR DIL: CPT

## 2023-11-22 PROCEDURE — 97535 SELF CARE MNGMENT TRAINING: CPT

## 2023-11-22 PROCEDURE — 87177 OVA AND PARASITES SMEARS: CPT

## 2023-11-22 PROCEDURE — 87329 GIARDIA AG IA: CPT

## 2023-11-22 PROCEDURE — 6360000004 HC RX CONTRAST MEDICATION: Performed by: STUDENT IN AN ORGANIZED HEALTH CARE EDUCATION/TRAINING PROGRAM

## 2023-11-22 PROCEDURE — 83735 ASSAY OF MAGNESIUM: CPT

## 2023-11-22 PROCEDURE — 6370000000 HC RX 637 (ALT 250 FOR IP): Performed by: STUDENT IN AN ORGANIZED HEALTH CARE EDUCATION/TRAINING PROGRAM

## 2023-11-22 PROCEDURE — 82962 GLUCOSE BLOOD TEST: CPT

## 2023-11-22 PROCEDURE — 6360000002 HC RX W HCPCS

## 2023-11-22 PROCEDURE — 97161 PT EVAL LOW COMPLEX 20 MIN: CPT

## 2023-11-22 PROCEDURE — 6370000000 HC RX 637 (ALT 250 FOR IP)

## 2023-11-22 PROCEDURE — 85025 COMPLETE CBC W/AUTO DIFF WBC: CPT

## 2023-11-22 PROCEDURE — 93010 ELECTROCARDIOGRAM REPORT: CPT | Performed by: INTERNAL MEDICINE

## 2023-11-22 PROCEDURE — 94761 N-INVAS EAR/PLS OXIMETRY MLT: CPT

## 2023-11-22 PROCEDURE — 87209 SMEAR COMPLEX STAIN: CPT

## 2023-11-22 RX ORDER — MAGNESIUM SULFATE HEPTAHYDRATE 40 MG/ML
2000 INJECTION, SOLUTION INTRAVENOUS ONCE
Status: COMPLETED | OUTPATIENT
Start: 2023-11-22 | End: 2023-11-22

## 2023-11-22 RX ORDER — CALCIUM GLUCONATE 20 MG/ML
2000 INJECTION, SOLUTION INTRAVENOUS ONCE
Status: COMPLETED | OUTPATIENT
Start: 2023-11-22 | End: 2023-11-22

## 2023-11-22 RX ADMIN — LAMOTRIGINE 100 MG: 100 TABLET ORAL at 08:56

## 2023-11-22 RX ADMIN — SODIUM CHLORIDE, PRESERVATIVE FREE 10 ML: 5 INJECTION INTRAVENOUS at 08:56

## 2023-11-22 RX ADMIN — POTASSIUM BICARBONATE 20 MEQ: 782 TABLET, EFFERVESCENT ORAL at 08:55

## 2023-11-22 RX ADMIN — ENOXAPARIN SODIUM 40 MG: 100 INJECTION SUBCUTANEOUS at 21:18

## 2023-11-22 RX ADMIN — CALCIUM GLUCONATE 2000 MG: 20 INJECTION, SOLUTION INTRAVENOUS at 10:10

## 2023-11-22 RX ADMIN — IOPAMIDOL 100 ML: 755 INJECTION, SOLUTION INTRAVENOUS at 09:39

## 2023-11-22 RX ADMIN — BUSPIRONE HYDROCHLORIDE 10 MG: 5 TABLET ORAL at 21:18

## 2023-11-22 RX ADMIN — SODIUM CHLORIDE, PRESERVATIVE FREE 10 ML: 5 INJECTION INTRAVENOUS at 21:18

## 2023-11-22 RX ADMIN — SODIUM CHLORIDE, POTASSIUM CHLORIDE, SODIUM LACTATE AND CALCIUM CHLORIDE: 600; 310; 30; 20 INJECTION, SOLUTION INTRAVENOUS at 01:23

## 2023-11-22 RX ADMIN — RISPERIDONE 1 MG: 1 TABLET ORAL at 01:22

## 2023-11-22 RX ADMIN — MAGNESIUM SULFATE HEPTAHYDRATE 2000 MG: 40 INJECTION, SOLUTION INTRAVENOUS at 08:54

## 2023-11-22 RX ADMIN — RISPERIDONE 1 MG: 1 TABLET ORAL at 21:18

## 2023-11-22 RX ADMIN — SODIUM CHLORIDE, POTASSIUM CHLORIDE, SODIUM LACTATE AND CALCIUM CHLORIDE: 600; 310; 30; 20 INJECTION, SOLUTION INTRAVENOUS at 16:53

## 2023-11-22 RX ADMIN — BUSPIRONE HYDROCHLORIDE 10 MG: 5 TABLET ORAL at 08:55

## 2023-11-22 RX ADMIN — RISPERIDONE 1 MG: 1 TABLET ORAL at 08:56

## 2023-11-22 RX ADMIN — POTASSIUM BICARBONATE 20 MEQ: 782 TABLET, EFFERVESCENT ORAL at 11:59

## 2023-11-22 NOTE — ED NOTES
TRANSFER - OUT REPORT:    Verbal report given to hospitals OLE RN on [place]  being transferred to 3rd floor  for routine progression of patient care       Report consisted of patient's Situation, Background, Assessment and   Recommendations(SBAR). Information from the following report(s) Nurse Handoff Report, ED Encounter Summary, and ED SBAR was reviewed with the receiving nurse. [place] Assessment:                           Lines:   Single Lumen Implantable Port 11/09/23 Right Subclavian (Active)       Peripheral IV 11/21/23 Right; Anterior Wrist (Active)       Peripheral IV 11/21/23 Distal;Left; Anterior Forearm (Active)        Opportunity for questions and clarification was provided.       Patient transported with:  Registered Nurse           Laura Raya  11/22/23 4862

## 2023-11-22 NOTE — CARE COORDINATION
11/22/23 1244   Readmission Assessment   Number of Days since last admission? 8-30 days   Previous Disposition Home with Home Health   Who is being Interviewed Caregiver   What was the patient's/caregiver's perception as to why they think they needed to return back to the hospital?   (dehydration)   Did you visit your Primary Care Physician after you left the hospital, before you returned this time? Yes   Did you see a specialist, such as Cardiac, Pulmonary, Orthopedic Physician, etc. after you left the hospital? Yes   Who advised the patient to return to the hospital? Cm Paredesy Staff   Does the patient report anything that got in the way of taking their medications? No   In our efforts to provide the best possible care to you and others like you, can you think of anything that we could have done to help you after you left the hospital the first time, so that you might not have needed to return so soon?    (Nothing)     Kenia Dia, RN, MSN/Care manager

## 2023-11-22 NOTE — PROGRESS NOTES
[address], [address] [facility]   Office [phone]  Fax [phone]          Subjective / Objective     Subjective  Overnight Events: none    Patient seen and examined at bedside. Resting comfortably. Vitals  Height: 1.651 m (5' 5\"), Weight - Scale: 54 kg (119 lb), BP: 119/65, Pain 0-10: Pain Level: 0;     Physical Examination:   General appearance - resting comfortably and in no distress  Chest - no accessory muscle use, no respiratory distress  Abdomen -  nondistended  Neurological - asleep  Skin - mild jaundice      I/O:  No intake/output data recorded.   Inpatient Medications  Current Facility-Administered Medications   Medication Dose Route Frequency    sodium chloride flush 0.9 % injection 5-40 mL  5-40 mL IntraVENous 2 times per day    sodium chloride flush 0.9 % injection 5-40 mL  5-40 mL IntraVENous PRN    0.9 % sodium chloride infusion   IntraVENous PRN    potassium chloride (KLOR-CON) extended release tablet 40 mEq  40 mEq Oral PRN    Or    potassium bicarb-citric acid (EFFER-K) effervescent tablet 40 mEq  40 mEq Oral PRN    Or    potassium chloride 10 mEq/100 mL IVPB (Peripheral Line)  10 mEq IntraVENous PRN    magnesium sulfate 2000 mg in 50 mL IVPB premix  2,000 mg IntraVENous PRN    enoxaparin (LOVENOX) injection 40 mg  40 mg SubCUTAneous Daily    ondansetron (ZOFRAN-ODT) disintegrating tablet 4 mg  4 mg Oral Q8H PRN    Or    ondansetron (ZOFRAN) injection 4 mg  4 mg IntraVENous Q6H PRN    acetaminophen (TYLENOL) tablet 650 mg  650 mg Oral Q6H PRN    Or    acetaminophen (TYLENOL) suppository 650 mg  650 mg Rectal Q6H PRN    lactated ringers IV soln infusion   IntraVENous Continuous    busPIRone (BUSPAR) tablet 10 mg  10 mg Oral BID    lamoTRIgine (LAMICTAL) tablet 100 mg  100 mg Oral Daily    [Held by provider] losartan (COZAAR) tablet 100 mg  100 mg Oral Daily    [Held by provider] metoprolol tartrate (LOPRESSOR) tablet 50 mg  50 mg Oral Q12H    risperiDONE (RISPERDAL) tablet 1 mg  1 mg Oral BID    traMADol (ULTRAM) tablet 50 mg  50 mg Oral Q6H PRN     Current Outpatient Medications   Medication Sig    traMADol (ULTRAM) 50 MG tablet Take 1 tablet by mouth every 6 hours as needed for Pain.    hydrOXYzine HCl (ATARAX) 25 MG tablet Take 1 tablet by mouth 3 times daily as needed for Itching    lidocaine-prilocaine (EMLA) 2.5-2.5 % cream Apply topically as needed. prochlorperazine (COMPAZINE) 10 MG tablet Take 1 tablet by mouth every 6 hours as needed (Nausea)    ondansetron (ZOFRAN-ODT) 4 MG disintegrating tablet Take 2 tablets by mouth 3 times daily as needed for Nausea or Vomiting    dexamethasone (DECADRON) 4 MG tablet Take 2 tablets by mouth daily (with breakfast) Take only on days 2 and 3 following chemotherapy. metoprolol tartrate (LOPRESSOR) 50 MG tablet TAKE 1 TABLET BY MOUTH EVERY 12 HOURS    linaclotide (LINZESS) 145 MCG capsule Take 1 capsule by mouth every morning (before breakfast)    UNABLE TO FIND Bedside Commode    lamoTRIgine (LAMICTAL) 100 MG tablet Take 1 tablet by mouth daily    potassium chloride (KLOR-CON M) 10 MEQ extended release tablet Take 1 tablet by mouth daily    rosuvastatin (CRESTOR) 5 MG tablet Take 1 tablet by mouth nightly    busPIRone (BUSPAR) 10 MG tablet Take 1 tablet by mouth 2 times daily    losartan (COZAAR) 100 MG tablet Take 1 tablet by mouth daily    risperiDONE (RISPERDAL) 1 MG tablet Take 1 tablet by mouth 2 times daily     Allergies  No Known Allergies  CBC:  Recent Labs     11/21/23  1636   WBC 5.7   HGB 9.8*        Metabolic Panel:  Recent Labs     11/21/23  1636   *   K 3.7      CO2 25   BUN 9   MG 1.8   ALT 26     Imaging/procedures:   Xray Result (most recent):  XR CHEST PORTABLE 11/21/2023    Narrative  EXAM: XR CHEST PORTABLE    DATE: 11/21/2023 5:07 PM    INDICATION: Weakness    COMPARISON: Chest radiograph October 24, 2023    FINDINGS: AP portable chest radiograph.   There is new right-sided Port-A-Cath

## 2023-11-22 NOTE — CONSULTS
2023 03:56 AM    CO2 18 2023 03:56 AM    GLUCOSE 86 2023 03:56 AM    BUN 10 2023 03:56 AM    CREATININE 0.32 2023 03:56 AM    EGFR >60 03/15/2023 09:55 AM    CALCIUM 6.7 2023 03:56 AM    MG 1.5 2023 03:56 AM     Lab Results   Component Value Date/Time    BILITOT 0.6 2023 03:56 AM    ALT 20 2023 03:56 AM    AST 21 2023 03:56 AM    ALKPHOS 229 2023 03:56 AM    ALKPHOS 127 03/15/2023 09:55 AM    PROT 4.8 2023 03:56 AM    LABALBU 1.7 2023 03:56 AM    GLOB 3.1 2023 03:56 AM       Imagin/22/23 CT abd/pelvis:  IMPRESSION:  1. Fluid-filled colon which is slightly dilated without wall thickening compatible with a nonspecific enteritis  2. Pancreatic mass with extension to the root of the mesentery and vascular encasement  3. Interval placement of a bile duct stent  4. Slight progression of hepatic metastases  5. Left adrenal metastasis and nodule in the left lower lobe not significantly changed    Assessment and Recommendations:   Kelsea Bethea is a 80 y.o. female     78 y.o. female with HTN, bipolar d/o, anemia, HLD, osteoporosis is seen for pancreas cancer. Pancreatic adenocarcinoma:  Stage IV, encasing SMA and SMV, metastatic spread to liver, left adrenal gland. Unfortunately, this disease is incurable, but is treatable to improve quality and duration of life. S/p ERCP on 10/26/23 with stent placement in CBD. CA 19-9 elevated 4617 at diagnosis. Discussed mFOLFIRINOX regimen with patient and her . Consent signed and chemotherapy teaching completed. Port placed. Supportive medications: zofran, compazine, EMLA lidocaine cream, dexamethasone  Received C1D1 of mFOLFIRINOX last week. -- Scheduled for C2 next week (10% DR across all 3 chemo drugs.  If not tolerating despite IVF, supportive medications, could switch to full dose FOLFOX.) Did discuss with patient and  that I fear she may not get strong enough to gain benefit from chemotherapy. But pt states she wants to keep trying.  states he will support her decision for now. -- Will need IVF on day of treatment, day of pump removal and Mon or Tuesday of following week. -- Follow up [facility] for germline genetic testing and somatic testing today     Biliary obstruction:  s/p metal stent placement in CBD with normalization of bilirubin. Monitor. Anemia of chronic disease:  Likely 2/2 underlying cancer. No vitamin deficiencies noted. T12 compression deformity  Age indeterminate: radiology recommending MRI thoracic spine. Hypotension with prior HTN:  Holding home antihypertensives given hypotension. BP improving with IVF. Diarrhea alternating with constipation / Malnutrition:  Had diarrhea at home, but improving here. Will need to restart home bowel regimen (Linzess?) if no BM here. On clear liquids here, but is likely to able to advance diet now. Dietician following. History of mood disorders:  Bipolar 1, panic attacks, anxiety. On buspirone 10mg BID, lamotrigine 100mg QD, risperidone 1mg BID. Monitor. Goals of care:  Discussed with patient that I recommend DNR/DNI status even if she wants to continue on treatment given disease is incurable and continuing treatment dose pose several risks. I feel CPR would do more harm than good (in the form of suffering and pain) in setting of metastatic cancer.  agrees. Pt to consider signing forms.      Signed By: Ludwig Venegas MD     November 22, 2023

## 2023-11-22 NOTE — PLAN OF CARE
Problem: Physical Therapy - Adult  Goal: By Discharge: Performs mobility at highest level of function for planned discharge setting. See evaluation for individualized goals. Description: FUNCTIONAL STATUS PRIOR TO ADMISSION: History provided by patient and  who is caregiver. Pt ambulates short household distances with assist, inconsistent compliance with RW. Requires assist for ADLs. Receiving [facility] after recent admission at end of October. Physical Therapy Goals  Initiated 11/22/2023  1. Patient will move from supine to sit and sit to supine in bed with independence within 7 day(s). 2.  Patient will perform sit to stand with supervision/set-up within 7 day(s). 3.  Patient will transfer from bed to chair and chair to bed with supervision/set-up using the least restrictive device within 7 day(s). 4.  Patient will ambulate with supervision/set-up for 50 feet with the least restrictive device within 7 day(s). 5.  Patient will ascend/descend 3 stairs with bilat handrail(s) with minimal assistance within 7 day(s). Outcome: Progressing   PHYSICAL THERAPY EVALUATION    Patient: Bridget Preciado (40 y.o. female)  Date: 11/22/2023  Primary Diagnosis: Hypotension [I95.9]  Hypotension, unspecified hypotension type [I95.9]       Precautions: Fall Risk, Other (comment) (C.diff r/o)                    ASSESSMENT :   DEFICITS/IMPAIRMENTS:   The patient is limited by decreased functional mobility, strength, activity tolerance, balance, orthostatic hypotension following admit with hypotension, decreased p.o. intake, diarrhea, fatigue. Of note, pt recently admitted Oct 2023 with decreased p.o. intake, wt loss, new diagnosis pancreatic CA. Pt received with flat affect, minimal verbal interaction. Mobilized with min A overall for transfer sit to stand from bed and toilet height and short distance amb with HHA.  Pt reported feeling weak with OOB activity; demo steady BP decline as noted:     11/22/23 1031 11/22/23 Insomnia     Other ill-defined conditions(799.89)     Large boil under right arm-pit.     Paranoia ([address])     Psychiatric disorder     bipolar     Past Surgical History:   Procedure Laterality Date    CHOLECYSTECTOMY      ERCP N/A 10/26/2023    ERCP ENDOSCOPIC RETROGRADE CHOLANGIOPANCREATOGRAPHY performed by Dunia Campos MD at [facility]    ERCP  10/26/2023    ERCP STENT INSERTION performed by Dunia Campos MD at [address] 5 YEARS  11/9/2023    IR PORT PLACEMENT EQUAL OR GREATER THAN 5 YEARS 11/9/2023 [facility] CARDIAC CATH/EP/IR LAB    RI UNLISTED PROCEDURE ABDOMEN PERITONEUM & OMENTUM      cholecystectomy 7/23/2012    UPPER GASTROINTESTINAL ENDOSCOPY N/A 10/26/2023    ENDOSCOPIC ULTRASOUND performed by Dunia Campos MD at [address] N/A 10/26/2023    EGD W/EUS FNA performed by Dunia Campos MD at [address] [facility] Situation:  Social/Functional History  Lives With: Spouse  Type of Home: House  Home Layout: Able to Live on Main level with bedroom/bathroom  Home Access: Stairs to enter with rails  Entrance Stairs - Number of Steps: 3  Entrance Stairs - Rails: Both  Bathroom Shower/Tub: Walk-in shower (sponge bathes)  Bathroom Equipment: Shower chair, Hand-held shower, 3-in-1 commode  Home Equipment: severino Mckenna, Wheelchair-manual  Receives Help From: Family    Cognitive/Behavioral Status:  Orientation  Overall Orientation Status: Within Normal Limits  Cognition  Overall Cognitive Status: WFL  Cognition Comment: presents with flat affect and minimal verbal interaction      Strength:    Strength: Generally decreased, functional    Tone & Sensation:   Tone: Normal  Sensation: Intact    Coordination:  Coordination: Generally decreased, functional    Range Of Motion:  AROM: Generally decreased, functional       Functional Mobility:  Bed Mobility:     Bed Mobility Training  Bed Mobility Training: Yes  Supine to Sit: Stand-by

## 2023-11-22 NOTE — CARE COORDINATION
11/22/23 1233   Service Assessment   Patient Orientation Alert and Oriented   Cognition Other (see comment)  (lethargic)   History Provided By Significant Other   Primary Caregiver Spouse   Accompanied By/Relationship spouse   Support Systems Spouse/Significant Other   Patient's Healthcare Decision Maker is: Legal Next of Kin   PCP Verified by CM Yes   Last Visit to PCP Within last 3 months   Prior Functional Level Assistance with the following:;Bathing;Dressing; Toileting;Feeding;Mobility   Current Functional Level Assistance with the following:;Bathing;Dressing; Toileting;Feeding;Mobility   Can patient return to prior living arrangement Yes   Ability to make needs known: Good   Family able to assist with home care needs: Yes   Would you like for me to discuss the discharge plan with any other family members/significant others, and if so, who? Yes  ( Katheryn Gallagher)   Crhistiana Ramos   CM/SW Referral ADLs/IADLs;Functions dependency needs; Disease Management Education   Social/Functional History   Lives With Spouse   Type of [address] rolling; Wheelchair-manual  (BSC)   Receives Help From Family   ADL Assistance Needs assistance   Bath Moderate assistance   Dressing Moderate assistance   Grooming Moderate assistance   Feeding Moderate assistance   Toileting Needs assistance   Homemaking Assistance Needs assistance   Ambulation Assistance Needs assistance   Transfer Assistance Needs assistance   Active  No   Patient's  Info spouse   Mode of Transportation Car   Occupation Retired   Discharge Planning   Type of [address] Spouse/Significant Other   Current Services Prior To Admission [address]   DME Ordered?  No   Potential Assistance Purchasing Medications No   Type of Home Care Services PT;Nursing Services   Patient expects to be discharged to: [facility] Chair Available No   History of falls? 0

## 2023-11-22 NOTE — CARE COORDINATION
CM follow up:  home health    Order received to resume home health services, referral sent to [facility] via [address] including order.     Bobby Vick, RN, MSN/Care manager

## 2023-11-22 NOTE — TELEPHONE ENCOUNTER
11/22/23 10:37 AM Return call placed to patient's spouse, Suha Knapp (listed on PHI). Verified patient ID x 2. Patient currently admitted for hypotension, nausea/vomiting, and diarrhea. Spouse requesting to complete forms for advanced care planning, including patient's wish to have her body donated to science. Discussed, since patient is currently inpatient, that spouse should speak with  or  on unit. Will also update Dr. Ping Maldonado of patient's admission as well. Suha Knapp states he will keep office updated of patient's status/hospital course as she is currently scheduled for OPIC/MD visits on 11/27. No additional questions or concerns at this time.

## 2023-11-22 NOTE — H&P
[facility]  [address]   Office [phone], Fax [phone]        Admission H&P     Name: Rosana Victor MRN: [id number]  Sex: Female   YOB: 1955  Age: 80 y.o. PCP: Isabella Haider DO     Source of Information: patient, medical records    Chief complaint: hypotension    History of Present Illness  Rosana Victor is a 80 y.o. female with PMH stage IV pancreatic cancer, HTN, bipolar I disorder, anemia, HLD, osteoporosis who presents to the ER complaining of:      Hypotension. Has had nausea, vomiting, and diarrhea for about 3 days. She was seen by home health today, who measured a low BP and referred her to the ER. She has been taking very little PO over the last few days. Patient also endorses abdominal pain, but no fevers, or blood in stool/vomit. In the ER: In the ED:  Vitals: Temp 98.4   BP 67/34   HR 84   RR 18   SatO2  98% on RA  Labs:   Lab Results   Component Value Date    WBC 5.7 11/21/2023    HGB 9.8 (L) 11/21/2023    HCT 29.1 (L) 11/21/2023    MCV 85.3 11/21/2023     11/21/2023     Lab Results   Component Value Date     (L) 11/21/2023    K 3.7 11/21/2023     11/21/2023    CO2 25 11/21/2023    BUN 9 11/21/2023    CREATININE 0.5 (L) 11/21/2023    GLUCOSE 116 (H) 11/21/2023    CALCIUM 7.6 (L) 11/21/2023    PROT 5.8 (L) 11/21/2023    LABALBU 2.2 (L) 11/21/2023    BILITOT 0.6 11/21/2023    ALKPHOS 300 (H) 11/21/2023    AST 25 11/21/2023    ALT 26 11/21/2023    LABGLOM >60 11/21/2023    GFRAA >60 08/17/2021    AGRATIO 0.8 (L) 03/15/2023    GLOB 3.6 11/21/2023     Lab Results   Component Value Date    TSH 2.53 01/12/2023    XUV4PKL 6.64 (H) 11/21/2023       Imaging:   Xray Result (most recent):  XR CHEST PORTABLE 11/21/2023    Narrative  EXAM: XR CHEST PORTABLE    DATE: 11/21/2023 5:07 PM    INDICATION: Weakness    COMPARISON: Chest radiograph October 24, 2023    FINDINGS: AP portable chest radiograph.

## 2023-11-22 NOTE — PROGRESS NOTES
0700 Bedside and Verbal shift change report given to [employer] (oncoming nurse) by Maria A Barajas RN (offgoing nurse). Report included the following information Nurse Handoff Report, Adult Overview, Intake/Output, MAR, and Recent Results. 1900 Bedside and Verbal shift change report given to [facility] (oncoming nurse) by Helder Woodward RN (offgoing nurse). Report included the following information Nurse Handoff Report, Adult Overview, Intake/Output, MAR, and Recent Results.

## 2023-11-22 NOTE — CONSULTS
Comprehensive Nutrition Assessment    Type and Reason for Visit: Initial    Nutrition Recommendations/Plan:   Advance diet as able - recommend Regular diet to provide sufficient calories as tolerated. Start Ensure Enlive BID once diet advanced. Add Banatrol TID once diet advanced. Send with yogurt, pudding or oatmeal.        Malnutrition Assessment:  Malnutrition Status:  Severe malnutrition (11/22/23 1159)    Context:  Acute Illness     Findings of the 6 clinical characteristics of malnutrition:  Energy Intake:  50% or less of estimated energy requirements for 5 or more days  Weight Loss:  Greater than 7.5% over 3 months     Body Fat Loss: Moderate body fat loss Triceps, Fat Overlying Ribs   Muscle Mass Loss: Moderate muscle mass loss Clavicles (pectoralis & deltoids), Scapula (trapezius)  Fluid Accumulation:  No significant fluid accumulation     Strength:  Not Performed       Nutrition Assessment:    Past medical hx:       Diagnosis Date    Abscess of buttock 7/23/2012    Bronchitis     Common bile duct calculus 7/23/2012    Gallstones 7/12/2012    GERD (gastroesophageal reflux disease)     High cholesterol     Hypercholesterolemia     Hypertension     Ill-defined condition     lower leg bilateral reddened rash. Ill-defined condition     missing upper front tooth    Insomnia     Other ill-defined conditions(799.89)     Large boil under right arm-pit. [facility])     Psychiatric disorder     bipolar       Consult received. Pt familiar to service from ~3 weeks ago when admitted. Pt started Chemo 11/13 and had occasional loose stool but over last 3-4 days, pt reported more constant/significant diarrhea. Stool sample needed for C. Diff testing. Wt down from 123 lbs on last admission ton 119 lbs currently. Pt lost significant wt over last 1-2 months. Pt meets malnutrition criteria as before. Advance diet as soon as able to provide sufficient kcal/protein in meals and ONS. No known food allergies. 68.9 kg (152 lb)   03/15/23 68 kg (150 lb)   01/12/23 68.5 kg (151 lb)   12/08/22 68 kg (150 lb)   09/30/21 70.3 kg (155 lb)           Nutrition Diagnosis:   Inadequate protein-energy intake related to catabolic illness as evidenced by weight loss, lab values, GI abnormality    Nutrition Interventions:   Food and/or Nutrient Delivery: Start Oral Nutrition Supplement, Continue Current Diet  Nutrition Education/Counseling: No recommendation at this time  Coordination of Nutrition Care: Continue to monitor while inpatient, Interdisciplinary Rounds       Goals:     Goals: PO intake 50% or greater, by next RD assessment       Nutrition Monitoring and Evaluation:   Behavioral-Environmental Outcomes: None Identified  Food/Nutrient Intake Outcomes: Food and Nutrient Intake, Supplement Intake  Physical Signs/Symptoms Outcomes: Biochemical Data, Weight    Discharge Planning:    Recommend pursue outpatient nutrition counseling, Continue Oral Nutrition Supplement     Shabana Castle RD  Available via [facility]  Office # [phone]

## 2023-11-22 NOTE — TELEPHONE ENCOUNTER
PT's spouse called in stating that he would like to speak to a nurse navigator. . Myah Williamson also noted that PT is currently inpatient.      # [phone]

## 2023-11-23 LAB
ALBUMIN SERPL-MCNC: 1.7 G/DL (ref 3.5–5)
ALBUMIN/GLOB SERPL: 0.6 (ref 1.1–2.2)
ALP SERPL-CCNC: 215 U/L (ref 45–117)
ALT SERPL-CCNC: 16 U/L (ref 12–78)
ANION GAP SERPL CALC-SCNC: 10 MMOL/L (ref 5–15)
AST SERPL-CCNC: 17 U/L (ref 15–37)
BASOPHILS # BLD: 0 K/UL (ref 0–0.1)
BASOPHILS NFR BLD: 0 % (ref 0–1)
BILIRUB SERPL-MCNC: 0.5 MG/DL (ref 0.2–1)
BUN SERPL-MCNC: 7 MG/DL (ref 6–20)
BUN/CREAT SERPL: 23 (ref 12–20)
C COLI+JEJUNI TUF STL QL NAA+PROBE: NEGATIVE
CALCIUM SERPL-MCNC: 7.4 MG/DL (ref 8.5–10.1)
CHLORIDE SERPL-SCNC: 107 MMOL/L (ref 97–108)
CO2 SERPL-SCNC: 21 MMOL/L (ref 21–32)
CREAT SERPL-MCNC: 0.3 MG/DL (ref 0.55–1.02)
DIFFERENTIAL METHOD BLD: ABNORMAL
EC STX1+STX2 GENES STL QL NAA+PROBE: NEGATIVE
EOSINOPHIL # BLD: 0.1 K/UL (ref 0–0.4)
EOSINOPHIL NFR BLD: 3 % (ref 0–7)
ERYTHROCYTE [DISTWIDTH] IN BLOOD BY AUTOMATED COUNT: 15.6 % (ref 11.5–14.5)
ETEC ELTA+ESTB GENES STL QL NAA+PROBE: NEGATIVE
GLOBULIN SER CALC-MCNC: 3 G/DL (ref 2–4)
GLUCOSE BLD STRIP.AUTO-MCNC: 104 MG/DL (ref 65–117)
GLUCOSE BLD STRIP.AUTO-MCNC: 105 MG/DL (ref 65–117)
GLUCOSE BLD STRIP.AUTO-MCNC: 109 MG/DL (ref 65–117)
GLUCOSE SERPL-MCNC: 95 MG/DL (ref 65–100)
HCT VFR BLD AUTO: 25.2 % (ref 35–47)
HGB BLD-MCNC: 8.4 G/DL (ref 11.5–16)
IMM GRANULOCYTES # BLD AUTO: 0 K/UL
IMM GRANULOCYTES NFR BLD AUTO: 0 %
LYMPHOCYTES # BLD: 1.2 K/UL (ref 0.8–3.5)
LYMPHOCYTES NFR BLD: 41 % (ref 12–49)
MAGNESIUM SERPL-MCNC: 1.6 MG/DL (ref 1.6–2.4)
MCH RBC QN AUTO: 28.6 PG (ref 26–34)
MCHC RBC AUTO-ENTMCNC: 33.3 G/DL (ref 30–36.5)
MCV RBC AUTO: 85.7 FL (ref 80–99)
MONOCYTES # BLD: 0.1 K/UL (ref 0–1)
MONOCYTES NFR BLD: 3 % (ref 5–13)
NEUTS SEG # BLD: 1.6 K/UL (ref 1.8–8)
NEUTS SEG NFR BLD: 53 % (ref 32–75)
NRBC # BLD: 0 K/UL (ref 0–0.01)
NRBC BLD-RTO: 0 PER 100 WBC
P SHIGELLOIDES DNA STL QL NAA+PROBE: NEGATIVE
PLATELET # BLD AUTO: 131 K/UL (ref 150–400)
PMV BLD AUTO: 11.3 FL (ref 8.9–12.9)
POTASSIUM SERPL-SCNC: 3.2 MMOL/L (ref 3.5–5.1)
PROT SERPL-MCNC: 4.7 G/DL (ref 6.4–8.2)
RBC # BLD AUTO: 2.94 M/UL (ref 3.8–5.2)
RBC MORPH BLD: ABNORMAL
SALMONELLA SP SPAO STL QL NAA+PROBE: NEGATIVE
SERVICE CMNT-IMP: NORMAL
SHIGELLA SP+EIEC IPAH STL QL NAA+PROBE: NEGATIVE
SODIUM SERPL-SCNC: 138 MMOL/L (ref 136–145)
V CHOL+PARA+VUL DNA STL QL NAA+NON-PROBE: NEGATIVE
WBC # BLD AUTO: 3 K/UL (ref 3.6–11)
Y ENTEROCOL DNA STL QL NAA+NON-PROBE: NEGATIVE

## 2023-11-23 PROCEDURE — 6360000002 HC RX W HCPCS

## 2023-11-23 PROCEDURE — 80053 COMPREHEN METABOLIC PANEL: CPT

## 2023-11-23 PROCEDURE — 82962 GLUCOSE BLOOD TEST: CPT

## 2023-11-23 PROCEDURE — 85025 COMPLETE CBC W/AUTO DIFF WBC: CPT

## 2023-11-23 PROCEDURE — 99232 SBSQ HOSP IP/OBS MODERATE 35: CPT | Performed by: FAMILY MEDICINE

## 2023-11-23 PROCEDURE — 6370000000 HC RX 637 (ALT 250 FOR IP)

## 2023-11-23 PROCEDURE — 6370000000 HC RX 637 (ALT 250 FOR IP): Performed by: STUDENT IN AN ORGANIZED HEALTH CARE EDUCATION/TRAINING PROGRAM

## 2023-11-23 PROCEDURE — 6360000002 HC RX W HCPCS: Performed by: STUDENT IN AN ORGANIZED HEALTH CARE EDUCATION/TRAINING PROGRAM

## 2023-11-23 PROCEDURE — 94761 N-INVAS EAR/PLS OXIMETRY MLT: CPT

## 2023-11-23 PROCEDURE — 83735 ASSAY OF MAGNESIUM: CPT

## 2023-11-23 PROCEDURE — 36415 COLL VENOUS BLD VENIPUNCTURE: CPT

## 2023-11-23 PROCEDURE — 2580000003 HC RX 258: Performed by: STUDENT IN AN ORGANIZED HEALTH CARE EDUCATION/TRAINING PROGRAM

## 2023-11-23 PROCEDURE — 2060000000 HC ICU INTERMEDIATE R&B

## 2023-11-23 RX ORDER — POTASSIUM CHLORIDE 7.45 MG/ML
10 INJECTION INTRAVENOUS
Status: COMPLETED | OUTPATIENT
Start: 2023-11-23 | End: 2023-11-23

## 2023-11-23 RX ADMIN — POTASSIUM CHLORIDE 10 MEQ: 7.46 INJECTION, SOLUTION INTRAVENOUS at 15:07

## 2023-11-23 RX ADMIN — BUSPIRONE HYDROCHLORIDE 10 MG: 5 TABLET ORAL at 09:44

## 2023-11-23 RX ADMIN — POTASSIUM CHLORIDE 10 MEQ: 7.46 INJECTION, SOLUTION INTRAVENOUS at 17:55

## 2023-11-23 RX ADMIN — ENOXAPARIN SODIUM 40 MG: 100 INJECTION SUBCUTANEOUS at 20:13

## 2023-11-23 RX ADMIN — LAMOTRIGINE 100 MG: 100 TABLET ORAL at 09:43

## 2023-11-23 RX ADMIN — POTASSIUM CHLORIDE 10 MEQ: 7.46 INJECTION, SOLUTION INTRAVENOUS at 16:53

## 2023-11-23 RX ADMIN — RISPERIDONE 1 MG: 1 TABLET ORAL at 20:13

## 2023-11-23 RX ADMIN — SODIUM CHLORIDE, PRESERVATIVE FREE 10 ML: 5 INJECTION INTRAVENOUS at 09:00

## 2023-11-23 RX ADMIN — POTASSIUM CHLORIDE 10 MEQ: 7.46 INJECTION, SOLUTION INTRAVENOUS at 18:55

## 2023-11-23 RX ADMIN — RISPERIDONE 1 MG: 1 TABLET ORAL at 09:43

## 2023-11-23 RX ADMIN — BUSPIRONE HYDROCHLORIDE 10 MG: 5 TABLET ORAL at 20:13

## 2023-11-23 NOTE — PROGRESS NOTES
[address], [address] [facility]   Office [phone]  Fax [phone]          Subjective / Objective     Subjective  Overnight Events: none    Patient seen and examined at bedside. Patient reports 3x diarrhea overnight. She reports PO intake just goes through you. Vitals  Height: 1.651 m (5' 5\"), BP: 129/63    Physical Exam  General: No acute distress. Alert. Cooperative. Head: Normocephalic. Atraumatic. ENT:             Mucosa pink. Moist mucous membranes. Conjunctiva pink. Sclera anicteric. PERRL. Respiratory: CTAB. No w/r/r. No accessory muscle use. Cardiovascular: RRR. Normal S1,S2. No m/r/g.    GI: + bowel sounds. Nontender. No rebound tenderness or guarding. Nondistended. Extremities: No LE edema. Distal pulses present. Skin:  Neuro: Warm. No rashes. CN II-XII grossly intact. No functional neurologic deficit noted.          I/O:  11/22 0701 - 11/23 0700  In: 560 [P.O.:560]  Out: -   Inpatient Medications  Current Facility-Administered Medications   Medication Dose Route Frequency    sodium chloride flush 0.9 % injection 5-40 mL  5-40 mL IntraVENous 2 times per day    sodium chloride flush 0.9 % injection 5-40 mL  5-40 mL IntraVENous PRN    0.9 % sodium chloride infusion   IntraVENous PRN    enoxaparin (LOVENOX) injection 40 mg  40 mg SubCUTAneous Daily    ondansetron (ZOFRAN-ODT) disintegrating tablet 4 mg  4 mg Oral Q8H PRN    Or    ondansetron (ZOFRAN) injection 4 mg  4 mg IntraVENous Q6H PRN    acetaminophen (TYLENOL) tablet 650 mg  650 mg Oral Q6H PRN    Or    acetaminophen (TYLENOL) suppository 650 mg  650 mg Rectal Q6H PRN    lactated ringers IV soln infusion   IntraVENous Continuous    busPIRone (BUSPAR) tablet 10 mg  10 mg Oral BID    lamoTRIgine (LAMICTAL) tablet 100 mg  100 mg Oral Daily    [Held by provider] losartan (COZAAR) tablet 100 mg  100 mg Oral Daily    [Held by provider] metoprolol tartrate (LOPRESSOR) tablet 50 mg  50 mg Oral Q12H    risperiDONE

## 2023-11-23 NOTE — CARE COORDINATION
CARE MANAGEMENT NOTE      CM received orders for [facility] services. Pt already accepted by Abelino Angel. CM sent additional orders to [facility] via [employer]. Consult completed.      _____________________________________  Joyce Brady, [address] Management   Available via [facility]  11/23/2023   11:53 AM

## 2023-11-24 PROBLEM — C25.9 MALIGNANT NEOPLASM OF PANCREAS (HCC): Status: ACTIVE | Noted: 2023-11-24

## 2023-11-24 PROBLEM — R62.7 FAILURE TO THRIVE IN ADULT: Status: ACTIVE | Noted: 2023-11-24

## 2023-11-24 LAB
ALBUMIN SERPL-MCNC: 1.6 G/DL (ref 3.5–5)
ALBUMIN/GLOB SERPL: 0.5 (ref 1.1–2.2)
ALP SERPL-CCNC: 202 U/L (ref 45–117)
ALT SERPL-CCNC: 13 U/L (ref 12–78)
ANION GAP SERPL CALC-SCNC: 13 MMOL/L (ref 5–15)
AST SERPL-CCNC: 16 U/L (ref 15–37)
BASOPHILS # BLD: 0 K/UL (ref 0–0.1)
BASOPHILS NFR BLD: 0 % (ref 0–1)
BILIRUB SERPL-MCNC: 0.4 MG/DL (ref 0.2–1)
BUN SERPL-MCNC: 4 MG/DL (ref 6–20)
BUN/CREAT SERPL: 15 (ref 12–20)
C DIFF GDH STL QL: NEGATIVE
C DIFF TOX A+B STL QL IA: NEGATIVE
C DIFF TOXIN INTERPRETATION: NORMAL
CALCIUM SERPL-MCNC: 7.1 MG/DL (ref 8.5–10.1)
CHLORIDE SERPL-SCNC: 106 MMOL/L (ref 97–108)
CO2 SERPL-SCNC: 19 MMOL/L (ref 21–32)
CREAT SERPL-MCNC: 0.26 MG/DL (ref 0.55–1.02)
DIFFERENTIAL METHOD BLD: ABNORMAL
EOSINOPHIL # BLD: 0 K/UL (ref 0–0.4)
EOSINOPHIL NFR BLD: 1 % (ref 0–7)
ERYTHROCYTE [DISTWIDTH] IN BLOOD BY AUTOMATED COUNT: 15.5 % (ref 11.5–14.5)
G LAMBLIA AG STL QL IA: NEGATIVE
GLOBULIN SER CALC-MCNC: 3 G/DL (ref 2–4)
GLUCOSE SERPL-MCNC: 101 MG/DL (ref 65–100)
HCT VFR BLD AUTO: 23.3 % (ref 35–47)
HGB BLD-MCNC: 8 G/DL (ref 11.5–16)
IMM GRANULOCYTES # BLD AUTO: 0 K/UL (ref 0–0.04)
IMM GRANULOCYTES NFR BLD AUTO: 0 % (ref 0–0.5)
LYMPHOCYTES # BLD: 1.3 K/UL (ref 0.8–3.5)
LYMPHOCYTES NFR BLD: 43 % (ref 12–49)
MAGNESIUM SERPL-MCNC: 1.4 MG/DL (ref 1.6–2.4)
MCH RBC QN AUTO: 29.2 PG (ref 26–34)
MCHC RBC AUTO-ENTMCNC: 34.3 G/DL (ref 30–36.5)
MCV RBC AUTO: 85 FL (ref 80–99)
MONOCYTES # BLD: 0.2 K/UL (ref 0–1)
MONOCYTES NFR BLD: 6 % (ref 5–13)
NEUTS SEG # BLD: 1.5 K/UL (ref 1.8–8)
NEUTS SEG NFR BLD: 50 % (ref 32–75)
NRBC # BLD: 0 K/UL (ref 0–0.01)
NRBC BLD-RTO: 0 PER 100 WBC
O+P STL CONC: NORMAL
PLATELET # BLD AUTO: 153 K/UL (ref 150–400)
PMV BLD AUTO: 10.9 FL (ref 8.9–12.9)
POTASSIUM SERPL-SCNC: 3.2 MMOL/L (ref 3.5–5.1)
PROT SERPL-MCNC: 4.6 G/DL (ref 6.4–8.2)
RBC # BLD AUTO: 2.74 M/UL (ref 3.8–5.2)
SODIUM SERPL-SCNC: 138 MMOL/L (ref 136–145)
SPECIMEN SOURCE: NORMAL
WBC # BLD AUTO: 3 K/UL (ref 3.6–11)

## 2023-11-24 PROCEDURE — 2500000003 HC RX 250 WO HCPCS

## 2023-11-24 PROCEDURE — 83735 ASSAY OF MAGNESIUM: CPT

## 2023-11-24 PROCEDURE — 36415 COLL VENOUS BLD VENIPUNCTURE: CPT

## 2023-11-24 PROCEDURE — 2060000000 HC ICU INTERMEDIATE R&B

## 2023-11-24 PROCEDURE — 94761 N-INVAS EAR/PLS OXIMETRY MLT: CPT

## 2023-11-24 PROCEDURE — 97530 THERAPEUTIC ACTIVITIES: CPT

## 2023-11-24 PROCEDURE — 97535 SELF CARE MNGMENT TRAINING: CPT

## 2023-11-24 PROCEDURE — 6370000000 HC RX 637 (ALT 250 FOR IP): Performed by: STUDENT IN AN ORGANIZED HEALTH CARE EDUCATION/TRAINING PROGRAM

## 2023-11-24 PROCEDURE — 6360000002 HC RX W HCPCS

## 2023-11-24 PROCEDURE — 6360000002 HC RX W HCPCS: Performed by: STUDENT IN AN ORGANIZED HEALTH CARE EDUCATION/TRAINING PROGRAM

## 2023-11-24 PROCEDURE — 85025 COMPLETE CBC W/AUTO DIFF WBC: CPT

## 2023-11-24 PROCEDURE — 2580000003 HC RX 258

## 2023-11-24 PROCEDURE — 2580000003 HC RX 258: Performed by: STUDENT IN AN ORGANIZED HEALTH CARE EDUCATION/TRAINING PROGRAM

## 2023-11-24 PROCEDURE — 99233 SBSQ HOSP IP/OBS HIGH 50: CPT | Performed by: INTERNAL MEDICINE

## 2023-11-24 PROCEDURE — 99232 SBSQ HOSP IP/OBS MODERATE 35: CPT | Performed by: FAMILY MEDICINE

## 2023-11-24 PROCEDURE — 80053 COMPREHEN METABOLIC PANEL: CPT

## 2023-11-24 RX ORDER — POTASSIUM CHLORIDE 7.45 MG/ML
10 INJECTION INTRAVENOUS
Status: COMPLETED | OUTPATIENT
Start: 2023-11-24 | End: 2023-11-24

## 2023-11-24 RX ORDER — SODIUM CHLORIDE 9 MG/ML
INJECTION, SOLUTION INTRAVENOUS CONTINUOUS
Status: DISCONTINUED | OUTPATIENT
Start: 2023-11-24 | End: 2023-11-25

## 2023-11-24 RX ORDER — POTASSIUM CHLORIDE 7.45 MG/ML
10 INJECTION INTRAVENOUS
Status: DISCONTINUED | OUTPATIENT
Start: 2023-11-24 | End: 2023-11-24

## 2023-11-24 RX ORDER — MAGNESIUM SULFATE HEPTAHYDRATE 40 MG/ML
2000 INJECTION, SOLUTION INTRAVENOUS
Status: COMPLETED | OUTPATIENT
Start: 2023-11-24 | End: 2023-11-24

## 2023-11-24 RX ADMIN — MAGNESIUM SULFATE HEPTAHYDRATE 2000 MG: 40 INJECTION, SOLUTION INTRAVENOUS at 11:28

## 2023-11-24 RX ADMIN — LAMOTRIGINE 100 MG: 100 TABLET ORAL at 09:45

## 2023-11-24 RX ADMIN — POTASSIUM CHLORIDE 10 MEQ: 7.46 INJECTION, SOLUTION INTRAVENOUS at 16:34

## 2023-11-24 RX ADMIN — RISPERIDONE 1 MG: 1 TABLET ORAL at 09:45

## 2023-11-24 RX ADMIN — BUSPIRONE HYDROCHLORIDE 10 MG: 5 TABLET ORAL at 20:20

## 2023-11-24 RX ADMIN — SODIUM CHLORIDE, PRESERVATIVE FREE 10 ML: 5 INJECTION INTRAVENOUS at 20:20

## 2023-11-24 RX ADMIN — BUSPIRONE HYDROCHLORIDE 10 MG: 5 TABLET ORAL at 09:44

## 2023-11-24 RX ADMIN — SODIUM CHLORIDE, PRESERVATIVE FREE 10 ML: 5 INJECTION INTRAVENOUS at 09:46

## 2023-11-24 RX ADMIN — SODIUM CHLORIDE, PRESERVATIVE FREE 10 ML: 5 INJECTION INTRAVENOUS at 20:21

## 2023-11-24 RX ADMIN — SODIUM CHLORIDE: 9 INJECTION, SOLUTION INTRAVENOUS at 11:16

## 2023-11-24 RX ADMIN — MAGNESIUM SULFATE HEPTAHYDRATE 2000 MG: 40 INJECTION, SOLUTION INTRAVENOUS at 09:36

## 2023-11-24 RX ADMIN — RISPERIDONE 1 MG: 1 TABLET ORAL at 20:20

## 2023-11-24 RX ADMIN — POTASSIUM CHLORIDE 10 MEQ: 7.46 INJECTION, SOLUTION INTRAVENOUS at 14:10

## 2023-11-24 RX ADMIN — POTASSIUM CHLORIDE 10 MEQ: 7.46 INJECTION, SOLUTION INTRAVENOUS at 10:08

## 2023-11-24 RX ADMIN — ENOXAPARIN SODIUM 40 MG: 100 INJECTION SUBCUTANEOUS at 20:20

## 2023-11-24 RX ADMIN — POTASSIUM CHLORIDE 10 MEQ: 7.46 INJECTION, SOLUTION INTRAVENOUS at 11:30

## 2023-11-24 NOTE — ACP (ADVANCE CARE PLANNING)
Primary Decision MakerEosman Scott Spouse - [phone]    Pt does not have AMD on file; blank copy was provided by Care Manager on 11/22/23 for pt/family to review. In absence of verified Medical POA,  Melody Scott is legal NOK and would be surrogate decision maker if pt is unable to speak for herself.

## 2023-11-24 NOTE — PROGRESS NOTES
Spiritual Care Assessment/Progress Note  [address] [facility]    Name: Madi Horne MRN: [id number]    Age: 80 y.o. Sex: female   Language: English     Date: 11/24/2023            Total Time Calculated: 20 min              Spiritual Assessment begun in [facility] [address] INTERMEDIATE CARE UNIT  Service Provided For[de-identified] Patient  Referral/Consult From[de-identified] Nurse, Palliative Care  Encounter Overview/Reason : Initial Encounter    Spiritual beliefs:      [x] Involved in a jimmy tradition/spiritual practice:      [] Supported by a jimmy community:      [] Claims no spiritual orientation:      [] Seeking spiritual identity:           [] Adheres to an individual form of spirituality:      [] Not able to assess:                Identified resources for coping and support system:   Support System: Family members       [x] Prayer                  [] Devotional reading               [] Music                  [] Guided Imagery     [] Pet visits                                        [] Other: (COMMENT)     Specific area/focus of visit   Encounter:    Crisis:    Spiritual/Emotional needs: Type: Spiritual Support, Other (comment) (emotional support)  Ritual, Rites and Sacraments:    Grief, Loss, and Adjustments: Type: Adjustment to illness  Ethics/Mediation:    Behavioral Health:    Palliative Care: Type: Palliative Care, Initial/Spiritual Assessment  Advance Care Planning:      Plan/Referrals: No future visits requested    Narrative:   Visited Ms Timo Bynum in room 332 per consult for  support. Reviewed patient's chart prior to visit. Ms Timo Bynum was lying quietly in bed; her facial affect was flat. Provided spiritual presence and active listening as patient shared that she was feeling overwhelmed by all that was taking place in regards to her health. She was unable to be more specific about her concerns. Acknowledged her feelings and offered words of support.  She shared that she lived with her , child and child's spouse, and a grandchild; said they were very supportive. Ms Eveline Harrison said she believed in [email or web address] but was not part of any jimmy community. With her permission, had prayer on behalf of patient and family. Assured her of ongoing  availability for support. Alphonse Tate.  [address]  To david : [phone] ([phone])

## 2023-11-24 NOTE — PROGRESS NOTES
[address], [address] [facility]   Office [phone]  Fax [phone]          Subjective / Objective     Subjective  Overnight Events: none    Patient seen and examined at bedside. Patient still reporting no PO intake. Feels like she has no appetite. Vitals  Weight - Scale: 55 kg (121 lb 4.8 oz), BP: (!) 136/42    Physical Exam  General: No acute distress. Alert. Cooperative. Head: Normocephalic. Atraumatic. ENT:             Mucosa pink. Moist mucous membranes. Conjunctiva pink. Sclera anicteric. PERRL. Respiratory: CTAB. No w/r/r. No accessory muscle use. Cardiovascular: RRR. Normal S1,S2. No m/r/g.    GI: + bowel sounds. Nontender. No rebound tenderness or guarding. Nondistended. Extremities: No LE edema. Distal pulses present. Skin:  Neuro: Warm. No rashes. CN II-XII grossly intact. No functional neurologic deficit noted.          I/O:  11/23 0701 - 11/24 0700  In: 830 [P.O.:30; I.V.:800]  Out: 1   Inpatient Medications  Current Facility-Administered Medications   Medication Dose Route Frequency    potassium chloride 10 mEq/100 mL IVPB (Peripheral Line)  10 mEq IntraVENous Q1H    magnesium sulfate 2000 mg in water 50 mL IVPB  2,000 mg IntraVENous Q1H    sodium chloride flush 0.9 % injection 5-40 mL  5-40 mL IntraVENous 2 times per day    sodium chloride flush 0.9 % injection 5-40 mL  5-40 mL IntraVENous PRN    0.9 % sodium chloride infusion   IntraVENous PRN    enoxaparin (LOVENOX) injection 40 mg  40 mg SubCUTAneous Daily    ondansetron (ZOFRAN-ODT) disintegrating tablet 4 mg  4 mg Oral Q8H PRN    Or    ondansetron (ZOFRAN) injection 4 mg  4 mg IntraVENous Q6H PRN    acetaminophen (TYLENOL) tablet 650 mg  650 mg Oral Q6H PRN    Or    acetaminophen (TYLENOL) suppository 650 mg  650 mg Rectal Q6H PRN    busPIRone (BUSPAR) tablet 10 mg  10 mg Oral BID    lamoTRIgine (LAMICTAL) tablet 100 mg  100 mg Oral Daily    [Held by provider] losartan (COZAAR) tablet 100 mg  100 mg Oral

## 2023-11-24 NOTE — CARE COORDINATION
11/24/2023  1:25PM:  CM received update back from [facility]. They spoke with pt's spouse and spouse wanted to discuss with MD regarding chemo treatment for pt and noted that spouse was unsure if pt would want to continue treatment or not. Pt's spouse was informed that if pt was to continue treatment, she would not be placed in Hospice care. CM met with spouse and advised this was a discussion that needed to be had with his wife's doctors. CM reached out to [facility] and asked for them to meet with spouse. CM will monitor. 11:58 AM  [facility] has accepted to complete an information session with pt's spouse. Contact information provided for them to reach out to pt's spouse. Pt's spouse provided with update. 11:07 AM  CM consult noted for Hospice info session. CM met with pt's spouse at bedside to discuss. Spouse stated he would be willing to talk to an agency that is in network with pt's insurance. List provided to spouse. Spouse provided verbal choice for selection of anyone in pt's network. Referral sent to [facility]. Pending response.      Janell Bermudez

## 2023-11-24 NOTE — PLAN OF CARE
Problem: Occupational Therapy - Adult  Goal: By Discharge: Performs self-care activities at highest level of function for planned discharge setting. See evaluation for individualized goals. Description: FUNCTIONAL STATUS PRIOR TO ADMISSION:  Pt requires assistance for all ADLs, especially dressing and bathing. Pt usually has close SBA or min A for all mobility. Has a RW but tends not to use it. Has been sponge bathing recently with 's assistance. Receives Help From: Family, ADL Assistance: Needs assistance, Bath: Moderate assistance, Dressing: Moderate assistance, Grooming: Moderate assistance, Feeding: Moderate assistance, Toileting: Needs assistance, Homemaking Assistance: Needs assistance, Ambulation Assistance: Needs assistance, Transfer Assistance: Needs assistance, Active : No     HOME SUPPORT: Pt lives with her  and child and their family (including a grandchild). Occupational Therapy Goals:  Initiated 11/22/2023  1. Patient will perform grooming, standing at sink,  with Supervision within 7 day(s). 2.  Patient will perform upper body dressing with Set-up within 7 day(s). 3.  Patient will perform anterior bathing with Set-up and Supervision within 7 day(s). 4.  Patient will perform toilet transfers with Supervision  within 7 day(s). 5.  Patient will perform all aspects of toileting with Minimal Assist within 7 day(s). 6.  Patient will participate in upper extremity therapeutic exercise/activities with Supervision for 10 minutes within 7 day(s). Outcome: Progressing     OCCUPATIONAL THERAPY TREATMENT  Patient: Enrike Daniels (17 y.o. female)  Date: 11/24/2023  Primary Diagnosis: Hypotension [I95.9]  Hypotension, unspecified hypotension type [I95.9]       Precautions: Fall Risk, Other (comment) (C.diff r/o)                Chart, occupational therapy assessment, plan of care, and goals were reviewed.     ASSESSMENT  Patient continues to benefit from skilled OT services and

## 2023-11-24 NOTE — PROGRESS NOTES
Phone call to [facility] micro lab [phone]  for cdiff specimen status. Spoke with personnel who is going to check on its progress and call the unit with update.   LUCÍA Trinidad

## 2023-11-24 NOTE — CONSULTS
Palliative Medicine  Patient Name: Joanne Leong  YOB: 1955  MRN: [id number]  Age: 80 y.o. Gender: female    Date of Initial Consult: 11/24/2023  Date of Service: 11/24/2023  Time: 3:52 PM  Provider: Leesa Jeronimo MD  Hospital Day: 4  Admit Date: 11/21/2023  Referring Provider: Hospitalist      Reasons for Consultation:  Goals of Care    HISTORY OF PRESENT ILLNESS (HPI):   Joanne Leong is a 80 y.o. female with a past medical history of stage IV pancreatic cancer encasing SMA and SMV, metastatic spread to liver, left adrenal gland. S/p ERCP on 10/26/23 with stent placement in CBD, who was admitted on 11/21/2023 from home after her first dose of chemotherapy with dehydration in the setting of diarrhea. Psychosocial: Lives at home with her . She appears weak during this hospitalization but tells me that she is able to perform activities of daily living with some amount of fatigue for the most part. She needs her 's help on some bad days. PALLIATIVE DIAGNOSES:    Fatigue due to neoplasm  Pancreatic cancer  Diarrhea  Poor p.o. intake      Pertinent Hospital Diagnoses:  Principal Problem:    Hypotension  Active Problems:    Failure to thrive in adult    Malignant neoplasm of pancreas ([address])  Resolved Problems:    * No resolved hospital problems. *      ASSESSMENT AND PLAN:   Spent time with patient at bedside, her  was not available at bedside. She is able to tell me that she has incurable pancreatic cancer and that she is very weak. She was given the option of discontinuing chemotherapy but she does not want that. She feels better with IV fluids and wants to continue chemotherapy for now. Per notes, there was plan for  to meet with hospice but patient is very clear she wants to continue with chemotherapy for now. Per Dr. Andreina Dukes note, plan for aggressive rehydration along with chemotherapy to help support her.   Please phone call to , left

## 2023-11-24 NOTE — DISCHARGE INSTRUCTIONS
medication bottles) to your follow-up appointments for review by your outpatient provider(s). Please check the list of medications and be sure it includes every medication (even non-prescription medications) that your provider wants you to take. It is important that you take the medication exactly as they are prescribed. Keep your medication in the bottles provided by the pharmacist and keep a list of the medication names, dosages, and times to be taken in your wallet. Do not take other medications without consulting your doctor. If you have any questions about your medications or other instructions, please talk to your nurse or care provider before you leave the hospital.     Information obtained by:     I understand that if any problems occur once I am at home I am to contact my physician. These instructions were explained to me and I had the opportunity to ask questions. I understand and acknowledge receipt of the instructions indicated above.                                                                                                                                                Physician's or R.N.'s Signature                                                                  Date/Time                                                                                                                                              Patient or Representative Signature                                                          Date/Time

## 2023-11-24 NOTE — PLAN OF CARE
Problem: Physical Therapy - Adult  Goal: By Discharge: Performs mobility at highest level of function for planned discharge setting. See evaluation for individualized goals. Description: FUNCTIONAL STATUS PRIOR TO ADMISSION: History provided by patient and  who is caregiver. Pt ambulates short household distances with assist, inconsistent compliance with RW. Requires assist for ADLs. Receiving [facility] after recent admission at end of October. Physical Therapy Goals  Initiated 11/22/2023  1. Patient will move from supine to sit and sit to supine in bed with independence within 7 day(s). 2.  Patient will perform sit to stand with supervision/set-up within 7 day(s). 3.  Patient will transfer from bed to chair and chair to bed with supervision/set-up using the least restrictive device within 7 day(s). 4.  Patient will ambulate with supervision/set-up for 50 feet with the least restrictive device within 7 day(s). 5.  Patient will ascend/descend 3 stairs with bilat handrail(s) with minimal assistance within 7 day(s). Outcome: Progressing   PHYSICAL THERAPY TREATMENT    Patient: Inder Baird (47 y.o. female)  Date: 11/24/2023  Diagnosis: Hypotension [I95.9]  Hypotension, unspecified hypotension type [I95.9] Hypotension      Precautions: Fall Risk, Other (comment) (C.diff r/o)                    ASSESSMENT:  Patient continues to benefit from skilled PT services and is slowly progressing towards goals. Patient still flat affect, but agreeable to session. Able to come to sitting and perform dynamic sitting balance activities (LE exercises) increased rest breaks needed between sets due to elevated heart rate. Patient then able to transfer to chair, drop in blood pressure noted. See below. Heart rate at rest 117 bpm, with activity 135 bpm. Recovers with seated rest break. On room air with sats 100%.            PLAN:  Patient continues to benefit from skilled intervention to address the above PT,DPT,NCS,CLT  Minutes: 19

## 2023-11-25 VITALS
TEMPERATURE: 98.2 F | OXYGEN SATURATION: 98 % | DIASTOLIC BLOOD PRESSURE: 56 MMHG | SYSTOLIC BLOOD PRESSURE: 138 MMHG | WEIGHT: 121.3 LBS | HEART RATE: 77 BPM | HEIGHT: 65 IN | BODY MASS INDEX: 20.21 KG/M2 | RESPIRATION RATE: 14 BRPM

## 2023-11-25 LAB
ALBUMIN SERPL-MCNC: 1.9 G/DL (ref 3.5–5)
ALBUMIN/GLOB SERPL: 0.7 (ref 1.1–2.2)
ALP SERPL-CCNC: 208 U/L (ref 45–117)
ALT SERPL-CCNC: 14 U/L (ref 12–78)
ANION GAP SERPL CALC-SCNC: 10 MMOL/L (ref 5–15)
AST SERPL-CCNC: 13 U/L (ref 15–37)
BASOPHILS # BLD: 0 K/UL (ref 0–0.1)
BASOPHILS NFR BLD: 0 % (ref 0–1)
BILIRUB SERPL-MCNC: 0.3 MG/DL (ref 0.2–1)
BUN SERPL-MCNC: 3 MG/DL (ref 6–20)
BUN/CREAT SERPL: 12 (ref 12–20)
CALCIUM SERPL-MCNC: 6.9 MG/DL (ref 8.5–10.1)
CHLORIDE SERPL-SCNC: 105 MMOL/L (ref 97–108)
CO2 SERPL-SCNC: 19 MMOL/L (ref 21–32)
CREAT SERPL-MCNC: 0.26 MG/DL (ref 0.55–1.02)
DIFFERENTIAL METHOD BLD: ABNORMAL
EOSINOPHIL # BLD: 0 K/UL (ref 0–0.4)
EOSINOPHIL NFR BLD: 2 % (ref 0–7)
ERYTHROCYTE [DISTWIDTH] IN BLOOD BY AUTOMATED COUNT: 16.1 % (ref 11.5–14.5)
GLOBULIN SER CALC-MCNC: 2.9 G/DL (ref 2–4)
GLUCOSE SERPL-MCNC: 98 MG/DL (ref 65–100)
HCT VFR BLD AUTO: 25.9 % (ref 35–47)
HGB BLD-MCNC: 8.7 G/DL (ref 11.5–16)
IMM GRANULOCYTES # BLD AUTO: 0 K/UL (ref 0–0.04)
IMM GRANULOCYTES NFR BLD AUTO: 0 % (ref 0–0.5)
LYMPHOCYTES # BLD: 0.8 K/UL (ref 0.8–3.5)
LYMPHOCYTES NFR BLD: 36 % (ref 12–49)
MAGNESIUM SERPL-MCNC: 2.1 MG/DL (ref 1.6–2.4)
MCH RBC QN AUTO: 28.1 PG (ref 26–34)
MCHC RBC AUTO-ENTMCNC: 33.6 G/DL (ref 30–36.5)
MCV RBC AUTO: 83.5 FL (ref 80–99)
MONOCYTES # BLD: 0.2 K/UL (ref 0–1)
MONOCYTES NFR BLD: 10 % (ref 5–13)
NEUTS SEG # BLD: 1.2 K/UL (ref 1.8–8)
NEUTS SEG NFR BLD: 52 % (ref 32–75)
NRBC # BLD: 0 K/UL (ref 0–0.01)
NRBC BLD-RTO: 0 PER 100 WBC
PLATELET # BLD AUTO: 169 K/UL (ref 150–400)
PMV BLD AUTO: 10.2 FL (ref 8.9–12.9)
POTASSIUM SERPL-SCNC: 3.1 MMOL/L (ref 3.5–5.1)
PROT SERPL-MCNC: 4.8 G/DL (ref 6.4–8.2)
RBC # BLD AUTO: 3.1 M/UL (ref 3.8–5.2)
SODIUM SERPL-SCNC: 134 MMOL/L (ref 136–145)
WBC # BLD AUTO: 2.3 K/UL (ref 3.6–11)

## 2023-11-25 PROCEDURE — 36415 COLL VENOUS BLD VENIPUNCTURE: CPT

## 2023-11-25 PROCEDURE — 6370000000 HC RX 637 (ALT 250 FOR IP): Performed by: STUDENT IN AN ORGANIZED HEALTH CARE EDUCATION/TRAINING PROGRAM

## 2023-11-25 PROCEDURE — 83735 ASSAY OF MAGNESIUM: CPT

## 2023-11-25 PROCEDURE — 80053 COMPREHEN METABOLIC PANEL: CPT

## 2023-11-25 PROCEDURE — 99238 HOSP IP/OBS DSCHRG MGMT 30/<: CPT | Performed by: FAMILY MEDICINE

## 2023-11-25 PROCEDURE — 85025 COMPLETE CBC W/AUTO DIFF WBC: CPT

## 2023-11-25 PROCEDURE — 2580000003 HC RX 258: Performed by: STUDENT IN AN ORGANIZED HEALTH CARE EDUCATION/TRAINING PROGRAM

## 2023-11-25 RX ORDER — ROSUVASTATIN CALCIUM 5 MG/1
5 TABLET, COATED ORAL NIGHTLY
Qty: 30 TABLET | Refills: 0 | Status: SHIPPED | OUTPATIENT
Start: 2023-11-25

## 2023-11-25 RX ADMIN — RISPERIDONE 1 MG: 1 TABLET ORAL at 08:50

## 2023-11-25 RX ADMIN — SODIUM CHLORIDE, PRESERVATIVE FREE 10 ML: 5 INJECTION INTRAVENOUS at 08:50

## 2023-11-25 RX ADMIN — POTASSIUM BICARBONATE 40 MEQ: 782 TABLET, EFFERVESCENT ORAL at 06:26

## 2023-11-25 RX ADMIN — LAMOTRIGINE 100 MG: 100 TABLET ORAL at 08:50

## 2023-11-25 RX ADMIN — BUSPIRONE HYDROCHLORIDE 10 MG: 5 TABLET ORAL at 08:50

## 2023-11-25 NOTE — CARE COORDINATION
Care Management Discharge Note:   Patient with d/c order. Patient d/c home with family transportation. Patient set up with [facility].     ______________________  Deyanira URIBE, RN  Care Management  11/25/2023  12:57 PM

## 2023-11-25 NOTE — PROGRESS NOTES
1900 - Bedside and Verbal shift change report given to [address] [place] [address] Road (oncoming nurse) by Sherry Witt RN (offgoing nurse). Report included the following information Nurse Handoff Report, ED Encounter Summary, Recent Results, and Cardiac Rhythm SR .     2000- RN confirmed potassium lab recheck with Family Medicine MD - MD advised RN to just check with morning labs.

## 2023-11-25 NOTE — PROGRESS NOTES
50 mg  50 mg Oral Q12H    risperiDONE (RISPERDAL) tablet 1 mg  1 mg Oral BID    traMADol (ULTRAM) tablet 50 mg  50 mg Oral Q6H PRN     Allergies  Allergies   Allergen Reactions    Ciprofloxacin      AAA    Levofloxacin      Has AAA     CBC:  Recent Labs     11/23/23  0042 11/24/23  0033 11/25/23  0343   WBC 3.0* 3.0* 2.3*   HGB 8.4* 8.0* 8.7*   * 153 054       Metabolic Panel:  Recent Labs     11/23/23  0042 11/24/23  0033 11/25/23  0343    138 134*   K 3.2* 3.2* 3.1*    106 105   CO2 21 19* 19*   BUN 7 4* 3*   MG 1.6 1.4* 2.1   ALT 16 13 14            Assessment and Plan     Vishnu Mccurdy is a 80 y.o. female with PMHx of stage IV pancreatic cancer, HTN, bipolar I disorder, anemia, HLD, who is admitted for hypotension secondary to poor PO intake. Diarrhea/poor PO intake: Diarrhea and PO intake improving. Low PO intake likely 2/2 to recent chemotherapy + advanced pancreatic cancer. EBP and Cdiff negative. - Regular diet. - Zofran prn  - Ova + parasite pending    Stage IV pancreatic cancer: follows with Dr. Musa Mcmahan. Started chemotherapy 11/13. Discussion with Dr. Musa Mcmahan about code status while inpatient- Patient considering DNR status. Patient continues to have low PO intake, but improved. Patient plans to continue chemotherapy, upcoming appointment next week. - Heme/onc consult   - Palliative consulted, will follow-up OP. Hypotension: Resolved. Likely related to poor PO intake combined with vomiting and diarrhea leading to volume depletion.    -Continue to monitor BP  -Encourage PO intake. Anemia: likely secondary to underlying cancer.  -Daily CBC     Elevated TSH: POA TSH 6.64. Likely 2/2 to acute illness.  -Recheck outpatient. Hypertension: On arrival pt was hypotensive. Home metoprolol 50mg BID and losartan 100mg QD.   - Hold home metoprolol and losartan. - Will continue to monitor at this time and readjust as BP's trend.      History of mood disorders: Bipolar 1, panic

## 2023-11-25 NOTE — PROGRESS NOTES
0715: Bedside and Verbal shift change report given to Sarabjit Henao (oncoming nurse) by Vinita Jett (offgoing nurse). Report included the following information Adult Overview, Recent Results, Med Rec Status, and Cardiac Rhythm SR to STabasilia . 1105: spoke with Donalee Severe regarding pt being up for discharge but showing that she needed [address] [facility]. Barb advised  was already set up and approved with [employer].

## 2023-11-25 NOTE — DISCHARGE SUMMARY
or pneumothorax is seen. Vascular clarity is normal. Patient is status post LEFT shoulder reverse total arthroplasty. No evidence of pneumonia or edema. IR PORT PLACEMENT > 5 YEARS    Result Date: 11/9/2023  PROCEDURE:  ULTRASOUND AND FLUOROSCOPIC GUIDED MEDIPORT PLACEMENT HISTORY: Aron Patel is a 78years old Female with pancreatic cancer. :  Michelet Mcnulty NP ATTENDING:  Milind Escalante MD CONSENT:  After full discussion of the procedure, including risks, benefits and alternatives, both verbal and written consent were obtained. TECHNIQUE: A timeout was called to verify the correct patient, procedure, site and allergies. Preliminary ultrasound imaging of the right neck demonstrated a patent and compressible internal jugular vein. Images were archived to PACS. The skin was prepped and draped utilizing maximal sterile barrier technique. 1% lidocaine was utilized for local anesthesia. The right internal jugular vein was accessed under direct sonographic guidance using a 21 gauge micropuncture needle. A 0.018 inch guidewire was advanced through the needle into the vein. The needle was then exchanged for a 4 [place] micropuncture sheath. The 0.018 wire was used to measure the intravascular length and removed. A 0.035 inch J wire was advanced through the sheath into the inferior vena cava. Attention was next directed to the right upper chest.  1% lidocaine with epinephrine was utilized for local anesthesia. A small horizontal incision was made inferior to the clavicle. A subcutaneous pocket was then formed using a combination of sharp and blunt dissection. The mediport was placed into the pocket and the catheter was tunneled to the venotomy site. The catheter was measured and cut to length. The micropuncture sheath was removed and a peel-away sheath was inserted over the guidewire. The catheter was inserted into the peel-away sheath and the sheath was removed.   The tip of the catheter was measuring 1.1 x 0.9 cm. Follow-up chest CT advised in 3-6 months. Sequelae status post interval status post biliary stent placement, partially imaged. 23x        Chronic diagnoses   Patient Active Problem List   Diagnosis    WON (acute kidney injury) ([address])    [place] toxicity    Hypercholesteremia    Anemia    Bipolar 1 disorder ([address])    Acute metabolic encephalopathy    Cigarette nicotine dependence without complication    Prediabetes    Anxiety    Essential hypertension with goal blood pressure less than 130/80    Seizure (HCC)    Anorexia    Pancreatic mass    Abnormal weight loss    Abdominal aortic aneurysm (AAA) without rupture (HCC)    Biliary obstruction    Tobacco use    Mixed hyperlipidemia    Acute cystitis without hematuria    Pancreatic cancer metastasized to liver [facility])    Encounter for screening for other viral diseases    Malignancy ([address])    Hypotension    Failure to thrive in adult    Malignant neoplasm of pancreas ([address])           Diet:  Regular diet. Activity:  As tolerated     Disposition: Home with home health. Discharge instructions to patient/family  Please seek medical attention for any new or worsening symptoms particularly chest pain, shortness of breath, fever, chills, nausea, vomiting, diarrhea, change in mentation, falling, weakness, bleeding.     Follow up plans/appointments  Follow-up Information       Follow up With Specialties Details Why Contact Info    Orquidea Lynch DO Family Medicine Schedule an appointment as soon as possible for a visit  [facility],5Th Floor  [facility]  [phone]      Patricia Pittman MD Hematology and Oncology, Hematology, Oncology Follow up on 11/27/2023  [address] [place] [address],[address]  [phone]               Ken Grigsby MD  Family Medicine Resident     For Billing    Chief Complaint   Patient presents with    Diarrhea    Hypotension    Fatigue       Principal Problem:    Hypotension  Active

## 2023-11-27 ENCOUNTER — OFFICE VISIT (OUTPATIENT)
Age: 68
End: 2023-11-27
Payer: MEDICARE

## 2023-11-27 ENCOUNTER — TELEPHONE (OUTPATIENT)
Age: 68
End: 2023-11-27

## 2023-11-27 ENCOUNTER — HOSPITAL ENCOUNTER (OUTPATIENT)
Facility: HOSPITAL | Age: 68
Setting detail: INFUSION SERIES
Discharge: HOME | End: 2023-11-27
Payer: MEDICARE

## 2023-11-27 VITALS
OXYGEN SATURATION: 98 % | BODY MASS INDEX: 19.63 KG/M2 | HEIGHT: 65 IN | DIASTOLIC BLOOD PRESSURE: 49 MMHG | HEART RATE: 58 BPM | WEIGHT: 117.8 LBS | TEMPERATURE: 97.6 F | SYSTOLIC BLOOD PRESSURE: 121 MMHG | RESPIRATION RATE: 18 BRPM

## 2023-11-27 VITALS
TEMPERATURE: 97.8 F | HEIGHT: 65 IN | WEIGHT: 117.8 LBS | BODY MASS INDEX: 19.63 KG/M2 | HEART RATE: 83 BPM | DIASTOLIC BLOOD PRESSURE: 52 MMHG | OXYGEN SATURATION: 99 % | RESPIRATION RATE: 20 BRPM | SYSTOLIC BLOOD PRESSURE: 86 MMHG

## 2023-11-27 DIAGNOSIS — C25.9 PANCREATIC ADENOCARCINOMA (HCC): Primary | ICD-10-CM

## 2023-11-27 DIAGNOSIS — R63.0 ANOREXIA: ICD-10-CM

## 2023-11-27 DIAGNOSIS — R53.1 WEAKNESS: ICD-10-CM

## 2023-11-27 DIAGNOSIS — Z11.59 ENCOUNTER FOR SCREENING FOR OTHER VIRAL DISEASES: ICD-10-CM

## 2023-11-27 DIAGNOSIS — Z71.89 GOALS OF CARE, COUNSELING/DISCUSSION: ICD-10-CM

## 2023-11-27 DIAGNOSIS — C25.9 MALIGNANT NEOPLASM OF PANCREAS, UNSPECIFIED LOCATION OF MALIGNANCY (HCC): Primary | ICD-10-CM

## 2023-11-27 DIAGNOSIS — C25.9 PANCREATIC CANCER METASTASIZED TO LIVER (HCC): ICD-10-CM

## 2023-11-27 DIAGNOSIS — E86.1 HYPOTENSION DUE TO HYPOVOLEMIA: ICD-10-CM

## 2023-11-27 DIAGNOSIS — C78.7 METASTASES TO THE LIVER (HCC): ICD-10-CM

## 2023-11-27 DIAGNOSIS — E87.6 HYPOKALEMIA: ICD-10-CM

## 2023-11-27 DIAGNOSIS — R19.7 DIARRHEA, UNSPECIFIED TYPE: ICD-10-CM

## 2023-11-27 DIAGNOSIS — I95.89 HYPOTENSION DUE TO HYPOVOLEMIA: ICD-10-CM

## 2023-11-27 DIAGNOSIS — C78.7 PANCREATIC CANCER METASTASIZED TO LIVER (HCC): ICD-10-CM

## 2023-11-27 LAB
ALBUMIN SERPL-MCNC: 1.9 G/DL (ref 3.5–5)
ALBUMIN/GLOB SERPL: 0.6 (ref 1.1–2.2)
ALP SERPL-CCNC: 200 U/L (ref 45–117)
ALT SERPL-CCNC: 17 U/L (ref 12–78)
ANION GAP SERPL CALC-SCNC: 8 MMOL/L (ref 5–15)
AST SERPL-CCNC: 18 U/L (ref 15–37)
BACTERIA SPEC CULT: NORMAL
BACTERIA SPEC CULT: NORMAL
BASOPHILS # BLD: 0 K/UL (ref 0–0.1)
BASOPHILS NFR BLD: 0 % (ref 0–1)
BILIRUB SERPL-MCNC: 0.5 MG/DL (ref 0.2–1)
BUN SERPL-MCNC: 3 MG/DL (ref 6–20)
BUN/CREAT SERPL: 10 (ref 12–20)
CALCIUM SERPL-MCNC: 7.3 MG/DL (ref 8.5–10.1)
CHLORIDE SERPL-SCNC: 106 MMOL/L (ref 97–108)
CO2 SERPL-SCNC: 26 MMOL/L (ref 21–32)
CREAT SERPL-MCNC: 0.31 MG/DL (ref 0.55–1.02)
DIFFERENTIAL METHOD BLD: ABNORMAL
EOSINOPHIL # BLD: 0.1 K/UL (ref 0–0.4)
EOSINOPHIL NFR BLD: 3 % (ref 0–7)
ERYTHROCYTE [DISTWIDTH] IN BLOOD BY AUTOMATED COUNT: 17.2 % (ref 11.5–14.5)
GLOBULIN SER CALC-MCNC: 3.1 G/DL (ref 2–4)
GLUCOSE SERPL-MCNC: 99 MG/DL (ref 65–100)
HCT VFR BLD AUTO: 27.6 % (ref 35–47)
HGB BLD-MCNC: 9.2 G/DL (ref 11.5–16)
IMM GRANULOCYTES # BLD AUTO: 0 K/UL
IMM GRANULOCYTES NFR BLD AUTO: 0 %
LYMPHOCYTES # BLD: 1.7 K/UL (ref 0.8–3.5)
LYMPHOCYTES NFR BLD: 59 % (ref 12–49)
MCH RBC QN AUTO: 28.7 PG (ref 26–34)
MCHC RBC AUTO-ENTMCNC: 33.3 G/DL (ref 30–36.5)
MCV RBC AUTO: 86 FL (ref 80–99)
MONOCYTES # BLD: 0.4 K/UL (ref 0–1)
MONOCYTES NFR BLD: 12 % (ref 5–13)
NEUTS SEG # BLD: 0.8 K/UL (ref 1.8–8)
NEUTS SEG NFR BLD: 26 % (ref 32–75)
NRBC # BLD: 0 K/UL (ref 0–0.01)
NRBC BLD-RTO: 0 PER 100 WBC
PLATELET # BLD AUTO: 242 K/UL (ref 150–400)
PMV BLD AUTO: 10.2 FL (ref 8.9–12.9)
POTASSIUM SERPL-SCNC: 2.7 MMOL/L (ref 3.5–5.1)
PROT SERPL-MCNC: 5 G/DL (ref 6.4–8.2)
RBC # BLD AUTO: 3.21 M/UL (ref 3.8–5.2)
RBC MORPH BLD: ABNORMAL
SERVICE CMNT-IMP: NORMAL
SERVICE CMNT-IMP: NORMAL
SODIUM SERPL-SCNC: 140 MMOL/L (ref 136–145)
WBC # BLD AUTO: 3 K/UL (ref 3.6–11)

## 2023-11-27 PROCEDURE — G8427 DOCREV CUR MEDS BY ELIG CLIN: HCPCS | Performed by: INTERNAL MEDICINE

## 2023-11-27 PROCEDURE — 1111F DSCHRG MED/CURRENT MED MERGE: CPT | Performed by: INTERNAL MEDICINE

## 2023-11-27 PROCEDURE — 3074F SYST BP LT 130 MM HG: CPT | Performed by: INTERNAL MEDICINE

## 2023-11-27 PROCEDURE — 85025 COMPLETE CBC W/AUTO DIFF WBC: CPT

## 2023-11-27 PROCEDURE — 99214 OFFICE O/P EST MOD 30 MIN: CPT | Performed by: INTERNAL MEDICINE

## 2023-11-27 PROCEDURE — 6360000002 HC RX W HCPCS: Performed by: NURSE PRACTITIONER

## 2023-11-27 PROCEDURE — 1123F ACP DISCUSS/DSCN MKR DOCD: CPT | Performed by: INTERNAL MEDICINE

## 2023-11-27 PROCEDURE — 2580000003 HC RX 258: Performed by: NURSE PRACTITIONER

## 2023-11-27 PROCEDURE — 1090F PRES/ABSN URINE INCON ASSESS: CPT | Performed by: INTERNAL MEDICINE

## 2023-11-27 PROCEDURE — 96374 THER/PROPH/DIAG INJ IV PUSH: CPT

## 2023-11-27 PROCEDURE — G8399 PT W/DXA RESULTS DOCUMENT: HCPCS | Performed by: INTERNAL MEDICINE

## 2023-11-27 PROCEDURE — 3078F DIAST BP <80 MM HG: CPT | Performed by: INTERNAL MEDICINE

## 2023-11-27 PROCEDURE — 4004F PT TOBACCO SCREEN RCVD TLK: CPT | Performed by: INTERNAL MEDICINE

## 2023-11-27 PROCEDURE — G8484 FLU IMMUNIZE NO ADMIN: HCPCS | Performed by: INTERNAL MEDICINE

## 2023-11-27 PROCEDURE — 80053 COMPREHEN METABOLIC PANEL: CPT

## 2023-11-27 PROCEDURE — 96361 HYDRATE IV INFUSION ADD-ON: CPT

## 2023-11-27 PROCEDURE — 96360 HYDRATION IV INFUSION INIT: CPT

## 2023-11-27 PROCEDURE — 3017F COLORECTAL CA SCREEN DOC REV: CPT | Performed by: INTERNAL MEDICINE

## 2023-11-27 PROCEDURE — G8420 CALC BMI NORM PARAMETERS: HCPCS | Performed by: INTERNAL MEDICINE

## 2023-11-27 PROCEDURE — 6370000000 HC RX 637 (ALT 250 FOR IP): Performed by: NURSE PRACTITIONER

## 2023-11-27 PROCEDURE — 86301 IMMUNOASSAY TUMOR CA 19-9: CPT

## 2023-11-27 RX ORDER — DEXAMETHASONE SODIUM PHOSPHATE 4 MG/ML
2 INJECTION, SOLUTION INTRA-ARTICULAR; INTRALESIONAL; INTRAMUSCULAR; INTRAVENOUS; SOFT TISSUE ONCE
Status: COMPLETED | OUTPATIENT
Start: 2023-11-27 | End: 2023-11-27

## 2023-11-27 RX ORDER — POTASSIUM CHLORIDE 750 MG/1
60 TABLET, EXTENDED RELEASE ORAL ONCE
Status: COMPLETED | OUTPATIENT
Start: 2023-11-27 | End: 2023-11-27

## 2023-11-27 RX ORDER — 0.9 % SODIUM CHLORIDE 0.9 %
1000 INTRAVENOUS SOLUTION INTRAVENOUS ONCE
Status: COMPLETED | OUTPATIENT
Start: 2023-11-27 | End: 2023-11-27

## 2023-11-27 RX ADMIN — SODIUM CHLORIDE 1000 ML: 9 INJECTION, SOLUTION INTRAVENOUS at 10:38

## 2023-11-27 RX ADMIN — POTASSIUM CHLORIDE 60 MEQ: 750 TABLET, EXTENDED RELEASE ORAL at 10:35

## 2023-11-27 RX ADMIN — DEXAMETHASONE SODIUM PHOSPHATE 2 MG: 4 INJECTION INTRA-ARTICULAR; INTRALESIONAL; INTRAMUSCULAR; INTRAVENOUS; SOFT TISSUE at 10:56

## 2023-11-27 ASSESSMENT — PAIN SCALES - GENERAL: PAINLEVEL_OUTOF10: 0

## 2023-11-27 NOTE — PROGRESS NOTES
[facility] [place] at [address], [address] [place] [address]  Alyssa Pair: [phone]  F: [phone]      Reason for Visit:   Polo Hodge is a 80 y.o. female who is seen today for evaluation of pancreatic mass; ERCP/EUS bx     Hematology Oncology Treatment History:     Diagnosis: Pancreatic adenocarcinoma    Stage: IV    Pathology:   10/26/23 EUS biopsy of head of pancreas: Adenocarcinoma (see Comment). Prior Treatment: None    Current Treatment: mFOLFIRINOX, started 11/13/23 - current     History of Present Illness:   Polo Hodge is a pleasant 80 y.o. female who is seen for follow up of pancretic cancer   She was hospitalized 10/24/23 for generalized weakness, constipation, weight loss. She was found to have Alk phos > 2330, ti-bili 2-3 range as well as imaging with pancreatic mass causing biliary obstruction, liver masses. She underwent metal stent placement in CBD as well as biopsy of pancreatic head mass which showed adenocarcinoma. No EGD/colonoscopy. Mammogram: 1/31/23 2 small asymmetries in MISAEL breast; recommended additional views with ultrasound    Interval History:  Patient here for follow up of pancreatic cancer. She was recently admitted from 11/21/23 to 11/25/23 for hypotension due to diarrhea and poor PO intake. Her  provides patient's history. Reports she is eating very little. Reports yesterday she drank 2 bottles of water, ate 1 fruit cup and a hot dog. Reports she is too weak to walk and requires one person assist for transfers. Reports she had 2 solid stools yesterday. Had one episode of diarrhea today. No fevers or chills. No nausea or vomiting. Present with , [employer] [employer].       PMHx: HTN, HLD, Seizure disorder  PSurgHx: Left shoulder replacement  SHx: 1 ppd x 40+ yrs; ETOH use: drank on weekends stopped in earl 90s.   with [employer] [employer]. Has 1 child and grandchild living in same house   FHx: Father had colon cancer  Meds/Allergies:

## 2023-11-27 NOTE — TELEPHONE ENCOUNTER
Fredis from [facility] reached out stating they need an evaluate and treat order for hospice faxed over. PT will be admitted tomorrow, 11-28.     Fax: [phone]    # [phone]

## 2023-11-27 NOTE — PROGRESS NOTES
[facility] Progress Note    Date: 2023    Name: Tanvir Conner    MRN: [id number]         : 1955    Ms. Deepthi Lowe Arrived via wheelchair and in no distress for C2D1 of Folfirinox (HELD) Regimen. Assessment was completed by Wilmer Vergara. right chest wall port accessed without difficulty, labs drawn & sent for processing. 2017 Patient proceed to appointment with Dr. Thomasina Oppenheim. Treatment held, new orders for 1 L Hydration, dexamethasone, and potassium PO added to treatment today. Ms. Soni's vitals were reviewed. Vitals:    23 0805   BP: (!) 87/48   Pulse:    Resp:    Temp:    SpO2:        Lab results were obtained and reviewed.   Recent Results (from the past 12 hour(s))   Comprehensive metabolic panel    Collection Time: 23  8:17 AM   Result Value Ref Range    Sodium 140 136 - 145 mmol/L    Potassium 2.7 (LL) 3.5 - 5.1 mmol/L    Chloride 106 97 - 108 mmol/L    CO2 26 21 - 32 mmol/L    Anion Gap 8 5 - 15 mmol/L    Glucose 99 65 - 100 mg/dL    BUN 3 (L) 6 - 20 MG/DL    Creatinine 0.31 (L) 0.55 - 1.02 MG/DL    Bun/Cre Ratio 10 (L) 12 - 20      Est, Glom Filt Rate >60 >60 ml/min/1.73m2    Calcium 7.3 (L) 8.5 - 10.1 MG/DL    Total Bilirubin 0.5 0.2 - 1.0 MG/DL    ALT 17 12 - 78 U/L    AST 18 15 - 37 U/L    Alk Phosphatase 200 (H) 45 - 117 U/L    Total Protein 5.0 (L) 6.4 - 8.2 g/dL    Albumin 1.9 (L) 3.5 - 5.0 g/dL    Globulin 3.1 2.0 - 4.0 g/dL    Albumin/Globulin Ratio 0.6 (L) 1.1 - 2.2     CBC With Auto Differential    Collection Time: 23  8:17 AM   Result Value Ref Range    WBC 3.0 (L) 3.6 - 11.0 K/uL    RBC 3.21 (L) 3.80 - 5.20 M/uL    Hemoglobin 9.2 (L) 11.5 - 16.0 g/dL    Hematocrit 27.6 (L) 35.0 - 47.0 %    MCV 86.0 80.0 - 99.0 FL    MCH 28.7 26.0 - 34.0 PG    MCHC 33.3 30.0 - 36.5 g/dL    RDW 17.2 (H) 11.5 - 14.5 %    Platelets 201 266 - 252 K/uL    MPV 10.2 8.9 - 12.9 FL    Nucleated RBCs 0.0 0  WBC    nRBC 0.00 0.00 - 0.01 K/uL    Neutrophils % 26 (L) 32 - 75 %

## 2023-11-28 ENCOUNTER — TELEMEDICINE (OUTPATIENT)
Age: 68
End: 2023-11-28

## 2023-11-28 ENCOUNTER — CLINICAL DOCUMENTATION (OUTPATIENT)
Age: 68
End: 2023-11-28

## 2023-11-28 DIAGNOSIS — I95.89 HYPOTENSION DUE TO HYPOVOLEMIA: ICD-10-CM

## 2023-11-28 DIAGNOSIS — C25.9 MALIGNANT NEOPLASM OF PANCREAS, UNSPECIFIED LOCATION OF MALIGNANCY (HCC): Primary | ICD-10-CM

## 2023-11-28 DIAGNOSIS — E86.1 HYPOTENSION DUE TO HYPOVOLEMIA: ICD-10-CM

## 2023-11-28 DIAGNOSIS — Z09 HOSPITAL DISCHARGE FOLLOW-UP: ICD-10-CM

## 2023-11-28 DIAGNOSIS — K59.04 CHRONIC IDIOPATHIC CONSTIPATION: ICD-10-CM

## 2023-11-28 RX ORDER — TRAMADOL HYDROCHLORIDE 50 MG/1
50 TABLET ORAL EVERY 6 HOURS PRN
Qty: 56 TABLET | Refills: 0 | Status: SHIPPED | OUTPATIENT
Start: 2023-11-28 | End: 2023-12-12

## 2023-11-28 NOTE — PROGRESS NOTES
[address] [facility] health certification & POC were put on Dr Anderson Roll desk to process

## 2023-11-28 NOTE — PROGRESS NOTES
Progress Note    she is a 80y.o. year old female who presents for evaluation. Subjective:     Patient unfortunately has been diagnosed with metastatic pancreatic cancer. She is having epigastric pain particularly after she eats and around when she gets her medications. I given her tramadol previously and this does control her pain. She has entered hospice with [facility] and discussed that they would be managing her pain medication afterwards but I would provide her with a 2-week prescription. Recently hospitalized for hypotension has since seen hematology/oncology they are giving her fluids. Her constipation has also been well controlled on the Kaelyn Curls  wants to know if we should continue this which we should. Reviewed PmHx, RxHx, FmHx, SocHx, AllgHx and updated and dated in the chart. Review of Systems - negative except as listed above in the HPI    Objective: There were no vitals filed for this visit. Current Outpatient Medications   Medication Sig    traMADol (ULTRAM) 50 MG tablet Take 1 tablet by mouth every 6 hours as needed for Pain for up to 14 days. Max Daily Amount: 200 mg    rosuvastatin (CRESTOR) 5 MG tablet Take 1 tablet by mouth nightly    hydrOXYzine HCl (ATARAX) 25 MG tablet Take 1 tablet by mouth 3 times daily as needed for Itching    lidocaine-prilocaine (EMLA) 2.5-2.5 % cream Apply topically as needed. prochlorperazine (COMPAZINE) 10 MG tablet Take 1 tablet by mouth every 6 hours as needed (Nausea)    ondansetron (ZOFRAN-ODT) 4 MG disintegrating tablet Take 2 tablets by mouth 3 times daily as needed for Nausea or Vomiting    dexamethasone (DECADRON) 4 MG tablet Take 2 tablets by mouth daily (with breakfast) Take only on days 2 and 3 following chemotherapy.     metoprolol tartrate (LOPRESSOR) 50 MG tablet TAKE 1 TABLET BY MOUTH EVERY 12 HOURS    linaclotide (LINZESS) 145 MCG capsule Take 1 capsule by mouth every morning (before breakfast)    UNABLE TO FIND

## 2023-11-29 ENCOUNTER — TELEPHONE (OUTPATIENT)
Age: 68
End: 2023-11-29

## 2023-11-29 LAB
CANCER AG19-9 SERPL-ACNC: 507 U/ML (ref 0–35)
G LAMBLIA AG STL QL IA: NEGATIVE
O+P STL CONC: NORMAL
SPECIMEN SOURCE: NORMAL

## 2023-11-29 NOTE — TELEPHONE ENCOUNTER
11/29/23 8:53 AM Call placed to Lucero Garza, hospice representative, who advised that patient was admitted to hospice services yesterday. Will update provider and cancel future appointments. No further questions or concerns at this time.

## 2023-11-29 NOTE — TELEPHONE ENCOUNTER
Pts  called in and is asking if you wanted to keep her on the linaclotide (LINZESS) 145 MCG capsule? Stated if you would like to keep her on it they would need a refill please. Thanks.

## 2023-11-29 NOTE — TELEPHONE ENCOUNTER
The medication has 5 refills on it. I told him yesterday at the appointment I wanted her to stay on it.

## 2023-11-29 NOTE — TELEPHONE ENCOUNTER
Og called in looking to do a Peer to Peer for the genetic testing    Reference # [id number]  # [phone]

## 2023-12-01 ENCOUNTER — CLINICAL DOCUMENTATION (OUTPATIENT)
Age: 68
End: 2023-12-01

## 2023-12-01 ENCOUNTER — TELEPHONE (OUTPATIENT)
Age: 68
End: 2023-12-01

## 2023-12-01 NOTE — TELEPHONE ENCOUNTER
Patient spouse called and stated that the patients blood pressure is doing better but that she still cannot walk. He also stated that the patient is now at [facility].      # [phone]

## 2023-12-04 ENCOUNTER — HOSPITAL ENCOUNTER (OUTPATIENT)
Facility: HOSPITAL | Age: 68
Setting detail: INFUSION SERIES
End: 2023-12-04

## 2023-12-04 DIAGNOSIS — C78.7 PANCREATIC CANCER METASTASIZED TO LIVER (HCC): Primary | ICD-10-CM

## 2023-12-04 DIAGNOSIS — Z11.59 ENCOUNTER FOR SCREENING FOR OTHER VIRAL DISEASES: ICD-10-CM

## 2023-12-04 DIAGNOSIS — C25.9 PANCREATIC CANCER METASTASIZED TO LIVER (HCC): Primary | ICD-10-CM

## 2023-12-13 ENCOUNTER — TELEPHONE (OUTPATIENT)
Age: 68
End: 2023-12-13

## 2023-12-13 RX ORDER — POTASSIUM CHLORIDE 750 MG/1
10 TABLET, EXTENDED RELEASE ORAL 2 TIMES DAILY
Qty: 60 TABLET | Refills: 1 | Status: SHIPPED | OUTPATIENT
Start: 2023-12-13

## 2023-12-13 NOTE — TELEPHONE ENCOUNTER
Called and spoke with patient's . He states that when she was in the hospital, they changed her potassium chloride 10 MEQ to 2 tablets daily and the script he just picked up was for one tablet tablet. He would like to know if you can send in a new RX for 2 tablets daily.

## 2023-12-13 NOTE — TELEPHONE ENCOUNTER
Uriel Beal states that [address] [place] [address] Drive make some changes with patient's mediations. He has some questions.  Please call him

## (undated) DEVICE — HANDPIECE SET WITH COAXIAL HIGH FLOW TIP AND SUCTION TUBE: Brand: INTERPULSE

## (undated) DEVICE — PAD,ABDOMINAL,5"X9",ST,LF,25/BX: Brand: MEDLINE INDUSTRIES, INC.

## (undated) DEVICE — FORCEPS BX L240CM JAW DIA2.8MM L CAP W/ NDL MIC MESH TOOTH

## (undated) DEVICE — BALLOON DILATATION CATHETER: Brand: HURRICANE™ RX

## (undated) DEVICE — ELECTRODE PT RET AD L9FT HI MOIST COND ADH HYDRGEL CORDED

## (undated) DEVICE — 3M™ IOBAN™ 2 ANTIMICROBIAL INCISE DRAPE 6651EZ: Brand: IOBAN™ 2

## (undated) DEVICE — T-MAX DISPOSABLE FACE MASK 8 PER BOX

## (undated) DEVICE — COVER LT HNDL PLAS RIG 1 PER PK

## (undated) DEVICE — TOTAL JOINT-SFMC: Brand: MEDLINE INDUSTRIES, INC.

## (undated) DEVICE — SLING ARM M 28-33CM BLK MESH FAB EZ OPN FR PNL W/

## (undated) DEVICE — SYRINGE MED 50ML LUERSLIP TIP

## (undated) DEVICE — TRIPLE LUMEN NEEDLE KNIFE: Brand: RX NEEDLE KNIFE XL

## (undated) DEVICE — Device

## (undated) DEVICE — SHEET, DRAPE, SPLIT, STERILE: Brand: MEDLINE

## (undated) DEVICE — CANNULATING SPHINCTEROTOME: Brand: JAGTOME™ REVOLUTION RX

## (undated) DEVICE — BITEBLOCK 54FR W/ DENT RIM BLOX

## (undated) DEVICE — GUIDEWIRE ENDOSCP WRK L450CM DIA0.035IN STD SHFT STR RND

## (undated) DEVICE — PREP KIT PEEL PTCH POVIDONE IOD

## (undated) DEVICE — KIT,1200CC CANISTER,3/16"X6' TUBING: Brand: MEDLINE INDUSTRIES, INC.

## (undated) DEVICE — GLOVE SURG SZ 7 L12IN FNGR THK79MIL GRN LTX FREE

## (undated) DEVICE — SUTURE FIBERWIRE SZ 2 W/ TAPERED NEEDLE BLUE L38IN NONABSORB BLU L26.5MM 1/2 CIRCLE AR7200

## (undated) DEVICE — NEEDLE SUT L35MM STD ROT CUF HALF CIR

## (undated) DEVICE — PAD,NON-ADHERENT,3X8,STERILE,LF,1/PK: Brand: MEDLINE

## (undated) DEVICE — DRAPE,REIN 53X77,STERILE: Brand: MEDLINE

## (undated) DEVICE — BW-412T DISP COMBO CLEANING BRUSH: Brand: SINGLE USE COMBINATION CLEANING BRUSH

## (undated) DEVICE — YANKAUER,OPEN TIP,W/O VENT,STERILE: Brand: MEDLINE INDUSTRIES, INC.

## (undated) DEVICE — BIT DRL SCR PERIPH 2.7MM DISP --

## (undated) DEVICE — STRYKER PERFORMANCE SERIES SAGITTAL BLADE: Brand: STRYKER PERFORMANCE SERIES

## (undated) DEVICE — TUBING SUCT 10FR MAL ALUM SHFT FN CAP VENT UNIV CONN W/ OBT

## (undated) DEVICE — SUTURE ETHBND EXCEL SZ 5 L30IN NONABSORBABLE GRN L40MM V-37 MB66G

## (undated) DEVICE — SOLUTION IRRIG 1000ML STRL H2O USP PLAS POUR BTL

## (undated) DEVICE — APPLIER LIG CLP M L11IN TI STR RNG HNDL FOR 20 CLP DISP

## (undated) DEVICE — GOWN,BREATHABLE,IMP SLV 3XL AURORA: Brand: MEDLINE

## (undated) DEVICE — SPONGE GZ W4XL4IN COT RADPQ HIGHLY ABSRB

## (undated) DEVICE — T4 HOOD

## (undated) DEVICE — SUTURE FIBERWIRE SZ 5 L38IN BLU L48MM 1/2 CIR CONVENTIONAL AR7213

## (undated) DEVICE — 4-PORT MANIFOLD: Brand: NEPTUNE 2

## (undated) DEVICE — SUTURE MCRYL SZ 3-0 L27IN ABSRB UD L19MM PS-2 3/8 CIR PRIM Y427H

## (undated) DEVICE — GLOVE SURG SZ 65 L12IN FNGR THK126MIL CRM LTX FREE

## (undated) DEVICE — ENDOSCOPIC ULTRASOUND FINE NEEDLE BIOPSY (FNB) DEVICE: Brand: ACQUIRE

## (undated) DEVICE — RETRIEVAL BALLOON CATHETER: Brand: EXTRACTOR™ PRO RX

## (undated) DEVICE — COVER,MAYO STAND,STERILE: Brand: MEDLINE

## (undated) DEVICE — GLOVE SURG SZ 9 L12IN FNGR THK79MIL GRN LTX FREE

## (undated) DEVICE — POSITIONER HD REST SUPINE LAT

## (undated) DEVICE — GLOVE SURG SZ 9 L12IN FNGR THK126MIL CRM LTX FREE

## (undated) DEVICE — BOOT ORTH XL KNEE GRN TYVEK HI CVR SKID RESIST HEAT SEAL E

## (undated) DEVICE — SUT PROL 5-0 36IN RB1 DA BLU --

## (undated) DEVICE — SUTURE VCRL SZ 2-0 L27IN ABSRB UD L36MM CP-1 1/2 CIR REV J266H

## (undated) DEVICE — 3M™ IOBAN™ 2 ANTIMICROBIAL INCISE DRAPE 6650EZ: Brand: IOBAN™ 2

## (undated) DEVICE — DRAPE,U/ SHT,SPLIT,PLAS,STERIL: Brand: MEDLINE

## (undated) DEVICE — SUTURE ABSRB BRAID COAT UD CT NO 1 36IN VCRL J959H

## (undated) DEVICE — ENDO CARRY-ON PROCEDURE KIT INCLUDES ENZYMATIC SPONGE, GAUZE, BIOHAZARD LABEL, TRAY, LUBRICANT, DIRTY SCOPE LABEL, WATER LABEL, TRAY, DRAWSTRING PAD, AND DEFENDO 4-PIECE KIT.: Brand: ENDO CARRY-ON PROCEDURE KIT

## (undated) DEVICE — SOLUTION IRRIG 3000ML 0.9% SOD CHL USP UROMATIC PLAS CONT

## (undated) DEVICE — CONTAINER SPEC 20 ML LID NEUT BUFF FORMALIN 10 % POLYPR STS

## (undated) DEVICE — Device: Brand: ENDO SMARTCAP

## (undated) DEVICE — TUBING, SUCTION, 1/4" X 10', STRAIGHT: Brand: MEDLINE

## (undated) DEVICE — INFLATION DEVICE: Brand: ENCORE 26

## (undated) DEVICE — 3M™ TEGADERM™ TRANSPARENT FILM DRESSING FRAME STYLE, 1628, 6 IN X 8 IN (15 CM X 20 CM), 10/CT 8CT/CASE: Brand: 3M™ TEGADERM™

## (undated) DEVICE — SNARE ENDOSCP M L240CM W27MM SHTH DIA2.4MM CHN 2.8MM OVL

## (undated) DEVICE — BUR SURG HD L5MM DIA5MM RND FLUT REPL CARB LINVATEC

## (undated) DEVICE — 450 ML BOTTLE OF 0.05% CHLORHEXIDINE GLUCONATE IN 99.95% STERILE WATER FOR IRRIGATION, USP AND APPLICATOR.: Brand: IRRISEPT ANTIMICROBIAL WOUND LAVAGE

## (undated) DEVICE — SYSTEM BX CAP BILI RAP EXCHG CAP LOK DEV COMPATIBLE W/ OLY

## (undated) DEVICE — PLASMABLADE PS210-030S 3.0S LOCK: Brand: PLASMABLADE™